# Patient Record
Sex: FEMALE | Race: BLACK OR AFRICAN AMERICAN | URBAN - METROPOLITAN AREA
[De-identification: names, ages, dates, MRNs, and addresses within clinical notes are randomized per-mention and may not be internally consistent; named-entity substitution may affect disease eponyms.]

---

## 2017-01-19 DIAGNOSIS — F41.8 DEPRESSION WITH ANXIETY: ICD-10-CM

## 2017-01-19 RX ORDER — SERTRALINE HYDROCHLORIDE 50 MG/1
TABLET, FILM COATED ORAL
Qty: 30 TAB | Refills: 0 | Status: SHIPPED | OUTPATIENT
Start: 2017-01-19 | End: 2017-02-20 | Stop reason: SDUPTHER

## 2017-01-24 ENCOUNTER — OFFICE VISIT (OUTPATIENT)
Dept: FAMILY MEDICINE CLINIC | Age: 72
End: 2017-01-24

## 2017-01-24 VITALS
RESPIRATION RATE: 16 BRPM | WEIGHT: 216.8 LBS | OXYGEN SATURATION: 98 % | HEART RATE: 106 BPM | TEMPERATURE: 97.9 F | SYSTOLIC BLOOD PRESSURE: 156 MMHG | BODY MASS INDEX: 34.84 KG/M2 | DIASTOLIC BLOOD PRESSURE: 60 MMHG | HEIGHT: 66 IN

## 2017-01-24 DIAGNOSIS — R05.9 COUGH: ICD-10-CM

## 2017-01-24 DIAGNOSIS — J22 LOWER RESPIRATORY INFECTION: Primary | ICD-10-CM

## 2017-01-24 DIAGNOSIS — M94.0 COSTOCHONDRITIS: ICD-10-CM

## 2017-01-24 RX ORDER — AZITHROMYCIN 250 MG/1
TABLET, FILM COATED ORAL
Qty: 6 TAB | Refills: 0 | Status: SHIPPED | OUTPATIENT
Start: 2017-01-24 | End: 2017-01-29

## 2017-01-24 RX ORDER — PROMETHAZINE HYDROCHLORIDE AND CODEINE PHOSPHATE 6.25; 1 MG/5ML; MG/5ML
1 SOLUTION ORAL
Qty: 120 ML | Refills: 0 | Status: SHIPPED | OUTPATIENT
Start: 2017-01-24 | End: 2017-04-27

## 2017-01-24 NOTE — MR AVS SNAPSHOT
Visit Information Date & Time Provider Department Dept. Phone Encounter #  
 1/24/2017 12:40 PM Kaye Flynn, [facility] [id number] Follow-up Instructions Return in about 6 weeks (around 3/7/2017), or if symptoms worsen or fail to improve, for costochondritis. Upcoming Health Maintenance Date Due Hepatitis C Screening 1945 COLONOSCOPY 8/22/1964 DTaP/Tdap/Td series (1 - Tdap) 8/22/1967 ZOSTER VACCINE AGE 60> 8/22/2006 OSTEOPOROSIS SCREENING (DEXA) 8/22/2011 INFLUENZA AGE 9 TO ADULT 8/1/2016 GLAUCOMA SCREENING Q2Y 5/11/2017 MEDICARE YEARLY EXAM 8/25/2017 BREAST CANCER SCRN MAMMOGRAM 5/31/2018 Allergies as of 1/24/2017  Review Complete On: 1/24/2017 By: Helen Avalos LPN Severity Noted Reaction Type Reactions Iodine  04/19/2010    Hives, Other (comments) Feels like Chest caving in  
  
Current Immunizations  Reviewed on 8/24/2016 Name Date Influenza High Dose Vaccine PF 1/21/2016 Influenza Vaccine PF 11/7/2013 Pneumococcal Conjugate (PCV-13) 11/7/2013 Not reviewed this visit You Were Diagnosed With   
  
 Codes Comments Lower respiratory infection    -  Primary ICD-10-CM: Kristofer Cedrick ICD-9-CM: 519.8 Cough     ICD-10-CM: R05 ICD-9-CM: 786.2 Costochondritis     ICD-10-CM: M94.0 ICD-9-CM: 733.6 Vitals BP Pulse Temp Resp Height(growth percentile) Weight(growth percentile) 156/60 (BP 1 Location: Left arm, BP Patient Position: Sitting) (!) 106 97.9 °F (36.6 °C) (Oral) 16 5' 6\" (1.676 m) 216 lb 12.8 oz (98.3 kg) LMP SpO2 BMI OB Status Smoking Status 01/01/1990 98% 34.99 kg/m2 Postmenopausal Former Smoker BMI and BSA Data Body Mass Index Body Surface Area 34.99 kg/m 2 2.14 m 2 Preferred Pharmacy Pharmacy Name Phone  [facility], [address] [facility] [address] AT [facility] [address] [phone] Your Updated Medication List  
  
   
This list is accurate as of: 1/24/17  1:22 PM.  Always use your most recent med list.  
  
  
  
  
 amitriptyline 25 mg tablet Commonly known as:  ELAVIL  
  
 ARMOUR THYROID 180 mg Tab Generic drug:  Thyroid (Pork) Take  by mouth. azithromycin 250 mg tablet Commonly known as:  Idelia Fines Take 2 tablets today, then take 1 tablet daily  
  
 biotin 2,500 mcg Tab Take  by mouth. butalbital-acetaminophen-caffeine -40 mg per tablet Commonly known as:  Mary Beth Stapleton Take 1 Tab by mouth every six (6) hours as needed for Pain or Headache. Max Daily Amount: 4 Tabs. CITRACAL PO Take 1 Tab by mouth daily. FLAXSEED PO Take 1 Tab by mouth daily. gabapentin 300 mg capsule Commonly known as:  NEURONTIN Take 300 mg by mouth daily. hydroCHLOROthiazide 25 mg tablet Commonly known as:  HYDRODIURIL  
TAKE 1 TABLET BY MOUTH DAILY MULTI-VITAMIN PO Take 1 Tab by mouth daily. naproxen 500 mg tablet Commonly known as:  NAPROSYN Take 1 Tab by mouth two (2) times daily (with meals). promethazine-codeine 6.25-10 mg/5 mL syrup Commonly known as:  PHENERGAN with CODEINE Take 5 mL by mouth every six (6) hours as needed for Cough. Max Daily Amount: 20 mL. Indications: COUGH, Nasal Congestion  
  
 sertraline 50 mg tablet Commonly known as:  ZOLOFT  
TAKE 1 TABLET BY MOUTH DAILY. INDICATIONS: ANXIETY WITH DEPRESSION. triamcinolone 55 mcg nasal inhaler Commonly known as:  NASACORT AQ  
2 Sprays daily. VITAMIN D 2,000 unit Cap capsule Generic drug:  Cholecalciferol (Vitamin D3) Take  by mouth. Prescriptions Printed Refills  
 promethazine-codeine (PHENERGAN WITH CODEINE) 6.25-10 mg/5 mL syrup 0 Sig: Take 5 mL by mouth every six (6) hours as needed for Cough. Max Daily Amount: 20 mL. Indications: COUGH, Nasal Congestion Class: Print Route: Oral  
  
Prescriptions Sent to Pharmacy Refills  
 azithromycin (ZITHROMAX) 250 mg tablet 0 Sig: Take 2 tablets today, then take 1 tablet daily Class: Normal  
 Pharmacy: [id number] [address], [place] [address]  #: [phone] Follow-up Instructions Return in about 6 weeks (around 3/7/2017), or if symptoms worsen or fail to improve, for costochondritis. Patient Instructions Costochondritis: Care Instructions Your Care Instructions You have chest pain because the cartilage of your rib cage is inflamed. This problem is called costochondritis. This type of chest wall pain may last from days to weeks. It is not a heart problem. Sometimes costochondritis occurs with a cold or the flu, and other times the exact cause is not known. Follow-up care is a key part of your treatment and safety. Be sure to make and go to all appointments, and call your doctor if you are having problems. Its also a good idea to know your test results and keep a list of the medicines you take. How can you care for yourself at home? · Take medicines for pain and inflammation exactly as directed. ¨ If the doctor gave you a prescription medicine, take it as prescribed. ¨ If you are not taking a prescription pain medicine, ask your doctor if you can take an over-the-counter medicine. ¨ Do not take two or more pain medicines at the same time unless the doctor told you to. Many pain medicines have acetaminophen, which is Tylenol. Too much acetaminophen (Tylenol) can be harmful. · It may help to use a warm compress or heating pad (set on low) on your chest. You can also try alternating heat and ice. Put ice or a cold pack on the area for 10 to 20 minutes at a time. Put a thin cloth between the ice and your skin. · Avoid any activity that strains the chest area.  As your pain gets better, you can slowly return to your normal activities. · Do not use tape, an elastic bandage, a \"rib belt,\" or anything else that restricts your chest wall motion. When should you call for help? Call 911 anytime you think you may need emergency care. For example, call if: 
· You have new or different chest pain or pressure. This may occur with: ¨ Sweating. ¨ Shortness of breath. ¨ Nausea or vomiting. ¨ Pain that spreads from the chest to the neck, jaw, or one or both shoulders or arms. ¨ Dizziness or lightheadedness. ¨ A fast or uneven pulse. After calling 911, chew 1 adult-strength aspirin. Wait for an ambulance. Do not try to drive yourself. · You have severe trouble breathing. Call your doctor now or seek immediate medical care if: 
· You have a fever or cough. · You have any trouble breathing. · Your chest pain gets worse. Watch closely for changes in your health, and be sure to contact your doctor if: 
· Your chest pain continues even though you are taking anti-inflammatory medicine. · Your chest wall pain has not improved after 5 to 7 days. Where can you learn more? Go to http://alvin-rogelio.info/. Enter A429 in the search box to learn more about \"Costochondritis: Care Instructions. \" Current as of: May 27, 2016 Content Version: 11.1 © 1834-8334 [employer]. Care instructions adapted under license by [employer] (which disclaims liability or warranty for this information). If you have questions about a medical condition or this instruction, always ask your healthcare professional. [place] [address] any warranty or liability for your use of this information. Cough: Care Instructions Your Care Instructions A cough is your body's response to something that bothers your throat or airways. Many things can cause a cough.  You might cough because of a cold or the flu, bronchitis, or asthma. Smoking, postnasal drip, allergies, and stomach acid that backs up into your throat also can cause coughs. A cough is a symptom, not a disease. Most coughs stop when the cause, such as a cold, goes away. You can take a few steps at home to cough less and feel better. Follow-up care is a key part of your treatment and safety. Be sure to make and go to all appointments, and call your doctor if you are having problems. It's also a good idea to know your test results and keep a list of the medicines you take. How can you care for yourself at home? · Drink lots of water and other fluids. This helps thin the mucus and soothes a dry or sore throat. Honey or lemon juice in hot water or tea may ease a dry cough. · Take cough medicine as directed by your doctor. · Prop up your head on pillows to help you breathe and ease a dry cough. · Try cough drops to soothe a dry or sore throat. Cough drops don't stop a cough. Medicine-flavored cough drops are no better than candy-flavored drops or hard candy. · Do not smoke. Avoid secondhand smoke. If you need help quitting, talk to your doctor about stop-smoking programs and medicines. These can increase your chances of quitting for good. When should you call for help? Call 911 anytime you think you may need emergency care. For example, call if: 
· You have severe trouble breathing. Call your doctor now or seek immediate medical care if: 
· You cough up blood. · You have new or worse trouble breathing. · You have a new or higher fever. · You have a new rash. Watch closely for changes in your health, and be sure to contact your doctor if: 
· You cough more deeply or more often, especially if you notice more mucus or a change in the color of your mucus. · You have new symptoms, such as a sore throat, an earache, or sinus pain. · You do not get better as expected. Where can you learn more? Go to http://alvin-rogelio.info/. Enter D279 in the search box to learn more about \"Cough: Care Instructions. \" Current as of: May 27, 2016 Content Version: 11.1 © 4081-0497 [employer]. Care instructions adapted under license by [employer] (which disclaims liability or warranty for this information). If you have questions about a medical condition or this instruction, always ask your healthcare professional. [address] any warranty or liability for your use of this information. Introducing [facility]! Dear Chandrakant Pacheco: Thank you for requesting a [facility] account. Our records indicate that you already have an active [facility] account. You can access your account anytime at [email or web address] Did you know that you can access your hospital and ER discharge instructions at any time in [facility]? You can also review all of your test results from your hospital stay or ER visit. Additional Information If you have questions, please visit the Frequently Asked Questions section of the [facility] website at [email or web address] Remember, [facility] is NOT to be used for urgent needs. For medical emergencies, dial 911. Now available from your iPhone and Android! Please provide this summary of care documentation to your next provider. Your primary care clinician is listed as Denis Barraza. If you have any questions after today's visit, please call [phone].

## 2017-01-24 NOTE — PROGRESS NOTES
1. Have you been to the ER, urgent care clinic since your last visit? Hospitalized since your last visit? No    2. Have you seen or consulted any other health care providers outside of the [address] [place] [address] since your last visit? Include any pap smears or colon screening. No    3. Would you like to have a Flu Shot Today?  No already had one

## 2017-01-24 NOTE — LETTER
1/24/2017 1:19 PM 
 
Ms. Josie Montalvo [address] [place] [phone] This letter is written on behalf of aforementioned patient. She is currently treated for costochondritis please suspend her membership dues for [employer] until complete resolution. Sincerely, Eros Prado NP

## 2017-01-24 NOTE — PATIENT INSTRUCTIONS
Costochondritis: Care Instructions  Your Care Instructions  You have chest pain because the cartilage of your rib cage is inflamed. This problem is called costochondritis. This type of chest wall pain may last from days to weeks. It is not a heart problem. Sometimes costochondritis occurs with a cold or the flu, and other times the exact cause is not known. Follow-up care is a key part of your treatment and safety. Be sure to make and go to all appointments, and call your doctor if you are having problems. Its also a good idea to know your test results and keep a list of the medicines you take. How can you care for yourself at home? · Take medicines for pain and inflammation exactly as directed. ¨ If the doctor gave you a prescription medicine, take it as prescribed. ¨ If you are not taking a prescription pain medicine, ask your doctor if you can take an over-the-counter medicine. ¨ Do not take two or more pain medicines at the same time unless the doctor told you to. Many pain medicines have acetaminophen, which is Tylenol. Too much acetaminophen (Tylenol) can be harmful. · It may help to use a warm compress or heating pad (set on low) on your chest. You can also try alternating heat and ice. Put ice or a cold pack on the area for 10 to 20 minutes at a time. Put a thin cloth between the ice and your skin. · Avoid any activity that strains the chest area. As your pain gets better, you can slowly return to your normal activities. · Do not use tape, an elastic bandage, a \"rib belt,\" or anything else that restricts your chest wall motion. When should you call for help? Call 911 anytime you think you may need emergency care. For example, call if:  · You have new or different chest pain or pressure. This may occur with:  ¨ Sweating. ¨ Shortness of breath. ¨ Nausea or vomiting. ¨ Pain that spreads from the chest to the neck, jaw, or one or both shoulders or arms. ¨ Dizziness or lightheadedness.   ¨ A fast or uneven pulse. After calling 911, chew 1 adult-strength aspirin. Wait for an ambulance. Do not try to drive yourself. · You have severe trouble breathing. Call your doctor now or seek immediate medical care if:  · You have a fever or cough. · You have any trouble breathing. · Your chest pain gets worse. Watch closely for changes in your health, and be sure to contact your doctor if:  · Your chest pain continues even though you are taking anti-inflammatory medicine. · Your chest wall pain has not improved after 5 to 7 days. Where can you learn more? Go to http://alvin-rogelio.info/. Enter A550 in the search box to learn more about \"Costochondritis: Care Instructions. \"  Current as of: May 27, 2016  Content Version: 11.1  © 5092-6696 [employer]. Care instructions adapted under license by [employer] (which disclaims liability or warranty for this information). If you have questions about a medical condition or this instruction, always ask your healthcare professional. [place] [address] any warranty or liability for your use of this information. Cough: Care Instructions  Your Care Instructions  A cough is your body's response to something that bothers your throat or airways. Many things can cause a cough. You might cough because of a cold or the flu, bronchitis, or asthma. Smoking, postnasal drip, allergies, and stomach acid that backs up into your throat also can cause coughs. A cough is a symptom, not a disease. Most coughs stop when the cause, such as a cold, goes away. You can take a few steps at home to cough less and feel better. Follow-up care is a key part of your treatment and safety. Be sure to make and go to all appointments, and call your doctor if you are having problems. It's also a good idea to know your test results and keep a list of the medicines you take. How can you care for yourself at home?   · Drink lots of water and other fluids. This helps thin the mucus and soothes a dry or sore throat. Honey or lemon juice in hot water or tea may ease a dry cough. · Take cough medicine as directed by your doctor. · Prop up your head on pillows to help you breathe and ease a dry cough. · Try cough drops to soothe a dry or sore throat. Cough drops don't stop a cough. Medicine-flavored cough drops are no better than candy-flavored drops or hard candy. · Do not smoke. Avoid secondhand smoke. If you need help quitting, talk to your doctor about stop-smoking programs and medicines. These can increase your chances of quitting for good. When should you call for help? Call 911 anytime you think you may need emergency care. For example, call if:  · You have severe trouble breathing. Call your doctor now or seek immediate medical care if:  · You cough up blood. · You have new or worse trouble breathing. · You have a new or higher fever. · You have a new rash. Watch closely for changes in your health, and be sure to contact your doctor if:  · You cough more deeply or more often, especially if you notice more mucus or a change in the color of your mucus. · You have new symptoms, such as a sore throat, an earache, or sinus pain. · You do not get better as expected. Where can you learn more? Go to http://alvin-rogelio.info/. Enter D279 in the search box to learn more about \"Cough: Care Instructions. \"  Current as of: May 27, 2016  Content Version: 11.1  © 8621-1059 [employer]. Care instructions adapted under license by [employer] (which disclaims liability or warranty for this information). If you have questions about a medical condition or this instruction, always ask your healthcare professional. [address] any warranty or liability for your use of this information.

## 2017-01-24 NOTE — PROGRESS NOTES
Subjective:   Wally Reid is a 80 y.o. female follow up costochondritis mainly on left anterior lateral chest wall. She reports that she has been coughing and has had a cold since beginning of January 2017. She has been using delsym and vaporizer but her symptoms are not improving. Complaining of head and chest congestion, fatigue, productive cough with brown sputum and also had diarrhea several days ago but has resolved. ROS: denies fever,chills, denies dyspnea, nausea or vomiting, denies diarrhea, +left lateral chest wall pain with coughing. New concern: needs letter outlining costochondritis and her membership to local exercise facility  PMH: reviewed medications and allergy lists and medical and family history. OBJECTIVE:  Awake and alert in no acute distress  HEENT: nares patent with erythema, boggy, clear secretions bilaterally. TMs retracted bilaterally, pharynx without erythema, neck supple with shotty lymphadenopathy. No tenderness to palpation over sinus passages bilaterally  Lungs with scattered rales anterior and posterior,  no rhonchi no wheezing no labored respirations  S1 S2 RRR without ectopy or murmur auscultated. Extremities: no clubbing, cyanosis, peripheral edema  Integumentary: no rashes  Normal skin turgor  Visit Vitals    /60 (BP 1 Location: Left arm, BP Patient Position: Sitting)    Pulse (!) 106    Temp 97.9 °F (36.6 °C) (Oral)    Resp 16    Ht 5' 6\" (1.676 m)    Wt 216 lb 12.8 oz (98.3 kg)    SpO2 98%    BMI 34.99 kg/m2     Mickie Tamayo was seen today for follow-up and cold symptoms. Diagnoses and all orders for this visit:    Lower respiratory infection  -     azithromycin (ZITHROMAX) 250 mg tablet; Take 2 tablets today, then take 1 tablet daily    Cough  -     promethazine-codeine (PHENERGAN WITH CODEINE) 6.25-10 mg/5 mL syrup; Take 5 mL by mouth every six (6) hours as needed for Cough. Max Daily Amount: 20 mL.  Indications: COUGH, Nasal Congestion    Costochondritis      General comfort measures  Reviewed risks and benefits and common side effects of new medication  Letter   I have discussed the diagnosis with the patient and the intended plan as seen in the above orders. The patient has received an after-visit summary and questions were answered concerning future plans. I have discussed medication side effects and warnings with the patient as well. Follow-up Disposition:  Return in about 6 weeks (around 3/7/2017), or if symptoms worsen or fail to improve, for costochondritis. Will discuss BMI at follow up visit.

## 2017-02-03 DIAGNOSIS — M94.0 COSTOCHONDRITIS: ICD-10-CM

## 2017-02-03 NOTE — TELEPHONE ENCOUNTER
Pt stated that she bent down today and felt that pain in her chest today can she get refill on this medication for her chest please advise [phone]

## 2017-02-06 RX ORDER — NAPROXEN 500 MG/1
500 TABLET ORAL 2 TIMES DAILY WITH MEALS
Qty: 60 TAB | Refills: 0 | Status: SHIPPED | OUTPATIENT
Start: 2017-02-06 | End: 2017-03-21 | Stop reason: SDUPTHER

## 2017-02-06 NOTE — TELEPHONE ENCOUNTER
Spoke with patient today. Denies any chest pain today. No dyspnea or heart palpitations noted. Does comment she has cough lingering from lower respiratory infection. States she just inquired about receiving a refill on Naprosyn for her costochondritis.

## 2017-02-20 DIAGNOSIS — F41.8 DEPRESSION WITH ANXIETY: ICD-10-CM

## 2017-02-20 RX ORDER — SERTRALINE HYDROCHLORIDE 50 MG/1
TABLET, FILM COATED ORAL
Qty: 30 TAB | Refills: 1 | Status: SHIPPED | OUTPATIENT
Start: 2017-02-20 | End: 2017-08-24

## 2017-03-20 DIAGNOSIS — I10 ESSENTIAL HYPERTENSION: ICD-10-CM

## 2017-03-20 RX ORDER — HYDROCHLOROTHIAZIDE 25 MG/1
TABLET ORAL
Qty: 90 TAB | Refills: 0 | Status: SHIPPED | OUTPATIENT
Start: 2017-03-20 | End: 2017-06-17 | Stop reason: SDUPTHER

## 2017-03-21 ENCOUNTER — HOSPITAL ENCOUNTER (OUTPATIENT)
Dept: LAB | Age: 72
Discharge: HOME | End: 2017-03-21
Payer: MEDICARE

## 2017-03-21 ENCOUNTER — OFFICE VISIT (OUTPATIENT)
Dept: FAMILY MEDICINE CLINIC | Age: 72
End: 2017-03-21

## 2017-03-21 VITALS
SYSTOLIC BLOOD PRESSURE: 144 MMHG | BODY MASS INDEX: 35.36 KG/M2 | HEART RATE: 104 BPM | DIASTOLIC BLOOD PRESSURE: 72 MMHG | RESPIRATION RATE: 16 BRPM | TEMPERATURE: 98 F | OXYGEN SATURATION: 98 % | WEIGHT: 220 LBS | HEIGHT: 66 IN

## 2017-03-21 DIAGNOSIS — Z23 ENCOUNTER FOR IMMUNIZATION: ICD-10-CM

## 2017-03-21 DIAGNOSIS — M25.511 ACUTE PAIN OF RIGHT SHOULDER: Primary | ICD-10-CM

## 2017-03-21 DIAGNOSIS — Z11.59 NEED FOR HEPATITIS C SCREENING TEST: ICD-10-CM

## 2017-03-21 DIAGNOSIS — Z12.11 COLON CANCER SCREENING: ICD-10-CM

## 2017-03-21 DIAGNOSIS — Z78.0 POSTMENOPAUSAL: ICD-10-CM

## 2017-03-21 PROCEDURE — 86803 HEPATITIS C AB TEST: CPT | Performed by: NURSE PRACTITIONER

## 2017-03-21 PROCEDURE — 36415 COLL VENOUS BLD VENIPUNCTURE: CPT | Performed by: NURSE PRACTITIONER

## 2017-03-21 RX ORDER — NAPROXEN 500 MG/1
500 TABLET ORAL 2 TIMES DAILY WITH MEALS
Qty: 60 TAB | Refills: 0 | Status: SHIPPED | OUTPATIENT
Start: 2017-03-21 | End: 2017-04-23 | Stop reason: SDUPTHER

## 2017-03-21 RX ORDER — ACETAMINOPHEN 500 MG
TABLET ORAL
COMMUNITY
End: 2021-01-01

## 2017-03-21 RX ORDER — LANOLIN ALCOHOL/MO/W.PET/CERES
1000 CREAM (GRAM) TOPICAL DAILY
COMMUNITY
End: 2018-01-25 | Stop reason: ALTCHOICE

## 2017-03-21 NOTE — PROGRESS NOTES
1. Have you been to the ER, urgent care clinic since your last visit? Hospitalized since your last visit? NO    2. Have you seen or consulted any other health care providers outside of the [address] [place] [address] since your last visit? Include any pap smears or colon screening. NO    3. Would you like to have a Flu Shot Today? Already had    4. Vaccines?  Discuss Shingles    Rx updated

## 2017-03-21 NOTE — PATIENT INSTRUCTIONS
Shoulder Pain: Care Instructions  Your Care Instructions    You can hurt your shoulder by using it too much during an activity, such as fishing or baseball. It can also happen as part of the everyday wear and tear of getting older. Shoulder injuries can be slow to heal, but your shoulder should get better with time. Your doctor may recommend a sling to rest your shoulder. If you have injured your shoulder, you may need testing and treatment. Follow-up care is a key part of your treatment and safety. Be sure to make and go to all appointments, and call your doctor if you are having problems. It's also a good idea to know your test results and keep a list of the medicines you take. How can you care for yourself at home? · Take pain medicines exactly as directed. ¨ If the doctor gave you a prescription medicine for pain, take it as prescribed. ¨ If you are not taking a prescription pain medicine, ask your doctor if you can take an over-the-counter medicine. ¨ Do not take two or more pain medicines at the same time unless the doctor told you to. Many pain medicines contain acetaminophen, which is Tylenol. Too much acetaminophen (Tylenol) can be harmful. · If your doctor recommends that you wear a sling, use it as directed. Do not take it off before your doctor tells you to. · Put ice or a cold pack on the sore area for 10 to 20 minutes at a time. Put a thin cloth between the ice and your skin. · If there is no swelling, you can put moist heat, a heating pad, or a warm cloth on your shoulder. Some doctors suggest alternating between hot and cold. · Rest your shoulder for a few days. If your doctor recommends it, you can then begin gentle exercise of the shoulder, but do not lift anything heavy. When should you call for help? Call 911 anytime you think you may need emergency care. For example, call if:  · You have chest pain or pressure. This may occur with:  ¨ Sweating. ¨ Shortness of breath.   ¨ Nausea or vomiting. ¨ Pain that spreads from the chest to the neck, jaw, or one or both shoulders or arms. ¨ Dizziness or lightheadedness. ¨ A fast or uneven pulse. After calling 911, chew 1 adult-strength aspirin. Wait for an ambulance. Do not try to drive yourself. · Your arm or hand is cool or pale or changes color. Call your doctor now or seek immediate medical care if:  · You have signs of infection, such as:  ¨ Increased pain, swelling, warmth, or redness in your shoulder. ¨ Red streaks leading from a place on your shoulder. ¨ Pus draining from an area of your shoulder. ¨ Swollen lymph nodes in your neck, armpits, or groin. ¨ A fever. Watch closely for changes in your health, and be sure to contact your doctor if:  · You cannot use your shoulder. · Your shoulder does not get better as expected. Where can you learn more? Go to http://[employer] Solarrogelio.info/. Enter R527 in the search box to learn more about \"Shoulder Pain: Care Instructions. \"  Current as of: May 23, 2016  Content Version: 11.1  © 4086-7485 [email or web address]. Care instructions adapted under license by [employer] (which disclaims liability or warranty for this information). If you have questions about a medical condition or this instruction, always ask your healthcare professional. [place] [address] any warranty or liability for your use of this information. Shoulder Stretches: Exercises  Your Care Instructions  Here are some examples of exercises for your shoulder. Start each exercise slowly. Ease off the exercise if you start to have pain. Your doctor or physical therapist will tell you when you can start these exercises and which ones will work best for you. How to do the exercises  Note: These exercises should cause you to feel a gentle stretch, but no pain. Shoulder stretch    1.  a doorway and place one arm against the door frame.  Your elbow should be a little higher than your shoulder. 2. Relax your shoulders as you lean forward, allowing your chest and shoulder muscles to stretch. You can also turn your body slightly away from your arm to stretch the muscles even more. 3. Hold for 15 to 30 seconds. 4. Repeat 2 to 4 times with each arm. Shoulder and chest stretch    Shoulder and chest stretch  1. While sitting, relax your upper body so you slump slightly in your chair. 2. As you breathe in, straighten your back and open your arms out to the sides. 3. Gently pull your shoulder blades back and downward. 4. Hold for 15 to 30 seconds as your breathe normally. 5. Repeat 2 to 4 times. Overhead stretch    1. Reach up over your head with both arms. 2. Hold for 15 to 30 seconds. 3. Repeat 2 to 4 times. Follow-up care is a key part of your treatment and safety. Be sure to make and go to all appointments, and call your doctor if you are having problems. It's also a good idea to know your test results and keep a list of the medicines you take. Where can you learn more? Go to http://alvin-rogelio.info/. Enter S254 in the search box to learn more about \"Shoulder Stretches: Exercises. \"  Current as of: May 23, 2016  Content Version: 11.1  © 3204-4040 [employer]. Care instructions adapted under license by [employer] (which disclaims liability or warranty for this information). If you have questions about a medical condition or this instruction, always ask your healthcare professional. [place] [address] any warranty or liability for your use of this information. Naproxen (By mouth)   Naproxen (na-PROX-en)  Treats fever and pain. This medicine is an NSAID.    Brand Name(s):Aleve, Aleve Arthritis, All Day Pain Relief, All Day Relief, Anaprox, Anaprox DS, EC Naprosyn, Flanax Pain Relief Kit, [facility] All Day Pain Relief, [employer] [facility] All Day Pain Relief, [facility] All Day Pain Relief, Mediproxen, Naprelan, NaproPak, NaproPax   There may be other brand names for this medicine. When This Medicine Should Not Be Used: This medicine is not right for everyone. Do not use it if you had an allergic reaction (including asthma) to naproxen, aspirin, or other NSAID medicine. How to Use This Medicine:   Liquid, Liquid Filled Capsule, Tablet, Coated Tablet, Long Acting Tablet  · Your doctor will tell you how much medicine to use. Do not use more than directed. · Prescription-strength naproxen: This medicine should come with a Medication Guide. Ask your pharmacist for a copy if you do not have one. · Follow the instructions on the medicine label if you are using this medicine without a prescription. · Take this medicine with food or milk so it does not upset your stomach. Drink a full glass of water after each dose. · Delayed-release tablet:  Swallow whole. Do not crush, break, or chew it. · Oral liquid:  Shake well just before you measure the dose. Measure the oral liquid medicine with a marked measuring spoon, oral syringe, or medicine cup. · Missed dose: Take a dose as soon as you remember. If it is almost time for your next dose, wait until then and take a regular dose. Do not take extra medicine to make up for a missed dose. · Store the medicine in a closed container at room temperature, away from heat, moisture, and direct light. Oral liquid:  Do not freeze. Drugs and Foods to Avoid:   Ask your doctor or pharmacist before using any other medicine, including over-the-counter medicines, vitamins, and herbal products. · Do not use any other NSAID medicine unless your doctor says it is okay. Some other NSAIDs are aspirin, diclofenac, ibuprofen, or celecoxib. · Some medicines and foods can affect how naproxen works.  Tell your doctor if you are using any of the following:  ¨ Blood thinner (such as warfarin)  ¨ Steroid medicine (such as hydrocortisone, methylprednisolone, prednisone, prednisolone, dexamethasone)  ¨ Blood pressure medicine  ¨ Lithium  ¨ Methotrexate  ¨ Probenecid  · Ask your doctor before you use an antacid or stomach medicine. Warnings While Using This Medicine:   · Tell your doctor if you are pregnant or breastfeeding. Do not use this medicine during the later part of pregnancy, unless your doctor tells you to. · Tell your doctor if you have kidney disease, liver disease, asthma, high blood pressure, congestive heart failure (CHF), other heart or blood problems, or a history of ulcers or digestion problems. Tell your doctor if you smoke or drink alcohol. · This medicine may cause the following problems:  ¨ Bleeding and ulcers in the stomach or intestines  ¨ Higher risk of heart attack or stroke  ¨ Liver damage  ¨ Kidney damage  · Call your doctor if symptoms get worse, pain lasts more than 10 days, or fever lasts more than 3 days. · Tell any doctor or dentist who treats that you are using this medicine, especially if you have surgery or a procedure. · This medicine may make you dizzy or drowsy. Do not drive or do anything else that could be dangerous if you are not alert. · Tell any doctor or dentist who treats you that you are using this medicine. This medicine may affect certain medical test results. · Keep all medicine out of the reach of children. Never share your medicine with anyone.   Possible Side Effects While Using This Medicine:   Call your doctor right away if you notice any of these side effects:  · Allergic reaction: Itching or hives, swelling in your face or hands, swelling or tingling in your mouth or throat, chest tightness, trouble breathing  · Blistering, peeling, red skin rash  · Change in how much or how often you urinate  · Chest pain, trouble breathing, weakness on one side of your body, severe headache, trouble seeing or talking, pain in your lower leg  · Chest pain that may spread, trouble breathing, nausea, unusual sweating, fainting  · Dark urine or pale stools, nausea, vomiting, loss of appetite, stomach pain, yellow skin or eyes  · Severe stomach pain, vomiting blood, bloody or black, tarry stools  · Swelling in your hands, ankles, or feet, rapid weight gain  · Unusual bleeding, bruising, or weakness  · Vision changes  If you notice these less serious side effects, talk with your doctor:   · Mild nausea, diarrhea, or constipation  · Ringing in your ears, dizziness, headache  If you notice other side effects that you think are caused by this medicine, tell your doctor. Call your doctor for medical advice about side effects. You may report side effects to FDA at 3-908-FDA-4292  © 2016 [address] is for End User's use only and may not be sold, redistributed or otherwise used for commercial purposes. The above information is an  only. It is not intended as medical advice for individual conditions or treatments. Talk to your doctor, nurse or pharmacist before following any medical regimen to see if it is safe and effective for you.

## 2017-03-21 NOTE — MR AVS SNAPSHOT
Visit Information Date & Time Provider Department Dept. Phone Encounter #  
 3/21/2017  3:40 PM Pro Andria, [facility] [id number] Follow-up Instructions Return in about 4 weeks (around 4/18/2017) for right shoulder pain. Upcoming Health Maintenance Date Due COLONOSCOPY 8/22/1964 DTaP/Tdap/Td series (1 - Tdap) 8/22/1967 ZOSTER VACCINE AGE 60> 8/22/2006 OSTEOPOROSIS SCREENING (DEXA) 8/22/2011 GLAUCOMA SCREENING Q2Y 5/11/2017 MEDICARE YEARLY EXAM 8/25/2017 BREAST CANCER SCRN MAMMOGRAM 5/31/2018 Allergies as of 3/21/2017  Review Complete On: 3/21/2017 By: Rylan Bains LPN Severity Noted Reaction Type Reactions Iodine  04/19/2010    Hives, Other (comments) Feels like Chest caving in  
  
Current Immunizations  Reviewed on 3/21/2017 Name Date Influenza High Dose Vaccine PF 1/21/2016 Influenza Vaccine PF 11/7/2013 Pneumococcal Conjugate (PCV-13) 11/7/2013 Reviewed by Pro Ayers NP on 3/21/2017 at  3:58 PM  
You Were Diagnosed With   
  
 Codes Comments Acute pain of right shoulder    -  Primary ICD-10-CM: M25.511 ICD-9-CM: 719.41 Need for hepatitis C screening test     ICD-10-CM: Z11.59 
ICD-9-CM: V73.89 Encounter for immunization     ICD-10-CM: J92 ICD-9-CM: V03.89 Postmenopausal     ICD-10-CM: Z78.0 ICD-9-CM: V49.81 Vitals BP Pulse Temp Resp Height(growth percentile) Weight(growth percentile) 144/72 (BP 1 Location: Left arm, BP Patient Position: Sitting) (!) 104 98 °F (36.7 °C) (Oral) 16 5' 6\" (1.676 m) 220 lb (99.8 kg) LMP SpO2 BMI OB Status Smoking Status 01/01/1990 98% 35.51 kg/m2 Postmenopausal Former Smoker Vitals History BMI and BSA Data Body Mass Index Body Surface Area 35.51 kg/m 2 2.16 m 2 Preferred Pharmacy Pharmacy Name Phone [facility] [address] [facility] [phone] Your Updated Medication List  
  
   
This list is accurate as of: 3/21/17  4:06 PM.  Always use your most recent med list.  
  
  
  
  
 amitriptyline 25 mg tablet Commonly known as:  ELAVIL  
  
 ARMOUR THYROID 180 mg Tab Generic drug:  Thyroid (Pork) Take  by mouth.  
  
 biotin 2,500 mcg Tab Take  by mouth. butalbital-acetaminophen-caffeine -40 mg per tablet Commonly known as:  Desiree Halls Take 1 Tab by mouth every six (6) hours as needed for Pain or Headache. Max Daily Amount: 4 Tabs. CITRACAL PO Take 1 Tab by mouth daily. FLAXSEED PO Take 1 Tab by mouth daily. gabapentin 300 mg capsule Commonly known as:  NEURONTIN Take 300 mg by mouth daily. hydroCHLOROthiazide 25 mg tablet Commonly known as:  HYDRODIURIL  
TAKE 1 TABLET BY MOUTH DAILY MULTI-VITAMIN PO Take 1 Tab by mouth daily. naproxen 500 mg tablet Commonly known as:  NAPROSYN Take 1 Tab by mouth two (2) times daily (with meals). promethazine-codeine 6.25-10 mg/5 mL syrup Commonly known as:  PHENERGAN with CODEINE Take 5 mL by mouth every six (6) hours as needed for Cough. Max Daily Amount: 20 mL. Indications: COUGH, Nasal Congestion  
  
 sertraline 50 mg tablet Commonly known as:  ZOLOFT  
TAKE 1 TABLET BY MOUTH DAILY. INDICATIONS: ANXIETY WITH DEPRESSION. triamcinolone 55 mcg nasal inhaler Commonly known as:  NASACORT AQ  
2 Sprays daily. varicella zoster vacine live 19,400 unit/0.65 mL Susr injection Commonly known as:  ZOSTAVAX  
1 Vial by SubCUTAneous route once for 1 dose. VITAMIN B-12 1,000 mcg tablet Generic drug:  cyanocobalamin Take 1,000 mcg by mouth daily. * VITAMIN D 2,000 unit Cap capsule Generic drug:  Cholecalciferol (Vitamin D3) Take  by mouth. * Cholecalciferol (Vitamin D3) 2,000 unit Cap capsule Commonly known as:  VITAMIN D3 Take  by Mouth. * Notice: This list has 2 medication(s) that are the same as other medications prescribed for you. Read the directions carefully, and ask your doctor or other care provider to review them with you. Prescriptions Printed Refills  
 varicella zoster vacine live (ZOSTAVAX) 19,400 unit/0.65 mL susr injection 0 Si Vial by SubCUTAneous route once for 1 dose. Class: Print Route: SubCUTAneous Prescriptions Sent to Pharmacy Refills  
 naproxen (NAPROSYN) 500 mg tablet 0 Sig: Take 1 Tab by mouth two (2) times daily (with meals). Class: Normal  
 Pharmacy: [facility] [address] [facility]  #: [phone] Route: Oral  
  
Follow-up Instructions Return in about 4 weeks (around 2017) for right shoulder pain. To-Do List   
 2017 Lab:  HEPATITIS C AB   
  
 2017 Imaging:  DEXA BONE DENSITY STUDY AXIAL Patient Instructions Shoulder Pain: Care Instructions Your Care Instructions You can hurt your shoulder by using it too much during an activity, such as fishing or baseball. It can also happen as part of the everyday wear and tear of getting older. Shoulder injuries can be slow to heal, but your shoulder should get better with time. Your doctor may recommend a sling to rest your shoulder. If you have injured your shoulder, you may need testing and treatment. Follow-up care is a key part of your treatment and safety. Be sure to make and go to all appointments, and call your doctor if you are having problems. It's also a good idea to know your test results and keep a list of the medicines you take. How can you care for yourself at home? · Take pain medicines exactly as directed.  
¨ If the doctor gave you a prescription medicine for pain, take it as prescribed. ¨ If you are not taking a prescription pain medicine, ask your doctor if you can take an over-the-counter medicine. ¨ Do not take two or more pain medicines at the same time unless the doctor told you to. Many pain medicines contain acetaminophen, which is Tylenol. Too much acetaminophen (Tylenol) can be harmful. · If your doctor recommends that you wear a sling, use it as directed. Do not take it off before your doctor tells you to. · Put ice or a cold pack on the sore area for 10 to 20 minutes at a time. Put a thin cloth between the ice and your skin. · If there is no swelling, you can put moist heat, a heating pad, or a warm cloth on your shoulder. Some doctors suggest alternating between hot and cold. · Rest your shoulder for a few days. If your doctor recommends it, you can then begin gentle exercise of the shoulder, but do not lift anything heavy. When should you call for help? Call 911 anytime you think you may need emergency care. For example, call if: 
· You have chest pain or pressure. This may occur with: ¨ Sweating. ¨ Shortness of breath. ¨ Nausea or vomiting. ¨ Pain that spreads from the chest to the neck, jaw, or one or both shoulders or arms. ¨ Dizziness or lightheadedness. ¨ A fast or uneven pulse. After calling 911, chew 1 adult-strength aspirin. Wait for an ambulance. Do not try to drive yourself. · Your arm or hand is cool or pale or changes color. Call your doctor now or seek immediate medical care if: 
· You have signs of infection, such as: 
¨ Increased pain, swelling, warmth, or redness in your shoulder. ¨ Red streaks leading from a place on your shoulder. ¨ Pus draining from an area of your shoulder. ¨ Swollen lymph nodes in your neck, armpits, or groin. ¨ A fever. Watch closely for changes in your health, and be sure to contact your doctor if: 
· You cannot use your shoulder. · Your shoulder does not get better as expected. Where can you learn more? Go to http://alvin-rogelio.info/. Enter Y048 in the search box to learn more about \"Shoulder Pain: Care Instructions. \" Current as of: May 23, 2016 Content Version: 11.1 © 2115-3049 [employer]bs. Care instructions adapted under license by [employer] (which disclaims liability or warranty for this information). If you have questions about a medical condition or this instruction, always ask your healthcare professional. [address] any warranty or liability for your use of this information. Shoulder Stretches: Exercises Your Care Instructions Here are some examples of exercises for your shoulder. Start each exercise slowly. Ease off the exercise if you start to have pain. Your doctor or physical therapist will tell you when you can start these exercises and which ones will work best for you. How to do the exercises Note: These exercises should cause you to feel a gentle stretch, but no pain. Shoulder stretch 1.  a doorway and place one arm against the door frame. Your elbow should be a little higher than your shoulder. 2. Relax your shoulders as you lean forward, allowing your chest and shoulder muscles to stretch. You can also turn your body slightly away from your arm to stretch the muscles even more. 3. Hold for 15 to 30 seconds. 4. Repeat 2 to 4 times with each arm. Shoulder and chest stretch Shoulder and chest stretch 1. While sitting, relax your upper body so you slump slightly in your chair. 2. As you breathe in, straighten your back and open your arms out to the sides. 3. Gently pull your shoulder blades back and downward. 4. Hold for 15 to 30 seconds as your breathe normally. 5. Repeat 2 to 4 times. Overhead stretch 1. Reach up over your head with both arms. 2. Hold for 15 to 30 seconds. 3. Repeat 2 to 4 times. Follow-up care is a key part of your treatment and safety.  Be sure to make and go to all appointments, and call your doctor if you are having problems. It's also a good idea to know your test results and keep a list of the medicines you take. Where can you learn more? Go to http://alvin-rogelio.info/. Enter S254 in the search box to learn more about \"Shoulder Stretches: Exercises. \" Current as of: May 23, 2016 Content Version: 11.1 © 2006-2016 [employer]. Care instructions adapted under license by [employer] (which disclaims liability or warranty for this information). If you have questions about a medical condition or this instruction, always ask your healthcare professional. [address] any warranty or liability for your use of this information. Naproxen (By mouth) Naproxen (na-PROX-en) Treats fever and pain. This medicine is an NSAID. Brand Name(s):Aleve, Aleve Arthritis, All Day Pain Relief, All Day Relief, Anaprox, Anaprox DS, EC Naprosyn, Flanax Pain Relief Kit, [facility] All Day Pain Relief, Good Sense All Day Pain Relief, Leader All Day Pain Relief, Mediproxen, Naprelan, NaproPak, NaproPax There may be other brand names for this medicine. When This Medicine Should Not Be Used: This medicine is not right for everyone. Do not use it if you had an allergic reaction (including asthma) to naproxen, aspirin, or other NSAID medicine. How to Use This Medicine:  
Liquid, Liquid Filled Capsule, Tablet, Coated Tablet, Long Acting Tablet · Your doctor will tell you how much medicine to use. Do not use more than directed. · Prescription-strength naproxen: This medicine should come with a Medication Guide. Ask your pharmacist for a copy if you do not have one. · Follow the instructions on the medicine label if you are using this medicine without a prescription. · Take this medicine with food or milk so it does not upset your stomach. Drink a full glass of water after each dose. · Delayed-release tablet:  Swallow whole. Do not crush, break, or chew it. · Oral liquid:  Shake well just before you measure the dose. Measure the oral liquid medicine with a marked measuring spoon, oral syringe, or medicine cup. · Missed dose: Take a dose as soon as you remember. If it is almost time for your next dose, wait until then and take a regular dose. Do not take extra medicine to make up for a missed dose. · Store the medicine in a closed container at room temperature, away from heat, moisture, and direct light. Oral liquid:  Do not freeze. Drugs and Foods to Avoid: Ask your doctor or pharmacist before using any other medicine, including over-the-counter medicines, vitamins, and herbal products. · Do not use any other NSAID medicine unless your doctor says it is okay. Some other NSAIDs are aspirin, diclofenac, ibuprofen, or celecoxib. · Some medicines and foods can affect how naproxen works. Tell your doctor if you are using any of the following: ¨ Blood thinner (such as warfarin) ¨ Steroid medicine (such as hydrocortisone, methylprednisolone, prednisone, prednisolone, dexamethasone) ¨ Blood pressure medicine ¨ Lithium ¨ Methotrexate ¨ Probenecid · Ask your doctor before you use an antacid or stomach medicine. Warnings While Using This Medicine: · Tell your doctor if you are pregnant or breastfeeding. Do not use this medicine during the later part of pregnancy, unless your doctor tells you to. · Tell your doctor if you have kidney disease, liver disease, asthma, high blood pressure, congestive heart failure (CHF), other heart or blood problems, or a history of ulcers or digestion problems. Tell your doctor if you smoke or drink alcohol. · This medicine may cause the following problems: ¨ Bleeding and ulcers in the stomach or intestines ¨ Higher risk of heart attack or stroke ¨ Liver damage ¨ Kidney damage · Call your doctor if symptoms get worse, pain lasts more than 10 days, or fever lasts more than 3 days. · Tell any doctor or dentist who treats that you are using this medicine, especially if you have surgery or a procedure. · This medicine may make you dizzy or drowsy. Do not drive or do anything else that could be dangerous if you are not alert. · Tell any doctor or dentist who treats you that you are using this medicine. This medicine may affect certain medical test results. · Keep all medicine out of the reach of children. Never share your medicine with anyone. Possible Side Effects While Using This Medicine:  
Call your doctor right away if you notice any of these side effects: · Allergic reaction: Itching or hives, swelling in your face or hands, swelling or tingling in your mouth or throat, chest tightness, trouble breathing · Blistering, peeling, red skin rash · Change in how much or how often you urinate · Chest pain, trouble breathing, weakness on one side of your body, severe headache, trouble seeing or talking, pain in your lower leg · Chest pain that may spread, trouble breathing, nausea, unusual sweating, fainting · Dark urine or pale stools, nausea, vomiting, loss of appetite, stomach pain, yellow skin or eyes · Severe stomach pain, vomiting blood, bloody or black, tarry stools · Swelling in your hands, ankles, or feet, rapid weight gain · Unusual bleeding, bruising, or weakness · Vision changes If you notice these less serious side effects, talk with your doctor: · Mild nausea, diarrhea, or constipation · Ringing in your ears, dizziness, headache If you notice other side effects that you think are caused by this medicine, tell your doctor. Call your doctor for medical advice about side effects. You may report side effects to FDA at 5-208-FDA-8734 © 2016 [address] is for End User's use only and may not be sold, redistributed or otherwise used for commercial purposes. The above information is an  only. It is not intended as medical advice for individual conditions or treatments. Talk to your doctor, nurse or pharmacist before following any medical regimen to see if it is safe and effective for you. Introducing [facility]! Dear Penelope Riley: Thank you for requesting a [employer] account. Our records indicate that you already have an active [employer] account. You can access your account anytime at [email or web address] Did you know that you can access your hospital and ER discharge instructions at any time in [employer]? You can also review all of your test results from your hospital stay or ER visit. Additional Information If you have questions, please visit the Frequently Asked Questions section of the [employer] website at [email or web address] Remember, [employer] is NOT to be used for urgent needs. For medical emergencies, dial 911. Now available from your iPhone and Android! Please provide this summary of care documentation to your next provider. Your primary care clinician is listed as Neil Barraza. If you have any questions after today's visit, please call [phone].

## 2017-03-21 NOTE — PROGRESS NOTES
Subjective:   Mere Yeboah is a 80 y.o. female presents today follow up costochondritis. Denies pain in chest wall today but does complain of right shoulder pain. Aching pain in right shoulder generally. Pain severity 5/10. Has not taken any Naprosyn since her costochondritis pain has subsided. Patient reports that she has overused right shoulder trying to exercise over the past several weeks. ROS: denies numbness or tingling to RUE, denies decrease in motor strength and denies trauma. Health maintenance reviewed  PMH: reviewed medications and allergy lists and medical and family history. Wt Readings from Last 3 Encounters:   03/21/17 220 lb (99.8 kg)   01/24/17 216 lb 12.8 oz (98.3 kg)   12/19/16 212 lb 6.4 oz (96.3 kg)     BP Readings from Last 3 Encounters:   03/21/17 144/72   01/24/17 156/60   12/19/16 130/78     OBJECTIVE:  Awake and alert in no acute distress  Obese  Lungs clear throughout  S1 S2 RRR without ectopy or murmur auscultated. Musculoskeletal: TTP anterior aspect of right shoulder pain with abduction and able to abduct to 110 degree approximately  No warmth to palpation   No edema to palpation  Motor strength +5/5 RUE  Visit Vitals    /72 (BP 1 Location: Left arm, BP Patient Position: Sitting)    Pulse (!) 104    Temp 98 °F (36.7 °C) (Oral)    Resp 16    Ht 5' 6\" (1.676 m)    Wt 220 lb (99.8 kg)    SpO2 98%    BMI 35.51 kg/m2     So Thakur was seen today for follow-up and shoulder pain. Diagnoses and all orders for this visit:    Acute pain of right shoulder  -     naproxen (NAPROSYN) 500 mg tablet; Take 1 Tab by mouth two (2) times daily (with meals). Need for hepatitis C screening test  -     HEPATITIS C AB; Future    Encounter for immunization  -     varicella zoster vacine live (ZOSTAVAX) 19,400 unit/0.65 mL susr injection; 1 Vial by SubCUTAneous route once for 1 dose. Postmenopausal  -     Dexa Bone Density Study Axial ([id number]);  Future    Other orders  - Cancel: TETANUS, DIPHTHERIA TOXOIDS AND ACELLULAR PERTUSSIS VACCINE (TDAP), IN INDIVIDS. >=7, IM    Health maintenance reviewed  Reviewed risks and benefits and common side effects of new medication  General comfort measures  Patient verbalizes understanding. I have discussed the diagnosis with the patient and the intended plan as seen in the above orders. The patient has received an after-visit summary and questions were answered concerning future plans. I have discussed medication side effects and warnings with the patient as well. Follow-up Disposition:  Return in about 4 weeks (around 4/18/2017) for right shoulder pain.

## 2017-03-24 LAB
HCV AB SER IA-ACNC: 0.09 INDEX
HCV AB SERPL QL IA: NEGATIVE
HCV COMMENT,HCGAC: NORMAL

## 2017-04-05 ENCOUNTER — HOSPITAL ENCOUNTER (OUTPATIENT)
Dept: GENERAL RADIOLOGY | Age: 72
Discharge: HOME | End: 2017-04-05
Attending: NURSE PRACTITIONER
Payer: MEDICARE

## 2017-04-05 DIAGNOSIS — Z78.0 POSTMENOPAUSAL: ICD-10-CM

## 2017-04-05 PROCEDURE — 77080 DXA BONE DENSITY AXIAL: CPT

## 2017-04-07 ENCOUNTER — TELEPHONE (OUTPATIENT)
Dept: FAMILY MEDICINE CLINIC | Age: 72
End: 2017-04-07

## 2017-04-07 NOTE — TELEPHONE ENCOUNTER
Spoke to pt adv that she needs to make an appt to come in to discuss her bone density results. She says she will call back to make the appt.

## 2017-04-07 NOTE — TELEPHONE ENCOUNTER
----- Message from Chi Valencia NP sent at 4/6/2017  3:55 PM EDT -----  Appointment for osteoporosis and discussion of Bone density results   [facility]

## 2017-04-07 NOTE — TELEPHONE ENCOUNTER
Pt called stating she is about to have a heart attack waiting on results of bone density can someone call her but nervous and don't want to wait until the 27th for results please advise [phone]

## 2017-04-07 NOTE — TELEPHONE ENCOUNTER
Patient returned call to the office. Advised patient that the provider would like her to follow up to discuss osteoporosis and bone density results. She verbalized understanding.

## 2017-04-23 DIAGNOSIS — M25.511 ACUTE PAIN OF RIGHT SHOULDER: ICD-10-CM

## 2017-04-24 RX ORDER — NAPROXEN 500 MG/1
TABLET ORAL
Qty: 60 TAB | Refills: 0 | Status: SHIPPED | OUTPATIENT
Start: 2017-04-24 | End: 2017-07-24 | Stop reason: SDUPTHER

## 2017-04-27 ENCOUNTER — OFFICE VISIT (OUTPATIENT)
Dept: FAMILY MEDICINE CLINIC | Age: 72
End: 2017-04-27

## 2017-04-27 VITALS
RESPIRATION RATE: 18 BRPM | SYSTOLIC BLOOD PRESSURE: 124 MMHG | BODY MASS INDEX: 35.2 KG/M2 | WEIGHT: 219 LBS | HEIGHT: 66 IN | HEART RATE: 100 BPM | TEMPERATURE: 97.8 F | OXYGEN SATURATION: 98 % | DIASTOLIC BLOOD PRESSURE: 80 MMHG

## 2017-04-27 DIAGNOSIS — M81.0 POST-MENOPAUSAL OSTEOPOROSIS: Primary | ICD-10-CM

## 2017-04-27 DIAGNOSIS — Z12.11 COLON CANCER SCREENING: ICD-10-CM

## 2017-04-27 RX ORDER — ALENDRONATE SODIUM 70 MG/1
70 TABLET ORAL
Qty: 4 TAB | Refills: 11 | Status: SHIPPED | OUTPATIENT
Start: 2017-04-27 | End: 2017-09-19 | Stop reason: SINTOL

## 2017-04-27 NOTE — PATIENT INSTRUCTIONS
Deciding About Bisphosphonate Medicine for Osteoporosis  What is osteoporosis? Osteoporosis is a disease that affects your bones. It means you have bones that are thin and brittle and have lots of holes inside them like a sponge. This makes them easy to break. It also increases your risk for spine and hip fractures. These fractures may make it hard for you to live on your own. Bisphosphonates are the most common medicines used to prevent bone loss. Most of the time, you take them as pills. They slow the way bone dissolves and is absorbed by your body. They can increase bone thickness and strength. What are key points about this decision? · If you have osteoporosis, these medicines can increase bone thickness. And they can lower your risk of spine and hip fractures. You may also want to think about taking them if you have osteopenia or risk factors for osteoporosis. · These medicines can have side effects, such as heartburn and belly pain. They also can cause headaches. Their long-term effects are not known. · Whether you take medicine or not, healthy habits can also help protect your bones. Talk to your doctor about taking calcium and vitamin D supplements. Get regular weight-bearing exercise. Cut back on alcohol. And if you smoke, quit. Why might you choose to take bisphosphonate medicines? · You have bone loss that is not normal for your age. · You have osteoporosis or osteopenia. Or you have risk factors for osteoporosis. · You have been taking hormone therapy (HT) and stopped. Bone loss medicines can protect against rapid bone loss after you quit HT. · You do not mind taking pills. Or you do not mind getting medicines through a tube in your vein, called an IV. · You think the benefits of taking these medicines outweigh the side effects. Why might you choose not to take these medicines? · You want to try other medicines that help prevent bone loss.  These include calcitonin (Calcimar or Miacalcin), denosumab (Prolia or Xgeva), hormone therapy, raloxifene (Evista), and teriparatide (Forteo). · You want to try healthy habits to prevent bone fractures. · You do not like the idea of taking pills. Or you do not like getting medicines through a tube in your vein, called an IV. · You are worried about the side effects of these medicines. Your decision  Thinking about the facts and your feelings can help you make a decision that is right for you. Be sure you understand the benefits and risks of your options, and think about what else you need to do before you make the decision. Where can you learn more? Go to http://alvin-rogelio.info/. Enter D082 in the search box to learn more about \"Deciding About Bisphosphonate Medicine for Osteoporosis. \"  Current as of: August 10, 2016  Content Version: 11.2  © 7901-6526 [employer]. Care instructions adapted under license by [employer] (which disclaims liability or warranty for this information). If you have questions about a medical condition or this instruction, always ask your healthcare professional. [place] [address] any warranty or liability for your use of this information. Osteoporosis: Care Instructions  Your Care Instructions    Osteoporosis causes bones to become thin and weak. It is much more common in women than in men. Osteoporosis may be very advanced before you know you have it. Sometimes the first sign is a broken bone in the hip, spine, or wrist or sudden pain in your middle or lower back. Follow-up care is a key part of your treatment and safety. Be sure to make and go to all appointments, and call your doctor if you are having problems. Its also a good idea to know your test results and keep a list of the medicines you take. How can you care for yourself at home?   · Your doctor may prescribe a bisphosphonate, such as risedronate (Actonel) or alendronate (Fosamax), for osteoporosis. If you are taking one of these medicines by mouth:  ¨ Take your medicine with a full glass of water when you first get up in the morning. ¨ Do not lie down, eat, drink a beverage, or take any other medicine for at least 30 minutes after taking the drug. This helps prevent stomach problems. ¨ Do not take your medicine late in the day if you forgot to take it in the morning. Skip it, and take the usual dose the next morning. ¨ If you have side effects, tell your doctor. He or she may prescribe another medicine. · Get enough calcium and vitamin D. The [place] of Medicine recommends adults younger than age 46 need 1,000 mg of calcium and 600 IU of vitamin D each day. Women ages 46 to 79 need 1,200 mg of calcium and 600 IU of vitamin D each day. Men ages 46 to 79 need 1,000 mg of calcium and 600 IU of vitamin D each day. Adults 71 and older need 1,200 mg of calcium and 800 IU of vitamin D each day. [place] Eat foods rich in calcium, like yogurt, cheese, milk, and dark green vegetables. This is a good way to get the calcium you need. You can get vitamin D from eggs, fatty fish, cereal, and milk. ¨ Talk to your doctor about taking a calcium plus vitamin D supplement. Be careful, though. Adults ages 23 to 48 should not get more than 2,500 mg of calcium and 4,000 IU of vitamin D each day, whether it is from supplements and/or food. Adults ages 46 and older should not get more than 2,000 mg of calcium and 4,000 IU of vitamin D each day from supplements and/or food. · Limit alcohol to 2 drinks a day for men and 1 drink a day for women. Too much alcohol can cause health problems. · Do not smoke. Smoking puts you at a much higher risk for osteoporosis. If you need help quitting, talk to your doctor about stop-smoking programs and medicines. These can increase your chances of quitting for good. · Get regular bone-building exercise.  Weight-bearing and resistance exercises keep bones healthy by working the muscles and bones against gravity. Start out at an exercise level that feels right for you. Add a little at a time until you can do the following:  ¨ Do 30 minutes of weight-bearing exercise on most days of the week. Walking, jogging, stair climbing, and dancing are good choices. ¨ Do resistance exercises with weights or elastic bands 2 to 3 days a week. · Reduce your risk of falls:  ¨ Wear supportive shoes with low heels and nonslip soles. ¨ Use a cane or walker, if you need it. Use shower chairs and bath benches. Put in handrails on stairways, around your shower or tub area, and near the toilet. ¨ Keep stairs, porches, and walkways well lit. Use night-lights. ¨ Remove throw rugs and other objects that are in the way. ¨ Avoid icy, wet, or slippery surfaces. ¨ Keep a cordless phone and a flashlight with new batteries by your bed. When should you call for help? Call your doctor now or seek immediate medical care if:  · You think you have broken a bone, have pain and swelling after a fall, or cannot move a part of your body. · You have sudden, severe pain when you stand or walk. Watch closely for changes in your health, and be sure to contact your doctor if you have any problems. Where can you learn more? Go to http://alvin-rogelio.info/. Enter K100 in the search box to learn more about \"Osteoporosis: Care Instructions. \"  Current as of: August 4, 2016  Content Version: 11.2  © 2195-5446 [employer]. Care instructions adapted under license by [employer] (which disclaims liability or warranty for this information). If you have questions about a medical condition or this instruction, always ask your healthcare professional. [address] any warranty or liability for your use of this information.

## 2017-04-27 NOTE — PROGRESS NOTES
Subjective:   Naveen Chen is a 80 y.o. female here today to follow up for bone density results. Advised that she has osteoporosis and discussed treatment options. Patient is taking calcium 1200 mg with vitamin d daily. She is exercising. She reports that she is motivated to take better care of herself. Portion control and exercise healthy eating general guidelines reviewed with patient to get closer to normal BMI  Patient verbalizes understanding. Health maintenance reviewed  PMH: reviewed medications and allergy lists and medical and family history. ROS: denies falls, denies pain. OBJECTIVE:  Awake and alert in no acute distress  Lungs clear throughout  S1 S2 RRR without ectopy or murmur auscultated. Visit Vitals    /80 (BP 1 Location: Right arm, BP Patient Position: Sitting)    Pulse 100    Temp 97.8 °F (36.6 °C) (Oral)    Resp 18    Ht 5' 6\" (1.676 m)    Wt 219 lb (99.3 kg)    SpO2 98%    BMI 35.35 kg/m2     Fautma Leyva was seen today for results and other. Diagnoses and all orders for this visit:    Post-menopausal osteoporosis  -     alendronate (FOSAMAX) 70 mg tablet; Take 1 Tab by mouth every seven (7) days. Indications: POST-MENOPAUSAL OSTEOPOROSIS    Colon cancer screening  -     REFERRAL TO COLON AND RECTAL SURGERY      Reviewed risks and benefits and common side effects of new medication  General comfort measures  Portion control and exercise healthy eating general guidelines reviewed with patient to get closer to normal BMI  Patient verbalizes understanding. I have discussed the diagnosis with the patient and the intended plan as seen in the above orders. The patient has received an after-visit summary and questions were answered concerning future plans. I have discussed medication side effects and warnings with the patient as well. Follow-up Disposition:  Return in about 4 months (around 8/27/2017) for osteoporosis .

## 2017-04-27 NOTE — MR AVS SNAPSHOT
Visit Information Date & Time Provider Department Dept. Phone Encounter #  
 4/27/2017 12:40 PM Guadalupe Santoyo NP [facility] [phone] [id number] Follow-up Instructions Return in about 4 months (around 8/27/2017) for osteoporosis . Upcoming Health Maintenance Date Due  
 GLAUCOMA SCREENING Q2Y 5/11/2017 MEDICARE YEARLY EXAM 8/25/2017 BREAST CANCER SCRN MAMMOGRAM 5/31/2018 DTaP/Tdap/Td series (2 - Td) 3/21/2027 COLONOSCOPY 4/27/2027 Allergies as of 4/27/2017  Review Complete On: 4/27/2017 By: Guadalupe Santoyo NP Severity Noted Reaction Type Reactions Iodine  04/19/2010    Hives, Other (comments) Feels like Chest caving in  
  
Current Immunizations  Reviewed on 3/21/2017 Name Date Influenza High Dose Vaccine PF 1/21/2016 Influenza Vaccine PF 11/7/2013 Pneumococcal Conjugate (PCV-13) 11/7/2013 Not reviewed this visit You Were Diagnosed With   
  
 Codes Comments Post-menopausal osteoporosis    -  Primary ICD-10-CM: M81.0 ICD-9-CM: 733.01 Colon cancer screening     ICD-10-CM: Z12.11 ICD-9-CM: V76.51 Vitals BP Pulse Temp Resp Height(growth percentile) Weight(growth percentile) 124/80 (BP 1 Location: Right arm, BP Patient Position: Sitting) 100 97.8 °F (36.6 °C) (Oral) 18 5' 6\" (1.676 m) 219 lb (99.3 kg) LMP SpO2 BMI OB Status Smoking Status 01/01/1990 98% 35.35 kg/m2 Postmenopausal Former Smoker Vitals History BMI and BSA Data Body Mass Index Body Surface Area  
 35.35 kg/m 2 2.15 m 2 Preferred Pharmacy Pharmacy Name Phone [facility] [phone] [phone] Your Updated Medication List  
  
   
This list is accurate as of: 4/27/17  1:04 PM.  Always use your most recent med list.  
  
  
  
  
 alendronate 70 mg tablet Commonly known as:  FOSAMAX Take 1 Tab by mouth every seven (7) days. Indications: POST-MENOPAUSAL OSTEOPOROSIS  
  
 amitriptyline 25 mg tablet Commonly known as:  ELAVIL  
  
 ARMOUR THYROID 180 mg Tab Generic drug:  Thyroid (Pork) Take  by mouth.  
  
 biotin 2,500 mcg Tab Take  by mouth. butalbital-acetaminophen-caffeine -40 mg per tablet Commonly known as:  Jack Constable Take 1 Tab by mouth every six (6) hours as needed for Pain or Headache. Max Daily Amount: 4 Tabs. CITRACAL PO Take 1 Tab by mouth daily. FLAXSEED PO Take 1 Tab by mouth daily. gabapentin 300 mg capsule Commonly known as:  NEURONTIN Take 300 mg by mouth daily. hydroCHLOROthiazide 25 mg tablet Commonly known as:  HYDRODIURIL  
TAKE 1 TABLET BY MOUTH DAILY MULTI-VITAMIN PO Take 1 Tab by mouth daily. naproxen 500 mg tablet Commonly known as:  NAPROSYN  
TAKE 1 TABLET BY MOUTH TWICE DAILY WITH MEALS  
  
 sertraline 50 mg tablet Commonly known as:  ZOLOFT  
TAKE 1 TABLET BY MOUTH DAILY. INDICATIONS: ANXIETY WITH DEPRESSION. triamcinolone 55 mcg nasal inhaler Commonly known as:  NASACORT AQ  
2 Sprays daily. VITAMIN B-12 1,000 mcg tablet Generic drug:  cyanocobalamin Take 1,000 mcg by mouth daily. * VITAMIN D 2,000 unit Cap capsule Generic drug:  Cholecalciferol (Vitamin D3) Take  by mouth. * Cholecalciferol (Vitamin D3) 2,000 unit Cap capsule Commonly known as:  VITAMIN D3 Take  by Mouth. * Notice: This list has 2 medication(s) that are the same as other medications prescribed for you. Read the directions carefully, and ask your doctor or other care provider to review them with you. Prescriptions Sent to Pharmacy Refills  
 alendronate (FOSAMAX) 70 mg tablet 11 Sig: Take 1 Tab by mouth every seven (7) days. Indications: POST-MENOPAUSAL OSTEOPOROSIS  Class: Normal  
 Pharmacy: [facility] [address]  #: [phone] Route: Oral  
  
We Performed the Following REFERRAL TO COLON AND RECTAL SURGERY [REF17 Custom] Comments:  
 Please evaluate patient for colonoscopy note patient wants to have procedure done at [facility] she lives at [facility]. Follow-up Instructions Return in about 4 months (around 8/27/2017) for osteoporosis . Referral Information Referral ID Referred By Referred To  
  
 [id number] Amrit Ngo MD   
   [address] [place] [address] [address] [facility] [place], [address] Phone: [phone] Fax: [phone] Visits Status Start Date End Date 1 New Request 4/27/17 4/27/18 If your referral has a status of pending review or denied, additional information will be sent to support the outcome of this decision. Patient Instructions Deciding About Bisphosphonate Medicine for Osteoporosis What is osteoporosis? Osteoporosis is a disease that affects your bones. It means you have bones that are thin and brittle and have lots of holes inside them like a sponge. This makes them easy to break. It also increases your risk for spine and hip fractures. These fractures may make it hard for you to live on your own. Bisphosphonates are the most common medicines used to prevent bone loss. Most of the time, you take them as pills. They slow the way bone dissolves and is absorbed by your body. They can increase bone thickness and strength. What are key points about this decision? · If you have osteoporosis, these medicines can increase bone thickness. And they can lower your risk of spine and hip fractures. You may also want to think about taking them if you have osteopenia or risk factors for osteoporosis. · These medicines can have side effects, such as heartburn and belly pain. They also can cause headaches. Their long-term effects are not known. · Whether you take medicine or not, healthy habits can also help protect your bones. Talk to your doctor about taking calcium and vitamin D supplements. Get regular weight-bearing exercise. Cut back on alcohol. And if you smoke, quit. Why might you choose to take bisphosphonate medicines? · You have bone loss that is not normal for your age. · You have osteoporosis or osteopenia. Or you have risk factors for osteoporosis. · You have been taking hormone therapy (HT) and stopped. Bone loss medicines can protect against rapid bone loss after you quit HT. · You do not mind taking pills. Or you do not mind getting medicines through a tube in your vein, called an IV. · You think the benefits of taking these medicines outweigh the side effects. Why might you choose not to take these medicines? · You want to try other medicines that help prevent bone loss. These include calcitonin (Calcimar or Miacalcin), denosumab (Prolia or Xgeva), hormone therapy, raloxifene (Evista), and teriparatide (Forteo). · You want to try healthy habits to prevent bone fractures. · You do not like the idea of taking pills. Or you do not like getting medicines through a tube in your vein, called an IV. · You are worried about the side effects of these medicines. Your decision Thinking about the facts and your feelings can help you make a decision that is right for you. Be sure you understand the benefits and risks of your options, and think about what else you need to do before you make the decision. Where can you learn more? Go to http://alvin-rogelio.info/. Enter H061 in the search box to learn more about \"Deciding About Bisphosphonate Medicine for Osteoporosis. \" Current as of: August 10, 2016 Content Version: 11.2 © 5514-4292 "[employer]. Care instructions adapted under license by [employer] (which disclaims liability or warranty for this information). If you have questions about a medical condition or this instruction, always ask your healthcare professional. [address] any warranty or liability for your use of this information. Osteoporosis: Care Instructions Your Care Instructions Osteoporosis causes bones to become thin and weak. It is much more common in women than in men. Osteoporosis may be very advanced before you know you have it. Sometimes the first sign is a broken bone in the hip, spine, or wrist or sudden pain in your middle or lower back. Follow-up care is a key part of your treatment and safety. Be sure to make and go to all appointments, and call your doctor if you are having problems. Its also a good idea to know your test results and keep a list of the medicines you take. How can you care for yourself at home? · Your doctor may prescribe a bisphosphonate, such as risedronate (Actonel) or alendronate (Fosamax), for osteoporosis. If you are taking one of these medicines by mouth: 
¨ Take your medicine with a full glass of water when you first get up in the morning. ¨ Do not lie down, eat, drink a beverage, or take any other medicine for at least 30 minutes after taking the drug. This helps prevent stomach problems. ¨ Do not take your medicine late in the day if you forgot to take it in the morning. Skip it, and take the usual dose the next morning. ¨ If you have side effects, tell your doctor. He or she may prescribe another medicine. · Get enough calcium and vitamin D. The [place] of Medicine recommends adults younger than age 46 need 1,000 mg of calcium and 600 IU of vitamin D each day. Women ages 46 to 79 need 1,200 mg of calcium and 600 IU of vitamin D each day.  Men ages 46 to 79 need 1,000 mg of calcium and 600 IU of vitamin D each day. Adults 71 and older need 1,200 mg of calcium and 800 IU of vitamin D each day. Jose Ramon Chapin ¨ Eat foods rich in calcium, like yogurt, cheese, milk, and dark green vegetables. This is a good way to get the calcium you need. You can get vitamin D from eggs, fatty fish, cereal, and milk. ¨ Talk to your doctor about taking a calcium plus vitamin D supplement. Be careful, though. Adults ages 23 to 48 should not get more than 2,500 mg of calcium and 4,000 IU of vitamin D each day, whether it is from supplements and/or food. Adults ages 46 and older should not get more than 2,000 mg of calcium and 4,000 IU of vitamin D each day from supplements and/or food. · Limit alcohol to 2 drinks a day for men and 1 drink a day for women. Too much alcohol can cause health problems. · Do not smoke. Smoking puts you at a much higher risk for osteoporosis. If you need help quitting, talk to your doctor about stop-smoking programs and medicines. These can increase your chances of quitting for good. · Get regular bone-building exercise. Weight-bearing and resistance exercises keep bones healthy by working the muscles and bones against gravity. Start out at an exercise level that feels right for you. Add a little at a time until you can do the following: ¨ Do 30 minutes of weight-bearing exercise on most days of the week. Walking, jogging, stair climbing, and dancing are good choices. ¨ Do resistance exercises with weights or elastic bands 2 to 3 days a week. · Reduce your risk of falls: 
¨ Wear supportive shoes with low heels and nonslip soles. ¨ Use a cane or walker, if you need it. Use shower chairs and bath benches. Put in handrails on stairways, around your shower or tub area, and near the toilet. ¨ Keep stairs, porches, and walkways well lit. Use night-lights. ¨ Remove throw rugs and other objects that are in the way. ¨ Avoid icy, wet, or slippery surfaces. ¨ Keep a cordless phone and a flashlight with new batteries by your bed. When should you call for help? Call your doctor now or seek immediate medical care if: 
· You think you have broken a bone, have pain and swelling after a fall, or cannot move a part of your body. · You have sudden, severe pain when you stand or walk. Watch closely for changes in your health, and be sure to contact your doctor if you have any problems. Where can you learn more? Go to http://alvin-rogelio.info/. Enter K100 in the search box to learn more about \"Osteoporosis: Care Instructions. \" Current as of: August 4, 2016 Content Version: 11.2 © 2064-0340 [employer]. Care instructions adapted under license by [employer] (which disclaims liability or warranty for this information). If you have questions about a medical condition or this instruction, always ask your healthcare professional. [address] any warranty or liability for your use of this information. Introducing [facility]! Dear Deanna Weldon: Thank you for requesting a SimpliVT account. Our records indicate that you already have an active SimpliVT account. You can access your account anytime at [email or web address] Did you know that you can access your hospital and ER discharge instructions at any time in SimpliVT? You can also review all of your test results from your hospital stay or ER visit. Additional Information If you have questions, please visit the Frequently Asked Questions section of the SimpliVT website at [email or web address] Remember, SimpliVT is NOT to be used for urgent needs. For medical emergencies, dial 911. Now available from your iPhone and Android! Please provide this summary of care documentation to your next provider. Your primary care clinician is listed as Geo Barraza.  If you have any questions after today's visit, please call [phone].

## 2017-06-17 DIAGNOSIS — I10 ESSENTIAL HYPERTENSION: ICD-10-CM

## 2017-06-19 RX ORDER — HYDROCHLOROTHIAZIDE 25 MG/1
TABLET ORAL
Qty: 90 TAB | Refills: 0 | Status: SHIPPED | OUTPATIENT
Start: 2017-06-19 | End: 2018-01-12 | Stop reason: SDUPTHER

## 2017-07-24 DIAGNOSIS — M25.511 ACUTE PAIN OF RIGHT SHOULDER: ICD-10-CM

## 2017-07-25 RX ORDER — NAPROXEN 500 MG/1
TABLET ORAL
Qty: 60 TAB | Refills: 0 | Status: SHIPPED | OUTPATIENT
Start: 2017-07-25 | End: 2017-08-24

## 2017-08-02 ENCOUNTER — OFFICE VISIT (OUTPATIENT)
Dept: SURGERY | Age: 72
End: 2017-08-02

## 2017-08-02 VITALS
RESPIRATION RATE: 22 BRPM | HEIGHT: 66 IN | OXYGEN SATURATION: 98 % | SYSTOLIC BLOOD PRESSURE: 144 MMHG | WEIGHT: 217 LBS | DIASTOLIC BLOOD PRESSURE: 70 MMHG | BODY MASS INDEX: 34.87 KG/M2 | TEMPERATURE: 97 F | HEART RATE: 99 BPM

## 2017-08-02 DIAGNOSIS — Z12.11 ENCOUNTER FOR SCREENING COLONOSCOPY: Primary | ICD-10-CM

## 2017-08-02 RX ORDER — POLYETHYLENE GLYCOL 3350, SODIUM SULFATE ANHYDROUS, SODIUM BICARBONATE, SODIUM CHLORIDE, POTASSIUM CHLORIDE 236; 22.74; 6.74; 5.86; 2.97 G/4L; G/4L; G/4L; G/4L; G/4L
POWDER, FOR SOLUTION ORAL
Qty: 1 BOTTLE | Refills: 0 | Status: SHIPPED | OUTPATIENT
Start: 2017-08-02 | End: 2018-01-25 | Stop reason: ALTCHOICE

## 2017-08-02 NOTE — PROGRESS NOTES
Colon Screen    Patient: Zaida Burch MRN: [id number]  SSN: [id number]    YOB: 1945  Age: 80 y.o. Sex: female        Subjective:   Zaida Burch presents for colon screening. PCP is Camilla Boone NP. Patient denies rectal pain or bleeding. Abdominal surgeries as described below, specifically none. Patient has a bowel movement 1-2 per day. She denies changes in weight or bowel habits. Patient has no known family history of colon cancer. Last colonoscopy was 10 years ago in [place]. She reports it was negative. Allergies   Allergen Reactions    Iodine Hives and Other (comments)     Feels like Chest caving in       Past Medical History:   Diagnosis Date    Anxiety 5/18/2010    Arthritis     OA spine, left knee    Depression 5/18/2010    Headache     migraines    HTN (hypertension) 5/18/2010    Hypertension     Hypothyroid 5/18/2010    Osteopenia     Seasonal allergic rhinitis 5/18/2010    Seasonal allergies     Spondylolisthesis of lumbar region 1/21/2016    Dr. Stephan Irene Shall ortho    Thyroid disease     hypothyroid,  thromeagaly     Past Surgical History:   Procedure Laterality Date    BREAST SURGERY PROCEDURE UNLISTED      breast cyst s/p biopsy 2008    HX [facility]  [facility]2011    [facility]  HX CATARACT REMOVAL  8/2011    right eye 2 weeks apart from left [facility]. Dr. Ismael Delatorre      10 years ago    HX HEENT      partical parathyroidectomy     HX ORTHOPAEDIC      rotator cuff repqir 10/1996    NEUROLOGICAL PROCEDURE UNLISTED  5-20-13    meninginoma Dr. Chetna Chavez      No family history on file. Social History   Substance Use Topics    Smoking status: Former Smoker     Packs/day: 0.25     Years: 40.00     Quit date: 2/8/2013    Smokeless tobacco: Never Used    Alcohol use No      Prior to Admission medications    Medication Sig Start Date End Date Taking? Authorizing Provider   naproxen (NAPROSYN) 500 mg tablet TAKE 1 TABLET BY MOUTH TWICE DAILY WITH MEALS 7/25/17  Yes Baljinder Longoria NP   hydroCHLOROthiazide (HYDRODIURIL) 25 mg tablet TAKE 1 TABLET BY MOUTH DAILY 6/19/17  Yes Baljinder Longoria NP   alendronate (FOSAMAX) 70 mg tablet Take 1 Tab by mouth every seven (7) days. Indications: POST-MENOPAUSAL OSTEOPOROSIS 4/27/17  Yes Baljinder Longoria NP   cyanocobalamin (VITAMIN B-12) 1,000 mcg tablet Take 1,000 mcg by mouth daily. Yes Historical Provider   amitriptyline (ELAVIL) 25 mg tablet  6/15/16  Yes Historical Provider   Thyroid, Pork, (ARMOUR THYROID) 180 mg tab Take  by mouth. Yes Historical Provider   biotin 2,500 mcg tab Take  by mouth. Yes Historical Provider   Cholecalciferol, Vitamin D3, (VITAMIN D) 2,000 unit Cap Take  by mouth. Yes Historical Provider   CALCIUM CITRATE (CITRACAL PO) Take 1 Tab by mouth daily. 4/19/10  Yes Historical Provider   FLAXSEED PO Take 1 Tab by mouth daily. 4/19/10  Yes Historical Provider   Cholecalciferol, Vitamin D3, (VITAMIN D3) 2,000 unit cap capsule Take  by Mouth. Historical Provider   sertraline (ZOLOFT) 50 mg tablet TAKE 1 TABLET BY MOUTH DAILY. INDICATIONS: ANXIETY WITH DEPRESSION. 2/20/17   Baljinder Longoria NP   butalbital-acetaminophen-caffeine (FIORICET, ESGIC) -40 mg per tablet Take 1 Tab by mouth every six (6) hours as needed for Pain or Headache. Max Daily Amount: 4 Tabs. 6/24/16   Jena Saldivar NP   triamcinolone (NASACORT AQ) 55 mcg nasal inhaler 2 Sprays daily. Historical Provider   gabapentin (NEURONTIN) 300 mg capsule Take 300 mg by mouth daily. Santo Nava MD   MULTIVITAMINS (MULTI-VITAMIN PO) Take 1 Tab by mouth daily. 4/19/10   Historical Provider          Review of Systems:  Gastrointestinal: negative      Risks colonoscopy described- colon injury, missed lesion, anesthesia problems, bleeding.     Colonoscopy preparation reviewed in detail with patient and a copy of the instructions was provided to the patient.        Dominga Albright LPN  August 2, 3118  12:07 PM

## 2017-08-02 NOTE — PATIENT INSTRUCTIONS
If you have any questions or concerns about today's appointment, the verbal and/or written instructions you were given for follow up care, please call our office at [phone]. [facility] - [address]    [phone] office  [phone]

## 2017-08-09 ENCOUNTER — HOSPITAL ENCOUNTER (OUTPATIENT)
Dept: GENERAL RADIOLOGY | Age: 72
Discharge: HOME | End: 2017-08-09
Payer: MEDICARE

## 2017-08-09 ENCOUNTER — OFFICE VISIT (OUTPATIENT)
Dept: FAMILY MEDICINE CLINIC | Age: 72
End: 2017-08-09

## 2017-08-09 VITALS
DIASTOLIC BLOOD PRESSURE: 88 MMHG | BODY MASS INDEX: 34.87 KG/M2 | SYSTOLIC BLOOD PRESSURE: 126 MMHG | HEART RATE: 92 BPM | RESPIRATION RATE: 18 BRPM | TEMPERATURE: 97.6 F | HEIGHT: 66 IN | WEIGHT: 217 LBS | OXYGEN SATURATION: 98 %

## 2017-08-09 DIAGNOSIS — R07.81 RIB PAIN ON RIGHT SIDE: Primary | ICD-10-CM

## 2017-08-09 DIAGNOSIS — R93.89 ABNORMAL X-RAY: ICD-10-CM

## 2017-08-09 DIAGNOSIS — R07.81 RIB PAIN ON RIGHT SIDE: ICD-10-CM

## 2017-08-09 DIAGNOSIS — R10.11 RIGHT UPPER QUADRANT ABDOMINAL PAIN: ICD-10-CM

## 2017-08-09 LAB
BILIRUB UR QL STRIP: NORMAL
GLUCOSE UR-MCNC: NEGATIVE MG/DL
KETONES P FAST UR STRIP-MCNC: NEGATIVE MG/DL
PH UR STRIP: 5.5 [PH] (ref 4.6–8)
PROT UR QL STRIP: NORMAL MG/DL
SP GR UR STRIP: 1.03 (ref 1–1.03)
UA UROBILINOGEN AMB POC: NORMAL (ref 0.2–1)
URINALYSIS CLARITY POC: CLEAR
URINALYSIS COLOR POC: YELLOW
URINE BLOOD POC: NEGATIVE
URINE LEUKOCYTES POC: NEGATIVE
URINE NITRITES POC: NEGATIVE

## 2017-08-09 PROCEDURE — 71100 X-RAY EXAM RIBS UNI 2 VIEWS: CPT

## 2017-08-09 RX ORDER — LEVOTHYROXINE SODIUM 112 UG/1
112 TABLET ORAL
COMMUNITY
End: 2017-08-24

## 2017-08-09 RX ORDER — HYDROCODONE BITARTRATE AND ACETAMINOPHEN 5; 300 MG/1; MG/1
1 TABLET ORAL
Qty: 20 TAB | Status: SHIPPED | OUTPATIENT
Start: 2017-08-09 | End: 2017-08-21 | Stop reason: ALTCHOICE

## 2017-08-09 NOTE — PROGRESS NOTES
HISTORY OF PRESENT ILLNESS  Nara Lakhani is a 80 y.o. female.   HPI    ROS    Physical Exam    ASSESSMENT and PLAN  {ASSESSMENT/PLAN:17913}

## 2017-08-09 NOTE — PROGRESS NOTES
HISTORY OF PRESENT ILLNESS  Delma Wooten is a 80 y.o. female. Patient presents today for right side pain. July 26 fell down 6 stairs. It did not hurt right away. No pain until this past Sunday. Now it hurts to make any movement. She denies shortness of breath. She denies fever. HPI    Review of Systems   Constitutional: Negative. HENT: Negative. Eyes: Negative. Respiratory: Negative. Cardiovascular: Negative. Gastrointestinal: Negative. Genitourinary: Negative. Musculoskeletal: Positive for falls. Skin: Negative. Neurological: Negative. Visit Vitals    /88 (BP 1 Location: Left arm, BP Patient Position: Sitting)    Pulse 92    Temp 97.6 °F (36.4 °C) (Oral)    Resp 18    Ht 5' 6\" (1.676 m)    Wt 217 lb (98.4 kg)    LMP 01/01/1990    SpO2 98%    BMI 35.02 kg/m2       Physical Exam   Constitutional: She appears well-developed. Neck: Normal range of motion. Neck supple. Cardiovascular: Normal rate, regular rhythm and normal heart sounds. No murmur heard. Pulmonary/Chest: Effort normal and breath sounds normal. No respiratory distress. She has no wheezes. She has no rales. Musculoskeletal:        Thoracic back: She exhibits tenderness (tenderness at the right lower rib cage with compression.), pain and spasm. She exhibits no swelling and no edema. ASSESSMENT and PLAN    ICD-10-CM ICD-9-CM    1. Rib pain on right side R07.81 786.50 XR RIBS RT UNI 2 V      HYDROcodone-acetaminophen (XODOL) 5-300 mg tablet   2. Right upper quadrant abdominal pain R10.11 789.01 AMB POC URINALYSIS DIP STICK AUTO W/O MICRO       PLAN:  Patient is aware of normal UA. Pt advised about how to take pain medications. She is to continue doing her breathing exercises that she started on her own. I spoke to patient regarding her x-ray results. She is aware of the fracture of the 5th, 7th and 8th right rib.   I also spoke to her about the finding on her 6th right rib and that it is recommended to do I CT. Pt agrees to this plan. I also spoke to her about the possible finding of a gallstone and she was advised a bland diet for now until we looked at that with an [place]. We will wait until the CT is done and patient agrees. Pt is to call with any concerns. Pt given after visit summary.

## 2017-08-09 NOTE — PROGRESS NOTES
1. Have you been to the ER, urgent care clinic since your last visit? Hospitalized since your last visit? No    2. Have you seen or consulted any other health care providers outside of the [address] [place] [address] since your last visit? Include any pap smears or colon screening.  No

## 2017-08-09 NOTE — MR AVS SNAPSHOT
Visit Information Date & Time Provider Department Dept. Phone Encounter #  
 8/9/2017 11:15 AM FRANCK Mcmahon [phone] Your Appointments 8/21/2017 12:20 PM  
Follow Up with FRANCK Haile ([facility]) Appt Note: f/u osteoporosis; r/s from 8/25 at request of pt  
 [address] [address] 2 Jignesh Shethza [address] [address] Upcoming Health Maintenance Date Due  
 GLAUCOMA SCREENING Q2Y 5/11/2017 INFLUENZA AGE 9 TO ADULT 8/1/2017 MEDICARE YEARLY EXAM 8/25/2017 BREAST CANCER SCRN MAMMOGRAM 6/6/2019 DTaP/Tdap/Td series (2 - Td) 3/21/2027 COLONOSCOPY 4/27/2027 Allergies as of 8/9/2017  Review Complete On: 8/9/2017 By: Kirby Randall NP Severity Noted Reaction Type Reactions Iodine  04/19/2010    Hives, Other (comments) Feels like Chest caving in  
  
Current Immunizations  Reviewed on 3/21/2017 Name Date Influenza High Dose Vaccine PF 1/21/2016 Influenza Vaccine PF 11/7/2013 Pneumococcal Conjugate (PCV-13) 11/7/2013 Not reviewed this visit You Were Diagnosed With   
  
 Codes Comments Rib pain on right side    -  Primary ICD-10-CM: R07.81 ICD-9-CM: 786.50 Right upper quadrant abdominal pain     ICD-10-CM: R10.11 ICD-9-CM: 789.01 Vitals BP Pulse Temp Resp Height(growth percentile) Weight(growth percentile) 126/88 (BP 1 Location: Left arm, BP Patient Position: Sitting) 92 97.6 °F (36.4 °C) (Oral) 18 5' 6\" (1.676 m) 217 lb (98.4 kg) LMP SpO2 BMI OB Status Smoking Status 01/01/1990 98% 35.02 kg/m2 Postmenopausal Former Smoker Vitals History BMI and BSA Data Body Mass Index Body Surface Area 35.02 kg/m 2 2.14 m 2 Preferred Pharmacy Pharmacy Name Phone  [facility], [address] [place] [address] [place] AT [facility] [address] [phone] Your Updated Medication List  
  
   
This list is accurate as of: 8/9/17 12:13 PM.  Always use your most recent med list.  
  
  
  
  
 alendronate 70 mg tablet Commonly known as:  FOSAMAX Take 1 Tab by mouth every seven (7) days. Indications: POST-MENOPAUSAL OSTEOPOROSIS  
  
 amitriptyline 25 mg tablet Commonly known as:  ELAVIL  
  
 ARMOUR THYROID 180 mg Tab Generic drug:  Thyroid (Pork) Take  by mouth.  
  
 biotin 2,500 mcg Tab Take  by mouth. butalbital-acetaminophen-caffeine -40 mg per tablet Commonly known as:  Scott Rojo Take 1 Tab by mouth every six (6) hours as needed for Pain or Headache. Max Daily Amount: 4 Tabs. CITRACAL PO Take 1 Tab by mouth daily. FLAXSEED PO Take 1 Tab by mouth daily. gabapentin 300 mg capsule Commonly known as:  NEURONTIN Take 300 mg by mouth daily. hydroCHLOROthiazide 25 mg tablet Commonly known as:  HYDRODIURIL  
TAKE 1 TABLET BY MOUTH DAILY HYDROcodone-acetaminophen 5-300 mg tablet Commonly known as:  Johnye Hero Take 1 Tab by mouth every four (4) hours as needed. Max Daily Amount: 6 Tabs. levothyroxine 112 mcg tablet Commonly known as:  SYNTHROID  
112 mcg. MULTI-VITAMIN PO Take 1 Tab by mouth daily. naproxen 500 mg tablet Commonly known as:  NAPROSYN  
TAKE 1 TABLET BY MOUTH TWICE DAILY WITH MEALS  
  
 PEG 3350-Electrolytes 236-22.74-6.74 -5.86 gram suspension Commonly known as:  GO-LYTELY Take as directed for bowel prep  Indications: BOWEL EVACUATION  
  
 sertraline 50 mg tablet Commonly known as:  ZOLOFT  
TAKE 1 TABLET BY MOUTH DAILY. INDICATIONS: ANXIETY WITH DEPRESSION. triamcinolone 55 mcg nasal inhaler Commonly known as:  NASACORT AQ  
2 Sprays daily. VITAMIN B-12 1,000 mcg tablet Generic drug:  cyanocobalamin Take 1,000 mcg by mouth daily. * VITAMIN D 2,000 unit Cap capsule Generic drug:  Cholecalciferol (Vitamin D3) Take  by mouth. * Cholecalciferol (Vitamin D3) 2,000 unit Cap capsule Commonly known as:  VITAMIN D3 Take  by Mouth. * Notice: This list has 2 medication(s) that are the same as other medications prescribed for you. Read the directions carefully, and ask your doctor or other care provider to review them with you. Prescriptions Printed Refills HYDROcodone-acetaminophen (XODOL) 5-300 mg tablet o Sig: Take 1 Tab by mouth every four (4) hours as needed. Max Daily Amount: 6 Tabs. Class: Print Route: Oral  
  
We Performed the Following AMB POC URINALYSIS DIP STICK AUTO W/O MICRO [95568 CPT(R)] To-Do List   
 08/09/2017 Imaging:  XR RIBS RT UNI 2 V Introducing [facility] SERVICES! Dear John Alarcon: Thank you for requesting a [facility] account. Our records indicate that you already have an active [facility] account. You can access your account anytime at [email or web address] Did you know that you can access your hospital and ER discharge instructions at any time in [facility]? You can also review all of your test results from your hospital stay or ER visit. Additional Information If you have questions, please visit the Frequently Asked Questions section of the [facility] website at [email or web address] Remember, [facility] is NOT to be used for urgent needs. For medical emergencies, dial 911. Now available from your iPhone and Android! Please provide this summary of care documentation to your next provider. Your primary care clinician is listed as Kiley Barraza. If you have any questions after today's visit, please call [phone].

## 2017-08-16 ENCOUNTER — HOSPITAL ENCOUNTER (OUTPATIENT)
Dept: CT IMAGING | Age: 72
Discharge: HOME | End: 2017-08-16
Attending: NURSE PRACTITIONER

## 2017-08-16 DIAGNOSIS — R93.89 ABNORMAL X-RAY: ICD-10-CM

## 2017-08-17 ENCOUNTER — TELEPHONE (OUTPATIENT)
Dept: FAMILY MEDICINE CLINIC | Age: 72
End: 2017-08-17

## 2017-08-17 RX ORDER — DIPHENHYDRAMINE HCL 25 MG
50 TABLET ORAL
Qty: 2 TAB | Refills: 0 | Status: SHIPPED | OUTPATIENT
Start: 2017-08-17 | End: 2017-08-17 | Stop reason: SDUPTHER

## 2017-08-17 RX ORDER — PREDNISONE 50 MG/1
50 TABLET ORAL
Qty: 3 TAB | Refills: 0 | Status: SHIPPED | OUTPATIENT
Start: 2017-08-17 | End: 2017-08-24

## 2017-08-17 RX ORDER — PREDNISONE 50 MG/1
50 TABLET ORAL
Qty: 3 TAB | Refills: 0 | Status: SHIPPED | OUTPATIENT
Start: 2017-08-17 | End: 2017-08-17 | Stop reason: SDUPTHER

## 2017-08-17 RX ORDER — RANITIDINE 150 MG/1
150 TABLET, FILM COATED ORAL 2 TIMES DAILY
Qty: 2 TAB | Refills: 0 | Status: SHIPPED | OUTPATIENT
Start: 2017-08-17 | End: 2017-08-24

## 2017-08-17 RX ORDER — RANITIDINE 150 MG/1
150 TABLET, FILM COATED ORAL 2 TIMES DAILY
Qty: 2 TAB | Refills: 0 | Status: SHIPPED | OUTPATIENT
Start: 2017-08-17 | End: 2017-08-17 | Stop reason: SDUPTHER

## 2017-08-17 RX ORDER — DIPHENHYDRAMINE HCL 25 MG
50 TABLET ORAL
Qty: 2 TAB | Refills: 0 | Status: SHIPPED | OUTPATIENT
Start: 2017-08-17 | End: 2017-08-24

## 2017-08-17 NOTE — TELEPHONE ENCOUNTER
Spoke with patient to inform that medications prednisone, ranitidine, and diphenhydramine have been sent to the pharmacy. Went over how each medication is to be taken. Gave phone number to Central Scheduling to reschedule CT chest. Patient verbalized understanding.

## 2017-08-21 ENCOUNTER — OFFICE VISIT (OUTPATIENT)
Dept: FAMILY MEDICINE CLINIC | Age: 72
End: 2017-08-21

## 2017-08-21 VITALS
RESPIRATION RATE: 18 BRPM | SYSTOLIC BLOOD PRESSURE: 140 MMHG | DIASTOLIC BLOOD PRESSURE: 60 MMHG | WEIGHT: 212 LBS | BODY MASS INDEX: 34.07 KG/M2 | HEART RATE: 106 BPM | OXYGEN SATURATION: 98 % | TEMPERATURE: 98.1 F | HEIGHT: 66 IN

## 2017-08-21 DIAGNOSIS — S22.41XD CLOSED FRACTURE OF MULTIPLE RIBS OF RIGHT SIDE WITH ROUTINE HEALING, SUBSEQUENT ENCOUNTER: ICD-10-CM

## 2017-08-21 DIAGNOSIS — Z87.891 HISTORY OF TOBACCO ABUSE: ICD-10-CM

## 2017-08-21 DIAGNOSIS — Z00.00 ROUTINE GENERAL MEDICAL EXAMINATION AT A HEALTH CARE FACILITY: Primary | ICD-10-CM

## 2017-08-21 NOTE — PATIENT INSTRUCTIONS
Stopping Smoking: Care Instructions  Your Care Instructions  Cigarette smokers crave the nicotine in cigarettes. Giving it up is much harder than simply changing a habit. Your body has to stop craving the nicotine. It is hard to quit, but you can do it. There are many tools that people use to quit smoking. You may find that combining tools works best for you. There are several steps to quitting. First you get ready to quit. Then you get support to help you. After that, you learn new skills and behaviors to become a nonsmoker. For many people, a necessary step is getting and using medicine. Your doctor will help you set up the plan that best meets your needs. You may want to attend a smoking cessation program to help you quit smoking. When you choose a program, look for one that has proven success. Ask your doctor for ideas. You will greatly increase your chances of success if you take medicine as well as get counseling or join a cessation program.  Some of the changes you feel when you first quit tobacco are uncomfortable. Your body will miss the nicotine at first, and you may feel short-tempered and grumpy. You may have trouble sleeping or concentrating. Medicine can help you deal with these symptoms. You may struggle with changing your smoking habits and rituals. The last step is the tricky one: Be prepared for the smoking urge to continue for a time. This is a lot to deal with, but keep at it. You will feel better. Follow-up care is a key part of your treatment and safety. Be sure to make and go to all appointments, and call your doctor if you are having problems. Its also a good idea to know your test results and keep a list of the medicines you take. How can you care for yourself at home? · Ask your family, friends, and coworkers for support. You have a better chance of quitting if you have help and support.   · Join a support group, such as Nicotine Anonymous, for people who are trying to quit smoking. · Consider signing up for a smoking cessation program, such as the American Lung Association's Freedom from Smoking program.  · Set a quit date. Pick your date carefully so that it is not right in the middle of a big deadline or stressful time. Once you quit, do not even take a puff. Get rid of all ashtrays and lighters after your last cigarette. Clean your house and your clothes so that they do not smell of smoke. · Learn how to be a nonsmoker. Think about ways you can avoid those things that make you reach for a cigarette. ¨ Avoid situations that put you at greatest risk for smoking. For some people, it is hard to have a drink with friends without smoking. For others, they might skip a coffee break with coworkers who smoke. ¨ Change your daily routine. Take a different route to work or eat a meal in a different place. · Cut down on stress. Calm yourself or release tension by doing an activity you enjoy, such as reading a book, taking a hot bath, or gardening. · Talk to your doctor or pharmacist about nicotine replacement therapy, which replaces the nicotine in your body. You still get nicotine but you do not use tobacco. Nicotine replacement products help you slowly reduce the amount of nicotine you need. These products come in several forms, many of them available over-the-counter:  ¨ Nicotine patches  ¨ Nicotine gum and lozenges  ¨ Nicotine inhaler  · Ask your doctor about bupropion (Wellbutrin) or varenicline (Chantix), which are prescription medicines. They do not contain nicotine. They help you by reducing withdrawal symptoms, such as stress and anxiety. · Some people find hypnosis, acupuncture, and massage helpful for ending the smoking habit. · Eat a healthy diet and get regular exercise. Having healthy habits will help your body move past its craving for nicotine. · Be prepared to keep trying. Most people are not successful the first few times they try to quit.  Do not get mad at yourself if you smoke again. Make a list of things you learned and think about when you want to try again, such as next week, next month, or next year. Where can you learn more? Go to http://alvin-rogelio.info/. Enter B033 in the search box to learn more about \"Stopping Smoking: Care Instructions. \"  Current as of: March 20, 2017  Content Version: 11.3  © 2596-0288 [employer]. Care instructions adapted under license by [employer] (which disclaims liability or warranty for this information). If you have questions about a medical condition or this instruction, always ask your healthcare professional. [address] any warranty or liability for your use of this information. CT Scan of the Chest: About This Test  What is it? A CT (computed tomography) scan uses X-rays to make detailed pictures of your body and structures inside your body. A CT scan of the chest can give your doctor information about your lungs, your heart, and other structures in your chest.  During the test, you will lie on a table that is attached to the CT scanner. The CT scanner is a large doughnut-shaped machine. Why is this test done? A CT scan of the chest can help find problems such as infection, lung cancer, blocked blood flow in the lung (pulmonary embolism), and other lung problems. It also can be used to see if cancer has spread into the chest from another area of the body. A low-dose CT scan is a different type of chest CT scan. It may be used as a lung cancer screening test for some smokers. Your age and how much you've smoked help your doctor decide if this would be a helpful test for you. This type of CT scan uses less radiation than a regular chest CT scan. How can you prepare for the test?  Talk to your doctor about all your health conditions before the test. For example, tell your doctor if:  · You are or might be pregnant.   · You are allergic to any medicines. · You have diabetes. · You take metformin. · You are breastfeeding. · You get nervous in confined spaces. You may need medicine to help you relax. What happens before the test?  · You may have to take off jewelry. · You will take off all or most of your clothes and change into a gown. If you do leave some clothes on, make sure you take everything out of your pockets. · You may have contrast material (dye) put into your arm through a tube called an IV. Contrast material helps doctors see specific organs, blood vessels, and most tumors. What happens during the test?  · You will lie on a table that is attached to the CT scanner. · The table will slide into the round opening of the scanner. The table will move during the scan. The scanner moves inside the doughnut-shaped casing around your body. · You will be asked to hold still during the scan. You may be asked to hold your breath for short periods. · You may be alone in the scanning room, but a technologist will be watching you through a window and talking with you during the test.  What else should you know about the test?  · A CT scan does not hurt. · If a dye is used, you may feel a quick sting or pinch when the IV is started. The dye may make you feel warm and flushed and give you a metallic taste in your mouth. Some people feel sick to their stomach or get a headache. · If you breastfeed and are concerned about whether the dye used in this test is safe, talk to your doctor. Most experts believe that very little dye passes into breast milk and even less is passed on to the baby. But if you prefer, you can store some of your breast milk ahead of time and use it for a day or two after the test.  · There is a small chance of getting cancer from some types of CT scans. The risk is higher in children, young adults, and people who have many radiation tests.  If you are concerned about this risk, talk to your doctor about the benefits and risks of a CT scan and confirm that the test is needed. How long does the test take? · The test will take about 30 to 60 minutes. Most of this time is spent getting ready for the scan. The actual test only takes a few minutes. What happens after the test?  · Drink plenty of fluids for 24 hours after the test if dye was used, unless your doctor tells you not to. When should you call for help? Watch closely for changes in your health, and be sure to contact your doctor if you have any problems. Follow-up care is a key part of your treatment and safety. Be sure to make and go to all appointments, and call your doctor if you are having problems. It's also a good idea to keep a list of the medicines you take. Ask your doctor when you can expect to have your test results. Where can you learn more? Go to http://alvin-rogelio.info/. Enter O065 in the search box to learn more about \"CT Scan of the Chest: About This Test.\"  Current as of: November 28, 2016  Content Version: 11.3  © 9835-6941 [employer]. Care instructions adapted under license by [employer] (which disclaims liability or warranty for this information). If you have questions about a medical condition or this instruction, always ask your healthcare professional. [address] any warranty or liability for your use of this information. Well Visit, Over 72: Care Instructions  Your Care Instructions  Physical exams can help you stay healthy. Your doctor has checked your overall health and may have suggested ways to take good care of yourself. He or she also may have recommended tests. At home, you can help prevent illness with healthy eating, regular exercise, and other steps. Follow-up care is a key part of your treatment and safety. Be sure to make and go to all appointments, and call your doctor if you are having problems.  It's also a good idea to know your test results and keep a list of the medicines you take. How can you care for yourself at home? · Reach and stay at a healthy weight. This will lower your risk for many problems, such as obesity, diabetes, heart disease, and high blood pressure. · Get at least 30 minutes of exercise on most days of the week. Walking is a good choice. You also may want to do other activities, such as running, swimming, cycling, or playing tennis or team sports. · Do not smoke. Smoking can make health problems worse. If you need help quitting, talk to your doctor about stop-smoking programs and medicines. These can increase your chances of quitting for good. · Protect your skin from too much sun. When you're outdoors from 10 a.m. to 4 p.m., stay in the shade or cover up with clothing and a hat with a wide brim. Wear sunglasses that block UV rays. Even when it's cloudy, put broad-spectrum sunscreen (SPF 30 or higher) on any exposed skin. · See a dentist one or two times a year for checkups and to have your teeth cleaned. · Wear a seat belt in the car. · Limit alcohol to 2 drinks a day for men and 1 drink a day for women. Too much alcohol can cause health problems. Follow your doctor's advice about when to have certain tests. These tests can spot problems early. For men and women  · Cholesterol. Your doctor will tell you how often to have this done based on your overall health and other things that can increase your risk for heart attack and stroke. · Blood pressure. Have your blood pressure checked during a routine doctor visit. Your doctor will tell you how often to check your blood pressure based on your age, your blood pressure results, and other factors. · Diabetes. Ask your doctor whether you should have tests for diabetes. · Vision. Experts recommend that you have yearly exams for glaucoma and other age-related eye problems. · Hearing. Tell your doctor if you notice any change in your hearing. You can have tests to find out how well you hear.   · Colon cancer tests. Keep having colon cancer tests as your doctor recommends. You can have one of several types of tests. · Heart attack and stroke risk. At least every 4 to 6 years, you should have your risk for heart attack and stroke assessed. Your doctor uses factors such as your age, blood pressure, cholesterol, and whether you smoke or have diabetes to show what your risk for a heart attack or stroke is over the next 10 years. · Osteoporosis. Talk to your doctor about whether you should have a bone density test to find out whether you have thinning bones. Also ask your doctor about whether you should take calcium and vitamin D supplements. For women  · Pap test and pelvic exam. You may no longer need a Pap test. Talk with your doctor about whether to stop or continue to have Pap tests. · Breast exam and mammogram. Ask how often you should have a mammogram, which is an X-ray of your breasts. A mammogram can spot breast cancer before it can be felt and when it is easiest to treat. · Thyroid disease. Talk to your doctor about whether to have your thyroid checked as part of a regular physical exam. Women have an increased chance of a thyroid problem. For men  · Prostate exam. Talk to your doctor about whether you should have a blood test (called a PSA test) for prostate cancer. Experts disagree on whether men should have this test. Some experts recommend that you discuss the benefits and risks of the test with your doctor. · Abdominal aortic aneurysm. Ask your doctor whether you should have a test to check for an aneurysm. You may need a test if you ever smoked or if your parent, brother, sister, or child has had an aneurysm. When should you call for help? Watch closely for changes in your health, and be sure to contact your doctor if you have any problems or symptoms that concern you. Where can you learn more? Go to http://alvin-rogelio.info/.   Enter I276 in the search box to learn more about \"Well Visit, Over 72: Care Instructions. \"  Current as of: July 19, 2016  Content Version: 11.3  © 1943-6952 [employer]. Care instructions adapted under license by [facility] (which disclaims liability or warranty for this information). If you have questions about a medical condition or this instruction, always ask your healthcare professional. [address] any warranty or liability for your use of this information.

## 2017-08-21 NOTE — ACP (ADVANCE CARE PLANNING)
Advance Care Planning (ACP) Provider Conversation Snapshot    Date of ACP Conversation: 08/21/17  Persons included in Conversation:  patient  Length of ACP Conversation in minutes:  <16 minutes (Non-Billable)    Authorized Decision Maker (if patient is incapable of making informed decisions): This person is:   Healthcare Agent/Medical Power of  under Advance Directive          For Patients with Decision Making Capacity:   Values/Goals: Exploration of values, goals, and preferences if recovery is not expected, even with continued medical treatment in the event of:  Imminent death  Severe, permanent brain injury  \"In these circumstances, what matters most to you? \"  patient has completed the form and had it notarized     Conversation Outcomes / Follow-Up Plan:   completed and copy on chart, original given back to patient.

## 2017-08-21 NOTE — PROGRESS NOTES
This is a Subsequent Medicare Annual Wellness Visit providing Personalized Prevention Plan Services (PPPS) (Performed 12 months after initial AWV and PPPS )    I have reviewed the patient's medical history in detail and updated the computerized patient record. History     Past Medical History:   Diagnosis Date    Anxiety 5/18/2010    Arthritis     OA spine, left knee    Depression 5/18/2010    Headache     migraines    HTN (hypertension) 5/18/2010    Hypertension     Hypothyroid 5/18/2010    Osteopenia     Seasonal allergic rhinitis 5/18/2010    Seasonal allergies     Spondylolisthesis of lumbar region 1/21/2016    Dr. Quiñonez [facility]    Thyroid disease     hypothyroid,  thromeagaly      Past Surgical History:   Procedure Laterality Date    BREAST SURGERY PROCEDURE UNLISTED      breast cyst s/p biopsy 2008    HX CATARACT [facility]  8[facility]2011    [facility]  HX CATARACT REMOVAL  8/2011    right eye 2 weeks apart from left [facility]. Dr. Karl Sheehan      10 years ago    HX HEENT      partical parathyroidectomy     HX ORTHOPAEDIC      rotator cuff repqir 10/1996    NEUROLOGICAL PROCEDURE UNLISTED  5-20-13    meninginoma Dr. iDon Guaman     Current Outpatient Prescriptions   Medication Sig Dispense Refill    raNITIdine (ZANTAC) 150 mg tablet Take 1 Tab by mouth two (2) times a day. 150mg po at 7 hours prior to study and 1 hour prior to study. 2 Tab 0    predniSONE (DELTASONE) 50 mg tablet Take 1 Tab by mouth daily (with breakfast). Take 50mg po at 13 hours before procedure, then 7 hours and then 1 hour prior to study. CT protocol IV Contrast-MVM 3 Tab 0    diphenhydrAMINE (BENADRYL ALLERGY) 25 mg tablet Take 2 Tabs by mouth every six (6) hours as needed for Sleep. 50mg po at 7 hours prior to study and 1 hour prior to study  CT Protocol IV Contrast-MVM 2 Tab 0    levothyroxine (SYNTHROID) 112 mcg tablet 112 mcg.  HYDROcodone-acetaminophen (XODOL) 5-300 mg tablet Take 1 Tab by mouth every four (4) hours as needed. Max Daily Amount: 6 Tabs. 20 Tab o    PEG 3350-Electrolytes (GO-LYTELY) 236-22.74-6.74 -5.86 gram suspension Take as directed for bowel prep  Indications: BOWEL EVACUATION 1 Bottle 0    naproxen (NAPROSYN) 500 mg tablet TAKE 1 TABLET BY MOUTH TWICE DAILY WITH MEALS 60 Tab 0    hydroCHLOROthiazide (HYDRODIURIL) 25 mg tablet TAKE 1 TABLET BY MOUTH DAILY 90 Tab 0    alendronate (FOSAMAX) 70 mg tablet Take 1 Tab by mouth every seven (7) days. Indications: POST-MENOPAUSAL OSTEOPOROSIS 4 Tab 11    Cholecalciferol, Vitamin D3, (VITAMIN D3) 2,000 unit cap capsule Take  by Mouth.  cyanocobalamin (VITAMIN B-12) 1,000 mcg tablet Take 1,000 mcg by mouth daily.  sertraline (ZOLOFT) 50 mg tablet TAKE 1 TABLET BY MOUTH DAILY. INDICATIONS: ANXIETY WITH DEPRESSION. 30 Tab 1    amitriptyline (ELAVIL) 25 mg tablet   6    butalbital-acetaminophen-caffeine (FIORICET, ESGIC) -40 mg per tablet Take 1 Tab by mouth every six (6) hours as needed for Pain or Headache. Max Daily Amount: 4 Tabs. 30 Tab 0    triamcinolone (NASACORT AQ) 55 mcg nasal inhaler 2 Sprays daily.  gabapentin (NEURONTIN) 300 mg capsule Take 300 mg by mouth daily.  Thyroid, Pork, (ARMOUR THYROID) 180 mg tab Take  by mouth.  biotin 2,500 mcg tab Take  by mouth.  MULTIVITAMINS (MULTI-VITAMIN PO) Take 1 Tab by mouth daily.  Cholecalciferol, Vitamin D3, (VITAMIN D) 2,000 unit Cap Take  by mouth.  CALCIUM CITRATE (CITRACAL PO) Take 1 Tab by mouth daily.  FLAXSEED PO Take 1 Tab by mouth daily.        Allergies   Allergen Reactions    Iodine Hives and Other (comments)     Feels like Chest caving in     Family History   Problem Relation Age of Onset    Cancer Brother      brain     Social History   Substance Use Topics    Smoking status: Former Smoker     Packs/day: 0.25     Years: 40.00     Quit date: 2/8/2013    Smokeless tobacco: Never Used    Alcohol use No     Patient Active Problem List   Diagnosis Code    HTN (hypertension) I10    Hypothyroid E03.9    Arthritis M19.90    Depression F32.9    Anxiety F41.9    Seasonal allergic rhinitis J30.2    Migraines G43.909    Insomnia G47.00    BPPV (benign paroxysmal positional vertigo) H81.10    Meningioma (HCC) D32.9    Spondylolisthesis of lumbar region M43.16    History of thyroidectomy E89.0    Post-menopausal osteoporosis M81.0       Depression Risk Factor Screening:     PHQ over the last two weeks 4/27/2017   PHQ Not Done -   Little interest or pleasure in doing things Not at all   Feeling down, depressed or hopeless Not at all   Total Score PHQ 2 0     Alcohol Risk Factor Screening: On any occasion during the past 3 months, have you had more than 3 drinks containing alcohol? No    Do you average more than 7 drinks per week? No        Functional Ability and Level of Safety:     Hearing Loss   none    Activities of Daily Living   Partial assistance. Requires assistance with: ambulation, bathing and hygiene and no ADLs- This is secondary to recent fall and fractured ribs. Note her daughter Toney Hudson is staying with patient for assistance with ADLs and her  is driving her to appointments and tests. Fall Risk   Fall Risk Assessment, last 12 mths 6/24/2016   Able to walk? Yes   Fall in past 12 months? yes     Abuse Screen   Patient is not abused    Review of Systems   Review of Systems - negative except below    Patient reports that she is also here for follow up for a fall on July 17, 2017 which resulted in multiple rib fractions on right and a sclerotic lesion on left 6 th rib. She has a scheduled CT tomorrow at 330  pm [facility]. She has ordered prep as she has had reactions to iodine in past.  Her  is with her today and is driving her to all appointments and diagnostics. Patient is complaining of moderate right sided rib pain.   She has three vicodin left and reports that she takes only when she is experiencing severe pain. Patient education regarding pain and pain control. She denies pain medication today. She is using a cane for gait safety fall prevention and safety  Patient verbalizes understanding. ROS: denies any falls since July 2017 fall, denies cigarette smoking, denies wheezing or coughing, denies labored breathing, denies anterior chest pain, denies left sided rib pain, +pain from bra squeezing her thorax, denies side effects to hydrocodone. Patient reports that she has been smoking 3 cigarettes daily but stopped last week. Praised for behavior changes    Physical Examination     Evaluation of Cognitive Function:  Mood/affect:  neutral  Appearance: age appropriate  Family member/caregiver input: Spouse offers that his daughter is living with patient (patient and spouse legally ). He also reports that he will drive her to appointments and diagnostics    Visit Vitals    /60 (BP 1 Location: Right arm, BP Patient Position: Sitting)    Pulse (!) 106    Temp 98.1 °F (36.7 °C) (Oral)    Resp 18    Ht 5' 6\" (1.676 m)    Wt 212 lb (96.2 kg)    LMP 01/01/1990    SpO2 98%    BMI 34.22 kg/m2     General:  Alert, cooperative, no distress, appears stated age. Head:  Normocephalic, without obvious abnormality, atraumatic. Eyes:  Conjunctivae/corneas clear. PERRL, EOMs intact. Fundi benign. Ears:  Normal TMs and external ear canals both ears. Nose: Nares normal. Septum midline. Mucosa normal. No drainage or sinus tenderness. Throat: Lips, mucosa, and tongue normal. Teeth and gums normal.   Neck: Supple, symmetrical, trachea midline, no adenopathy, thyroid: no enlargement/tenderness/nodules, no carotid bruit and no JVD. Back:   Symmetric, no curvature. ROM normal. No CVA tenderness. Lungs:   Clear to auscultation bilaterally.    Chest wall:  TTP right lateral thoracic mid to lower    Heart:  Regular rate and rhythm, S1, S2 normal, no murmur, click, rub or gallop. Breast Exam:  Not examined patient complaining of pain will defer until follow up appointment   Abdomen:   Soft, non-tender. Bowel sounds normal. No masses,  No organomegaly. Genitalia:  Not examined   Rectal:  Not examined   Extremities: Extremities normal, atraumatic, no cyanosis or edema. Pulses: 2+ and symmetric all extremities. Skin: Skin color, texture, turgor normal. No rashes or lesions. Lymph nodes: Cervical, supraclavicular, and axillary nodes normal.   Neurologic: CNII-XII intact. Normal strength, sensation and reflexes throughout. Patient Care Team:  Dawson Kauffman NP as PCP - [facility])        Diagnoses and all orders for this visit:    1. Routine general medical examination at a health care facility    2. Closed fracture of multiple ribs of right side with routine healing, subsequent encounter    3. History of tobacco abuse    Anticipatory guidance regarding health promotion for this age range and patient verbalizes understanding. Anticipatory guidance given  Rib fractures  Chest CT   Patient verbalizes understanding. I have discussed the diagnosis with the patient and the intended plan as seen in the above orders. The patient has received an after-visit summary and questions were answered concerning future plans. I have discussed medication side effects and warnings with the patient as well.     Follow-up Disposition:  Return in about 4 days (around 8/25/2017) for appointment with Yamel/for follow up CT scan of chest.

## 2017-08-21 NOTE — MR AVS SNAPSHOT
Visit Information Date & Time Provider Department Dept. Phone Encounter #  
 8/21/2017 12:20 PM FRANCK Dunbar [phone] [id number] Follow-up Instructions Return in about 4 days (around 8/25/2017) for appointment with Palma/for follow up CT scan of chest.  
  
Your Appointments 8/25/2017 10:20 AM  
ACUTE CARE with FRANCK Dunbar ([address]) Appt Note: fu labs per palma [address] [facility] [address] [id number] 2 Jignesh Dennis [address] [address] Upcoming Health Maintenance Date Due  
 MEDICARE YEARLY EXAM 8/22/2018 BREAST CANCER SCRN MAMMOGRAM 6/6/2019 GLAUCOMA SCREENING Q2Y 8/21/2019 DTaP/Tdap/Td series (2 - Td) 3/21/2027 COLONOSCOPY 4/27/2027 Allergies as of 8/21/2017  Review Complete On: 8/21/2017 By: Tod Dutta LPN Severity Noted Reaction Type Reactions Iodine  04/19/2010    Hives, Other (comments) Feels like Chest caving in  
  
Current Immunizations  Reviewed on 3/21/2017 Name Date Influenza High Dose Vaccine PF 1/21/2016 Influenza Vaccine PF 11/7/2013 Pneumococcal Conjugate (PCV-13) 11/7/2013 Not reviewed this visit You Were Diagnosed With   
  
 Codes Comments Routine general medical examination at a health care facility    -  Primary ICD-10-CM: Z00.00 ICD-9-CM: V70.0 Closed fracture of multiple ribs of right side with routine healing, subsequent encounter     ICD-10-CM: S22.41XD ICD-9-CM: V54.19 Tobacco abuse     ICD-10-CM: Z72.0 ICD-9-CM: 305.1 Vitals BP Pulse Temp Resp Height(growth percentile) Weight(growth percentile) 160/68 (BP 1 Location: Left arm, BP Patient Position: Sitting) (!) 106 98.1 °F (36.7 °C) (Oral) 18 5' 6\" (1.676 m) 212 lb (96.2 kg) LMP SpO2 BMI OB Status Smoking Status 01/01/1990 98% 34.22 kg/m2 Postmenopausal Former Smoker BMI and BSA Data Body Mass Index Body Surface Area  
 34.22 kg/m 2 2.12 m 2 Preferred Pharmacy Pharmacy Name Phone [address], [address] 17 [facility] 22 [phone] [facility] [phone] Your Updated Medication List  
  
   
This list is accurate as of: 8/21/17 12:49 PM.  Always use your most recent med list.  
  
  
  
  
 alendronate 70 mg tablet Commonly known as:  FOSAMAX Take 1 Tab by mouth every seven (7) days. Indications: POST-MENOPAUSAL OSTEOPOROSIS  
  
 amitriptyline 25 mg tablet Commonly known as:  ELAVIL  
  
 ARMOUR THYROID 180 mg Tab Generic drug:  Thyroid (Pork) Take  by mouth.  
  
 biotin 2,500 mcg Tab Take  by mouth. butalbital-acetaminophen-caffeine -40 mg per tablet Commonly known as:  Rise Jyotsna Take 1 Tab by mouth every six (6) hours as needed for Pain or Headache. Max Daily Amount: 4 Tabs. CITRACAL PO Take 1 Tab by mouth daily. diphenhydrAMINE 25 mg tablet Commonly known as:  BENADRYL ALLERGY Take 2 Tabs by mouth every six (6) hours as needed for Sleep. 50mg po at 7 hours prior to study and 1 hour prior to study CT Protocol IV Contrast-MVM FLAXSEED PO Take 1 Tab by mouth daily. gabapentin 300 mg capsule Commonly known as:  NEURONTIN Take 300 mg by mouth daily. hydroCHLOROthiazide 25 mg tablet Commonly known as:  HYDRODIURIL  
TAKE 1 TABLET BY MOUTH DAILY  
  
 levothyroxine 112 mcg tablet Commonly known as:  SYNTHROID  
112 mcg. MULTI-VITAMIN PO Take 1 Tab by mouth daily. naproxen 500 mg tablet Commonly known as:  NAPROSYN  
TAKE 1 TABLET BY MOUTH TWICE DAILY WITH MEALS  
  
 PEG 3350-Electrolytes 236-22.74-6.74 -5.86 gram suspension Commonly known as:  GO-LYTELY Take as directed for bowel prep  Indications: BOWEL EVACUATION  
  
 predniSONE 50 mg tablet Commonly known as:  Patrick Res  
 Take 1 Tab by mouth daily (with breakfast). Take 50mg po at 13 hours before procedure, then 7 hours and then 1 hour prior to study. CT protocol IV Contrast-MVM  
  
 raNITIdine 150 mg tablet Commonly known as:  ZANTAC Take 1 Tab by mouth two (2) times a day. 150mg po at 7 hours prior to study and 1 hour prior to study. sertraline 50 mg tablet Commonly known as:  ZOLOFT  
TAKE 1 TABLET BY MOUTH DAILY. INDICATIONS: ANXIETY WITH DEPRESSION. triamcinolone 55 mcg nasal inhaler Commonly known as:  NASACORT AQ  
2 Sprays daily. VITAMIN B-12 1,000 mcg tablet Generic drug:  cyanocobalamin Take 1,000 mcg by mouth daily. * VITAMIN D 2,000 unit Cap capsule Generic drug:  Cholecalciferol (Vitamin D3) Take  by mouth. * Cholecalciferol (Vitamin D3) 2,000 unit Cap capsule Commonly known as:  VITAMIN D3 Take  by Mouth. * Notice: This list has 2 medication(s) that are the same as other medications prescribed for you. Read the directions carefully, and ask your doctor or other care provider to review them with you. Follow-up Instructions Return in about 4 days (around 8/25/2017) for appointment with Yamel/for follow up CT scan of chest.  
  
To-Do List   
 08/22/2017 3:30 PM  
  Appointment with HBV CT RM 1 at [facility] RAD CT ([phone]) DIET RESTRICTIONS  Nothing to eat or drink 4 hours prior to study May have water to take meds  GENERAL INSTRUCTIONS  If you were given medications to take for a contrast allergy prior to having this study, please arrange to have someone drive you to your appointment. If you have had a creatinine level drawn within the past 30 days, please bring most recent results to your appt. This study does not require you to drink contrast prior to your study.   MEDICATIONS  Bring a complete list of all medications you are currently taking to include prescriptions, over-the-counter meds, herbals, vitamins & any dietary supplements. OUTSIDE FILMS  Bring outside films, CDs, and reports related to the study with you on the day of your exam.  QUESTIONS  Notify the CT Department if you have any questions concerning your study. [place] - [phone] [facility] [phone] Patient Instructions Stopping Smoking: Care Instructions Your Care Instructions Cigarette smokers crave the nicotine in cigarettes. Giving it up is much harder than simply changing a habit. Your body has to stop craving the nicotine. It is hard to quit, but you can do it. There are many tools that people use to quit smoking. You may find that combining tools works best for you. There are several steps to quitting. First you get ready to quit. Then you get support to help you. After that, you learn new skills and behaviors to become a nonsmoker. For many people, a necessary step is getting and using medicine. Your doctor will help you set up the plan that best meets your needs. You may want to attend a smoking cessation program to help you quit smoking. When you choose a program, look for one that has proven success. Ask your doctor for ideas. You will greatly increase your chances of success if you take medicine as well as get counseling or join a cessation program. 
Some of the changes you feel when you first quit tobacco are uncomfortable. Your body will miss the nicotine at first, and you may feel short-tempered and grumpy. You may have trouble sleeping or concentrating. Medicine can help you deal with these symptoms. You may struggle with changing your smoking habits and rituals. The last step is the tricky one: Be prepared for the smoking urge to continue for a time. This is a lot to deal with, but keep at it. You will feel better. Follow-up care is a key part of your treatment and safety.  Be sure to make and go to all appointments, and call your doctor if you are having problems. Its also a good idea to know your test results and keep a list of the medicines you take. How can you care for yourself at home? · Ask your family, friends, and coworkers for support. You have a better chance of quitting if you have help and support. · Join a support group, such as Nicotine Anonymous, for people who are trying to quit smoking. · Consider signing up for a smoking cessation program, such as the American Lung Association's Freedom from Smoking program. 
· Set a quit date. Pick your date carefully so that it is not right in the middle of a big deadline or stressful time. Once you quit, do not even take a puff. Get rid of all ashtrays and lighters after your last cigarette. Clean your house and your clothes so that they do not smell of smoke. · Learn how to be a nonsmoker. Think about ways you can avoid those things that make you reach for a cigarette. ¨ Avoid situations that put you at greatest risk for smoking. For some people, it is hard to have a drink with friends without smoking. For others, they might skip a coffee break with coworkers who smoke. ¨ Change your daily routine. Take a different route to work or eat a meal in a different place. · Cut down on stress. Calm yourself or release tension by doing an activity you enjoy, such as reading a book, taking a hot bath, or gardening. · Talk to your doctor or pharmacist about nicotine replacement therapy, which replaces the nicotine in your body. You still get nicotine but you do not use tobacco. Nicotine replacement products help you slowly reduce the amount of nicotine you need. These products come in several forms, many of them available over-the-counter: ¨ Nicotine patches ¨ Nicotine gum and lozenges ¨ Nicotine inhaler · Ask your doctor about bupropion (Wellbutrin) or varenicline (Chantix), which are prescription medicines. They do not contain nicotine.  They help you by reducing withdrawal symptoms, such as stress and anxiety. · Some people find hypnosis, acupuncture, and massage helpful for ending the smoking habit. · Eat a healthy diet and get regular exercise. Having healthy habits will help your body move past its craving for nicotine. · Be prepared to keep trying. Most people are not successful the first few times they try to quit. Do not get mad at yourself if you smoke again. Make a list of things you learned and think about when you want to try again, such as next week, next month, or next year. Where can you learn more? Go to http://alvinChainalyticsrogelio.info/. Enter R229 in the search box to learn more about \"Stopping Smoking: Care Instructions. \" Current as of: March 20, 2017 Content Version: 11.3 © 5106-6736 Onehub. Care instructions adapted under license by Like.fm (which disclaims liability or warranty for this information). If you have questions about a medical condition or this instruction, always ask your healthcare professional. [place] [address] any warranty or liability for your use of this information. CT Scan of the Chest: About This Test 
What is it? A CT (computed tomography) scan uses X-rays to make detailed pictures of your body and structures inside your body. A CT scan of the chest can give your doctor information about your lungs, your heart, and other structures in your chest. 
During the test, you will lie on a table that is attached to the CT scanner. The CT scanner is a large doughnut-shaped machine. Why is this test done? A CT scan of the chest can help find problems such as infection, lung cancer, blocked blood flow in the lung (pulmonary embolism), and other lung problems. It also can be used to see if cancer has spread into the chest from another area of the body. A low-dose CT scan is a different type of chest CT scan.  It may be used as a lung cancer screening test for some smokers. Your age and how much you've smoked help your doctor decide if this would be a helpful test for you. This type of CT scan uses less radiation than a regular chest CT scan. How can you prepare for the test? 
Talk to your doctor about all your health conditions before the test. For example, tell your doctor if: 
· You are or might be pregnant. · You are allergic to any medicines. · You have diabetes. · You take metformin. · You are breastfeeding. · You get nervous in confined spaces. You may need medicine to help you relax. What happens before the test? 
· You may have to take off jewelry. · You will take off all or most of your clothes and change into a gown. If you do leave some clothes on, make sure you take everything out of your pockets. · You may have contrast material (dye) put into your arm through a tube called an IV. Contrast material helps doctors see specific organs, blood vessels, and most tumors. What happens during the test? 
· You will lie on a table that is attached to the CT scanner. · The table will slide into the round opening of the scanner. The table will move during the scan. The scanner moves inside the doughnut-shaped casing around your body. · You will be asked to hold still during the scan. You may be asked to hold your breath for short periods. · You may be alone in the scanning room, but a technologist will be watching you through a window and talking with you during the test. 
What else should you know about the test? 
· A CT scan does not hurt. · If a dye is used, you may feel a quick sting or pinch when the IV is started. The dye may make you feel warm and flushed and give you a metallic taste in your mouth. Some people feel sick to their stomach or get a headache. · If you breastfeed and are concerned about whether the dye used in this test is safe, talk to your doctor.  Most experts believe that very little dye passes into breast milk and even less is passed on to the baby. But if you prefer, you can store some of your breast milk ahead of time and use it for a day or two after the test. 
· There is a small chance of getting cancer from some types of CT scans. The risk is higher in children, young adults, and people who have many radiation tests. If you are concerned about this risk, talk to your doctor about the benefits and risks of a CT scan and confirm that the test is needed. How long does the test take? · The test will take about 30 to 60 minutes. Most of this time is spent getting ready for the scan. The actual test only takes a few minutes. What happens after the test? 
· Drink plenty of fluids for 24 hours after the test if dye was used, unless your doctor tells you not to. When should you call for help? Watch closely for changes in your health, and be sure to contact your doctor if you have any problems. Follow-up care is a key part of your treatment and safety. Be sure to make and go to all appointments, and call your doctor if you are having problems. It's also a good idea to keep a list of the medicines you take. Ask your doctor when you can expect to have your test results. Where can you learn more? Go to http://alvin-rogelio.info/. Enter J618 in the search box to learn more about \"CT Scan of the Chest: About This Test.\" Current as of: November 28, 2016 Content Version: 11.3 © 0325-2525 Devtap. Care instructions adapted under license by BioMax (which disclaims liability or warranty for this information). If you have questions about a medical condition or this instruction, always ask your healthcare professional. [place] [address] any warranty or liability for your use of this information. Well Visit, Over 72: Care Instructions Your Care Instructions Physical exams can help you stay healthy. Your doctor has checked your overall health and may have suggested ways to take good care of yourself. He or she also may have recommended tests. At home, you can help prevent illness with healthy eating, regular exercise, and other steps. Follow-up care is a key part of your treatment and safety. Be sure to make and go to all appointments, and call your doctor if you are having problems. It's also a good idea to know your test results and keep a list of the medicines you take. How can you care for yourself at home? · Reach and stay at a healthy weight. This will lower your risk for many problems, such as obesity, diabetes, heart disease, and high blood pressure. · Get at least 30 minutes of exercise on most days of the week. Walking is a good choice. You also may want to do other activities, such as running, swimming, cycling, or playing tennis or team sports. · Do not smoke. Smoking can make health problems worse. If you need help quitting, talk to your doctor about stop-smoking programs and medicines. These can increase your chances of quitting for good. · Protect your skin from too much sun. When you're outdoors from 10 a.m. to 4 p.m., stay in the shade or cover up with clothing and a hat with a wide brim. Wear sunglasses that block UV rays. Even when it's cloudy, put broad-spectrum sunscreen (SPF 30 or higher) on any exposed skin. · See a dentist one or two times a year for checkups and to have your teeth cleaned. · Wear a seat belt in the car. · Limit alcohol to 2 drinks a day for men and 1 drink a day for women. Too much alcohol can cause health problems. Follow your doctor's advice about when to have certain tests. These tests can spot problems early. For men and women · Cholesterol. Your doctor will tell you how often to have this done based on your overall health and other things that can increase your risk for heart attack and stroke. · Blood pressure. Have your blood pressure checked during a routine doctor visit. Your doctor will tell you how often to check your blood pressure based on your age, your blood pressure results, and other factors. · Diabetes. Ask your doctor whether you should have tests for diabetes. · Vision. Experts recommend that you have yearly exams for glaucoma and other age-related eye problems. · Hearing. Tell your doctor if you notice any change in your hearing. You can have tests to find out how well you hear. · Colon cancer tests. Keep having colon cancer tests as your doctor recommends. You can have one of several types of tests. · Heart attack and stroke risk. At least every 4 to 6 years, you should have your risk for heart attack and stroke assessed. Your doctor uses factors such as your age, blood pressure, cholesterol, and whether you smoke or have diabetes to show what your risk for a heart attack or stroke is over the next 10 years. · Osteoporosis. Talk to your doctor about whether you should have a bone density test to find out whether you have thinning bones. Also ask your doctor about whether you should take calcium and vitamin D supplements. For women · Pap test and pelvic exam. You may no longer need a Pap test. Talk with your doctor about whether to stop or continue to have Pap tests. · Breast exam and mammogram. Ask how often you should have a mammogram, which is an X-ray of your breasts. A mammogram can spot breast cancer before it can be felt and when it is easiest to treat. · Thyroid disease. Talk to your doctor about whether to have your thyroid checked as part of a regular physical exam. Women have an increased chance of a thyroid problem. For men · Prostate exam. Talk to your doctor about whether you should have a blood test (called a PSA test) for prostate cancer.  Experts disagree on whether men should have this test. Some experts recommend that you discuss the benefits and risks of the test with your doctor. · Abdominal aortic aneurysm. Ask your doctor whether you should have a test to check for an aneurysm. You may need a test if you ever smoked or if your parent, brother, sister, or child has had an aneurysm. When should you call for help? Watch closely for changes in your health, and be sure to contact your doctor if you have any problems or symptoms that concern you. Where can you learn more? Go to http://alvin-rogelio.info/. Enter O539 in the search box to learn more about \"Well Visit, Over 65: Care Instructions. \" Current as of: July 19, 2016 Content Version: 11.3 © 8721-7103 Magisto. Care instructions adapted under license by GoAlbert (which disclaims liability or warranty for this information). If you have questions about a medical condition or this instruction, always ask your healthcare professional. [place] [address] any warranty or liability for your use of this information. Introducing [facility]! Dear Juvenal Trimble: Thank you for requesting a [employer] account. Our records indicate that you already have an active [employer] account. You can access your account anytime at [email or web address] Did you know that you can access your hospital and ER discharge instructions at any time in [employer]? You can also review all of your test results from your hospital stay or ER visit. Additional Information If you have questions, please visit the Frequently Asked Questions section of the [employer] website at [email or web address] Remember, [employer] is NOT to be used for urgent needs. For medical emergencies, dial 911. Now available from your iPhone and Android! Please provide this summary of care documentation to your next provider. Your primary care clinician is listed as Ade Barraza.  If you have any questions after today's visit, please call [phone].

## 2017-08-22 ENCOUNTER — HOSPITAL ENCOUNTER (OUTPATIENT)
Dept: CT IMAGING | Age: 72
Discharge: HOME | End: 2017-08-22
Attending: NURSE PRACTITIONER
Payer: MEDICARE

## 2017-08-22 DIAGNOSIS — R93.89 ABNORMAL X-RAY: ICD-10-CM

## 2017-08-22 LAB — CREAT UR-MCNC: 0.7 MG/DL (ref 0.6–1.3)

## 2017-08-22 PROCEDURE — 82565 ASSAY OF CREATININE: CPT

## 2017-08-22 PROCEDURE — 71260 CT THORAX DX C+: CPT

## 2017-08-22 PROCEDURE — 74011636320 HC RX REV CODE- 636/320: Performed by: NURSE PRACTITIONER

## 2017-08-22 RX ADMIN — IOPAMIDOL 80 ML: 612 INJECTION, SOLUTION INTRAVENOUS at 16:00

## 2017-08-24 RX ORDER — HYDROCODONE BITARTRATE AND ACETAMINOPHEN 5; 300 MG/1; MG/1
1 TABLET ORAL
COMMUNITY
End: 2018-10-19 | Stop reason: ALTCHOICE

## 2017-08-24 NOTE — PROGRESS NOTES
I left a message times two that I called. Her CT still shows that lesion on the 6 th rib. I would like her to see an oncologist for further evaluation. I will put in the ref as soon as patient is aware of the CT results.

## 2017-08-25 ENCOUNTER — OFFICE VISIT (OUTPATIENT)
Dept: FAMILY MEDICINE CLINIC | Age: 72
End: 2017-08-25

## 2017-08-25 VITALS
HEART RATE: 109 BPM | OXYGEN SATURATION: 98 % | HEIGHT: 66 IN | WEIGHT: 214 LBS | RESPIRATION RATE: 16 BRPM | TEMPERATURE: 97.7 F | DIASTOLIC BLOOD PRESSURE: 80 MMHG | SYSTOLIC BLOOD PRESSURE: 160 MMHG | BODY MASS INDEX: 34.39 KG/M2

## 2017-08-25 DIAGNOSIS — R92.8 ABNORMAL MAMMOGRAM OF LEFT BREAST: ICD-10-CM

## 2017-08-25 DIAGNOSIS — T14.8XXA BLISTER: ICD-10-CM

## 2017-08-25 DIAGNOSIS — L29.9 ITCHING: ICD-10-CM

## 2017-08-25 DIAGNOSIS — R93.89 ABNORMAL CHEST CT: Primary | ICD-10-CM

## 2017-08-25 RX ORDER — CETIRIZINE HCL 10 MG
10 TABLET ORAL
Qty: 30 TAB | Refills: 1 | Status: SHIPPED | OUTPATIENT
Start: 2017-08-25 | End: 2017-09-19 | Stop reason: SDUPTHER

## 2017-08-25 NOTE — PROGRESS NOTES
1. Have you been to the ER, urgent care clinic since your last visit? Hospitalized since your last visit? No    2. Have you seen or consulted any other health care providers outside of the [facility] since your last visit? Include any pap smears or colon screening. No  Subjective:   Gurdeep Natarajan is a 71 y.o. female with her estranged , Fernanda Crump, here today for CT of chest results----see last encounter. Reviewed with patient the followin. Left lateral rib 6 lesion remains nonspecific and this same rib has a healing  posterior rib fracture distant from the lesion. While fibrous dysplasia can have  this appearance, neoplastic or metastatic involvement cannot be excluded. Further workup may be indicated.     2. Multiple right rib fractures as detailed. No pneumothorax.     3. Left breast focal calcification, advise correlation with mammographic imaging  if clinically indicated. Last mammogram 17 at [facility] [address] [place] [address] normal study. ROS: +anxiety and tears, patient has talked with her  regarding her medical issues, +pain on left posterior and lateral midthoracic region moderate pain today bilateral ribs (5/10), denies falls, denies coughing, +fell asleep with heating pad on left posterior thoracic back and noticed a blister, she also reports that she has notices a pruritic rash lower back. Itching is intense at times per patient. Patient continues to smoke cigarettes.         Current Outpatient Prescriptions   Medication Sig Dispense Refill    cetirizine (ZYRTEC) 10 mg tablet Take 1 Tab by mouth daily as needed for Itching. 30 Tab 1    hydroCHLOROthiazide (HYDRODIURIL) 25 mg tablet TAKE 1 TABLET BY MOUTH DAILY 90 Tab 0    alendronate (FOSAMAX) 70 mg tablet Take 1 Tab by mouth every seven (7) days. Indications: POST-MENOPAUSAL OSTEOPOROSIS 4 Tab 11    Cholecalciferol, Vitamin D3, (VITAMIN D3) 2,000 unit cap capsule Take  by Mouth.       cyanocobalamin (VITAMIN B-12) 1,000 mcg tablet Take 1,000 mcg by mouth daily.  amitriptyline (ELAVIL) 25 mg tablet   6    triamcinolone (NASACORT AQ) 55 mcg nasal inhaler 2 Sprays daily.  Thyroid, Pork, (ARMOUR THYROID) 180 mg tab Take  by mouth.  biotin 2,500 mcg tab Take  by mouth.  MULTIVITAMINS (MULTI-VITAMIN PO) Take 1 Tab by mouth daily.  CALCIUM CITRATE (CITRACAL PO) Take 1 Tab by mouth daily.  FLAXSEED PO Take 1 Tab by mouth daily.  HYDROcodone-acetaminophen (VICODIN) 5-300 mg tablet Take 1 Tab by mouth every four (4) hours as needed.  PEG 3350-Electrolytes (GO-LYTELY) 236-22.74-6.74 -5.86 gram suspension Take as directed for bowel prep  Indications: BOWEL EVACUATION 1 Bottle 0      Wt Readings from Last 3 Encounters:   08/25/17 214 lb (97.1 kg)   08/21/17 212 lb (96.2 kg)   08/09/17 217 lb (98.4 kg)     BP Readings from Last 3 Encounters:   08/25/17 160/80   08/21/17 140/60   08/09/17 126/88     PMH: reviewed medications and allergy lists and medical and family history. OBJECTIVE:  Awake and alert in no acute distress  Lungs clear throughout  S1 S2 RRR without ectopy or murmur auscultated. Breasts: breasts appear normal, no suspicious masses, no skin or nipple changes or axillary nodes, risk and benefit of breast self-exam was discussed. Musculoskeletal: right lateral ribs with TTP  Left lateral thoracic rib with TTP mid thoracic   Integumentary: maculopapular rash lower spine approximately 1.5 cm   One blister visible with fluid clear, left scapular region inferior no erythema approximately 5 mm   Visit Vitals    /80 (BP 1 Location: Left arm, BP Patient Position: Sitting)    Pulse (!) 109    Temp 97.7 °F (36.5 °C) (Oral)    Ht 5' 6\" (1.676 m)    Wt 214 lb (97.1 kg)    SpO2 98%    BMI 34.54 kg/m2     Diagnoses and all orders for this visit:    1. Abnormal chest CT  -     REFERRAL TO PULMONARY DISEASE  -     REFERRAL TO ONCOLOGY    2.  Abnormal mammogram of left breast  -     DURAN MAMMO LT DX INCL CAD; Future  -     US BREAST LT LIMITED=<3 QUAD; Future    3. Itching  -     cetirizine (ZYRTEC) 10 mg tablet; Take 1 Tab by mouth daily as needed for Itching. 4. Blister    Anticipatory guidance given  General comfort measures  Advised to leave the blister and if it ruptures then use bacitracin Zinc topical to area tid for 7 days  Do not cover  Patient verbalizes understanding. I have discussed the diagnosis with the patient and the intended plan as seen in the above orders. The patient has received an after-visit summary and questions were answered concerning future plans. I have discussed medication side effects and warnings with the patient as well. Follow-up Disposition:  Return if symptoms worsen or fail to improve. Time with Pt 15 minutes, >50% of which was counseling pt regarding Dx and Tx options diagnostics and referrals and coordination of care.

## 2017-08-25 NOTE — MR AVS SNAPSHOT
Visit Information Date & Time Provider Department Dept. Phone Encounter #  
 8/25/2017 10:20 AM Bernie Becerril NP [facility] [phone] [id number] Follow-up Instructions Return if symptoms worsen or fail to improve. Upcoming Health Maintenance Date Due  
 MEDICARE YEARLY EXAM 8/22/2018 BREAST CANCER SCRN MAMMOGRAM 6/6/2019 GLAUCOMA SCREENING Q2Y 8/21/2019 DTaP/Tdap/Td series (2 - Td) 3/21/2027 COLONOSCOPY 4/27/2027 Allergies as of 8/25/2017  Review Complete On: 8/25/2017 By: Bernie Becerril NP Severity Noted Reaction Type Reactions Iodine  04/19/2010    Hives, Other (comments) Feels like Chest caving in  
  
Current Immunizations  Reviewed on 3/21/2017 Name Date Influenza High Dose Vaccine PF 1/21/2016 Influenza Vaccine PF 11/7/2013 Pneumococcal Conjugate (PCV-13) 11/7/2013 Not reviewed this visit You Were Diagnosed With   
  
 Codes Comments Abnormal chest CT    -  Primary ICD-10-CM: R93.8 ICD-9-CM: 793.2 Abnormal mammogram of left breast     ICD-10-CM: R92.8 ICD-9-CM: 793.80 Itching     ICD-10-CM: L29.9 ICD-9-CM: 698.9 Blister     ICD-10-CM: T14.8 ICD-9-CM: 919.2 Vitals BP Pulse Temp Resp Height(growth percentile) Weight(growth percentile) 160/80 (BP 1 Location: Left arm, BP Patient Position: Sitting) (!) 109 97.7 °F (36.5 °C) (Oral) 16 5' 6\" (1.676 m) 214 lb (97.1 kg) LMP SpO2 BMI OB Status Smoking Status 01/01/1990 98% 34.54 kg/m2 Postmenopausal Former Smoker Vitals History BMI and BSA Data Body Mass Index Body Surface Area 34.54 kg/m 2 2.13 m 2 Preferred Pharmacy Pharmacy Name Phone [address] [facility] [phone] Your Updated Medication List  
  
   
This list is accurate as of: 8/25/17 11:05 AM.  Always use your most recent med list.  
  
  
  
  
 alendronate 70 mg tablet Commonly known as:  FOSAMAX Take 1 Tab by mouth every seven (7) days. Indications: POST-MENOPAUSAL OSTEOPOROSIS  
  
 amitriptyline 25 mg tablet Commonly known as:  ELAVIL  
  
 ARMOUR THYROID 180 mg Tab Generic drug:  Thyroid (Pork) Take  by mouth.  
  
 biotin 2,500 mcg Tab Take  by mouth. cetirizine 10 mg tablet Commonly known as:  ZYRTEC Take 1 Tab by mouth daily as needed for Itching. Cholecalciferol (Vitamin D3) 2,000 unit Cap capsule Commonly known as:  VITAMIN D3 Take  by Mouth. CITRACAL PO Take 1 Tab by mouth daily. FLAXSEED PO Take 1 Tab by mouth daily. hydroCHLOROthiazide 25 mg tablet Commonly known as:  HYDRODIURIL  
TAKE 1 TABLET BY MOUTH DAILY MULTI-VITAMIN PO Take 1 Tab by mouth daily. PEG 3350-Electrolytes 236-22.74-6.74 -5.86 gram suspension Commonly known as:  GO-LYTELY Take as directed for bowel prep  Indications: BOWEL EVACUATION  
  
 triamcinolone 55 mcg nasal inhaler Commonly known as:  NASACORT AQ  
2 Sprays daily. VICODIN 5-300 mg tablet Generic drug:  HYDROcodone-acetaminophen Take 1 Tab by mouth every four (4) hours as needed. VITAMIN B-12 1,000 mcg tablet Generic drug:  cyanocobalamin Take 1,000 mcg by mouth daily. Prescriptions Sent to Pharmacy Refills  
 cetirizine (ZYRTEC) 10 mg tablet 1 Sig: Take 1 Tab by mouth daily as needed for Itching. Class: Normal  
 Pharmacy: [address] [place] [address] Ph #: [phone] Route: Oral  
  
We Performed the Following REFERRAL TO PULMONARY DISEASE [QGV92 Custom] Comments:  
 Please evaluate patient for abnormal chest ct. Follow-up Instructions Return if symptoms worsen or fail to improve. To-Do List   
 08/25/2017 Imaging:  DURAN MAMMO LT DX INCL CAD   
  
 08/25/2017 Imaging:  US BREAST LT LIMITED=<3 QUAD Referral Information Referral ID Referred By Referred To  
  
 [id number] Latesha Barraza, Char Jiang MD   
   [address] [place] [address] [address] [facility] Alicia Sinclair, [address] [address] Phone: [phone] Fax: [phone] Visits Status Start Date End Date 1 New Request 8/25/17 8/25/18 If your referral has a status of pending review or denied, additional information will be sent to support the outcome of this decision. Introducing [facility]! Dear Amada Alarcon: Thank you for requesting a [employer] account. Our records indicate that you already have an active [employer] account. You can access your account anytime at [email or web address] Did you know that you can access your hospital and ER discharge instructions at any time in [employer]? You can also review all of your test results from your hospital stay or ER visit. Additional Information If you have questions, please visit the Frequently Asked Questions section of the [employer] website at [email or web address] Remember, [employer] is NOT to be used for urgent needs. For medical emergencies, dial 911. Now available from your iPhone and Android! Please provide this summary of care documentation to your next provider. Your primary care clinician is listed as Jamey Barraza. If you have any questions after today's visit, please call [phone].

## 2017-08-28 NOTE — PROGRESS NOTES
Pt has been seen by Cuco Capone and referred to both pulmonology and oncology. Pt is aware of CT results.

## 2017-08-30 ENCOUNTER — OFFICE VISIT (OUTPATIENT)
Dept: PULMONOLOGY | Age: 72
End: 2017-08-30

## 2017-08-30 VITALS
TEMPERATURE: 97.6 F | DIASTOLIC BLOOD PRESSURE: 70 MMHG | HEIGHT: 66 IN | RESPIRATION RATE: 20 BRPM | WEIGHT: 210 LBS | SYSTOLIC BLOOD PRESSURE: 134 MMHG | BODY MASS INDEX: 33.75 KG/M2 | HEART RATE: 99 BPM | OXYGEN SATURATION: 99 %

## 2017-08-30 DIAGNOSIS — Z87.891 EX-SMOKER: ICD-10-CM

## 2017-08-30 DIAGNOSIS — R91.1 LUNG NODULE: Primary | ICD-10-CM

## 2017-08-30 DIAGNOSIS — S22.49XA MULTIPLE RIB FRACTURES INVOLVING FOUR OR MORE RIBS: ICD-10-CM

## 2017-08-30 NOTE — MR AVS SNAPSHOT
Visit Information Date & Time Provider Department Dept. Phone Encounter #  
 8/30/2017  3:00 PM Desmond Higgins MD [facility] [id number] Follow-up Instructions Return in about 1 year (around 8/30/2018). Upcoming Health Maintenance Date Due  
 MEDICARE YEARLY EXAM 8/22/2018 BREAST CANCER SCRN MAMMOGRAM 6/6/2019 GLAUCOMA SCREENING Q2Y 8/21/2019 DTaP/Tdap/Td series (2 - Td) 3/21/2027 COLONOSCOPY 4/27/2027 Allergies as of 8/30/2017  Review Complete On: 8/30/2017 By: John Newell LPN Severity Noted Reaction Type Reactions Iodine Medium 04/19/2010   Systemic Hives, Rash, Other (comments) Feels like Chest caving in. Rash LL lumbosacral area Current Immunizations  Reviewed on 3/21/2017 Name Date Influenza High Dose Vaccine PF 1/21/2016 Influenza Vaccine PF 11/7/2013 Pneumococcal Conjugate (PCV-13) 11/7/2013 Not reviewed this visit Vitals BP Pulse Temp Resp Height(growth percentile) Weight(growth percentile) 134/70 (BP 1 Location: Left arm, BP Patient Position: Sitting) 99 97.6 °F (36.4 °C) 20 5' 6\" (1.676 m) 210 lb (95.3 kg) LMP SpO2 BMI OB Status Smoking Status 01/01/1990 99% 33.89 kg/m2 Postmenopausal Former Smoker BMI and BSA Data Body Mass Index Body Surface Area  
 33.89 kg/m 2 2.11 m 2 Preferred Pharmacy Pharmacy Name Phone [address] [facility] [address] [place] [address] [facility] [phone] Your Updated Medication List  
  
   
This list is accurate as of: 8/30/17  3:54 PM.  Always use your most recent med list.  
  
  
  
  
 alendronate 70 mg tablet Commonly known as:  FOSAMAX Take 1 Tab by mouth every seven (7) days. Indications: POST-MENOPAUSAL OSTEOPOROSIS  
  
 amitriptyline 25 mg tablet Commonly known as:  ELAVIL Take 25 mg by mouth nightly. ARMOUR THYROID 180 mg Tab Generic drug:  Thyroid (Pork) Take  by mouth.  
  
 biotin 2,500 mcg Tab Take  by mouth daily. cetirizine 10 mg tablet Commonly known as:  ZYRTEC Take 1 Tab by mouth daily as needed for Itching. Cholecalciferol (Vitamin D3) 2,000 unit Cap capsule Commonly known as:  VITAMIN D3 Take  by Mouth. CITRACAL PO Take 1 Tab by mouth daily. FLAXSEED PO Take 1 Tab by mouth daily. hydroCHLOROthiazide 25 mg tablet Commonly known as:  HYDRODIURIL  
TAKE 1 TABLET BY MOUTH DAILY MULTI-VITAMIN PO Take 1 Tab by mouth daily. PEG 3350-Electrolytes 236-22.74-6.74 -5.86 gram suspension Commonly known as:  GO-LYTELY Take as directed for bowel prep  Indications: BOWEL EVACUATION  
  
 triamcinolone 55 mcg nasal inhaler Commonly known as:  NASACORT AQ  
2 Sprays daily. VICODIN 5-300 mg tablet Generic drug:  HYDROcodone-acetaminophen Take 1 Tab by mouth every four (4) hours as needed. VITAMIN B-12 1,000 mcg tablet Generic drug:  cyanocobalamin Take 1,000 mcg by mouth daily. Follow-up Instructions Return in about 1 year (around 8/30/2018). Patient Instructions Stopping Smoking: Care Instructions Your Care Instructions Cigarette smokers crave the nicotine in cigarettes. Giving it up is much harder than simply changing a habit. Your body has to stop craving the nicotine. It is hard to quit, but you can do it. There are many tools that people use to quit smoking. You may find that combining tools works best for you. There are several steps to quitting. First you get ready to quit. Then you get support to help you. After that, you learn new skills and behaviors to become a nonsmoker. For many people, a necessary step is getting and using medicine. Your doctor will help you set up the plan that best meets your needs. You may want to attend a smoking cessation program to help you quit smoking. When you choose a program, look for one that has proven success. Ask your doctor for ideas. You will greatly increase your chances of success if you take medicine as well as get counseling or join a cessation program. 
Some of the changes you feel when you first quit tobacco are uncomfortable. Your body will miss the nicotine at first, and you may feel short-tempered and grumpy. You may have trouble sleeping or concentrating. Medicine can help you deal with these symptoms. You may struggle with changing your smoking habits and rituals. The last step is the tricky one: Be prepared for the smoking urge to continue for a time. This is a lot to deal with, but keep at it. You will feel better. Follow-up care is a key part of your treatment and safety. Be sure to make and go to all appointments, and call your doctor if you are having problems. Its also a good idea to know your test results and keep a list of the medicines you take. How can you care for yourself at home? · Ask your family, friends, and coworkers for support. You have a better chance of quitting if you have help and support. · Join a support group, such as Nicotine Anonymous, for people who are trying to quit smoking. · Consider signing up for a smoking cessation program, such as the American Lung Association's Freedom from Smoking program. 
· Set a quit date. Pick your date carefully so that it is not right in the middle of a big deadline or stressful time. Once you quit, do not even take a puff. Get rid of all ashtrays and lighters after your last cigarette. Clean your house and your clothes so that they do not smell of smoke. · Learn how to be a nonsmoker. Think about ways you can avoid those things that make you reach for a cigarette. ¨ Avoid situations that put you at greatest risk for smoking. For some people, it is hard to have a drink with friends without smoking. For others, they might skip a coffee break with coworkers who smoke. ¨ Change your daily routine. Take a different route to work or eat a meal in a different place. · Cut down on stress. Calm yourself or release tension by doing an activity you enjoy, such as reading a book, taking a hot bath, or gardening. · Talk to your doctor or pharmacist about nicotine replacement therapy, which replaces the nicotine in your body. You still get nicotine but you do not use tobacco. Nicotine replacement products help you slowly reduce the amount of nicotine you need. These products come in several forms, many of them available over-the-counter: ¨ Nicotine patches ¨ Nicotine gum and lozenges ¨ Nicotine inhaler · Ask your doctor about bupropion (Wellbutrin) or varenicline (Chantix), which are prescription medicines. They do not contain nicotine. They help you by reducing withdrawal symptoms, such as stress and anxiety. · Some people find hypnosis, acupuncture, and massage helpful for ending the smoking habit. · Eat a healthy diet and get regular exercise. Having healthy habits will help your body move past its craving for nicotine. · Be prepared to keep trying. Most people are not successful the first few times they try to quit. Do not get mad at yourself if you smoke again. Make a list of things you learned and think about when you want to try again, such as next week, next month, or next year. Where can you learn more? Go to http://alvin-rogelio.info/. Enter G308 in the search box to learn more about \"Stopping Smoking: Care Instructions. \" Current as of: March 20, 2017 Content Version: 11.3 © 3418-0373 [employer]. Care instructions adapted under license by [employer] (which disclaims liability or warranty for this information). If you have questions about a medical condition or this instruction, always ask your healthcare professional. [address] any warranty or liability for your use of this information. Introducing [facility]! Dear Zahraa Spencer: Thank you for requesting a [employer] account. Our records indicate that you already have an active [employer] account. You can access your account anytime at [email or web address]. ViaBill/"[employer] Did you know that you can access your hospital and ER discharge instructions at any time in [employer]? You can also review all of your test results from your hospital stay or ER visit. Additional Information If you have questions, please visit the Frequently Asked Questions section of the [employer] website at [email or web address]. [employer]/"[employer]/. Remember, [employer] is NOT to be used for urgent needs. For medical emergencies, dial 911. Now available from your iPhone and Android! Please provide this summary of care documentation to your next provider. Your primary care clinician is listed as Nguyễn Aguilera. If you have any questions after today's visit, please call [phone].

## 2017-08-30 NOTE — PATIENT INSTRUCTIONS

## 2017-08-30 NOTE — PROGRESS NOTES
[facility]  Pulmonary, Critical Care, and Sleep Medicine      Pulmonary Office Initial Consultation    Name: Kaley Alatorre     : 1945     Date: 2017        Subjective:   Patient has been referred for evaluation of: Abnormal Ct scan of chest findings. Patient is a 71 y.o. female with c/o right side pain.  fell down 6 stairs. It did not hurt right away. No pain untl later  Now it hurts to make any movement. She denies shortness of breath. She denies fever. Had Ct scan chest after CXR and referred to pulmonology. Denies any cough. ,wheezing, chest pain, fever, chills, night sweats dyspepsia, reflux. Treatment so far- pain control  Imaging studies- CXR and CT scan chest  Other testing- mammogram  Comorbid conditions include- HTN, anxiety/depression, breast pain- recurring. exsmoker - quit 1 month back  Occupational exposure-none      Past Medical History:   Diagnosis Date    Anxiety 2010    Arthritis     OA spine, left knee    Depression 2010    Headache     migraines    HTN (hypertension) 2010    Hypothyroid 2010    Osteopenia     Rib fractures 2017    r/t fall    Seasonal allergic rhinitis 2010    Seasonal allergies     Spondylolisthesis of lumbar region 2016    Dr. Laura Mcginnis [facility]    Thyroid disease     hypothyroid,  thromeagaly       Past Surgical History:   Procedure Laterality Date    BREAST SURGERY PROCEDURE UNLISTED      breast cyst s/p biopsy     HX CATARACT REM[facility]AL  2011    [facility]  HX CATARACT REMOVAL  2011    right eye 2 weeks apart from left [facility].  Dr. Ramos Davsi    HX COLONOSCOPY      HX HEENT      partical parathyroidectomy     HX ORTHOPAEDIC      rotator cuff repqir 10/1996    NEUROLOGICAL PROCEDURE UNLISTED  13    meninginoma Dr. Lanette Cabrera       Social History     Social History    Marital status: LEGALLY      Spouse name: N/A    Number of children: N/A    Years of education: N/A     Social History Main Topics    Smoking status: Former Smoker     Packs/day: 0.25     Years: 40.00     Types: Cigarettes     Start date: 8/11/1970     Quit date: 8/17/2017    Smokeless tobacco: Never Used      Comment: patient reports that she stopped smoking severa days ago    Alcohol use Yes      Comment: wine for communion only    Drug use: No    Sexual activity: Yes     Partners: Male     Other Topics Concern    None     Social History Narrative       Family History   Problem Relation Age of Onset    Cancer Brother      brain       Allergies   Allergen Reactions    Iodine Hives, Rash and Other (comments)     Feels like Chest caving in. Rash LL lumbosacral area       . Current Outpatient Prescriptions   Medication Sig Dispense Refill    cetirizine (ZYRTEC) 10 mg tablet Take 1 Tab by mouth daily as needed for Itching. 30 Tab 1    HYDROcodone-acetaminophen (VICODIN) 5-300 mg tablet Take 1 Tab by mouth every four (4) hours as needed.  hydroCHLOROthiazide (HYDRODIURIL) 25 mg tablet TAKE 1 TABLET BY MOUTH DAILY 90 Tab 0    alendronate (FOSAMAX) 70 mg tablet Take 1 Tab by mouth every seven (7) days. Indications: POST-MENOPAUSAL OSTEOPOROSIS 4 Tab 11    Cholecalciferol, Vitamin D3, (VITAMIN D3) 2,000 unit cap capsule Take  by Mouth.  amitriptyline (ELAVIL) 25 mg tablet Take 25 mg by mouth nightly. 6    triamcinolone (NASACORT AQ) 55 mcg nasal inhaler 2 Sprays daily.  Thyroid, Pork, (ARMOUR THYROID) 180 mg tab Take  by mouth.  biotin 2,500 mcg tab Take  by mouth daily.  MULTIVITAMINS (MULTI-VITAMIN PO) Take 1 Tab by mouth daily.  CALCIUM CITRATE (CITRACAL PO) Take 1 Tab by mouth daily.  FLAXSEED PO Take 1 Tab by mouth daily.       PEG 3350-Electrolytes (GO-LYTELY) 236-22.74-6.74 -5.86 gram suspension Take as directed for bowel prep  Indications: BOWEL EVACUATION 1 Bottle 0    cyanocobalamin (VITAMIN B-12) 1,000 mcg tablet Take 1,000 mcg by mouth daily. Review of Systems:  HEENT: No epistaxis, no nasal drainage, no difficulty in swallowing, no redness in eyes  Respiratory: as above  Cardiovascular: no chest pain, no palpitations, no chronic leg edema, no syncope  Gastrointestinal: no abd pain, no vomiting, no diarrhea, no bleeding symptoms  Genitourinary: No urinary symptoms or hematuria  Integument/breast: No ulcers or rashes  Musculoskeletal:Neg  Neurological: No focal weakness, no seizures, no headaches  Behvioral/Psych: No anxiety, no depression  Constitutional: No fever, no chills, no weight loss, no night sweats     Objective:     Visit Vitals    /70 (BP 1 Location: Left arm, BP Patient Position: Sitting)    Pulse 99    Temp 97.6 °F (36.4 °C)    Resp 20    Ht 5' 6\" (1.676 m)    Wt 95.3 kg (210 lb)    LMP 01/01/1990    SpO2 99%    BMI 33.89 kg/m2        Physical Exam:   General: comfortable, no acute distress  HEENT: pupils reactive, sclera anicteric, EOM intact  Neck: No adenopathy or thyroid swelling, no lymphadenopathy or JVD, supple  Chest wall: tender on right  CVS: S1S2 no murmurs  RS: Mod AE bilaterally, no tactile fremitus or egophony, no accessory muscle use  Abd: soft, non tender, no hepatosplenomegaly  Neuro: non focal, awake, alert  Extrm: no leg edema, clubbing or cyanosis  Skin: no rash    Data review:   Pertinent labs: CBC, BMP, LFT's    Imaging:  I have personally reviewed the patients radiographs and have reviewed the reports:  CT scan chest 8/22/2017  Impression:   1. Left lateral rib 6 lesion remains nonspecific and this same rib has a healing posterior rib fracture distant from the lesion. While fibrous dysplasia can have this appearance, neoplastic or metastatic involvement cannot be excluded. Further workup may be indicated.     2. Multiple right rib fractures as detailed. No pneumothorax.      Patient Active Problem List   Diagnosis Code    HTN (hypertension) I10    Hypothyroid E03.9    Arthritis M19.90    Depression F32.9    Anxiety F41.9    Seasonal allergic rhinitis J30.2    Migraines G43.909    Insomnia G47.00    BPPV (benign paroxysmal positional vertigo) H81.10    Meningioma (HCC) D32.9    Spondylolisthesis of lumbar region M43.16    History of thyroidectomy E89.0    Post-menopausal osteoporosis M81.0    History of tobacco abuse Z87.891     IMPRESSION:   · Abnormal Ct scan of chest- incidental findings of left pleural fissural fibrotic scar and a Left lateral rib 6 lesion remains nonspecific and this same rib has a healing posterior rib fracture distant from the lesion. While fibrous dysplasia can have this appearance, neoplastic or metastatic involvement cannot be excluded. · Ex- smoker- quit about 1 month back  · Right sided rib fractures from fall- current chest wall pain and splinting  · H/o breast pain      RECOMMENDATIONS:   · Will encourage use of incentive spirometer, deep breathing with pain control  · Will need follow up with imaging- LDCT in 1 year. Discussed with patient rationale and completed informed shared decision with emphasis on need for continued smoking cessation. · Will follow up with PCP- breast mammogram follow up. · Will call if any change in symptoms otherwise will follow up annually. · screening PFT for COPD recommended  · Preventive vaccinations- Pneumovax, influenza. Health maintenance screens deferred to Primary care provider.      Yecenia Little MD

## 2017-09-05 ENCOUNTER — ANESTHESIA EVENT (OUTPATIENT)
Dept: ENDOSCOPY | Age: 72
End: 2017-09-05
Payer: MEDICARE

## 2017-09-06 ENCOUNTER — ANESTHESIA (OUTPATIENT)
Dept: ENDOSCOPY | Age: 72
End: 2017-09-06
Payer: MEDICARE

## 2017-09-06 ENCOUNTER — HOSPITAL ENCOUNTER (OUTPATIENT)
Age: 72
Setting detail: OUTPATIENT SURGERY
Discharge: HOME | End: 2017-09-06
Attending: COLON & RECTAL SURGERY | Admitting: COLON & RECTAL SURGERY
Payer: MEDICARE

## 2017-09-06 VITALS
TEMPERATURE: 97.8 F | OXYGEN SATURATION: 98 % | HEIGHT: 66 IN | DIASTOLIC BLOOD PRESSURE: 53 MMHG | HEART RATE: 99 BPM | BODY MASS INDEX: 33.2 KG/M2 | RESPIRATION RATE: 16 BRPM | WEIGHT: 206.6 LBS | SYSTOLIC BLOOD PRESSURE: 139 MMHG

## 2017-09-06 PROCEDURE — 77030013992 HC SNR POLYP ENDOSC BSC -B: Performed by: COLON & RECTAL SURGERY

## 2017-09-06 PROCEDURE — C1729 CATH, DRAINAGE: HCPCS | Performed by: COLON & RECTAL SURGERY

## 2017-09-06 PROCEDURE — 74011250636 HC RX REV CODE- 250/636: Performed by: NURSE ANESTHETIST, CERTIFIED REGISTERED

## 2017-09-06 PROCEDURE — 77030018846 HC SOL IRR STRL H20 ICUM -A: Performed by: COLON & RECTAL SURGERY

## 2017-09-06 PROCEDURE — 77030009426 HC FCPS BIOP ENDOSC BSC -B: Performed by: COLON & RECTAL SURGERY

## 2017-09-06 PROCEDURE — 74011000250 HC RX REV CODE- 250: Performed by: NURSE ANESTHETIST, CERTIFIED REGISTERED

## 2017-09-06 PROCEDURE — 74011250636 HC RX REV CODE- 250/636

## 2017-09-06 PROCEDURE — 76060000031 HC ANESTHESIA FIRST 0.5 HR: Performed by: COLON & RECTAL SURGERY

## 2017-09-06 PROCEDURE — 76040000019: Performed by: COLON & RECTAL SURGERY

## 2017-09-06 PROCEDURE — 77030008565 HC TBNG SUC IRR ERBE -B: Performed by: COLON & RECTAL SURGERY

## 2017-09-06 RX ORDER — SODIUM CHLORIDE 0.9 % (FLUSH) 0.9 %
5-10 SYRINGE (ML) INJECTION AS NEEDED
Status: DISCONTINUED | OUTPATIENT
Start: 2017-09-06 | End: 2017-09-06 | Stop reason: HOSPADM

## 2017-09-06 RX ORDER — SODIUM CHLORIDE 0.9 % (FLUSH) 0.9 %
5-10 SYRINGE (ML) INJECTION EVERY 8 HOURS
Status: DISCONTINUED | OUTPATIENT
Start: 2017-09-06 | End: 2017-09-06 | Stop reason: HOSPADM

## 2017-09-06 RX ORDER — SODIUM CHLORIDE 9 MG/ML
INJECTION INTRAMUSCULAR; INTRAVENOUS; SUBCUTANEOUS
Status: DISCONTINUED
Start: 2017-09-06 | End: 2017-09-06 | Stop reason: HOSPADM

## 2017-09-06 RX ORDER — HYDROMORPHONE HYDROCHLORIDE 1 MG/ML
0.5 INJECTION, SOLUTION INTRAMUSCULAR; INTRAVENOUS; SUBCUTANEOUS
Status: DISCONTINUED | OUTPATIENT
Start: 2017-09-06 | End: 2017-09-06 | Stop reason: HOSPADM

## 2017-09-06 RX ORDER — PROPOFOL 10 MG/ML
INJECTION, EMULSION INTRAVENOUS AS NEEDED
Status: DISCONTINUED | OUTPATIENT
Start: 2017-09-06 | End: 2017-09-06 | Stop reason: HOSPADM

## 2017-09-06 RX ORDER — DIPHENHYDRAMINE HYDROCHLORIDE 50 MG/ML
12.5 INJECTION, SOLUTION INTRAMUSCULAR; INTRAVENOUS
Status: DISCONTINUED | OUTPATIENT
Start: 2017-09-06 | End: 2017-09-06 | Stop reason: HOSPADM

## 2017-09-06 RX ORDER — NALOXONE HYDROCHLORIDE 0.4 MG/ML
0.04 INJECTION, SOLUTION INTRAMUSCULAR; INTRAVENOUS; SUBCUTANEOUS AS NEEDED
Status: DISCONTINUED | OUTPATIENT
Start: 2017-09-06 | End: 2017-09-06 | Stop reason: HOSPADM

## 2017-09-06 RX ORDER — ALBUTEROL SULFATE 0.83 MG/ML
2.5 SOLUTION RESPIRATORY (INHALATION) AS NEEDED
Status: DISCONTINUED | OUTPATIENT
Start: 2017-09-06 | End: 2017-09-06 | Stop reason: HOSPADM

## 2017-09-06 RX ORDER — ONDANSETRON 2 MG/ML
4 INJECTION INTRAMUSCULAR; INTRAVENOUS ONCE
Status: DISCONTINUED | OUTPATIENT
Start: 2017-09-06 | End: 2017-09-06 | Stop reason: HOSPADM

## 2017-09-06 RX ORDER — SODIUM CHLORIDE, SODIUM LACTATE, POTASSIUM CHLORIDE, CALCIUM CHLORIDE 600; 310; 30; 20 MG/100ML; MG/100ML; MG/100ML; MG/100ML
75 INJECTION, SOLUTION INTRAVENOUS CONTINUOUS
Status: DISCONTINUED | OUTPATIENT
Start: 2017-09-06 | End: 2017-09-06 | Stop reason: HOSPADM

## 2017-09-06 RX ORDER — SODIUM CHLORIDE, SODIUM LACTATE, POTASSIUM CHLORIDE, CALCIUM CHLORIDE 600; 310; 30; 20 MG/100ML; MG/100ML; MG/100ML; MG/100ML
50 INJECTION, SOLUTION INTRAVENOUS CONTINUOUS
Status: DISCONTINUED | OUTPATIENT
Start: 2017-09-06 | End: 2017-09-06 | Stop reason: HOSPADM

## 2017-09-06 RX ADMIN — PROPOFOL 50 MG: 10 INJECTION, EMULSION INTRAVENOUS at 14:52

## 2017-09-06 RX ADMIN — PROPOFOL 50 MG: 10 INJECTION, EMULSION INTRAVENOUS at 14:39

## 2017-09-06 RX ADMIN — SODIUM CHLORIDE, SODIUM LACTATE, POTASSIUM CHLORIDE, AND CALCIUM CHLORIDE 75 ML/HR: 600; 310; 30; 20 INJECTION, SOLUTION INTRAVENOUS at 14:01

## 2017-09-06 RX ADMIN — PROPOFOL 50 MG: 10 INJECTION, EMULSION INTRAVENOUS at 14:46

## 2017-09-06 RX ADMIN — PROPOFOL 50 MG: 10 INJECTION, EMULSION INTRAVENOUS at 14:34

## 2017-09-06 RX ADMIN — FAMOTIDINE 20 MG: 10 INJECTION INTRAVENOUS at 14:01

## 2017-09-06 RX ADMIN — SODIUM CHLORIDE, SODIUM LACTATE, POTASSIUM CHLORIDE, AND CALCIUM CHLORIDE: 600; 310; 30; 20 INJECTION, SOLUTION INTRAVENOUS at 14:34

## 2017-09-06 RX ADMIN — PROPOFOL 50 MG: 10 INJECTION, EMULSION INTRAVENOUS at 14:42

## 2017-09-06 NOTE — DISCHARGE INSTRUCTIONS
Colonoscopy Discharge Instructions       Jalen Guerin  [id number]  1945      COLONOSCOPY FINDINGS:  Your colonoscopy showed:    Normal examination. FOLLOW UP RECOMMENDATIONS:  Dr. Viral Ireland recommends your next colonoscopy in 10 years. DISCOMFORT:  If you have redness at your IV site- apply warm compress to area; if redness or soreness persist- contact your physician  There may be a slight amount of blood passed from the rectum, more than a teaspoon of bright red blood is not expected - contact your physician  Gaseous discomfort is common- walking, belching will help relieve any gas pains. If discomfort persist- contact your physician    DIET:   Regular diet. ACTIVITY:  You may resume your normal daily activities, however, it is recommended that you spend the remainder of the day resting - avoiding any strenuous activities. You may not operate a vehicle for 24 hours  You may not engage in an occupation involving machinery or appliances for rest of today  You may not drink alcoholic beverages for at least 24 hours  Avoid making any critical decisions for at least 24 hour    CALL M.D.   ANY SIGN OF:   Increasing pain, nausea, vomiting  Abdominal distension (swelling)  New increased bleeding   Fever or chills  Pain in chest area or shortness of breath      Adeel Nicole MD, FACS, FASCRS  Colon and Rectal Surgery  [facility]  Office [phone]  Fax     [phone] SUMMARY from Nurse    The following personal items are in your possession at time of discharge:    Dental Appliances: None  Visual Aid: None                    PATIENT INSTRUCTIONS:    After general anesthesia or intravenous sedation, for 24 hours or while taking prescription Narcotics:  · Limit your activities  · Do not drive and operate hazardous machinery  · Do not make important personal or business decisions  · Do  not drink alcoholic beverages  · If you have not urinated within 8 hours after discharge, please contact your surgeon on call. Report the following to your surgeon:  · Excessive pain, swelling, redness or odor of or around the surgical area  · Temperature over 100.5  · Nausea and vomiting lasting longer than 4 hours or if unable to take medications  · Any signs of decreased circulation or nerve impairment to extremity: change in color, persistent  numbness, tingling, coldness or increase pain  · Any questions    *  Please give a list of your current medications to your Primary Care Provider. *  Please update this list whenever your medications are discontinued, doses are      changed, or new medications (including over-the-counter products) are added. *  Please carry medication information at all times in case of emergency situations. These are general instructions for a healthy lifestyle:    No smoking/ No tobacco products/ Avoid exposure to second hand smoke    Surgeon General's Warning:  Quitting smoking now greatly reduces serious risk to your health. Obesity, smoking, and sedentary lifestyle greatly increases your risk for illness    A healthy diet, regular physical exercise & weight monitoring are important for maintaining a healthy lifestyle    You may be retaining fluid if you have a history of heart failure or if you experience any of the following symptoms:  Weight gain of 3 pounds or more overnight or 5 pounds in a week, increased swelling in our hands or feet or shortness of breath while lying flat in bed. Please call your doctor as soon as you notice any of these symptoms; do not wait until your next office visit. Recognize signs and symptoms of STROKE:    F-face looks uneven    A-arms unable to move or move unevenly    S-speech slurred or non-existent    T-time-call 911 as soon as signs and symptoms begin-DO NOT go       Back to bed or wait to see if you get better-TIME IS BRAIN.     Warning Signs of HEART ATTACK     Call 911 if you have these symptoms:   Chest discomfort. Most heart attacks involve discomfort in the center of the chest that lasts more than a few minutes, or that goes away and comes back. It can feel like uncomfortable pressure, squeezing, fullness, or pain.  Discomfort in other areas of the upper body. Symptoms can include pain or discomfort in one or both arms, the back, neck, jaw, or stomach.  Shortness of breath with or without chest discomfort.  Other signs may include breaking out in a cold sweat, nausea, or lightheadedness. Don't wait more than five minutes to call 911 - MINUTES MATTER! Fast action can save your life. Calling 911 is almost always the fastest way to get lifesaving treatment. Emergency Medical Services staff can begin treatment when they arrive -- up to an hour sooner than if someone gets to the hospital by car. The discharge information has been reviewed with the patient and spouse. The patient and spouse verbalized understanding. Discharge medications reviewed with the patient and spouse and appropriate educational materials and side effects teaching were provided.

## 2017-09-06 NOTE — ANESTHESIA POSTPROCEDURE EVALUATION
Post-Anesthesia Evaluation and Assessment    Patient: Chantal Sow MRN: [id number]  SSN: [id number]    YOB: 1945  Age: 71 y.o. Sex: female       Cardiovascular Function/Vital Signs  Visit Vitals    /53    Pulse 99    Temp 36.6 °C (97.8 °F)    Resp 16    Ht 5' 6\" (1.676 m)    Wt 93.7 kg (206 lb 9.6 oz)    SpO2 98%    BMI 33.35 kg/m2       Patient is status post MAC anesthesia for Procedure(s):  COLONOSCOPY. Nausea/Vomiting: None    Postoperative hydration reviewed and adequate. Pain:  Pain Scale 1: Numeric (0 - 10) (09/06/17 1530)  Pain Intensity 1: 0 (09/06/17 1530)   Managed    Neurological Status: At baseline    Mental Status and Level of Consciousness: Arousable    Pulmonary Status:   O2 Device: Room air (09/06/17 1530)   Adequate oxygenation and airway patent    Complications related to anesthesia: None    Post-anesthesia assessment completed.  No concerns    Signed By: Emily Chavez MD     September 6, 2017

## 2017-09-06 NOTE — H&P
[facility]  [address] [address], [facility]        Colon and Rectal Surgery History and Physical    Subjective:      Cathie Urena is a 71 y.o. female who presents for colon screening. PCP is Torsten Song NP. Patient denies rectal pain or bleeding. Abdominal surgeries as described below, specifically none. Patient has a bowel movement 1-2 per day. She denies changes in weight or bowel habits. Patient has no known family history of colon cancer.      Last colonoscopy was 10 years ago in [place]. She reports it was negative. Patient Active Problem List    Diagnosis Date Noted    History of tobacco abuse 08/21/2017    Post-menopausal osteoporosis 04/27/2017    Spondylolisthesis of lumbar region 01/21/2016    History of thyroidectomy 05/20/2013    Meningioma ([address] 05/06/2013    BPPV (benign paroxysmal positional vertigo) 08/18/2010    Insomnia 08/03/2010    Migraines 06/22/2010    HTN (hypertension) 05/18/2010    Hypothyroid 05/18/2010    Arthritis 05/18/2010    Depression 05/18/2010    Anxiety 05/18/2010    Seasonal allergic rhinitis 05/18/2010     Past Medical History:   Diagnosis Date    Anxiety 5/18/2010    Arthritis     OA spine, left knee    Depression 5/18/2010    Headache     migraines    HTN (hypertension) 5/18/2010    Hypothyroid 5/18/2010    Osteopenia     Rib fractures 07/17/2017    r/t fall    Seasonal allergic rhinitis 5/18/2010    Seasonal allergies     Spondylolisthesis of lumbar region 1/21/2016    Dr. Vijaya King [facility] ortho    Thyroid disease     hypothyroid,  thromeagaly      Past Surgical History:   Procedure Laterality Date    BREAST SURGERY PROCEDURE UNLISTED      breast cyst s/p biopsy 2008    HX [facility]ACT [facility]  [facility]2011    [facility]  HX CATARACT REMOVAL  8/2011    right eye 2 weeks apart from left [facility].  Dr. Jairo Dyer COLONOSCOPY  2007    HX HEENT      partical parathyroidectomy     HX ORTHOPAEDIC      rotator cuff repqir 10/1996    NEUROLOGICAL PROCEDURE UNLISTED  5-20-13    meninginoma Dr. Jodine Fabry      Social History   Substance Use Topics    Smoking status: Former Smoker     Packs/day: 0.25     Years: 40.00     Types: Cigarettes     Start date: 8/11/1970     Quit date: 8/17/2017    Smokeless tobacco: Never Used      Comment: patient reports that she stopped smoking severa days ago    Alcohol use Yes      Comment: wine for communion only      Family History   Problem Relation Age of Onset    Cancer Brother      brain      Prior to Admission medications    Medication Sig Start Date End Date Taking? Authorizing Provider   HYDROcodone-acetaminophen (VICODIN) 5-300 mg tablet Take 1 Tab by mouth every four (4) hours as needed. Yes Historical Provider   hydroCHLOROthiazide (HYDRODIURIL) 25 mg tablet TAKE 1 TABLET BY MOUTH DAILY 6/19/17  Yes Sridevi Colon NP   alendronate (FOSAMAX) 70 mg tablet Take 1 Tab by mouth every seven (7) days. Indications: POST-MENOPAUSAL OSTEOPOROSIS 4/27/17  Yes Sridevi Colon NP   Cholecalciferol, Vitamin D3, (VITAMIN D3) 2,000 unit cap capsule Take  by Mouth. Yes Historical Provider   cyanocobalamin (VITAMIN B-12) 1,000 mcg tablet Take 1,000 mcg by mouth daily. Yes Historical Provider   amitriptyline (ELAVIL) 25 mg tablet Take 25 mg by mouth nightly. 6/15/16  Yes Historical Provider   triamcinolone (NASACORT AQ) 55 mcg nasal inhaler 2 Sprays daily. Yes Historical Provider   Thyroid, Pork, (ARMOUR THYROID) 180 mg tab Take  by mouth. Yes Historical Provider   biotin 2,500 mcg tab Take  by mouth daily. Yes Historical Provider   MULTIVITAMINS (MULTI-VITAMIN PO) Take 1 Tab by mouth daily. 4/19/10  Yes Historical Provider   CALCIUM CITRATE (CITRACAL PO) Take 1 Tab by mouth daily. 4/19/10  Yes Historical Provider   FLAXSEED PO Take 1 Tab by mouth daily.  4/19/10  Yes Historical Provider   cetirizine (ZYRTEC) 10 mg tablet Take 1 Tab by mouth daily as needed for Itching. 8/25/17   Sridevi Colon NP   PEG 3350-Electrolytes (GO-LYTELY) 874-34.05-5.43 -5.86 gram suspension Take as directed for bowel prep  Indications: BOWEL EVACUATION 8/2/17   Lokesh Espinoza MD     No current facility-administered medications for this encounter. Allergies   Allergen Reactions    Iodine Hives, Rash and Other (comments)     Feels like Chest caving in. Rash LL lumbosacral area            Objective:     Visit Vitals    Ht 5' 6\" (1.676 m)    Wt 96.2 kg (212 lb)    LMP 01/01/1990    BMI 34.22 kg/m2        Physical Exam:   GENERAL: alert, cooperative, no distress, appears stated age  LUNG: clear to auscultation bilaterally  HEART: regular rate and rhythm, S1, S2 normal, no murmur, click, rub or gallop  ABDOMEN: soft, non-tender. Bowel sounds normal. No masses,  no organomegaly       Assessment:     Maral Villalba is a 71 y.o. female who requires colonoscopy for   Screening colonoscopy. Plan:     1. I recommend proceeding with colonoscopy. The patient was in full agreement and was eager to proceed. 2. I discussed the details of the colonoscopy procedure. The risks of colonoscopy were discussed including colon injury/perforation, anesthesia issues, bleeding, and the possibility of incomplete examination. The patient was willing to accept these risks and proceed with the examination. All questions were answered to the patient's satisfaction.          Lokesh Espinoza MD, FACS, FASCRS  Colon and Rectal Surgery  [facility]  Office [phone]  Fax     [phone]  9/6/2017  12:45 PM

## 2017-09-06 NOTE — PERIOP NOTES
Patient c/o tightness to the left hand. Patient reports her hand began to feel tight after an IV attempt in that hand earlier today. No swelling noted to hand. Patient able to move fingers and make a fist spontaneously. Ice pack applied.

## 2017-09-06 NOTE — ANESTHESIA PREPROCEDURE EVALUATION
Anesthetic History   No history of anesthetic complications            Review of Systems / Medical History  Patient summary reviewed and pertinent labs reviewed    Pulmonary  Within defined limits                 Neuro/Psych         Psychiatric history     Cardiovascular    Hypertension                   GI/Hepatic/Renal                Endo/Other      Hypothyroidism  Arthritis     Other Findings   Comments:   Risk Factors for Postoperative nausea/vomiting:       History of postoperative nausea/vomiting? YES       Female? YES       Motion sickness? NO       Intended opioid administration for postoperative analgesia? NO      Smoking Abstinence  Current Smoker? NO  Elective Surgery? YES  Seen preoperatively by anesthesiologist or proxy prior to day of surgery? YES  Pt abstained from smoking 24 hours prior to anesthesia?  N/A           Physical Exam    Airway  Mallampati: III  TM Distance: 4 - 6 cm  Neck ROM: decreased range of motion, short neck   Mouth opening: Diminished (comment)     Cardiovascular    Rhythm: irregular  Rate: normal         Dental    Dentition: Full upper dentures and Poor dentition     Pulmonary      Decreased breath sounds: bilateral           Abdominal  GI exam deferred       Other Findings            Anesthetic Plan    ASA: 3  Anesthesia type: MAC            Anesthetic plan and risks discussed with: Patient

## 2017-09-06 NOTE — PROCEDURES
Colonoscopy Procedure Note      Neel Negron  1945  [id number]                Date of Procedure: 9/6/2017    Indications:    Screening colonoscopy     Preoperative diagnosis: Colon cancer screening [Z12.11]      Postoperative diagnosis: Normal exam.     Title of Procedure:  Colonoscopy, screening    :  Joel Phillips MD    Assistant(s): Endoscopy Technician-1: Xenia Reese MKPACNC  Endoscopy Technician-2: Jalen Mcdowell  Endoscopy RN-1: Reynold Yao RN  Endoscopy RN-2: Jacinda Kate RN    Referring Source:  Maria Martinez NP    Sedation:  MAC      ASA Class: ASA 3 - Severe systemic disease but compensated        Procedure Details:    Prior to the procedure, a history and physical were performed. The patients medications, allergies and sensitivities were reviewed and all questions were answered. After informed consent was obtained for the procedure, with all risks and benefits of procedure explained. The patient was taken to the endoscopy suite and placed in the left lateral decubitus position. Patient identification and proposed procedure were verified prior to the procedure by the nurse and I. Following the  satisfactory administration of sedation,  the anus was inspected and appeared within normal limits. Digital rectal examination revealed Normal sphincter tone and squeeze pressure. Palpation revealed No Masses. Next the Olympus video colonoscope was introduced through the anus and advanced to cecum, which was identified by the ileocecal valve and appendiceal orifice, terminal ileum. The quality of preparation was excellent. The terminal ileum was visualized. The colonoscope was then slowly withdrawn and the mucosa carefully examined for any abnormalities. Cecal withdrawl time was greater than six minutes. The patient tolerated the procedure well.       Findings:   Rectum: normal  Sigmoid: normal  Descending Colon: normal  Transverse Colon: normal  Ascending Colon: normal  Cecum: normal  Terminal Ileum: normal      Interventions:  none    Specimen Removed: * No specimens in log *     Complications: None. EBL:  None. Impression:    Normal exam.      Recommendations: -Repeat colonoscopy in 10 years   Resume normal medication(s). Discharge Disposition:  Home in the company of a  when able to ambulate.         Lawanda Quiroga MD, FACS, FASCRS  Colon and Rectal Surgery  [facility]  Office [phone]  Fax     [phone]  9/6/2017  3:02 PM       [facility]  [place] [address]  [place], [address]

## 2017-09-06 NOTE — IP AVS SNAPSHOT
Keila Fournier 
 
 
 [address] [facility] [address] 
[phone] Patient: Pam Fonseca MRN: [id number] :1945 You are allergic to the following Allergen Reactions Iodine Hives Rash Other (comments) Feels like Chest caving in. Rash LL lumbosacral area Recent Documentation Height Weight BMI OB Status Smoking Status 1.676 m 93.7 kg 33.35 kg/m2 Postmenopausal Former Smoker Emergency Contacts Name Discharge Info Relation Home Work Mobile Katt Lee CAREGIVER [3] Daughter [21] [phone] Jonelle Duran DISCHARGE CAREGIVER [3] Other Relative [6] [phone] Amada Winter  Spouse [3] [phone] About your hospitalization You were admitted on:  2017 You last received care in the:  [facility] PACU You were discharged on:  2017 Unit phone number:  [phone] Why you were hospitalized Your primary diagnosis was:  Not on File Providers Seen During Your Hospitalizations Provider Role Specialty Primary office phone Lisette Chase MD Attending Provider Colon and Rectal Surgery [phone] Your Primary Care Physician (PCP) Primary Care Physician Office Phone Office Fax Janiya Miller [phone] Follow-up Information Follow up With Details Comments Contact Info Lashell Borrero NP   [address] [id number] [facility] [address] [phone] Lisette Chase MD  Please contact Dr Philip Doss for any questions. [facility] [address] [place] [address] 
[phone] Current Discharge Medication List  
  
CONTINUE these medications which have NOT CHANGED Dose & Instructions Dispensing Information Comments Morning Noon Evening Bedtime  
 alendronate 70 mg tablet Commonly known as:  FOSAMAX Your last dose was: Your next dose is: Dose:  70 mg Take 1 Tab by mouth every seven (7) days. Indications: POST-MENOPAUSAL OSTEOPOROSIS Quantity:  4 Tab Refills:  11  
     
   
   
   
  
 amitriptyline 25 mg tablet Commonly known as:  ELAVIL Your last dose was: Your next dose is:    
   
   
 Dose:  25 mg Take 25 mg by mouth nightly. Refills:  6 ARMOUR THYROID 180 mg Tab Generic drug:  Thyroid (Pork) Your last dose was: Your next dose is: Take  by mouth. Refills:  0  
     
   
   
   
  
 biotin 2,500 mcg Tab Your last dose was: Your next dose is: Take  by mouth daily. Refills:  0  
     
   
   
   
  
 cetirizine 10 mg tablet Commonly known as:  ZYRTEC Your last dose was: Your next dose is:    
   
   
 Dose:  10 mg Take 1 Tab by mouth daily as needed for Itching. Quantity:  30 Tab Refills:  1 Cholecalciferol (Vitamin D3) 2,000 unit Cap capsule Commonly known as:  VITAMIN D3 Your last dose was: Your next dose is: Take  by Mouth. Refills:  0  
     
   
   
   
  
 CITRACAL PO Your last dose was: Your next dose is:    
   
   
 Dose:  1 Tab Take 1 Tab by mouth daily. Refills:  0 FLAXSEED PO Your last dose was: Your next dose is:    
   
   
 Dose:  1 Tab Take 1 Tab by mouth daily. Refills:  0  
     
   
   
   
  
 hydroCHLOROthiazide 25 mg tablet Commonly known as:  HYDRODIURIL Your last dose was: Your next dose is: TAKE 1 TABLET BY MOUTH DAILY Quantity:  90 Tab Refills:  0 MULTI-VITAMIN PO Your last dose was: Your next dose is:    
   
   
 Dose:  1 Tab Take 1 Tab by mouth daily. Refills:  0  
     
   
   
   
  
 PEG 3350-Electrolytes 236-22.74-6.74 -5.86 gram suspension Commonly known as:  GO-LYTELY Your last dose was: Your next dose is: Take as directed for bowel prep  Indications: BOWEL EVACUATION Quantity:  1 Bottle Refills:  0  
     
   
   
   
  
 triamcinolone 55 mcg nasal inhaler Commonly known as:  NASACORT AQ Your last dose was: Your next dose is:    
   
   
 Dose:  2 Spray 2 Sprays daily. Refills:  0  
     
   
   
   
  
 VICODIN 5-300 mg tablet Generic drug:  HYDROcodone-acetaminophen Your last dose was: Your next dose is:    
   
   
 Dose:  1 Tab Take 1 Tab by mouth every four (4) hours as needed. Refills:  0  
     
   
   
   
  
 VITAMIN B-12 1,000 mcg tablet Generic drug:  cyanocobalamin Your last dose was: Your next dose is:    
   
   
 Dose:  1000 mcg Take 1,000 mcg by mouth daily. Refills:  0 Discharge Instructions Colonoscopy Discharge Instructions Beba Sanchez [id number] 
1945 COLONOSCOPY FINDINGS: 
Your colonoscopy showed:    Normal examination. FOLLOW UP RECOMMENDATIONS: 
Dr. Hipolito Guardado recommends your next colonoscopy in 10 years. DISCOMFORT: 
If you have redness at your IV site- apply warm compress to area; if redness or soreness persist- contact your physician There may be a slight amount of blood passed from the rectum, more than a teaspoon of bright red blood is not expected - contact your physician Gaseous discomfort is common- walking, belching will help relieve any gas pains. If discomfort persist- contact your physician DIET: 
 Regular diet. ACTIVITY: 
You may resume your normal daily activities, however, it is recommended that you spend the remainder of the day resting - avoiding any strenuous activities. You may not operate a vehicle for 24 hours You may not engage in an occupation involving machinery or appliances for rest of today You may not drink alcoholic beverages for at least 24 hours Avoid making any critical decisions for at least 24 hour CALL M.D. ANY SIGN OF: Increasing pain, nausea, vomiting Abdominal distension (swelling) New increased bleeding Fever or chills Pain in chest area or shortness of breath Jack Mary MD, FACS, FASCRS Colon and Rectal Surgery [facility] Office [phone] Fax     [phone] DISCHARGE SUMMARY from Nurse The following personal items are in your possession at time of discharge: 
 
Dental Appliances: None Visual Aid: None PATIENT INSTRUCTIONS: 
 
 
F-face looks uneven A-arms unable to move or move unevenly S-speech slurred or non-existent T-time-call 911 as soon as signs and symptoms begin-DO NOT go Back to bed or wait to see if you get better-TIME IS BRAIN. Warning Signs of HEART ATTACK Call 911 if you have these symptoms: 
? Chest discomfort. Most heart attacks involve discomfort in the center of the chest that lasts more than a few minutes, or that goes away and comes back. It can feel like uncomfortable pressure, squeezing, fullness, or pain. ? Discomfort in other areas of the upper body. Symptoms can include pain or discomfort in one or both arms, the back, neck, jaw, or stomach. ? Shortness of breath with or without chest discomfort. ? Other signs may include breaking out in a cold sweat, nausea, or lightheadedness. Don't wait more than five minutes to call [address]! Fast action can save your life. Calling 911 is almost always the fastest way to get lifesaving treatment. Emergency Medical Services staff can begin treatment when they arrive  up to an hour sooner than if someone gets to the hospital by car. The discharge information has been reviewed with the patient and spouse. The patient and spouse verbalized understanding. Discharge medications reviewed with the patient and spouse and appropriate educational materials and side effects teaching were provided. Discharge Orders None Introducing [facility]! Dear Victorino Galeas: Thank you for requesting a [employer] account. Our records indicate that you already have an active [employer] account. You can access your account anytime at [email or web address] Did you know that you can access your hospital and ER discharge instructions at any time in [employer]? You can also review all of your test results from your hospital stay or ER visit. Additional Information If you have questions, please visit the Frequently Asked Questions section of the [employer] website at [email or web address] Remember, [employer] is NOT to be used for urgent needs. For medical emergencies, dial 911. Now available from your iPhone and Android! General Information Please provide this summary of care documentation to your next provider. Patient Signature:  ____________________________________________________________ Date:  ____________________________________________________________  
  
Fayrene Retort Provider Signature:  ____________________________________________________________ Date:  ____________________________________________________________

## 2017-09-19 ENCOUNTER — OFFICE VISIT (OUTPATIENT)
Dept: FAMILY MEDICINE CLINIC | Age: 72
End: 2017-09-19

## 2017-09-19 VITALS
HEIGHT: 66 IN | WEIGHT: 207.4 LBS | RESPIRATION RATE: 20 BRPM | BODY MASS INDEX: 33.33 KG/M2 | DIASTOLIC BLOOD PRESSURE: 64 MMHG | SYSTOLIC BLOOD PRESSURE: 120 MMHG | HEART RATE: 112 BPM | OXYGEN SATURATION: 98 % | TEMPERATURE: 98.2 F

## 2017-09-19 DIAGNOSIS — L29.9 ITCHING: ICD-10-CM

## 2017-09-19 DIAGNOSIS — T50.905A MEDICATION REACTION, INITIAL ENCOUNTER: ICD-10-CM

## 2017-09-19 DIAGNOSIS — L27.0 DRUG EXANTHEM: Primary | ICD-10-CM

## 2017-09-19 RX ORDER — CETIRIZINE HCL 10 MG
10 TABLET ORAL
Qty: 30 TAB | Refills: 1 | Status: SHIPPED | OUTPATIENT
Start: 2017-09-19 | End: 2018-01-25 | Stop reason: ALTCHOICE

## 2017-09-19 RX ORDER — PREDNISONE 10 MG/1
TABLET ORAL
Qty: 21 TAB | Refills: 0 | Status: SHIPPED | OUTPATIENT
Start: 2017-09-19 | End: 2017-10-02 | Stop reason: ALTCHOICE

## 2017-09-19 NOTE — PROGRESS NOTES
Milan Saldivar is a 71 y.o. female  Chief Complaint   Patient presents with    Skin Problem    Nausea    Diarrhea     1. Have you been to the ER, urgent care clinic since your last visit? Hospitalized since your last visit? No    2. Have you seen or consulted any other health care providers outside of the [address] [place] [address] since your last visit? Include any pap smears or colon screening.  No

## 2017-09-19 NOTE — PROGRESS NOTES
1. Have you been to the ER, urgent care clinic since your last visit? Hospitalized since your last visit? No    2. Have you seen or consulted any other health care providers outside of the [address] [place] [address] since your last visit? Include any pap smears or colon screening. No   HISTORY OF PRESENT ILLNESS    Jonathan Don is a 71y.o. year old female here today for:  itching    Onset several weeks  Context every time she takes the alenodrate   Site chest and thorax  Duration  Several weeks   Type of pain or sensation no pain  Associated symptoms  none  Severity severe itching and rash  Exacerbating factors taking alenodrate   Relieving factors  Aveeno lotion, hydroxyzine helps with itching only    ROS:  Denies lip swelling no tongue swelling, no dysphage        Current Outpatient Prescriptions   Medication Sig Dispense Refill    cetirizine (ZYRTEC) 10 mg tablet Take 1 Tab by mouth daily as needed for Itching. 30 Tab 1    HYDROcodone-acetaminophen (VICODIN) 5-300 mg tablet Take 1 Tab by mouth every four (4) hours as needed.  PEG 3350-Electrolytes (GO-LYTELY) 236-22.74-6.74 -5.86 gram suspension Take as directed for bowel prep  Indications: BOWEL EVACUATION 1 Bottle 0    hydroCHLOROthiazide (HYDRODIURIL) 25 mg tablet TAKE 1 TABLET BY MOUTH DAILY 90 Tab 0    Cholecalciferol, Vitamin D3, (VITAMIN D3) 2,000 unit cap capsule Take  by Mouth.  cyanocobalamin (VITAMIN B-12) 1,000 mcg tablet Take 1,000 mcg by mouth daily.  amitriptyline (ELAVIL) 25 mg tablet Take 25 mg by mouth nightly. 6    triamcinolone (NASACORT AQ) 55 mcg nasal inhaler 2 Sprays daily.  Thyroid, Pork, (ARMOUR THYROID) 180 mg tab Take  by mouth.  biotin 2,500 mcg tab Take  by mouth daily.  MULTIVITAMINS (MULTI-VITAMIN PO) Take 1 Tab by mouth daily.  CALCIUM CITRATE (CITRACAL PO) Take 1 Tab by mouth daily.  FLAXSEED PO Take 1 Tab by mouth daily.        Patient Active Problem List   Diagnosis Code    HTN (hypertension) I10    Hypothyroid E03.9    Arthritis M19.90    Depression F32.9    Anxiety F41.9    Seasonal allergic rhinitis J30.2    Migraines G43.909    Insomnia G47.00    BPPV (benign paroxysmal positional vertigo) H81.10    Meningioma (HCC) D32.9    Spondylolisthesis of lumbar region M43.16    History of thyroidectomy E89.0    Post-menopausal osteoporosis M81.0    History of tobacco abuse Z87.891     Reviewed past medical, family and social history      OBJECTIVE:  Awake and alert in no acute distress  Lungs clear throughout  S1 S2 RRR without ectopy or murmur auscultated. Integumentary: scattered maculopapular on chest anterior or posterior  Upper buttocks  Visit Vitals    /80 (BP 1 Location: Left arm, BP Patient Position: Sitting)    Pulse (!) 112    Temp 98.2 °F (36.8 °C) (Oral)    Resp 20    Ht 5' 6\" (1.676 m)    Wt 207 lb 6.4 oz (94.1 kg)    SpO2 98%    BMI 33.48 kg/m2     Diagnoses and all orders for this visit:    1. Drug exanthem  -     predniSONE (STERAPRED DS) 10 mg dose pack; See administration instruction per 10mg dose pack  -     cetirizine (ZYRTEC) 10 mg tablet; Take 1 Tab by mouth daily as needed for Itching. 2. Medication reaction, initial encounter  -     cetirizine (ZYRTEC) 10 mg tablet; Take 1 Tab by mouth daily as needed for Itching. 3. Itching  -     cetirizine (ZYRTEC) 10 mg tablet; Take 1 Tab by mouth daily as needed for Itching.    stopped alendronate  Reviewed risks and benefits and common side effects of new medication  General comfort measures  Patient verbalizes understanding. I have discussed the diagnosis with the patient and the intended plan as seen in the above orders. The patient has received an after-visit summary and questions were answered concerning future plans. I have discussed medication side effects and warnings with the patient as well.     Follow-up Disposition:  Return in about 2 weeks (around 10/2/2017), or if symptoms worsen or fail to improve, for follow up medication reaction drug exanthem.

## 2017-09-19 NOTE — PATIENT INSTRUCTIONS
Drug Allergy: Care Instructions  Your Care Instructions  A drug allergy occurs when your immune system overreacts to something in a medicine. This causes an allergic reaction. You may have skin problems, such as hives or a rash. Your lips, mouth, and throat may swell. You may have trouble breathing. And you may have belly pain, nausea, vomiting, or diarrhea. A reaction can range from mild to life-threatening. After you have an allergic reaction to a medicine, you may always be allergic to that medicine and to others like it. Drug allergies are different than side effects and drug interactions. Side effects are bad reactions to a medicine. Drug interactions occur when two or more medicines that you take do not get along in your body. Some people may confuse these with drug allergies. Follow-up care is a key part of your treatment and safety. Be sure to make and go to all appointments, and call your doctor if you are having problems. It's also a good idea to know your test results and keep a list of the medicines you take. How can you care for yourself at home? · Your doctor may prescribe a shot of epinephrine to carry with you in case you have a severe reaction. Learn how to give yourself the shot, and keep it with you at all times. Make sure it has not . · Go to the emergency room every time you have a severe reaction. Go even if you have used your shot of epinephrine and are feeling better. Symptoms can come back after a shot. · Be safe with medicines. If you were given a medicine for your allergic reaction, take it exactly as directed. Call your doctor if you think you are having a problem with your medicine. · Avoid medicines like the one that caused your allergy. Ask your doctor or pharmacist if you think you may be taking a similar medicine. · If you have a mild skin rash or itching from your allergy:  ¨ Wear light clothing that does not irritate your skin. ¨ Use calamine lotion.  Or take an over-the-counter antihistamine, such as diphenhydramine (Benadryl) or loratadine (Claritin). Read and follow the instructions on the label. ¨ Take a cool shower or bath. ¨ Do not use strong soaps, detergents, and other chemicals. They can make itching worse. · Wear medical alert jewelry that lists your allergies. You can buy this at most drugstores. · Be sure that anyone treating you for any health problem knows that you are allergic to this medicine. When should you call for help? Give an epinephrine shot if:  · You think you are having a severe allergic reaction. After giving an epinephrine shot call 911, even if you feel better. Call 911 if:  · You have symptoms of a severe allergic reaction. These may include:  ¨ Sudden raised, red areas (hives) all over your body. ¨ Swelling of the throat, mouth, lips, or tongue. ¨ Trouble breathing. ¨ Passing out (losing consciousness). Or you may feel very lightheaded or suddenly feel weak, confused, or restless. · You have been given an epinephrine shot, even if you feel better. Call your doctor now or seek immediate medical care if:  · You have symptoms of an allergic reaction, such as:  ¨ A rash or hives (raised, red areas on the skin). ¨ Itching. ¨ Swelling. ¨ Belly pain, nausea, or vomiting. Watch closely for changes in your health, and be sure to contact your doctor if:  · You do not get better as expected. Where can you learn more? Go to http://alvin-rogelio.info/. Enter V196 in the search box to learn more about \"Drug Allergy: Care Instructions. \"  Current as of: April 3, 2017  Content Version: 11.3  © 5221-5263 [employer]. Care instructions adapted under license by [employer] (which disclaims liability or warranty for this information).  If you have questions about a medical condition or this instruction, always ask your healthcare professional. Erby [employer]ones disclaims any warranty or liability for your use of this information.

## 2017-09-19 NOTE — MR AVS SNAPSHOT
Visit Information Date & Time Provider Department Dept. Phone Encounter #  
 9/19/2017 10:40 AM Carson Gonzalez NP [facility] [phone] [id number] Follow-up Instructions Return in about 2 weeks (around 10/2/2017) for follow up medication reaction drug exanthem. Upcoming Health Maintenance Date Due  
 MEDICARE YEARLY EXAM 8/22/2018 BREAST CANCER SCRN MAMMOGRAM 6/6/2019 GLAUCOMA SCREENING Q2Y 8/21/2019 DTaP/Tdap/Td series (2 - Td) 3/21/2027 COLONOSCOPY 9/6/2027 Allergies as of 9/19/2017  Review Complete On: 9/19/2017 By: Carson Gonzalez NP Severity Noted Reaction Type Reactions Iodine Medium 04/19/2010   Systemic Hives, Rash, Other (comments) Feels like Chest caving in. Rash LL lumbosacral area Fosamax [Alendronate]  09/19/2017    Itching  
 rash Current Immunizations  Reviewed on 3/21/2017 Name Date Influenza High Dose Vaccine PF 1/21/2016 Influenza Vaccine PF 11/7/2013 Pneumococcal Conjugate (PCV-13) 11/7/2013 Not reviewed this visit You Were Diagnosed With   
  
 Codes Comments Drug exanthem    -  Primary ICD-10-CM: L27.0 ICD-9-CM: 693.0 Medication reaction, initial encounter     ICD-10-CM: T88. 7XXA ICD-9-CM: E947.9 Itching     ICD-10-CM: L29.9 ICD-9-CM: 698.9 Vitals BP Pulse Temp Resp Height(growth percentile) Weight(growth percentile) 120/64 (BP 1 Location: Left arm, BP Patient Position: Sitting) (!) 112 98.2 °F (36.8 °C) (Oral) 20 5' 6\" (1.676 m) 207 lb 6.4 oz (94.1 kg) LMP SpO2 BMI OB Status Smoking Status 01/01/1990 98% 33.48 kg/m2 Postmenopausal Former Smoker Vitals History BMI and BSA Data Body Mass Index Body Surface Area  
 33.48 kg/m 2 2.09 m 2 Preferred Pharmacy Pharmacy Name Phone [address], [address] [facility] [address] [phone] Your Updated Medication List  
  
 This list is accurate as of: 9/19/17 11:06 AM.  Always use your most recent med list.  
  
  
  
  
 amitriptyline 25 mg tablet Commonly known as:  ELAVIL Take 25 mg by mouth nightly. ARMOUR THYROID 180 mg Tab Generic drug:  Thyroid (Pork) Take  by mouth.  
  
 biotin 2,500 mcg Tab Take  by mouth daily. cetirizine 10 mg tablet Commonly known as:  ZYRTEC Take 1 Tab by mouth daily as needed for Itching. Cholecalciferol (Vitamin D3) 2,000 unit Cap capsule Commonly known as:  VITAMIN D3 Take  by Mouth. CITRACAL PO Take 1 Tab by mouth daily. FLAXSEED PO Take 1 Tab by mouth daily. hydroCHLOROthiazide 25 mg tablet Commonly known as:  HYDRODIURIL  
TAKE 1 TABLET BY MOUTH DAILY MULTI-VITAMIN PO Take 1 Tab by mouth daily. PEG 3350-Electrolytes 236-22.74-6.74 -5.86 gram suspension Commonly known as:  GO-LYTELY Take as directed for bowel prep  Indications: BOWEL EVACUATION  
  
 predniSONE 10 mg dose pack Commonly known as:  STERAPRED DS See administration instruction per 10mg dose pack  
  
 triamcinolone 55 mcg nasal inhaler Commonly known as:  NASACORT AQ  
2 Sprays daily. VICODIN 5-300 mg tablet Generic drug:  HYDROcodone-acetaminophen Take 1 Tab by mouth every four (4) hours as needed. VITAMIN B-12 1,000 mcg tablet Generic drug:  cyanocobalamin Take 1,000 mcg by mouth daily. Prescriptions Sent to Pharmacy Refills  
 predniSONE (STERAPRED DS) 10 mg dose pack 0 Sig: See administration instruction per 10mg dose pack Class: Normal  
 Pharmacy: [facility] [address] Ph #: [phone]  
 cetirizine (ZYRTEC) 10 mg tablet 1 Sig: Take 1 Tab by mouth daily as needed for Itching. Class: Normal  
 Pharmacy: [address] [place] [address] Ph #: [phone]  Route: Oral  
 Follow-up Instructions Return in about 2 weeks (around 10/2/2017) for follow up medication reaction drug exanthem. Patient Instructions Drug Allergy: Care Instructions Your Care Instructions A drug allergy occurs when your immune system overreacts to something in a medicine. This causes an allergic reaction. You may have skin problems, such as hives or a rash. Your lips, mouth, and throat may swell. You may have trouble breathing. And you may have belly pain, nausea, vomiting, or diarrhea. A reaction can range from mild to life-threatening. After you have an allergic reaction to a medicine, you may always be allergic to that medicine and to others like it. Drug allergies are different than side effects and drug interactions. Side effects are bad reactions to a medicine. Drug interactions occur when two or more medicines that you take do not get along in your body. Some people may confuse these with drug allergies. Follow-up care is a key part of your treatment and safety. Be sure to make and go to all appointments, and call your doctor if you are having problems. It's also a good idea to know your test results and keep a list of the medicines you take. How can you care for yourself at home? · Your doctor may prescribe a shot of epinephrine to carry with you in case you have a severe reaction. Learn how to give yourself the shot, and keep it with you at all times. Make sure it has not . · Go to the emergency room every time you have a severe reaction. Go even if you have used your shot of epinephrine and are feeling better. Symptoms can come back after a shot. · Be safe with medicines. If you were given a medicine for your allergic reaction, take it exactly as directed. Call your doctor if you think you are having a problem with your medicine. · Avoid medicines like the one that caused your allergy.  Ask your doctor or pharmacist if you think you may be taking a similar medicine. · If you have a mild skin rash or itching from your allergy: ¨ Wear light clothing that does not irritate your skin. ¨ Use calamine lotion. Or take an over-the-counter antihistamine, such as diphenhydramine (Benadryl) or loratadine (Claritin). Read and follow the instructions on the label. ¨ Take a cool shower or bath. ¨ Do not use strong soaps, detergents, and other chemicals. They can make itching worse. · Wear medical alert jewelry that lists your allergies. You can buy this at most [employer]. · Be sure that anyone treating you for any health problem knows that you are allergic to this medicine. When should you call for help? Give an epinephrine shot if: 
· You think you are having a severe allergic reaction. After giving an epinephrine shot call 911, even if you feel better. Call 911 if: 
· You have symptoms of a severe allergic reaction. These may include: 
¨ Sudden raised, red areas (hives) all over your body. ¨ Swelling of the throat, mouth, lips, or tongue. ¨ Trouble breathing. ¨ Passing out (losing consciousness). Or you may feel very lightheaded or suddenly feel weak, confused, or restless. · You have been given an epinephrine shot, even if you feel better. Call your doctor now or seek immediate medical care if: 
· You have symptoms of an allergic reaction, such as: ¨ A rash or hives (raised, red areas on the skin). ¨ Itching. ¨ Swelling. ¨ Belly pain, nausea, or vomiting. Watch closely for changes in your health, and be sure to contact your doctor if: 
· You do not get better as expected. Where can you learn more? Go to http://alvin-rogelio.info/. Enter X567 in the search box to learn more about \"Drug Allergy: Care Instructions. \" Current as of: April 3, 2017 Content Version: 11.3 © 3684-2872 [employer].  Care instructions adapted under license by [address] (which disclaims liability or warranty for this information). If you have questions about a medical condition or this instruction, always ask your healthcare professional. [address] any warranty or liability for your use of this information. Introducing [facility]! Dear John Alarcon: Thank you for requesting a InterEx account. Our records indicate that you already have an active InterEx account. You can access your account anytime at [email or web address] Did you know that you can access your hospital and ER discharge instructions at any time in InterEx? You can also review all of your test results from your hospital stay or ER visit. Additional Information If you have questions, please visit the Frequently Asked Questions section of the InterEx website at [email or web address] Remember, InterEx is NOT to be used for urgent needs. For medical emergencies, dial 911. Now available from your iPhone and Android! Please provide this summary of care documentation to your next provider. Your primary care clinician is listed as Kiley Barraza. If you have any questions after today's visit, please call [phone].

## 2017-10-02 ENCOUNTER — OFFICE VISIT (OUTPATIENT)
Dept: FAMILY MEDICINE CLINIC | Age: 72
End: 2017-10-02

## 2017-10-02 VITALS
HEIGHT: 66 IN | WEIGHT: 209 LBS | DIASTOLIC BLOOD PRESSURE: 64 MMHG | HEART RATE: 101 BPM | SYSTOLIC BLOOD PRESSURE: 130 MMHG | BODY MASS INDEX: 33.59 KG/M2 | OXYGEN SATURATION: 100 % | TEMPERATURE: 98.1 F | RESPIRATION RATE: 18 BRPM

## 2017-10-02 DIAGNOSIS — Z72.0 TOBACCO ABUSE: ICD-10-CM

## 2017-10-02 DIAGNOSIS — M89.9 RIB LESION: Primary | ICD-10-CM

## 2017-10-02 NOTE — PROGRESS NOTES
Yeimy Walden is a 71 y.o. female  Chief Complaint   Patient presents with    Allergic Reaction     \"Itching is well under control. \"    Request For New Medication     I HAVE NAPROXEN  AND HYDRO/ACET 5-300MG  CAN i TAKE EITHER OR PAIN      1. Have you been to the ER, urgent care clinic since your last visit? Hospitalized since your last visit? No    2. Have you seen or consulted any other health care providers outside of the [address] [place] [address] since your last visit? Include any pap smears or colon screening.  No

## 2017-10-02 NOTE — PROGRESS NOTES
1. Have you been to the ER, urgent care clinic since your last visit? Hospitalized since your last visit? No    2. Have you seen or consulted any other health care providers outside of the [address] [place] [address] [facility] since your last visit? Include any pap smears or colon screening. No  Subjective:   Ne Tse is a 71 y.o. female follow up rib sclerosis sixth rib left under care of oncology--diagnostics pending. Patient has had medication reaction to fosamax. She denies complaints today. She is living with her daughter now. She reports that she feels no pain and also reports that she is feeling stronger. She would like to drive. Advised no driving if taking pain medications. Patient verbalizes understanding. Continues to smoke 1 cigarette daily when stressed tobacco cessation counseling------ patient offers no commitment today    Current Outpatient Prescriptions   Medication Sig Dispense Refill    cetirizine (ZYRTEC) 10 mg tablet Take 1 Tab by mouth daily as needed for Itching. 30 Tab 1    HYDROcodone-acetaminophen (VICODIN) 5-300 mg tablet Take 1 Tab by mouth every four (4) hours as needed.  PEG 3350-Electrolytes (GO-LYTELY) 236-22.74-6.74 -5.86 gram suspension Take as directed for bowel prep  Indications: BOWEL EVACUATION 1 Bottle 0    hydroCHLOROthiazide (HYDRODIURIL) 25 mg tablet TAKE 1 TABLET BY MOUTH DAILY 90 Tab 0    Cholecalciferol, Vitamin D3, (VITAMIN D3) 2,000 unit cap capsule Take  by Mouth.  cyanocobalamin (VITAMIN B-12) 1,000 mcg tablet Take 1,000 mcg by mouth daily.  amitriptyline (ELAVIL) 25 mg tablet Take 25 mg by mouth nightly. 6    triamcinolone (NASACORT AQ) 55 mcg nasal inhaler 2 Sprays daily.  Thyroid, Pork, (ARMOUR THYROID) 180 mg tab Take  by mouth.  biotin 2,500 mcg tab Take  by mouth daily.  MULTIVITAMINS (MULTI-VITAMIN PO) Take 1 Tab by mouth daily.  CALCIUM CITRATE (CITRACAL PO) Take 1 Tab by mouth daily.       FLAXSEED PO Take 1 Tab by mouth daily. ROS: denies coughing, denies fatigue, denies rash denies itching   New concerns: none. Objective:   Awake and alert in no acute distress  Lungs clear throughout  S1 S2 RRR without ectopy or murmur auscultated. Musculoskeletal: no TTP ribs lateral left   Gait normal stable  Integumentary: normal skin turgor no rashes  Visit Vitals    /64 (BP 1 Location: Left arm, BP Patient Position: Sitting)    Pulse (!) 101    Temp 98.1 °F (36.7 °C) (Oral)    Resp 18    Ht 5' 6\" (1.676 m)    Wt 209 lb (94.8 kg)    LMP 01/01/1990    SpO2 100%    BMI 33.73 kg/m2      Diagnoses and all orders for this visit:    1. Rib lesion    2. Tobacco abuse    tobacco cessation counseling------ patient offers no commitment today  Keep appointment with oncology  Meals on wheels arranged   I have discussed the diagnosis with the patient and the intended plan as seen in the above orders. The patient has received an after-visit summary and questions were answered concerning future plans. I have discussed medication side effects and warnings with the patient as well. Follow-up Disposition:  Return if symptoms worsen or fail to improve.

## 2017-10-02 NOTE — PATIENT INSTRUCTIONS
Broken Rib: Care Instructions  Your Care Instructions    A broken rib is a crack or break in one of the bones of the rib cage. Breathing can be very painful because the muscles used for breathing pull on the rib. In most cases, a broken rib will heal on its own. You can take pain medicine while the rib mends. Pain relief allows you to take deep breaths. In the past, doctors recommended taping or wrapping broken ribs. This is no longer done because taping makes it hard for you to take deep breaths. You need to take deep breaths at least once an hour to prevent pneumonia or a partial collapse of a lung. Your rib will heal in about 6 weeks. You heal best when you take good care of yourself. Eat a variety of healthy foods, and don't smoke. Follow-up care is a key part of your treatment and safety. Be sure to make and go to all appointments, and call your doctor if you are having problems. It's also a good idea to know your test results and keep a list of the medicines you take. How can you care for yourself at home? · Be safe with medicines. Read and follow all instructions on the label. ¨ If the doctor gave you a prescription medicine for pain, take it as prescribed. ¨ If you are not taking a prescription pain medicine, ask your doctor if you can take an over-the-counter medicine. · Even if it hurts, cough or take the deepest breath you can at least once every hour. This will get air deeply into your lungs and reduce your chance of getting pneumonia or a partial collapse of a lung. Hold a pillow against your chest to make this less painful. · Put ice or a cold pack on the area for 10 to 20 minutes at a time. Put a thin cloth between the ice and your skin. When should you call for help? Call 911 anytime you think you may need emergency care. For example, call if:  · You have severe trouble breathing. Call your doctor now or seek immediate medical care if:  · You have some trouble breathing.   · You have a fever. · You have a new or worse cough. Watch closely for changes in your health, and be sure to contact your doctor if:  · You have pain even after taking your medicine. · You do not get better as expected. Where can you learn more? Go to http://alvin-rogelio.info/. Enter M135 in the search box to learn more about \"Broken Rib: Care Instructions. \"  Current as of: March 21, 2017  Content Version: 11.3  © 2100-0460 [employer]. Care instructions adapted under license by [email or web address][email or web address] (which disclaims liability or warranty for this information). If you have questions about a medical condition or this instruction, always ask your healthcare professional. [address] any warranty or liability for your use of this information. Pneumococcal Polysaccharide Vaccine: What You Need to Know  Why get vaccinated? Vaccination can protect older adults (and some children and younger adults) from pneumococcal disease. Pneumococcal disease is caused by bacteria that can spread from person to person through close contact. It can cause ear infections, and it can also lead to more serious infections of the:  · Lungs (pneumonia),  · Blood (bacteremia), and  · Covering of the brain and spinal cord (meningitis). Meningitis can cause deafness and brain damage, and it can be fatal.  Anyone can get pneumococcal disease, but children under 3years of age, people with certain medical conditions, adults over 72years of age, and cigarette smokers are at the highest risk. About 18,000 older adults die each year from pneumococcal disease in the [place]. Treatment of pneumococcal infections with penicillin and other drugs used to be more effective. But some strains of the disease have become resistant to these drugs. This makes prevention of the disease, through vaccination, even more important.   Pneumococcal polysaccharide vaccine (PPSV23)  Pneumococcal polysaccharide vaccine (PPSV23) protects against 23 types of pneumococcal bacteria. It will not prevent all pneumococcal disease. PPSV23 is recommended for:  · All adults 72years of age and older,  · Anyone 2 through 59years of age with certain long-term health problems,  · Anyone 2 through 59years of age with a weakened immune system,  · Adults 23 through 59years of age who smoke cigarettes or have asthma. Most people need only one dose of PPSV. A second dose is recommended for certain high-risk groups. People 72 and older should get a dose even if they have gotten one or more doses of the vaccine before they turned 65. Your healthcare provider can give you more information about these recommendations. Most healthy adults develop protection within 2 to 3 weeks of getting the shot. Some people should not get this vaccine  · Anyone who has had a life-threatening allergic reaction to PPSV should not get another dose. · Anyone who has a severe allergy to any component of PPSV should not receive it. Tell your provider if you have any severe allergies. · Anyone who is moderately or severely ill when the shot is scheduled may be asked to wait until they recover before getting the vaccine. Someone with a mild illness can usually be vaccinated. · Children less than 3years of age should not receive this vaccine. · There is no evidence that PPSV is harmful to either a pregnant woman or to her fetus. However, as a precaution, women who need the vaccine should be vaccinated before becoming pregnant, if possible. Risks of a vaccine reaction  With any medicine, including vaccines, there is a chance of side effects. These are usually mild and go away on their own, but serious reactions are also possible. About half of people who get PPSV have mild side effects, such as redness or pain where the shot is given, which go away within about two days.   Less than 1 out of 100 people develop a fever, muscle aches, or more severe local reactions. Problems that could happen after any vaccine:  · People sometimes faint after a medical procedure, including vaccination. Sitting or lying down for about 15 minutes can help prevent fainting, and injuries caused by a fall. Tell your doctor if you feel dizzy, or have vision changes or ringing in the ears. · Some people get severe pain in the shoulder and have difficulty moving the arm where a shot was given. This happens very rarely. · Any medication can cause a severe allergic reaction. Such reactions from a vaccine are very rare, estimated at about 1 in a million doses, and would happen within a few minutes to a few hours after the vaccination. As with any medicine, there is a very remote chance of a vaccine causing a serious injury or death. The safety of vaccines is always being monitored. For more information, visit: www.cdc.gov/vaccinesafety/  What if there is a serious reaction? What should I look for? Look for anything that concerns you, such as signs of a severe allergic reaction, very high fever, or unusual behavior. Signs of a severe allergic reaction can include hives, swelling of the face and throat, difficulty breathing, a fast heartbeat, dizziness, and weakness. These would usually start a few minutes to a few hours after the vaccination. What should I do? If you think it is a severe allergic reaction or other emergency that can't wait, call 9-1-1 or get to the nearest hospital. Otherwise, call your doctor. Afterward, the reaction should be reported to the Vaccine Adverse Event Reporting System (VAERS). Your doctor might file this report, or you can do it yourself through the VAERS web site at www.vaers. hhs.gov, or by calling 2-325.620.4025. VAERS does not give medical advice. How can I learn more? · Ask your doctor. He or she can give you the vaccine package insert or suggest other sources of information. · Call your local or state health department.   · Contact the Centers for Disease Control and Prevention (CDC):  ¨ Call 1-671.116.5197 (1-800-CDC-INFO) or  ¨ Visit CDC's website at www.cdc.gov/vaccines  Vaccine Information Statement  PPSV Vaccine  (04/24/2015)  Department of Health and Human Services  Centers for Disease Control and Prevention  Many Vaccine Information Statements are available in Ukrainian and other languages. See www.immunize.org/vis. Hojas de información Sobre Vacunas están disponibles en español y en muchos otros idiomas. Visite Terry.si. Care instructions adapted under license by [employer] (which disclaims liability or warranty for this information). If you have questions about a medical condition or this instruction, always ask your healthcare professional. [address] any warranty or liability for your use of this information.

## 2017-10-02 NOTE — MR AVS SNAPSHOT
Visit Information Date & Time Provider Department Dept. Phone Encounter #  
 10/2/2017 11:20 AM Jasmin Snider NP [facility] [phone] [id number] Follow-up Instructions Return if symptoms worsen or fail to improve. Upcoming Health Maintenance Date Due  
 MEDICARE YEARLY EXAM 8/22/2018 BREAST CANCER SCRN MAMMOGRAM 6/6/2019 GLAUCOMA SCREENING Q2Y 8/21/2019 DTaP/Tdap/Td series (2 - Td) 3/21/2027 COLONOSCOPY 9/6/2027 Allergies as of 10/2/2017  Review Complete On: 10/2/2017 By: Jasmin Snider NP Severity Noted Reaction Type Reactions Iodine Medium 04/19/2010   Systemic Hives, Rash, Other (comments) Feels like Chest caving in. Rash LL lumbosacral area Fosamax [Alendronate]  09/19/2017    Itching  
 rash Current Immunizations  Reviewed on 10/2/2017 Name Date Influenza High Dose Vaccine PF 1/21/2016 Influenza Vaccine PF 11/7/2013 Pneumococcal Conjugate (PCV-13) 11/7/2013 Pneumococcal Polysaccharide (PPSV-23)  Incomplete Reviewed by Jasmin Snider NP on 10/2/2017 at 12:36 PM  
You Were Diagnosed With   
  
 Codes Comments Rib lesion    -  Primary ICD-10-CM: M89.9 ICD-9-CM: 733.90 Tobacco abuse     ICD-10-CM: Z72.0 ICD-9-CM: 305.1 Encounter for immunization     ICD-10-CM: P59 ICD-9-CM: V03.89 Vitals BP Pulse Temp Resp Height(growth percentile) Weight(growth percentile) 130/64 (BP 1 Location: Left arm, BP Patient Position: Sitting) (!) 101 98.1 °F (36.7 °C) (Oral) 18 5' 6\" (1.676 m) 209 lb (94.8 kg) LMP SpO2 BMI OB Status Smoking Status 01/01/1990 100% 33.73 kg/m2 Postmenopausal Current Every Day Smoker Vitals History BMI and BSA Data Body Mass Index Body Surface Area  
 33.73 kg/m 2 2.1 m 2 Preferred Pharmacy Pharmacy Name Phone [address], [phone] [facility] [phone] Your Updated Medication List  
  
   
This list is accurate as of: 10/2/17 12:39 PM.  Always use your most recent med list.  
  
  
  
  
 amitriptyline 25 mg tablet Commonly known as:  ELAVIL Take 25 mg by mouth nightly. ARMOUR THYROID 180 mg Tab Generic drug:  Thyroid (Pork) Take  by mouth.  
  
 biotin 2,500 mcg Tab Take  by mouth daily. cetirizine 10 mg tablet Commonly known as:  ZYRTEC Take 1 Tab by mouth daily as needed for Itching. Cholecalciferol (Vitamin D3) 2,000 unit Cap capsule Commonly known as:  VITAMIN D3 Take  by Mouth. CITRACAL PO Take 1 Tab by mouth daily. FLAXSEED PO Take 1 Tab by mouth daily. hydroCHLOROthiazide 25 mg tablet Commonly known as:  HYDRODIURIL  
TAKE 1 TABLET BY MOUTH DAILY MULTI-VITAMIN PO Take 1 Tab by mouth daily. PEG 3350-Electrolytes 236-22.74-6.74 -5.86 gram suspension Commonly known as:  GO-LYTELY Take as directed for bowel prep  Indications: BOWEL EVACUATION  
  
 predniSONE 10 mg dose pack Commonly known as:  STERAPRED DS See administration instruction per 10mg dose pack  
  
 triamcinolone 55 mcg nasal inhaler Commonly known as:  NASACORT AQ  
2 Sprays daily. VICODIN 5-300 mg tablet Generic drug:  HYDROcodone-acetaminophen Take 1 Tab by mouth every four (4) hours as needed. VITAMIN B-12 1,000 mcg tablet Generic drug:  cyanocobalamin Take 1,000 mcg by mouth daily. We Performed the Following PNEUMOCOCCAL POLYSACCHARIDE VACCINE, 23-VALENT, ADULT OR IMMUNOSUPPRESSED PT DOSE, [97902 CPT(R)] Follow-up Instructions Return if symptoms worsen or fail to improve. Patient Instructions Broken Rib: Care Instructions Your Care Instructions A broken rib is a crack or break in one of the bones of the rib cage. Breathing can be very painful because the muscles used for breathing pull on the rib. In most cases, a broken rib will heal on its own. You can take pain medicine while the rib mends. Pain relief allows you to take deep breaths. In the past, doctors recommended taping or wrapping broken ribs. This is no longer done because taping makes it hard for you to take deep breaths. You need to take deep breaths at least once an hour to prevent pneumonia or a partial collapse of a lung. Your rib will heal in about 6 weeks. You heal best when you take good care of yourself. Eat a variety of healthy foods, and don't smoke. Follow-up care is a key part of your treatment and safety. Be sure to make and go to all appointments, and call your doctor if you are having problems. It's also a good idea to know your test results and keep a list of the medicines you take. How can you care for yourself at home? · Be safe with medicines. Read and follow all instructions on the label. ¨ If the doctor gave you a prescription medicine for pain, take it as prescribed. ¨ If you are not taking a prescription pain medicine, ask your doctor if you can take an over-the-counter medicine. · Even if it hurts, cough or take the deepest breath you can at least once every hour. This will get air deeply into your lungs and reduce your chance of getting pneumonia or a partial collapse of a lung. Hold a pillow against your chest to make this less painful. · Put ice or a cold pack on the area for 10 to 20 minutes at a time. Put a thin cloth between the ice and your skin. When should you call for help? Call 911 anytime you think you may need emergency care. For example, call if: 
· You have severe trouble breathing. Call your doctor now or seek immediate medical care if: 
· You have some trouble breathing. · You have a fever. · You have a new or worse cough. Watch closely for changes in your health, and be sure to contact your doctor if: 
· You have pain even after taking your medicine. · You do not get better as expected. Where can you learn more? Go to http://alvin-rogelio.info/. Enter M135 in the search box to learn more about \"Broken Rib: Care Instructions. \" Current as of: March 21, 2017 Content Version: 11.3 © 0394-5157 [employer]. Care instructions adapted under license by [employer] (which disclaims liability or warranty for this information). If you have questions about a medical condition or this instruction, always ask your healthcare professional. [address] any warranty or liability for your use of this information. Pneumococcal Polysaccharide Vaccine: What You Need to Know Why get vaccinated? Vaccination can protect older adults (and some children and younger adults) from pneumococcal disease. Pneumococcal disease is caused by bacteria that can spread from person to person through close contact. It can cause ear infections, and it can also lead to more serious infections of the: 
· Lungs (pneumonia), · Blood (bacteremia), and 
· Covering of the brain and spinal cord (meningitis). Meningitis can cause deafness and brain damage, and it can be fatal. 
Anyone can get pneumococcal disease, but children under 3years of age, people with certain medical conditions, adults over 72years of age, and cigarette smokers are at the highest risk. About 18,000 older adults die each year from pneumococcal disease in the [place]. Treatment of pneumococcal infections with penicillin and other drugs used to be more effective. But some strains of the disease have become resistant to these drugs. This makes prevention of the disease, through vaccination, even more important. Pneumococcal polysaccharide vaccine (PPSV23) Pneumococcal polysaccharide vaccine (PPSV23) protects against 23 types of pneumococcal bacteria. It will not prevent all pneumococcal disease. PPSV23 is recommended for: · All adults 72years of age and older, 
 · Anyone 2 through 59years of age with certain long-term health problems, 
· Anyone 2 through 59years of age with a weakened immune system, 
· Adults 23 through 59years of age who smoke cigarettes or have asthma. Most people need only one dose of PPSV. A second dose is recommended for certain high-risk groups. People 72 and older should get a dose even if they have gotten one or more doses of the vaccine before they turned 65. Your healthcare provider can give you more information about these recommendations. Most healthy adults develop protection within 2 to 3 weeks of getting the shot. Some people should not get this vaccine · Anyone who has had a life-threatening allergic reaction to PPSV should not get another dose. · Anyone who has a severe allergy to any component of PPSV should not receive it. Tell your provider if you have any severe allergies. · Anyone who is moderately or severely ill when the shot is scheduled may be asked to wait until they recover before getting the vaccine. Someone with a mild illness can usually be vaccinated. · Children less than 3years of age should not receive this vaccine. · There is no evidence that PPSV is harmful to either a pregnant woman or to her fetus. However, as a precaution, women who need the vaccine should be vaccinated before becoming pregnant, if possible. Risks of a vaccine reaction With any medicine, including vaccines, there is a chance of side effects. These are usually mild and go away on their own, but serious reactions are also possible. About half of people who get PPSV have mild side effects, such as redness or pain where the shot is given, which go away within about two days. Less than 1 out of 100 people develop a fever, muscle aches, or more severe local reactions. Problems that could happen after any vaccine: · People sometimes faint after a medical procedure, including vaccination. Sitting or lying down for about 15 minutes can help prevent fainting, and injuries caused by a fall. Tell your doctor if you feel dizzy, or have vision changes or ringing in the ears. · Some people get severe pain in the shoulder and have difficulty moving the arm where a shot was given. This happens very rarely. · Any medication can cause a severe allergic reaction. Such reactions from a vaccine are very rare, estimated at about 1 in a million doses, and would happen within a few minutes to a few hours after the vaccination. As with any medicine, there is a very remote chance of a vaccine causing a serious injury or death. The safety of vaccines is always being monitored. For more information, visit: www.cdc.gov/vaccinesafety/ What if there is a serious reaction? What should I look for? Look for anything that concerns you, such as signs of a severe allergic reaction, very high fever, or unusual behavior. Signs of a severe allergic reaction can include hives, swelling of the face and throat, difficulty breathing, a fast heartbeat, dizziness, and weakness. These would usually start a few minutes to a few hours after the vaccination. What should I do? If you think it is a severe allergic reaction or other emergency that can't wait, call 9-1-1 or get to the nearest hospital. Otherwise, call your doctor. Afterward, the reaction should be reported to the Vaccine Adverse Event Reporting System (VAERS). Your doctor might file this report, or you can do it yourself through the VAERS web site at www.vaers. hhs.gov, or by calling 1-335.922.5596. VAERS does not give medical advice. How can I learn more? · Ask your doctor. He or she can give you the vaccine package insert or suggest other sources of information. · Call your local or state health department. · Contact the Centers for Disease Control and Prevention (CDC): 
¨ Call 6-987.496.4692 (1-800-CDC-INFO) or ¨ Visit CDC's website at www.cdc.gov/vaccines Vaccine Information Statement PPSV Vaccine 
(04/24/2015) Department of Health and [employer] [place] Centers for Disease Control and Prevention Many Vaccine Information Statements are available in [place]ian and other languages. See www.immunize.org/vis. Hojas de información Sobre Vacunas están disponibles en español y en muchos otros idiomas. Visite Terry.si. Care instructions adapted under license by [employer] (which disclaims liability or warranty for this information). If you have questions about a medical condition or this instruction, always ask your healthcare professional. [address] any warranty or liability for your use of this information. Introducing \[facility] [place] [facility]\"" & HEALTH SERVICES! Dear Jennifer Blanco: Thank you for requesting a [employer] account. Our records indicate that you already have an active [employer] account. You can access your account anytime at [email or web address] Did you know that you can access your hospital and ER discharge instructions at any time in [employer]? You can also review all of your test results from your hospital stay or ER visit. Additional Information If you have questions, please visit the Frequently Asked Questions section of the [employer] website at [email or web address] Remember, [employer] is NOT to be used for urgent needs. For medical emergencies, dial 911. Now available from your iPhone and Android! Please provide this summary of care documentation to your next provider. Your primary care clinician is listed as Saniya Barraza. If you have any questions after today's visit, please call [phone].

## 2017-12-05 ENCOUNTER — TELEPHONE (OUTPATIENT)
Dept: FAMILY MEDICINE CLINIC | Age: 72
End: 2017-12-05

## 2017-12-05 NOTE — TELEPHONE ENCOUNTER
Patient called in and stated that she has been a patient with FRANCK [facility] since 2000 and just recently she was told that she need to find a new PCP that is closer to her home in [place] due to the fact that she can no longer drive. Patient stated that she is currently in [facility] for severe pain. Patient stated that she wants to speak with someone on what she should do. Patient can be reached at [phone]. Please advise.

## 2017-12-05 NOTE — TELEPHONE ENCOUNTER
Spoke with the patient. States she was admitted to [facility] 2 days ago for severe pain. States more fractures have been found. Comments she is unable to drive so she will need to find a PCP closer to home. Just wanted to inform the provider.

## 2017-12-11 ENCOUNTER — PATIENT OUTREACH (OUTPATIENT)
Dept: FAMILY MEDICINE CLINIC | Age: 72
End: 2017-12-11

## 2017-12-11 NOTE — PROGRESS NOTES
Chart review. Patient transferred from [facility] to [facility] on 12/6/17 for multiple pathologic rib and compression fractures. Patient recently diagnosed with multiple myeloma about a month ago. Will receive palliative radiation therapy. Will follow up once discharge but patient will most likely obtain new PCP closer to home.   Provider aware.

## 2017-12-13 ENCOUNTER — PATIENT OUTREACH (OUTPATIENT)
Dept: FAMILY MEDICINE CLINIC | Age: 72
End: 2017-12-13

## 2017-12-13 NOTE — PROGRESS NOTES
Left message for daughter to return phone call to office. Will try back after 5 PM as requested on perm to release info form. Need to know if patient has found PCP closer to home.   Patient current admitted to [facility].

## 2018-01-10 ENCOUNTER — PATIENT OUTREACH (OUTPATIENT)
Dept: FAMILY MEDICINE CLINIC | Age: 73
End: 2018-01-10

## 2018-01-10 NOTE — PROGRESS NOTES
Patient admitted to [facility] 1/4/18-1/7/18 for pneumonia. Attempted to contact patient via telephone. Voice message left.   Will continue to follow

## 2018-01-12 DIAGNOSIS — I10 ESSENTIAL HYPERTENSION: ICD-10-CM

## 2018-01-12 RX ORDER — HYDROCHLOROTHIAZIDE 25 MG/1
TABLET ORAL
Qty: 90 TAB | Refills: 0 | Status: SHIPPED | OUTPATIENT
Start: 2018-01-12 | End: 2018-11-21 | Stop reason: SDUPTHER

## 2018-01-15 ENCOUNTER — PATIENT OUTREACH (OUTPATIENT)
Dept: FAMILY MEDICINE CLINIC | Age: 73
End: 2018-01-15

## 2018-01-15 NOTE — PROGRESS NOTES
2nd attempt made to contact patient post [facility] hospitalization 1/4/18-1/7/18. Left voice message. Will continue to follow.

## 2018-01-17 ENCOUNTER — PATIENT OUTREACH (OUTPATIENT)
Dept: FAMILY MEDICINE CLINIC | Age: 73
End: 2018-01-17

## 2018-01-17 NOTE — LETTER
1/17/2018 11:12 AM 
 
Ms. Jermaine Escobar [address] \[facility]\"" [id number] Dear Ms. Moisés: 
 
I've made several attempts to reach you to follow up on your recent hospitalizations. Your health is very important. It is important that you follow up with your Primary Care office as soon as possible. Please contact me at [phone] Sincerely, Rivera Reese RN

## 2018-01-17 NOTE — PROGRESS NOTES
Attempted to contact patient post [facility] hospitalization. Left voice message. Letter sent.

## 2018-01-18 ENCOUNTER — PATIENT OUTREACH (OUTPATIENT)
Dept: FAMILY MEDICINE CLINIC | Age: 73
End: 2018-01-18

## 2018-01-18 NOTE — PROGRESS NOTES
Goals        Post Hospitalization     Attends follow-up appointments as directed.  Prevent complications post hospitalization. Kari Batista a 71 y.o. female was admitted to [facility] on 1/4/18 to 1/7/18 for nausea and vomiting with possible aspiration pneumonia. Hospital course:  Beba Acevedo is a 71 y.o. 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 Introduced self, role and reason for call. Verified 2 patient identifiers.  reports:  Patient is asleep  Patient has constant pain treated with meds   was adamant that patient wasn't treated for pneumonia   Patient has Home Health for SN and PT/OT      DME:   walker  Resources/Support:   Home health  Children   spouse  AMD:   Not on file  Reconciled home medications and reviewed allergies. Instructed to bring all medications with her to next appointment. Educated  to monitor and report the following Red flags: excessive nausea and vomiting, choking or any new or concerning symptoms.  verbalized understanding of information discussed and is aware of  when to seek medical attention from PCP, urgent care or ED. Opportunity to ask questions was provided. Has contact information for future reference or further questions. Appointments:  Dr. Mayank Mullins 1/25/18 1300  No earlier appointment available.  aware of appointments. He will provide transportation. Potential Barriers to care  Financial:none noted  Understanding of disease process:needs continuous reiforcement  Medications:None noted  Transportation:None noted    Adherence to previous treatment and likelihood for follow-up:   verbalized understanding of discharge instructions and need for follow up.      Plan of Care/Goals:  Patient will follow up with PCP office  Patient will be free from 30 day readmission  Patient will continue to receive home health resources as needed

## 2018-01-19 ENCOUNTER — TELEPHONE (OUTPATIENT)
Dept: FAMILY MEDICINE CLINIC | Age: 73
End: 2018-01-19

## 2018-01-19 NOTE — TELEPHONE ENCOUNTER
Eddi Mckee called to notify pcp that she will be moving the next medical social work eval to the next medicare eval week due to some scheduling issues.

## 2018-01-19 NOTE — TELEPHONE ENCOUNTER
2) Ike from [facility] called to notify pcp that they missed one day of PT this week, due to the pt having some stomach issues this week.

## 2018-01-25 ENCOUNTER — OFFICE VISIT (OUTPATIENT)
Dept: FAMILY MEDICINE CLINIC | Age: 73
End: 2018-01-25

## 2018-01-25 VITALS
RESPIRATION RATE: 16 BRPM | DIASTOLIC BLOOD PRESSURE: 92 MMHG | TEMPERATURE: 98.3 F | OXYGEN SATURATION: 95 % | SYSTOLIC BLOOD PRESSURE: 130 MMHG | WEIGHT: 184 LBS | BODY MASS INDEX: 29.57 KG/M2 | HEART RATE: 107 BPM | HEIGHT: 66 IN

## 2018-01-25 DIAGNOSIS — C90.00 MULTIPLE MYELOMA NOT HAVING ACHIEVED REMISSION (HCC): Primary | ICD-10-CM

## 2018-01-25 DIAGNOSIS — R11.15 NON-INTRACTABLE CYCLICAL VOMITING WITH NAUSEA: ICD-10-CM

## 2018-01-25 RX ORDER — DEXAMETHASONE 4 MG/1
TABLET ORAL
COMMUNITY
End: 2018-10-19 | Stop reason: ALTCHOICE

## 2018-01-25 RX ORDER — ONDANSETRON HYDROCHLORIDE 8 MG/1
8 TABLET, FILM COATED ORAL
COMMUNITY
End: 2020-01-01 | Stop reason: ALTCHOICE

## 2018-01-25 RX ORDER — CEFUROXIME AXETIL 500 MG/1
500 TABLET ORAL 2 TIMES DAILY
COMMUNITY
End: 2018-04-16

## 2018-01-25 RX ORDER — LEVOTHYROXINE AND LIOTHYRONINE 76; 18 UG/1; UG/1
90 TABLET ORAL DAILY
COMMUNITY
End: 2019-03-04 | Stop reason: DRUGHIGH

## 2018-01-25 RX ORDER — ACYCLOVIR 400 MG/1
400 TABLET ORAL
COMMUNITY
End: 2018-10-19

## 2018-01-25 RX ORDER — CYCLOPHOSPHAMIDE 50 MG/1
600 CAPSULE ORAL
COMMUNITY
End: 2019-03-04 | Stop reason: ALTCHOICE

## 2018-01-25 NOTE — PROGRESS NOTES
1. Have you been to the ER, urgent care clinic since your last visit? Hospitalized since your last visit? Yes Where: [facility]    2. Have you seen or consulted any other health care providers outside of the [address] [place] [address] since your last visit? Include any pap smears or colon screening.  Yes Where: [facility]

## 2018-01-25 NOTE — PATIENT INSTRUCTIONS
Nausea and Vomiting: Care Instructions  Your Care Instructions    When you are nauseated, you may feel weak and sweaty and notice a lot of saliva in your mouth. Nausea often leads to vomiting. Most of the time you do not need to worry about nausea and vomiting, but they can be signs of other illnesses. Two common causes of nausea and vomiting are stomach flu and food poisoning. Nausea and vomiting from viral stomach flu will usually start to improve within 24 hours. Nausea and vomiting from food poisoning may last from 12 to 48 hours. The doctor has checked you carefully, but problems can develop later. If you notice any problems or new symptoms, get medical treatment right away. Follow-up care is a key part of your treatment and safety. Be sure to make and go to all appointments, and call your doctor if you are having problems. It's also a good idea to know your test results and keep a list of the medicines you take. How can you care for yourself at home? · To prevent dehydration, drink plenty of fluids, enough so that your urine is light yellow or clear like water. Choose water and other caffeine-free clear liquids until you feel better. If you have kidney, heart, or liver disease and have to limit fluids, talk with your doctor before you increase the amount of fluids you drink. · Rest in bed until you feel better. · When you are able to eat, try clear soups, mild foods, and liquids until all symptoms are gone for 12 to 48 hours. Other good choices include dry toast, crackers, cooked cereal, and gelatin dessert, such as Jell-O. When should you call for help? Call 911 anytime you think you may need emergency care. For example, call if:  ? · You passed out (lost consciousness). ?Call your doctor now or seek immediate medical care if:  ? · You have symptoms of dehydration, such as:  ¨ Dry eyes and a dry mouth. ¨ Passing only a little dark urine.   ¨ Feeling thirstier than usual.   ? · You have new or worsening belly pain. ? · You have a new or higher fever. ? · You vomit blood or what looks like coffee grounds. ? Watch closely for changes in your health, and be sure to contact your doctor if:  ? · You have ongoing nausea and vomiting. ? · Your vomiting is getting worse. ? · Your vomiting lasts longer than 2 days. ? · You are not getting better as expected. Where can you learn more? Go to http://alvin-rogelio.info/. Enter 25 [id number] in the search box to learn more about \"Nausea and Vomiting: Care Instructions. \"  Current as of: March 20, 2017  Content Version: 11.4  © 5064-5711 [employer]. Care instructions adapted under license by [employer] (which disclaims liability or warranty for this information). If you have questions about a medical condition or this instruction, always ask your healthcare professional. [address] any warranty or liability for your use of this information.

## 2018-01-25 NOTE — MR AVS SNAPSHOT
Eric Huitron 
 
 
 [address] [address] 
[phone] Patient: Inez Chu MRN:  [id number] Visit Information Date & Time Provider Department Dept. Phone Encounter #  
 2018  1:00 PM Kalin Nicole, [address] [place] [address] [phone] [id number] Follow-up Instructions Return in about 3 months (around 2018), or if symptoms worsen or fail to improve, for MM nausea and vomting. Upcoming Health Maintenance Date Due  
 MEDICARE YEARLY EXAM 2018 GLAUCOMA SCREENING Q2Y 2019 BREAST CANCER SCRN MAMMOGRAM 2019 DTaP/Tdap/Td series (2 - Td) 3/21/2027 COLONOSCOPY 2027 Allergies as of 2018  Review Complete On: 2018 By: Leann Quispe LPN Severity Noted Reaction Type Reactions Iodine Medium 2010   Systemic Hives, Rash, Other (comments) Feels like Chest caving in. Rash LL lumbosacral area Fosamax [Alendronate]  2017    Itching  
 rash Current Immunizations  Reviewed on 10/2/2017 Name Date Influenza High Dose Vaccine PF 2016 Influenza Vaccine PF 2013 Pneumococcal Conjugate (PCV-13) 2013 Not reviewed this visit You Were Diagnosed With   
  
 Codes Comments Multiple myeloma not having achieved remission ([facility] [address]    -  Primary ICD-10-CM: C90.00 ICD-9-CM: 203.00 Non-intractable cyclical vomiting with nausea     ICD-10-CM: G43. A0 ICD-9-CM: 427. 2 Vitals BP Pulse Temp Resp Height(growth percentile) Weight(growth percentile) (!) 130/92 (!) 107 98.3 °F (36.8 °C) (Oral) 16 5' 6\" (1.676 m) 184 lb (83.5 kg) LMP SpO2 BMI OB Status Smoking Status 1990 95% 29.7 kg/m2 Postmenopausal Former Smoker Vitals History BMI and BSA Data Body Mass Index Body Surface Area  
 29.7 kg/m 2 1.97 m 2 Preferred Pharmacy Pharmacy Name Phone [facility] [address] [phone] Your Updated Medication List  
  
   
This list is accurate as of: 1/25/18  1:50 PM.  Always use your most recent med list.  
  
  
  
  
 acyclovir 400 mg tablet Commonly known as:  ZOVIRAX  
400 mg. ARMOUR THYROID 120 mg Tab Generic drug:  Thyroid (Pork) Take 120 mg by mouth daily. cefUROXime 500 mg tablet Commonly known as:  CEFTIN Take 500 mg by mouth two (2) times a day. Cholecalciferol (Vitamin D3) 2,000 unit Cap capsule Commonly known as:  VITAMIN D3 Take  by Mouth. CITRACAL PO Take 1 Tab by mouth daily. cyclophosphamide 50 mg capsule Commonly known as:  CYTOXAN Take 600 mg by mouth every seven (7) days. Take 12 capsules by mouth once a week. dexamethasone 4 mg tablet Commonly known as:  DECADRON Take  by mouth. Take 10 tablets by mouth once a week. FLAXSEED PO Take 1 Tab by mouth daily. hydroCHLOROthiazide 25 mg tablet Commonly known as:  HYDRODIURIL  
TAKE 1 TABLET BY MOUTH DAILY MULTI-VITAMIN PO Take 1 Tab by mouth daily. ondansetron hcl 8 mg tablet Commonly known as:  Melissa Shruthi Take 8 mg by mouth every eight (8) hours as needed for Nausea. triamcinolone 55 mcg nasal inhaler Commonly known as:  NASACORT AQ  
2 Sprays daily. VICODIN 5-300 mg tablet Generic drug:  HYDROcodone-acetaminophen Take 1 Tab by mouth every four (4) hours as needed. Follow-up Instructions Return in about 3 months (around 4/25/2018), or if symptoms worsen or fail to improve, for MM nausea and vomting. Patient Instructions Nausea and Vomiting: Care Instructions Your Care Instructions When you are nauseated, you may feel weak and sweaty and notice a lot of saliva in your mouth. Nausea often leads to vomiting.  Most of the time you do not need to worry about nausea and vomiting, but they can be signs of other illnesses. Two common causes of nausea and vomiting are stomach flu and food poisoning. Nausea and vomiting from viral stomach flu will usually start to improve within 24 hours. Nausea and vomiting from food poisoning may last from 12 to 48 hours. The doctor has checked you carefully, but problems can develop later. If you notice any problems or new symptoms, get medical treatment right away. Follow-up care is a key part of your treatment and safety. Be sure to make and go to all appointments, and call your doctor if you are having problems. It's also a good idea to know your test results and keep a list of the medicines you take. How can you care for yourself at home? · To prevent dehydration, drink plenty of fluids, enough so that your urine is light yellow or clear like water. Choose water and other caffeine-free clear liquids until you feel better. If you have kidney, heart, or liver disease and have to limit fluids, talk with your doctor before you increase the amount of fluids you drink. · Rest in bed until you feel better. · When you are able to eat, try clear soups, mild foods, and liquids until all symptoms are gone for 12 to 48 hours. Other good choices include dry toast, crackers, cooked cereal, and gelatin dessert, such as Jell-O. When should you call for help? Call 911 anytime you think you may need emergency care. For example, call if: 
? · You passed out (lost consciousness). ?Call your doctor now or seek immediate medical care if: 
? · You have symptoms of dehydration, such as: ¨ Dry eyes and a dry mouth. ¨ Passing only a little dark urine. ¨ Feeling thirstier than usual.  
? · You have new or worsening belly pain. ? · You have a new or higher fever. ? · You vomit blood or what looks like coffee grounds. ? Watch closely for changes in your health, and be sure to contact your doctor if: ? · You have ongoing nausea and vomiting. ? · Your vomiting is getting worse. ? · Your vomiting lasts longer than 2 days. ? · You are not getting better as expected. Where can you learn more? Go to http://alvin-rogelio.info/. Enter 25 [id number] in the search box to learn more about \"Nausea and Vomiting: Care Instructions. \" Current as of: March 20, 2017 Content Version: 11.4 © 0701-8692 [employer]. Care instructions adapted under license by [employer] (which disclaims liability or warranty for this information). If you have questions about a medical condition or this instruction, always ask your healthcare professional. [place] [address] any warranty or liability for your use of this information. Introducing [facility]! Dear Media Pro: Thank you for requesting a [employer] account. Our records indicate that you already have an active [employer] account. You can access your account anytime at [email or web address] Did you know that you can access your hospital and ER discharge instructions at any time in [employer]? You can also review all of your test results from your hospital stay or ER visit. Additional Information If you have questions, please visit the Frequently Asked Questions section of the [employer] website at [email or web address] Remember, [employer] is NOT to be used for urgent needs. For medical emergencies, dial 911. Now available from your iPhone and Android! Please provide this summary of care documentation to your next provider. Your primary care clinician is listed as Judy Barraza. If you have any questions after today's visit, please call [phone].

## 2018-01-25 NOTE — PROGRESS NOTES
HISTORY OF PRESENT ILLNESS  Forrest Hernandes is a 71 y.o. female. HPI  Patient is here today for evaluation and treatment of: Abdominal Pain    Abdominal Pain: Pt was recently hospitalized for nausea and Vomiting. Pt has a h/o mutiple myeloma. She is on chemo ; she gets this every Tuesday; ; she now gets a \" funny \" feeling in her abdomen. Its is not painful, there is not much nausea now that she is on zofran. She had some diarrhea yesterday. She thinks acyclovir may cause her to vomit. Had a recent viral URI; she was given Robitussin and this helped; She has had some cold intolerance. BP was controlled on Tuesday at [facility]. Current Outpatient Prescriptions:     cefUROXime (CEFTIN) 500 mg tablet, Take 500 mg by mouth two (2) times a day., Disp: , Rfl:     cyclophosphamide (CYTOXAN) 50 mg capsule, Take 600 mg by mouth every seven (7) days. Take 12 capsules by mouth once a week., Disp: , Rfl:     dexamethasone (DECADRON) 4 mg tablet, Take  by mouth. Take 10 tablets by mouth once a week., Disp: , Rfl:     Thyroid, Pork, (ARMOUR THYROID) 120 mg tab, Take 120 mg by mouth daily. , Disp: , Rfl:     ondansetron hcl (ZOFRAN) 8 mg tablet, Take 8 mg by mouth every eight (8) hours as needed for Nausea., Disp: , Rfl:     hydroCHLOROthiazide (HYDRODIURIL) 25 mg tablet, TAKE 1 TABLET BY MOUTH DAILY, Disp: 90 Tab, Rfl: 0    HYDROcodone-acetaminophen (VICODIN) 5-300 mg tablet, Take 1 Tab by mouth every four (4) hours as needed. , Disp: , Rfl:     Cholecalciferol, Vitamin D3, (VITAMIN D3) 2,000 unit cap capsule, Take  by Mouth., Disp: , Rfl:     triamcinolone (NASACORT AQ) 55 mcg nasal inhaler, 2 Sprays daily. , Disp: , Rfl:     MULTIVITAMINS (MULTI-VITAMIN PO), Take 1 Tab by mouth daily. , Disp: , Rfl:     CALCIUM CITRATE (CITRACAL PO), Take 1 Tab by mouth daily. , Disp: , Rfl:     FLAXSEED PO, Take 1 Tab by mouth daily. , Disp: , Rfl:     acyclovir (ZOVIRAX) 400 mg tablet, 400 mg., Disp: , Rfl:       PM,  Meds, Allergies, Family History, Social history reviewed    Review of Systems   Constitutional: Negative for chills and fever. Respiratory: Negative for shortness of breath and wheezing. Cardiovascular: Negative for chest pain and palpitations. Physical Exam   Constitutional: She appears well-developed and well-nourished. No distress. Cardiovascular: Normal rate and regular rhythm. Exam reveals no gallop and no friction rub. No murmur heard. Pulmonary/Chest: Breath sounds normal. No respiratory distress. She has no wheezes. She has no rales. Abdominal: Bowel sounds are normal. She exhibits no distension. There is no tenderness. There is no rebound and no guarding. Nursing note and vitals reviewed. Visit Vitals    BP (!) 130/92    Pulse (!) 107    Temp 98.3 °F (36.8 °C) (Oral)    Resp 16    Ht 5' 6\" (1.676 m)    Wt 184 lb (83.5 kg)    LMP 01/01/1990    SpO2 95%    BMI 29.7 kg/m2         ASSESSMENT and PLAN    ICD-10-CM ICD-9-CM    1. Multiple myeloma not having achieved remission (HCC) C90.00 203.00    2. Non-intractable cyclical vomiting with nausea G43. A0 536.2        As above, likely multifactorial; ? Related to acyclovir; advised pt to discuss with the prescribing provider; may need to stop med temporarily or try an alternative  [place] diet advised  Continue zofran  Follow up with oncology as scheduled    Follow-up Disposition:  Return in about 3 months (around 4/25/2018), or if symptoms worsen or fail to improve, for MM nausea and vomting. This has been fully explained to the patient, who indicates understanding.

## 2018-02-01 ENCOUNTER — TELEPHONE (OUTPATIENT)
Dept: FAMILY MEDICINE CLINIC | Age: 73
End: 2018-02-01

## 2018-02-01 NOTE — TELEPHONE ENCOUNTER
Returned call to the patient. States her blood pressure has been fine. Has been experiencing some chills,sweats and vomiting. Patient already scheduled for 2/5/2018 with Dr. Larry Contreras.

## 2018-02-01 NOTE — TELEPHONE ENCOUNTER
Pt called in to speak with a nurse because she is having high bp but also symptoms of the flu. Sweats, chills and vomiting.  She scheduled next acute apt available

## 2018-02-02 NOTE — TELEPHONE ENCOUNTER
Patient states her symptoms have gotten better. She has not had any nausea or vomiting today, sweats haven't been occurring as often. Advised patient that if her symptoms get any worse at any point, to go to the emergency room immediately. Patient verbalized understanding.

## 2018-02-05 ENCOUNTER — OFFICE VISIT (OUTPATIENT)
Dept: FAMILY MEDICINE CLINIC | Age: 73
End: 2018-02-05

## 2018-02-05 VITALS
RESPIRATION RATE: 18 BRPM | BODY MASS INDEX: 28.73 KG/M2 | WEIGHT: 178.8 LBS | HEART RATE: 104 BPM | DIASTOLIC BLOOD PRESSURE: 69 MMHG | OXYGEN SATURATION: 97 % | HEIGHT: 66 IN | SYSTOLIC BLOOD PRESSURE: 101 MMHG | TEMPERATURE: 98 F

## 2018-02-05 DIAGNOSIS — R68.83 CHILLS: ICD-10-CM

## 2018-02-05 DIAGNOSIS — R07.81 RIB PAIN ON LEFT SIDE: ICD-10-CM

## 2018-02-05 DIAGNOSIS — R53.83 FATIGUE, UNSPECIFIED TYPE: Primary | ICD-10-CM

## 2018-02-05 LAB
QUICKVUE INFLUENZA TEST: NEGATIVE
VALID INTERNAL CONTROL?: YES

## 2018-02-05 RX ORDER — MELOXICAM 15 MG/1
15 TABLET ORAL DAILY
Qty: 30 TAB | Refills: 0 | Status: SHIPPED | OUTPATIENT
Start: 2018-02-05 | End: 2018-02-05 | Stop reason: SDUPTHER

## 2018-02-05 RX ORDER — MELOXICAM 15 MG/1
15 TABLET ORAL DAILY
Qty: 30 TAB | Refills: 0 | Status: SHIPPED | OUTPATIENT
Start: 2018-02-05 | End: 2018-03-21 | Stop reason: ALTCHOICE

## 2018-02-05 NOTE — PROGRESS NOTES
Chief Complaint   Patient presents with    Chills    Sweats    Vomiting   Patient is here today due to chills sweats and vomiting x 1 wk. Pt also sts she has pain in the left rib due to off balance episode that caused her to fall up against the stairs in her home. Sts that she feels very sore. 1. Have you been to the ER, urgent care clinic since your last visit? Hospitalized since your last visit? No    2. Have you seen or consulted any other health care providers outside of the [address] [place] [address] since your last visit? Include any pap smears or colon screening.  No

## 2018-02-05 NOTE — PATIENT INSTRUCTIONS
Nausea and Vomiting: Care Instructions  Your Care Instructions    When you are nauseated, you may feel weak and sweaty and notice a lot of saliva in your mouth. Nausea often leads to vomiting. Most of the time you do not need to worry about nausea and vomiting, but they can be signs of other illnesses. Two common causes of nausea and vomiting are stomach flu and food poisoning. Nausea and vomiting from viral stomach flu will usually start to improve within 24 hours. Nausea and vomiting from food poisoning may last from 12 to 48 hours. The doctor has checked you carefully, but problems can develop later. If you notice any problems or new symptoms, get medical treatment right away. Follow-up care is a key part of your treatment and safety. Be sure to make and go to all appointments, and call your doctor if you are having problems. It's also a good idea to know your test results and keep a list of the medicines you take. How can you care for yourself at home? · To prevent dehydration, drink plenty of fluids, enough so that your urine is light yellow or clear like water. Choose water and other caffeine-free clear liquids until you feel better. If you have kidney, heart, or liver disease and have to limit fluids, talk with your doctor before you increase the amount of fluids you drink. · Rest in bed until you feel better. · When you are able to eat, try clear soups, mild foods, and liquids until all symptoms are gone for 12 to 48 hours. Other good choices include dry toast, crackers, cooked cereal, and gelatin dessert, such as Jell-O. When should you call for help? Call 911 anytime you think you may need emergency care. For example, call if:  ? · You passed out (lost consciousness). ?Call your doctor now or seek immediate medical care if:  ? · You have symptoms of dehydration, such as:  ¨ Dry eyes and a dry mouth. ¨ Passing only a little dark urine.   ¨ Feeling thirstier than usual.   ? · You have new or worsening belly pain. ? · You have a new or higher fever. ? · You vomit blood or what looks like coffee grounds. ? Watch closely for changes in your health, and be sure to contact your doctor if:  ? · You have ongoing nausea and vomiting. ? · Your vomiting is getting worse. ? · Your vomiting lasts longer than 2 days. ? · You are not getting better as expected. Where can you learn more? Go to http://alvin-rogelio.info/. Enter 25 [id number] in the search box to learn more about \"Nausea and Vomiting: Care Instructions. \"  Current as of: March 20, 2017  Content Version: 11.4  © 4753-2460 [employer]. Care instructions adapted under license by [employer] (which disclaims liability or warranty for this information). If you have questions about a medical condition or this instruction, always ask your healthcare professional. [place] [address] any warranty or liability for your use of this information. Fatigue: Care Instructions  Your Care Instructions    Fatigue is a feeling of tiredness, exhaustion, or lack of energy. You may feel fatigue because of too much or not enough activity. It can also come from stress, lack of sleep, boredom, and poor diet. Many medical problems, such as viral infections, can cause fatigue. Emotional problems, especially depression, are often the cause of fatigue. Fatigue is most often a symptom of another problem. Treatment for fatigue depends on the cause. For example, if you have fatigue because you have a certain health problem, treating this problem also treats your fatigue. If depression or anxiety is the cause, treatment may help. Follow-up care is a key part of your treatment and safety. Be sure to make and go to all appointments, and call your doctor if you are having problems. It's also a good idea to know your test results and keep a list of the medicines you take. How can you care for yourself at home?   · Get regular exercise. But don't overdo it. Go back and forth between rest and exercise. · Get plenty of rest.  · Eat a healthy diet. Do not skip meals, especially breakfast.  · Reduce your use of caffeine, tobacco, and alcohol. Caffeine is most often found in coffee, tea, cola drinks, and chocolate. · Limit medicines that can cause fatigue. This includes tranquilizers and cold and allergy medicines. When should you call for help? Watch closely for changes in your health, and be sure to contact your doctor if:  ? · You have new symptoms such as fever or a rash. ? · Your fatigue gets worse. ? · You have been feeling down, depressed, or hopeless. Or you may have lost interest in things that you usually enjoy. ? · You are not getting better as expected. Where can you learn more? Go to http://alvin-rogelio.info/. Enter S996 in the search box to learn more about \"Fatigue: Care Instructions. \"  Current as of: March 20, 2017  Content Version: 11.4  © 6515-1041 [employer]. Care instructions adapted under license by [employer] (which disclaims liability or warranty for this information). If you have questions about a medical condition or this instruction, always ask your healthcare professional. [address] any warranty or liability for your use of this information.

## 2018-02-05 NOTE — PROGRESS NOTES
Chief Complaint   Patient presents with    Chills    Sweats    Vomiting       HPI:  Patient is a 71year old female presents today for an acute visit with complaints of fatigue. Symptoms started about four days ago with chills, vomiting, and diarrhea now resolved. However, she has been feeling tired lately though she denies recent fall and she is currently receiving chemotherapy for multiple myeloma. She also recently started having discomfort on her left rib cage and thus she came in today for evaluation. Past Medical History:   Diagnosis Date    Anxiety 5/18/2010    Arthritis     OA spine, left knee    Depression 5/18/2010    Headache(784.0)     migraines    HTN (hypertension) 5/18/2010    Hypothyroid 5/18/2010    Osteopenia     Rib fractures 07/17/2017    r/t fall    Seasonal allergic rhinitis 5/18/2010    Seasonal allergies     Spondylolisthesis of lumbar region 1/21/2016    Dr. Laverne Rizo [facility]    Thyroid disease     hypothyroid,  thromeagaly     Allergies   Allergen Reactions    Iodine Hives, Rash and Other (comments)     Feels like Chest caving in. Rash LL lumbosacral area    Fosamax [Alendronate] Itching     rash     Current Outpatient Prescriptions   Medication Sig Dispense Refill    vit B12-levomefolate calcium-vit B6 (FOLTX) 2-1.13-25 mg tablet Take 1 Tab by mouth daily. 30 Tab 0    meloxicam (MOBIC) 15 mg tablet Take 1 Tab by mouth daily. 30 Tab 0    cefUROXime (CEFTIN) 500 mg tablet Take 500 mg by mouth two (2) times a day.  cyclophosphamide (CYTOXAN) 50 mg capsule Take 600 mg by mouth every seven (7) days. Take 12 capsules by mouth once a week.  dexamethasone (DECADRON) 4 mg tablet Take  by mouth. Take 10 tablets by mouth once a week.  Thyroid, Pork, (ARMOUR THYROID) 120 mg tab Take 120 mg by mouth daily.  ondansetron hcl (ZOFRAN) 8 mg tablet Take 8 mg by mouth every eight (8) hours as needed for Nausea.  acyclovir (ZOVIRAX) 400 mg tablet 400 mg.  hydroCHLOROthiazide (HYDRODIURIL) 25 mg tablet TAKE 1 TABLET BY MOUTH DAILY 90 Tab 0    HYDROcodone-acetaminophen (VICODIN) 5-300 mg tablet Take 1 Tab by mouth every four (4) hours as needed.  Cholecalciferol, Vitamin D3, (VITAMIN D3) 2,000 unit cap capsule Take  by Mouth.  triamcinolone (NASACORT AQ) 55 mcg nasal inhaler 2 Sprays daily.  MULTIVITAMINS (MULTI-VITAMIN PO) Take 1 Tab by mouth daily.  CALCIUM CITRATE (CITRACAL PO) Take 1 Tab by mouth daily.  FLAXSEED PO Take 1 Tab by mouth daily. ROS:  Pertinent as in HPI      Physical Exam:  Visit Vitals    /69 (BP 1 Location: Left arm, BP Patient Position: Sitting)    Pulse (!) 104    Temp 98 °F (36.7 °C) (Oral)    Resp 18    Ht 5' 6\" (1.676 m)    Wt 178 lb 12.8 oz (81.1 kg)    LMP 01/01/1990    SpO2 97%    BMI 28.86 kg/m2     General: a & o x 3, afebrile, well-nourished, interacting appropriately, in no acute distress  Respiratory: symmetrical chest expansion, lung sounds clear bilaterally  Cardiovascular: normal S1S2, regular rate and rhythm  Abdomen: non-distended, normoactive bowel sounds x 4 quadrants, soft, non-tender to palpation  Musculoskeletal: Mild tenderness on left rib cage      Assessment/Plan:    ICD-10-CM ICD-9-CM    1. Fatigue, unspecified type R53.83 780.79 vit [id number] calcium-vit B6 (FOLTX) 2-1.13-25 mg tablet      DISCONTINUED: vit B12-levomefolate calcium-vit B6 (FOLTX) 2-1.13-25 mg tablet   2. Rib pain on left side R07.81 786.50 meloxicam (MOBIC) 15 mg tablet      DISCONTINUED: meloxicam (MOBIC) 15 mg tablet         Orders Placed This Encounter    DISCONTD: vit B12-levomefolate calcium-vit B6 (FOLTX) 2-1.13-25 mg tablet     Sig: Take 1 Tab by mouth daily. Dispense:  30 Tab     Refill:  0    DISCONTD: meloxicam (MOBIC) 15 mg tablet     Sig: Take 1 Tab by mouth daily.      Dispense:  30 Tab     Refill:  0    vit B12-levomefolate calcium-vit B6 (FOLTX) 2-1.13-25 mg tablet Sig: Take 1 Tab by mouth daily. Dispense:  30 Tab     Refill:  0    meloxicam (MOBIC) 15 mg tablet     Sig: Take 1 Tab by mouth daily. Dispense:  30 Tab     Refill:  0         Additional Notes: Discussed today's diagnosis, treatment plans. Discussed medication indications and side effects. After Visit Summary: Discussed provided printed patient instructions. Answered questions accordingly.   Follow-up Disposition: As previously scheduled with PCP        Jone Alvarenga DO, MPH  Internal Medicine

## 2018-02-05 NOTE — MR AVS SNAPSHOT
Mp Castro 
 
 
 [address] [address] [address] [id number] [phone] Patient: Sonia Glass MRN:  [id number] Visit Information Date & Time Provider Department Dept. Phone Encounter #  
 2018  2:00  [address] [facility] [phone] [id number] Upcoming Health Maintenance Date Due Pneumococcal 65+ High/Highest Risk (2 of 2 - PPSV23) 2014 MEDICARE YEARLY EXAM 2018 GLAUCOMA SCREENING Q2Y 2019 BREAST CANCER SCRN MAMMOGRAM 2019 DTaP/Tdap/Td series (2 - Td) 3/21/2027 COLONOSCOPY 2027 Allergies as of 2018  Review Complete On: 2018 By: Aaron House LPN Severity Noted Reaction Type Reactions Iodine Medium 2010   Systemic Hives, Rash, Other (comments) Feels like Chest caving in. Rash LL lumbosacral area Fosamax [Alendronate]  2017    Itching  
 rash Current Immunizations  Reviewed on 10/2/2017 Name Date Influenza High Dose Vaccine PF 2016 Influenza Vaccine PF 2013 Pneumococcal Conjugate (PCV-13) 2013 Not reviewed this visit You Were Diagnosed With   
  
 Codes Comments Fatigue, unspecified type    -  Primary ICD-10-CM: R53.83 ICD-9-CM: 780.79 Rib pain on left side     ICD-10-CM: R07.81 ICD-9-CM: 786.50 Vitals BP Pulse Temp Resp Height(growth percentile) Weight(growth percentile) 101/69 (BP 1 Location: Left arm, BP Patient Position: Sitting) (!) 104 98 °F (36.7 °C) (Oral) 18 5' 6\" (1.676 m) 178 lb 12.8 oz (81.1 kg) LMP SpO2 BMI OB Status Smoking Status 1990 97% 28.86 kg/m2 Postmenopausal Former Smoker Vitals History BMI and BSA Data Body Mass Index Body Surface Area  
 28.86 kg/m 2 1.94 m 2 Preferred Pharmacy Pharmacy Name Phone  [facility] [address] [place] [address] [facility] [phone] Your Updated Medication List  
  
   
This list is accurate as of: 2/5/18  2:19 PM.  Always use your most recent med list.  
  
  
  
  
 acyclovir 400 mg tablet Commonly known as:  ZOVIRAX  
400 mg. ARMOUR THYROID 120 mg Tab Generic drug:  Thyroid (Pork) Take 120 mg by mouth daily. cefUROXime 500 mg tablet Commonly known as:  CEFTIN Take 500 mg by mouth two (2) times a day. Cholecalciferol (Vitamin D3) 2,000 unit Cap capsule Commonly known as:  VITAMIN D3 Take  by Mouth. CITRACAL PO Take 1 Tab by mouth daily. cyclophosphamide 50 mg capsule Commonly known as:  CYTOXAN Take 600 mg by mouth every seven (7) days. Take 12 capsules by mouth once a week. dexamethasone 4 mg tablet Commonly known as:  DECADRON Take  by mouth. Take 10 tablets by mouth once a week. FLAXSEED PO Take 1 Tab by mouth daily. hydroCHLOROthiazide 25 mg tablet Commonly known as:  HYDRODIURIL  
TAKE 1 TABLET BY MOUTH DAILY  
  
 meloxicam 15 mg tablet Commonly known as:  MOBIC Take 1 Tab by mouth daily. MULTI-VITAMIN PO Take 1 Tab by mouth daily. ondansetron hcl 8 mg tablet Commonly known as:  Normie Brisk Take 8 mg by mouth every eight (8) hours as needed for Nausea. triamcinolone 55 mcg nasal inhaler Commonly known as:  NASACORT AQ  
2 Sprays daily. VICODIN 5-300 mg tablet Generic drug:  HYDROcodone-acetaminophen Take 1 Tab by mouth every four (4) hours as needed. vit B12-levomefolate calcium-vit B6 2-1.13-25 mg tablet Commonly known as:  [place] Take 1 Tab by mouth daily. Prescriptions Sent to Pharmacy Refills  
 vit B12-levomefolate calcium-vit B6 (FOLTX) 2-1.13-25 mg tablet 0 Sig: Take 1 Tab by mouth daily. Class: Normal  
 Pharmacy: [address] [place] [address] #: [phone]  Route: Oral  
 meloxicam (MOBIC) 15 mg tablet 0 Sig: Take 1 Tab by mouth daily. Class: Normal  
 Pharmacy: [address] [place] [address] #: [phone] Route: Oral  
  
Patient Instructions Nausea and Vomiting: Care Instructions Your Care Instructions When you are nauseated, you may feel weak and sweaty and notice a lot of saliva in your mouth. Nausea often leads to vomiting. Most of the time you do not need to worry about nausea and vomiting, but they can be signs of other illnesses. Two common causes of nausea and vomiting are stomach flu and food poisoning. Nausea and vomiting from viral stomach flu will usually start to improve within 24 hours. Nausea and vomiting from food poisoning may last from 12 to 48 hours. The doctor has checked you carefully, but problems can develop later. If you notice any problems or new symptoms, get medical treatment right away. Follow-up care is a key part of your treatment and safety. Be sure to make and go to all appointments, and call your doctor if you are having problems. It's also a good idea to know your test results and keep a list of the medicines you take. How can you care for yourself at home? · To prevent dehydration, drink plenty of fluids, enough so that your urine is light yellow or clear like water. Choose water and other caffeine-free clear liquids until you feel better. If you have kidney, heart, or liver disease and have to limit fluids, talk with your doctor before you increase the amount of fluids you drink. · Rest in bed until you feel better. · When you are able to eat, try clear soups, mild foods, and liquids until all symptoms are gone for 12 to 48 hours. Other good choices include dry toast, crackers, cooked cereal, and gelatin dessert, such as Jell-O. When should you call for help? Call 911 anytime you think you may need emergency care. For example, call if: ? · You passed out (lost consciousness). ?Call your doctor now or seek immediate medical care if: 
? · You have symptoms of dehydration, such as: ¨ Dry eyes and a dry mouth. ¨ Passing only a little dark urine. ¨ Feeling thirstier than usual.  
? · You have new or worsening belly pain. ? · You have a new or higher fever. ? · You vomit blood or what looks like coffee grounds. ? Watch closely for changes in your health, and be sure to contact your doctor if: 
? · You have ongoing nausea and vomiting. ? · Your vomiting is getting worse. ? · Your vomiting lasts longer than 2 days. ? · You are not getting better as expected. Where can you learn more? Go to http://alvin-rogelio.info/. Enter 25 [id number] in the search box to learn more about \"Nausea and Vomiting: Care Instructions. \" Current as of: March 20, 2017 Content Version: 11.4 © 1258-3904 [email or web address]. Care instructions adapted under license by [employer] (which disclaims liability or warranty for this information). If you have questions about a medical condition or this instruction, always ask your healthcare professional. [place] [address] any warranty or liability for your use of this information. Fatigue: Care Instructions Your Care Instructions Fatigue is a feeling of tiredness, exhaustion, or lack of energy. You may feel fatigue because of too much or not enough activity. It can also come from stress, lack of sleep, boredom, and poor diet. Many medical problems, such as viral infections, can cause fatigue. Emotional problems, especially depression, are often the cause of fatigue. Fatigue is most often a symptom of another problem. Treatment for fatigue depends on the cause. For example, if you have fatigue because you have a certain health problem, treating this problem also treats your fatigue. If depression or anxiety is the cause, treatment may help. Follow-up care is a key part of your treatment and safety. Be sure to make and go to all appointments, and call your doctor if you are having problems. It's also a good idea to know your test results and keep a list of the medicines you take. How can you care for yourself at home? · Get regular exercise. But don't overdo it. Go back and forth between rest and exercise. · Get plenty of rest. 
· Eat a healthy diet. Do not skip meals, especially breakfast. 
· Reduce your use of caffeine, tobacco, and alcohol. Caffeine is most often found in coffee, tea, cola drinks, and chocolate. · Limit medicines that can cause fatigue. This includes tranquilizers and cold and allergy medicines. When should you call for help? Watch closely for changes in your health, and be sure to contact your doctor if: 
? · You have new symptoms such as fever or a rash. ? · Your fatigue gets worse. ? · You have been feeling down, depressed, or hopeless. Or you may have lost interest in things that you usually enjoy. ? · You are not getting better as expected. Where can you learn more? Go to http://alvin-rogelio.info/. Enter B243 in the search box to learn more about \"Fatigue: Care Instructions. \" Current as of: March 20, 2017 Content Version: 11.4 © 3572-1276 [employer]. Care instructions adapted under license by [employer] (which disclaims liability or warranty for this information). If you have questions about a medical condition or this instruction, always ask your healthcare professional. [place] [address] any warranty or liability for your use of this information. Introducing [facility] & HEALTH SERVICES! Dear Jennifer Murphy: Thank you for requesting a [employer] account. Our records indicate that you already have an active [employer] account. You can access your account anytime at [email or web address] Did you know that you can access your hospital and ER discharge instructions at any time in [place]? You can also review all of your test results from your hospital stay or ER visit. Additional Information If you have questions, please visit the Frequently Asked Questions section of the [place] website at [email or web address] Remember, [place] is NOT to be used for urgent needs. For medical emergencies, dial 911. Now available from your iPhone and Android! Please provide this summary of care documentation to your next provider. Your primary care clinician is listed as Arik Barraza. If you have any questions after today's visit, please call [phone].

## 2018-02-13 ENCOUNTER — TELEPHONE (OUTPATIENT)
Dept: FAMILY MEDICINE CLINIC | Age: 73
End: 2018-02-13

## 2018-02-13 NOTE — TELEPHONE ENCOUNTER
Patient is requesting to have a letter stating she is ok to have dental work done to have a tooth removed. She needs this due to her Cancer diagnosis. Please advise [phone].

## 2018-02-14 NOTE — TELEPHONE ENCOUNTER
Called patient at [phone] (home)  (non-secure line) and did not leave a detailed message. Patient needs to be informed to request this letter from oncologist that is treating patient.

## 2018-03-21 ENCOUNTER — OFFICE VISIT (OUTPATIENT)
Dept: FAMILY MEDICINE CLINIC | Age: 73
End: 2018-03-21

## 2018-03-21 VITALS
SYSTOLIC BLOOD PRESSURE: 123 MMHG | HEIGHT: 66 IN | RESPIRATION RATE: 16 BRPM | BODY MASS INDEX: 28.61 KG/M2 | OXYGEN SATURATION: 97 % | TEMPERATURE: 97.4 F | WEIGHT: 178 LBS | HEART RATE: 100 BPM | DIASTOLIC BLOOD PRESSURE: 80 MMHG

## 2018-03-21 DIAGNOSIS — I10 ESSENTIAL HYPERTENSION: ICD-10-CM

## 2018-03-21 DIAGNOSIS — Z01.818 PREOPERATIVE GENERAL PHYSICAL EXAMINATION: ICD-10-CM

## 2018-03-21 DIAGNOSIS — C90.00 MULTIPLE MYELOMA NOT HAVING ACHIEVED REMISSION (HCC): Primary | ICD-10-CM

## 2018-03-21 DIAGNOSIS — F32.89 OTHER DEPRESSION: ICD-10-CM

## 2018-03-21 RX ORDER — SERTRALINE HYDROCHLORIDE 50 MG/1
50 TABLET, FILM COATED ORAL DAILY
Qty: 30 TAB | Refills: 6 | Status: SHIPPED | OUTPATIENT
Start: 2018-03-21 | End: 2018-04-16 | Stop reason: ALTCHOICE

## 2018-03-21 NOTE — PATIENT INSTRUCTIONS
Learning About Dental Care for Older Adults  Dental care for older people is much the same as for younger adults. But older adults do have concerns that younger adults do not. Older adults may have problems with gum disease and decay on the roots of their teeth. They may need missing teeth replaced or broken fillings fixed. Or they may have dentures that need to be cared for. Some older adults may have trouble holding a toothbrush. You can help remind the person you are caring for to brush and floss their teeth or to clean their dentures. In some cases, you may need to do the brushing and other dental care tasks. People who have trouble using their hands or who have dementia may need this extra help. How can you help with dental care? Normal dental care  To keep the teeth and gums healthy:  · Brush the teeth with fluoride toothpaste twice a day-in the morning and at night-and floss at least once a day. Plaque can quickly build up on the teeth of older adults. · Watch for the signs of gum disease. These signs include gums that bleed after brushing or after eating hard foods, such as apples. · See a dentist regularly. Many experts recommend checkups every 6 months. · Keep the dentist up to date on any new medications the person is taking. · Encourage a balanced diet that includes whole grains, vegetables, and fruits, and that is low in saturated fat and sodium. · Encourage the person you're caring for not to use tobacco products. They can affect dental and general health. Many older adults have a fixed income and feel that they can't afford dental care. But most towns and cities have programs in which dentists help older adults by lowering fees. Contact your area's public health offices or  for information about dental care in your area. Using a toothbrush  Older adults with arthritis sometimes have trouble brushing their teeth because they can't easily hold the toothbrush.  Their hands and fingers may be stiff, painful, or weak. If this is the case, you can:  · Offer an electric toothbrush. · Enlarge the handle of a non-electric toothbrush by wrapping a sponge, an elastic bandage, or adhesive tape around it. · Push the toothbrush handle through a ball made of rubber or soft foam.  · Make the handle longer and thicker by taping Popsicle sticks or tongue depressors to it. You may also be able to buy special toothbrushes, toothpaste dispensers, and floss holders. Your doctor may recommend a soft-bristle toothbrush if the person you care for bleeds easily. Bleeding can happen because of a health problem or from certain medicines. A toothpaste for sensitive teeth may help if the person you care for has sensitive teeth. How do you brush and floss someone's teeth? If the person you are caring for has a hard time cleaning their teeth on their own, you may need to brush and floss their teeth for them. It may be easiest to have the person sit and face away from you, and to sit or stand behind them. That way you can steady their head against your arm as you reach around to floss and brush their teeth. Choose a place that has good lighting and is comfortable for both of you. Before you begin, gather your supplies. You will need gloves, floss, a toothbrush, and a container to hold water if you are not near a sink. Wash and dry your hands well and put on gloves. Start by flossing:  · Gently work a piece of floss between each of the teeth toward the gums. A plastic flossing tool may make this easier, and they are available at most [facility]. · Curve the floss around each tooth into a U-shape and gently slide it under the gum line. · Move the floss firmly up and down several times to scrape off the plaque. After you've finished flossing, throw away the used floss and begin brushing:  · Wet the brush and apply toothpaste. · Place the brush at a 45-degree angle where the teeth meet the gums.  Press firmly, and move the brush in small circles over the surface of the teeth. · Be careful not to brush too hard. Vigorous brushing can make the gums pull away from the teeth and can scratch the tooth enamel. · Brush all surfaces of the teeth, on the tongue side and on the cheek side. Pay special attention to the front teeth and all surfaces of the back teeth. · Brush chewing surfaces with short back-and-forth strokes. After you've finished, help the person rinse the remaining toothpaste from their mouth. Where can you learn more? Go to http://alvin-rogelio.info/. Enter G464 in the search box to learn more about \"Learning About Dental Care for Older Adults. \"  Current as of: September 24, 2016  Content Version: 11.4  © 8522-4825 [employer]. Care instructions adapted under license by [employer] (which disclaims liability or warranty for this information). If you have questions about a medical condition or this instruction, always ask your healthcare professional. [address] [address] any warranty or liability for your use of this information.

## 2018-03-21 NOTE — MR AVS SNAPSHOT
[facility] 
 
 
 [address] [address] [address] [facility] [place] [address] 
[phone] Patient: Vianca Hooker MRN:  [id number] Visit Information Date & Time Provider Department Dept. Phone Encounter #  
 3/21/2018 10:20 AM Nusrat Stephens, [address] [place] [address] [id number] Follow-up Instructions Return in about 4 months (around 2018) for htn/MM. Upcoming Health Maintenance Date Due Pneumococcal 65+ High/Highest Risk (2 of 2 - PPSV23) 2014 MEDICARE YEARLY EXAM 2018 GLAUCOMA SCREENING Q2Y 2019 BREAST CANCER SCRN MAMMOGRAM 2019 DTaP/Tdap/Td series (2 - Td) 3/21/2027 COLONOSCOPY 2027 Allergies as of 3/21/2018  Review Complete On: 3/21/2018 By: Saran Caballero LPN Severity Noted Reaction Type Reactions Iodine Medium 2010   Systemic Hives, Rash, Other (comments) Feels like Chest caving in. Rash LL lumbosacral area Fosamax [Alendronate]  2017    Itching  
 rash Current Immunizations  Reviewed on 3/21/2018 Name Date Influenza High Dose Vaccine PF 2016 Influenza Vaccine PF 2013 Pneumococcal Conjugate (PCV-13) 2013 Reviewed by Nusrat Stephens MD on 3/21/2018 at 12:01 PM  
You Were Diagnosed With   
  
 Codes Comments Multiple myel[facility] not having achieved [facility] ([facility]    -  Primary ICD-10-CM: C90.00 ICD-9-CM: 203.00 Essential hypertension     ICD-10-CM: I10 
ICD-9-CM: 401.9 Other depression     ICD-10-CM: F32.89 ICD-9-CM: 327 Preoperative general physical examination     ICD-10-CM: K77.596 ICD-9-CM: V72.83 Vitals BP Pulse Temp Resp Height(growth percentile) Weight(growth percentile) 123/80 (BP 1 Location: Right arm, BP Patient Position: Sitting) 100 97.4 °F (36.3 °C) (Oral) 16 5' 6\" (1.676 m) 178 lb (80.7 kg) LMP SpO2 BMI OB Status Smoking Status 01/01/1990 97% 28.73 kg/m2 Postmenopausal Former Smoker BMI and BSA Data Body Mass Index Body Surface Area 28.73 kg/m 2 1.94 m 2 Preferred Pharmacy Pharmacy Name Phone [facility] [address] [phone] Your Updated Medication List  
  
   
This list is accurate as of 3/21/18 12:04 PM.  Always use your most recent med list.  
  
  
  
  
 acyclovir 400 mg tablet Commonly known as:  ZOVIRAX  
400 mg. ARMOUR THYROID 120 mg Tab Generic drug:  Thyroid (Pork) Take 120 mg by mouth daily. cefUROXime 500 mg tablet Commonly known as:  CEFTIN Take 500 mg by mouth two (2) times a day. Cholecalciferol (Vitamin D3) 2,000 unit Cap capsule Commonly known as:  VITAMIN D3 Take  by Mouth. CITRACAL PO Take 1 Tab by mouth daily. cyclophosphamide 50 mg capsule Commonly known as:  CYTOXAN Take 600 mg by mouth every seven (7) days. Take 12 capsules by mouth once a week. dexamethasone 4 mg tablet Commonly known as:  DECADRON Take  by mouth. Take 10 tablets by mouth once a week. FLAXSEED PO Take 1 Tab by mouth daily. hydroCHLOROthiazide 25 mg tablet Commonly known as:  HYDRODIURIL  
TAKE 1 TABLET BY MOUTH DAILY MULTI-VITAMIN PO Take 1 Tab by mouth daily. ondansetron hcl 8 mg tablet Commonly known as:  Pamula Huger Take 8 mg by mouth every eight (8) hours as needed for Nausea. sertraline 50 mg tablet Commonly known as:  ZOLOFT Take 1 Tab by mouth daily. triamcinolone 55 mcg nasal inhaler Commonly known as:  NASACORT AQ  
2 Sprays daily. VICODIN 5-300 mg tablet Generic drug:  HYDROcodone-acetaminophen Take 1 Tab by mouth every four (4) hours as needed. vit B12-levomefolate calcium-vit B6 2-1.13-25 mg tablet Commonly known as:  Stevphen Ashley Take 1 Tab by mouth daily. Prescriptions Sent to Pharmacy Refills  
 sertraline (ZOLOFT) 50 mg tablet 6 Sig: Take 1 Tab by mouth daily. Class: Normal  
 Pharmacy: [facility] [address], [place] [address]  #: [phone] Route: Oral  
  
Follow-up Instructions Return in about 4 months (around 7/21/2018) for htn/MM. Patient Instructions Learning About Dental Care for Older Adults Dental care for older people is much the same as for younger adults. But older adults do have concerns that younger adults do not. Older adults may have problems with gum disease and decay on the roots of their teeth. They may need missing teeth replaced or broken fillings fixed. Or they may have dentures that need to be cared for. Some older adults may have trouble holding a toothbrush. You can help remind the person you are caring for to brush and floss their teeth or to clean their dentures. In some cases, you may need to do the brushing and other dental care tasks. People who have trouble using their hands or who have dementia may need this extra help. How can you help with dental care? Normal dental care To keep the teeth and gums healthy: · Brush the teeth with fluoride toothpaste twice a day-in the morning and at night-and floss at least once a day. Plaque can quickly build up on the teeth of older adults. · Watch for the signs of gum disease. These signs include gums that bleed after brushing or after eating hard foods, such as apples. · See a dentist regularly. Many experts recommend checkups every 6 months. · Keep the dentist up to date on any new medications the person is taking. · Encourage a balanced diet that includes whole grains, vegetables, and fruits, and that is low in saturated fat and sodium. · Encourage the person you're caring for not to use tobacco products. They can affect dental and general health.  
Many older adults have a fixed income and feel that they can't afford dental care. But most [facility] and [facility] have programs in which dentists help older adults by lowering fees. Contact your area's public health offices or  for information about dental care in your area. Using a toothbrush Older adults with arthritis sometimes have trouble brushing their teeth because they can't easily hold the toothbrush. Their hands and fingers may be stiff, painful, or weak. If this is the case, you can: · Offer an electric toothbrush. · Enlarge the handle of a non-electric toothbrush by wrapping a sponge, an elastic bandage, or adhesive tape around it. · Push the toothbrush handle through a ball made of rubber or soft foam. 
· Make the handle longer and thicker by taping Popsicle sticks or tongue depressors to it. You may also be able to buy special toothbrushes, toothpaste dispensers, and floss holders. Your doctor may recommend a soft-bristle toothbrush if the person you care for bleeds easily. Bleeding can happen because of a health problem or from certain medicines. A toothpaste for sensitive teeth may help if the person you care for has sensitive teeth. How do you brush and floss someone's teeth? If the person you are caring for has a hard time cleaning their teeth on their own, you may need to brush and floss their teeth for them. It may be easiest to have the person sit and face away from you, and to sit or stand behind them. That way you can steady their head against your arm as you reach around to floss and brush their teeth. Choose a place that has good lighting and is comfortable for both of you. Before you begin, gather your supplies. You will need gloves, floss, a toothbrush, and a container to hold water if you are not near a sink. Wash and dry your hands well and put on gloves. Start by flossing: · Gently work a piece of floss between each of the teeth toward the gums. A plastic flossing tool may make this easier, and they are available at most [facility]. · Curve the floss around each tooth into a U-shape and gently slide it under the gum line. · Move the floss firmly up and down several times to scrape off the plaque. After you've finished flossing, throw away the used floss and begin brushing: · Wet the brush and apply toothpaste. · Place the brush at a 45-degree angle where the teeth meet the gums. Press firmly, and move the brush in small circles over the surface of the teeth. · Be careful not to brush too hard. Vigorous brushing can make the gums pull away from the teeth and can scratch the tooth enamel. · Brush all surfaces of the teeth, on the tongue side and on the cheek side. Pay special attention to the front teeth and all surfaces of the back teeth. · Brush chewing surfaces with short back-and-forth strokes. After you've finished, help the person rinse the remaining toothpaste from their mouth. Where can you learn more? Go to http://alvin-rogelio.info/. Enter L590 in the search box to learn more about \"Learning About Dental Care for Older Adults. \" Current as of: September 24, 2016 Content Version: 11.4 © 1870-1585 [employer]. Care instructions adapted under license by [employer] (which disclaims liability or warranty for this information). If you have questions about a medical condition or this instruction, always ask your healthcare professional. [address] [address] any warranty or liability for your use of this information. Introducing [facility]! Dear Damien Parekh: Thank you for requesting a [employer] account. Our records indicate that you already have an active [employer] account. You can access your account anytime at [email or web address] Did you know that you can access your hospital and ER discharge instructions at any time in [employer]? You can also review all of your test results from your hospital stay or ER visit. Additional Information If you have questions, please visit the Frequently Asked Questions section of the [employer] website at [email or web address] Remember, Phloronol is NOT to be used for urgent needs. For medical emergencies, dial 911. Now available from your iPhone and Android! Please provide this summary of care documentation to your next provider. Your primary care clinician is listed as Anderson Barraza. If you have any questions after today's visit, please call [phone].

## 2018-03-21 NOTE — PROGRESS NOTES
Preoperative Evaluation    Date of Exam: 3/21/2018    Radha Fish is a 71 y.o. female (:1945) who presents for preoperative evaluation. Procedure/Surgery: tooth extraction  Date of Procedure/Surgery: TBD  Surgeon[facility]  Primary Physician: Velvet Knapp NP/Kathryn Hanson MD  Latex Allergy: no    Pt is scheduled for tooth extraction; She has multiple myeloma,  She has HTN;    She feels depressed and wants to resume her zoloft. Has situational stressors    Medical History:     Past Medical History:   Diagnosis Date    Anxiety 2010    Arthritis     OA spine, left knee    Depression 2010    Headache(784.0)     migraines    HTN (hypertension) 2010    Hypothyroid 2010    [facility] ([facility]     Osteopenia     Rib fractures 2017    r/t fall    Seasonal allergic rhinitis 2010    Seasonal allergies     Spondylolisthesis of lumbar region 2016    Dr. Campbell [facility]    Thyroid disease     hypothyroid,  thromeagaly     Allergies: Allergies   Allergen Reactions    Iodine Hives, Rash and Other (comments)     Feels like Chest caving in. Rash LL lumbosacral area    Fosamax [Alendronate] Itching     rash      Medications:     Current Outpatient Prescriptions   Medication Sig    vit B12-levomefolate calcium-vit B6 (FOLTX) 2-1.13-25 mg tablet Take 1 Tab by mouth daily.  cyclophosphamide (CYTOXAN) 50 mg capsule Take 600 mg by mouth every seven (7) days. Take 12 capsules by mouth once a week.  dexamethasone (DECADRON) 4 mg tablet Take  by mouth. Take 10 tablets by mouth once a week.  Thyroid, Pork, (ARMOUR THYROID) 120 mg tab Take 120 mg by mouth daily.  ondansetron hcl (ZOFRAN) 8 mg tablet Take 8 mg by mouth every eight (8) hours as needed for Nausea.     hydroCHLOROthiazide (HYDRODIURIL) 25 mg tablet TAKE 1 TABLET BY MOUTH DAILY    Cholecalciferol, Vitamin D3, (VITAMIN D3) 2,000 unit cap capsule Take  by Mouth.  triamcinolone (NASACORT AQ) 55 mcg nasal inhaler 2 Sprays daily.  MULTIVITAMINS (MULTI-VITAMIN PO) Take 1 Tab by mouth daily.  CALCIUM CITRATE (CITRACAL PO) Take 1 Tab by mouth daily.  FLAXSEED PO Take 1 Tab by mouth daily.  meloxicam (MOBIC) 15 mg tablet Take 1 Tab by mouth daily.  cefUROXime (CEFTIN) 500 mg tablet Take 500 mg by mouth two (2) times a day.  acyclovir (ZOVIRAX) 400 mg tablet 400 mg.    HYDROcodone-acetaminophen (VICODIN) 5-300 mg tablet Take 1 Tab by mouth every four (4) hours as needed. No current facility-administered medications for this visit. Surgical History:     Past Surgical History:   Procedure Laterality Date    BREAST SURGERY PROCEDURE UNLISTED      breast cyst s/p biopsy 2008    COLONOSCOPY N/A 9/6/2017    COLONOSCOPY performed by Uvaldo Cornejo MD at [address] [place] [address]  8/2011    left eye two weeks apart from [facility] Dr Maximilian Murdock.  HX CATARACT REMOVAL  8/2011    right eye 2 weeks apart from [facility].  Dr. Hutchinson Frames HX COLONOSCOPY  2007    HX HEENT      partical parathyroidectomy     HX ORTHOPAEDIC      rotator cuff repqir 10/1996    NEUROLOGICAL PROCEDURE UNLISTED  5-20-13    meninginoma Dr. Irene Mike     Social History:     Social History     Social History    Marital status: LEGALLY      Spouse name: N/A    Number of children: N/A    Years of education: N/A     Social History Main Topics    Smoking status: Former Smoker     Packs/day: 0.25     Years: 40.00     Types: Cigarettes     Start date: 8/11/1970     Quit date: 8/17/2017    Smokeless tobacco: Never Used      Comment: patient is smoking one cigarette periodically    Alcohol use Yes      Comment: wine for communion only    Drug use: No    Sexual activity: Yes     Partners: Male     Other Topics Concern    None     Social History Narrative       Recent use of: No recent use of aspirin (ASA), NSAIDS or steroids    Tetanus up to date: tetanus status unknown to the patient      Anesthesia Complications: None  History of abnormal bleeding : None  History of Blood Transfusions: no      REVIEW OF SYSTEMS:  A comprehensive review of systems was negative except for that written in the HPI.     EXAM:   Visit Vitals    /80 (BP 1 Location: Right arm, BP Patient Position: Sitting)    Pulse 100    Temp 97.4 °F (36.3 °C) (Oral)    Resp 16    Ht 5' 6\" (1.676 m)    Wt 178 lb (80.7 kg)    LMP 01/01/1990    SpO2 97%    BMI 28.73 kg/m2     General appearance - alert, well appearing, and in no distress  Eyes - pupils equal and reactive, extraocular eye movements intact  Ears - bilateral TM's and external ear canals normal  Nose - normal and patent, no erythema, discharge or polyps  Mouth - mucous membranes moist, pharynx normal without lesions  Neck - supple, no significant adenopathy  Lymphatics - no palpable lymphadenopathy, no hepatosplenomegaly  Chest - clear to auscultation, no wheezes, rales or rhonchi, symmetric air entry  Heart - normal rate, regular rhythm, normal S1, S2, no murmurs, rubs, clicks or gallops  Abdomen - soft, nontender, nondistended, no masses or organomegaly  Breasts - breasts appear normal, no suspicious masses, no skin or nipple changes or axillary nodes  Musculoskeletal - no joint tenderness, deformity or swelling  Extremities - no pedal edema noted  Skin - normal coloration and turgor, no rashes, no suspicious skin lesions noted          IMPRESSION:   Multiple Myeloma  HTN  depression  Poor dentition    No contraindications to planned surgery    Fani Sands MD   3/21/2018

## 2018-03-21 NOTE — PROGRESS NOTES
1. Have you been to the ER, urgent care clinic since your last visit? Hospitalized since your last visit? No    2. Have you seen or consulted any other health care providers outside of the [address] [place] [address] since your last visit? Include any pap smears or colon screening.  No

## 2018-04-10 ENCOUNTER — TELEPHONE (OUTPATIENT)
Dept: FAMILY MEDICINE CLINIC | Age: 73
End: 2018-04-10

## 2018-04-10 NOTE — TELEPHONE ENCOUNTER
Swati from Dr. Kimberlee Butler [facility]) called to ask about the med clr form that was to be faxed over after pt last appt 3/21 from Dr. Jessica Myles office for pt to have a tooth extraction completed. I asked Swati if the note would be sufficient without the form. Swati gave Dr. Jessica Myles number [phone] for me to see if it would be ok. Called over to Dr. Jessica Myles office and spoke with Jessica Mooney and she provided me with the fax number [phone] to fax over the med clr note from 3/21/18. Jessica Mooney stated that if there is a problem she would call over to the office to notify. Med clr note was faxed and confirmation scanned into media. Please be advised.

## 2018-04-16 ENCOUNTER — OFFICE VISIT (OUTPATIENT)
Dept: FAMILY MEDICINE CLINIC | Age: 73
End: 2018-04-16

## 2018-04-16 ENCOUNTER — TELEPHONE (OUTPATIENT)
Dept: FAMILY MEDICINE CLINIC | Age: 73
End: 2018-04-16

## 2018-04-16 ENCOUNTER — HOSPITAL ENCOUNTER (OUTPATIENT)
Dept: LAB | Age: 73
Discharge: HOME | End: 2018-04-16
Payer: MEDICARE

## 2018-04-16 VITALS
BODY MASS INDEX: 26.52 KG/M2 | WEIGHT: 165 LBS | RESPIRATION RATE: 18 BRPM | DIASTOLIC BLOOD PRESSURE: 75 MMHG | OXYGEN SATURATION: 98 % | HEIGHT: 66 IN | SYSTOLIC BLOOD PRESSURE: 113 MMHG | HEART RATE: 106 BPM | TEMPERATURE: 98.3 F

## 2018-04-16 DIAGNOSIS — R31.9 HEMATURIA, UNSPECIFIED TYPE: ICD-10-CM

## 2018-04-16 DIAGNOSIS — N30.01 ACUTE CYSTITIS WITH HEMATURIA: Primary | ICD-10-CM

## 2018-04-16 DIAGNOSIS — F32.A DEPRESSION, UNSPECIFIED DEPRESSION TYPE: ICD-10-CM

## 2018-04-16 DIAGNOSIS — Z87.440 HISTORY OF UTI: ICD-10-CM

## 2018-04-16 DIAGNOSIS — B02.9 HERPES ZOSTER WITHOUT COMPLICATION: ICD-10-CM

## 2018-04-16 DIAGNOSIS — C90.00 MULTIPLE MYELOMA, REMISSION STATUS UNSPECIFIED (HCC): ICD-10-CM

## 2018-04-16 LAB
BILIRUB UR QL STRIP: NORMAL
GLUCOSE UR-MCNC: NEGATIVE MG/DL
KETONES P FAST UR STRIP-MCNC: NORMAL MG/DL
PH UR STRIP: 7.5 [PH] (ref 4.6–8)
PROT UR QL STRIP: NORMAL
SP GR UR STRIP: 1.02 (ref 1–1.03)
UA UROBILINOGEN AMB POC: NORMAL (ref 0.2–1)
URINALYSIS CLARITY POC: NORMAL
URINALYSIS COLOR POC: YELLOW
URINE BLOOD POC: NORMAL
URINE LEUKOCYTES POC: NORMAL
URINE NITRITES POC: NEGATIVE

## 2018-04-16 PROCEDURE — 87086 URINE CULTURE/COLONY COUNT: CPT | Performed by: NURSE PRACTITIONER

## 2018-04-16 RX ORDER — NITROFURANTOIN 25; 75 MG/1; MG/1
100 CAPSULE ORAL 2 TIMES DAILY
Qty: 14 CAP | Refills: 0 | Status: SHIPPED | OUTPATIENT
Start: 2018-04-16 | End: 2018-04-23

## 2018-04-16 RX ORDER — VALACYCLOVIR HYDROCHLORIDE 1 G/1
1000 TABLET, FILM COATED ORAL 3 TIMES DAILY
Qty: 21 TAB | Refills: 0 | Status: SHIPPED | OUTPATIENT
Start: 2018-04-16 | End: 2018-04-23

## 2018-04-16 RX ORDER — SERTRALINE HYDROCHLORIDE 100 MG/1
100 TABLET, FILM COATED ORAL DAILY
Qty: 90 TAB | Refills: 1 | Status: SHIPPED | OUTPATIENT
Start: 2018-04-16 | End: 2018-10-31 | Stop reason: SDUPTHER

## 2018-04-16 RX ORDER — NYSTATIN 100000 [USP'U]/ML
SUSPENSION ORAL
Refills: 1 | COMMUNITY
Start: 2018-03-27 | End: 2019-03-04 | Stop reason: ALTCHOICE

## 2018-04-16 NOTE — MR AVS SNAPSHOT
[address] [facility] 
 
 
 [address] [facility] [address] [address] 
[phone] Patient: Cathie Urena MRN:  [id number] Visit Information Date & Time Provider Department Dept. Phone Encounter #  
 2018 11:40 AM FRANCK Galicia [address] [place] [address] [phone] [id number] Follow-up Instructions Return in about 2 weeks (around 2018) for follow up UTI, herpes zoster, depression. Your Appointments 2018 11:00 AM  
Office Visit with MD L[facility]) Appt Note: 4 m fu  
 2613 819 [facility],[address], [place] [address]  
[phone]  
  
   
 [address],  [address] Upcoming Health Maintenance Date Due Pneumococcal 65+ High/Highest Risk (2 of 2 - PPSV23) 2018* MEDICARE YEARLY EXAM 2018 GLAUCOMA SCREENING Q2Y 2019 BREAST CANCER SCRN MAMMOGRAM 2019 DTaP/Tdap/Td series (2 - Td) 3/21/2027 COLONOSCOPY 2027 *Topic was postponed. The date shown is not the original due date. Allergies as of 2018  Review Complete On: 2018 By: Torsten Song NP Severity Noted Reaction Type Reactions Iodine Medium 2010   Systemic Hives, Rash, Other (comments) Feels like Chest caving in. Rash LL lumbosacral area Fosamax [Alendronate]  2017    Itching  
 rash Current Immunizations  Reviewed on 3/21/2018 Name Date Influenza High Dose Vaccine PF 2016 Influenza Vaccine PF 2013 Pneumococcal Conjugate (PCV-13) 2013 Not reviewed this visit You Were Diagnosed With   
  
 Codes Comments Acute cystitis with hematuria    -  Primary ICD-10-CM: N30.01 
ICD-9-CM: 595.0 History of UTI     ICD-10-CM: Z87.440 ICD-9-CM: V13.02 Hematuria, unspecified type     ICD-10-CM: R31.9 ICD-9-CM: 599.70   
 Depression, unspecified depression type     ICD-10-CM: F32.9 ICD-9-CM: 807 Herpes zoster without complication     DMW-61-AV: B02.9 ICD-9-CM: 053.9 Multiple myeloma, remission status unspecified (HCC)     ICD-10-CM: C90.00 ICD-9-CM: 203.00 Vitals BP Pulse Temp Resp Height(growth percentile) Weight(growth percentile) 113/75 (BP 1 Location: Left arm, BP Patient Position: Sitting) (!) 106 98.3 °F (36.8 °C) (Oral) 18 5' 6\" (1.676 m) 165 lb (74.8 kg) LMP SpO2 BMI OB Status Smoking Status 01/01/1990 98% 26.63 kg/m2 Postmenopausal Former Smoker BMI and BSA Data Body Mass Index Body Surface Area  
 26.63 kg/m 2 1.87 m 2 Preferred Pharmacy Pharmacy Name Phone [address], [address] [place] [address] [phone] [facility] [phone] Your Updated Medication List  
  
   
This list is accurate as of 4/16/18 12:20 PM.  Always use your most recent med list.  
  
  
  
  
 acyclovir 400 mg tablet Commonly known as:  ZOVIRAX  
400 mg. ARMOUR THYROID 120 mg Tab Generic drug:  Thyroid (Pork) Take 120 mg by mouth daily. Cholecalciferol (Vitamin D3) 2,000 unit Cap capsule Commonly known as:  VITAMIN D3 Take  by Mouth. CITRACAL PO Take 1 Tab by mouth daily. cyclophosphamide 50 mg capsule Commonly known as:  CYTOXAN Take 600 mg by mouth every seven (7) days. Take 12 capsules by mouth once a week. dexamethasone 4 mg tablet Commonly known as:  DECADRON Take  by mouth. Take 10 tablets by mouth once a week. FLAXSEED PO Take 1 Tab by mouth daily. hydroCHLOROthiazide 25 mg tablet Commonly known as:  HYDRODIURIL  
TAKE 1 TABLET BY MOUTH DAILY MULTI-VITAMIN PO Take 1 Tab by mouth daily. nitrofurantoin (macrocrystal-monohydrate) 100 mg capsule Commonly known as:  MACROBID Take 1 Cap by mouth two (2) times a day for 7 days. nystatin 100,000 unit/mL suspension Commonly known as:  MYCOSTATIN  
SAS 5 ML PO QID  
  
 ondansetron hcl 8 mg tablet Commonly known as:  Zannie Alto Take 8 mg by mouth every eight (8) hours as needed for Nausea. sertraline 100 mg tablet Commonly known as:  ZOLOFT Take 1 Tab by mouth daily. triamcinolone 55 mcg nasal inhaler Commonly known as:  NASACORT AQ  
2 Sprays daily. valACYclovir 1 gram tablet Commonly known as:  VALTREX Take 1 Tab by mouth three (3) times daily for 7 days. Indications: herpes zoster VICODIN 5-300 mg tablet Generic drug:  HYDROcodone-acetaminophen Take 1 Tab by mouth every four (4) hours as needed. vit B12-levomefolate calcium-vit B6 2-1.13-25 mg tablet Commonly known as:  Molinda Dunk Take 1 Tab by mouth daily. Prescriptions Sent to Pharmacy Refills  
 sertraline (ZOLOFT) 100 mg tablet 1 Sig: Take 1 Tab by mouth daily. Class: Normal  
 Pharmacy: [address] [place] [address] Ph #: [phone] Route: Oral  
 valACYclovir (VALTREX) 1 gram tablet 0 Sig: Take 1 Tab by mouth three (3) times daily for 7 days. Indications: herpes zoster Class: Normal  
 Pharmacy: [address] [place] [address] Ph #: [phone] Route: Oral  
 nitrofurantoin, macrocrystal-monohydrate, (MACROBID) 100 mg capsule 0 Sig: Take 1 Cap by mouth two (2) times a day for 7 days. Class: Normal  
 Pharmacy: [address] [place] [address] Ph #: [phone] Route: Oral  
  
We Performed the Following AMB POC URINALYSIS DIP STICK AUTO W/O MICRO [78864 CPT(R)] Follow-up Instructions Return in about 2 weeks (around 4/30/2018) for follow up UTI, herpes zoster, depression. To-Do List   
 04/16/2018 Microbiology:  CULTURE, URINE Patient Instructions Shingles: Care Instructions Your Care Instructions Shingles (herpes zoster) causes pain and a blistered rash. The rash can appear anywhere on the body but will be on only one side of the body, the left or right. It will be in a band, a strip, or a small area. The pain can be very severe. Shingles can also cause tingling or itching in the area of the rash. The blisters scab over after a few days and heal in 2 to 4 weeks. Medicines can help you feel better and may help prevent more serious problems caused by shingles. Shingles is caused by the same virus that causes chickenpox. When you have chickenpox, the virus gets into your nerve roots and stays there (becomes dormant) long after you get over the chickenpox. If the virus becomes active again, it can cause shingles. Follow-up care is a key part of your treatment and safety. Be sure to make and go to all appointments, and call your doctor if you are having problems. It's also a good idea to know your test results and keep a list of the medicines you take. How can you care for yourself at home? · Be safe with medicines. Take your medicines exactly as prescribed. Call your doctor if you think you are having a problem with your medicine. Antiviral medicine helps you get better faster. · Try not to scratch or pick at the blisters. They will crust over and fall off on their own if you leave them alone. · Put cool, wet cloths on the area to relieve pain and itching. You can also use calamine lotion. Try not to use so much lotion that it cakes and is hard to get off. · Put cornstarch or baking soda on the sores to help dry them out so they heal faster. · Do not use thick ointment, such as petroleum jelly, on the sores. This will keep them from drying and healing. · To help remove loose crusts, soak them in tap water. This can help decrease oozing, and dry and soothe the skin.  
· Take an over-the-counter pain medicine, such as acetaminophen (Tylenol), ibuprofen (Advil, Motrin), or naproxen (Aleve). Read and follow all instructions on the label. · Avoid close contact with people until the blisters have healed. It is very important for you to avoid contact with anyone who has never had chickenpox or the chickenpox vaccine. Pregnant women, young babies, and anyone else who has a hard time fighting infection (such as someone with HIV, diabetes, or cancer) is especially at risk. When should you call for help? Call your doctor now or seek immediate medical care if: 
? · You have a new or higher fever. ? · You have a severe headache and a stiff neck. ? · You lose the ability to think clearly. ? · The rash spreads to your forehead, nose, eyes, or eyelids. ? · You have eye pain, or your vision gets worse. ? · You have new pain in your face, or you cannot move the muscles in your face. ? · Blisters spread to new parts of your body. ? Watch closely for changes in your health, and be sure to contact your doctor if: 
? · The rash has not healed after 2 to 4 weeks. ? · You still have pain after the rash has healed. Where can you learn more? Go to http://alvinSciences-U.info/. Mark Acevedo in the search box to learn more about \"Shingles: Care Instructions. \" Current as of: March 3, 2017 Content Version: 11.4 © 2989-9301 [employer]. Care instructions adapted under license by [employer] (which disclaims liability or warranty for this information). If you have questions about a medical condition or this instruction, always ask your healthcare professional. [place] [address] any warranty or liability for your use of this information. Valacyclovir (Valtrex) - (By mouth) Why this medicine is used:  
Treats herpes virus infections including chickenpox. This will not cure herpes, but may prevent a herpes breakout. Contact a nurse or doctor right away if you have: · Decrease in how much or how often you urinate · Confusion, agitation, behavior changes · Unusual bleeding or bruising · Pinpoint red spots on your skin Common side effects: 
· Headache, tiredness · Nausea, vomiting, stomach pain © 2017 [facility] Information is for End User's use only and may not be sold, redistributed or otherwise used for commercial purposes. Urinary Tract Infection in Women: Care Instructions Your Care Instructions A urinary tract infection, or UTI, is a general term for an infection anywhere between the kidneys and the urethra (where urine comes out). Most UTIs are bladder infections. They often cause pain or burning when you urinate. UTIs are caused by bacteria and can be cured with antibiotics. Be sure to complete your treatment so that the infection goes away. Follow-up care is a key part of your treatment and safety. Be sure to make and go to all appointments, and call your doctor if you are having problems. It's also a good idea to know your test results and keep a list of the medicines you take. How can you care for yourself at home? · Take your antibiotics as directed. Do not stop taking them just because you feel better. You need to take the full course of antibiotics. · Drink extra water and other fluids for the next day or two. This may help wash out the bacteria that are causing the infection. (If you have kidney, heart, or liver disease and have to limit fluids, talk with your doctor before you increase your fluid intake.) · Avoid drinks that are carbonated or have caffeine. They can irritate the bladder. · Urinate often. Try to empty your bladder each time. · To relieve pain, take a hot bath or lay a heating pad set on low over your lower belly or genital area. Never go to sleep with a heating pad in place. To prevent UTIs · Drink plenty of water each day.  This helps you urinate often, which clears bacteria from your system. (If you have kidney, heart, or liver disease and have to limit fluids, talk with your doctor before you increase your fluid intake.) · Urinate when you need to. · Urinate right after you have sex. · Change sanitary pads often. · Avoid douches, bubble baths, feminine hygiene sprays, and other feminine hygiene products that have deodorants. · After going to the bathroom, wipe from front to back. When should you call for help? Call your doctor now or seek immediate medical care if: 
? · Symptoms such as fever, chills, nausea, or vomiting get worse or appear for the first time. ? · You have new pain in your back just below your rib cage. This is called flank pain. ? · There is new blood or pus in your urine. ? · You have any problems with your antibiotic medicine. ? Watch closely for changes in your health, and be sure to contact your doctor if: 
? · You are not getting better after taking an antibiotic for 2 days. ? · Your symptoms go away but then come back. Where can you learn more? Go to http://alvin-rogelio.info/. Enter U693 in the search box to learn more about \"Urinary Tract Infection in Women: Care Instructions. \" Current as of: May 12, 2017 Content Version: 11.4 © 3359-0974 [employer]. Care instructions adapted under license by [employer] (which disclaims liability or warranty for this information). If you have questions about a medical condition or this instruction, always ask your healthcare professional. [place] [address] any warranty or liability for your use of this information. Nitrofurantoin Combination (By mouth) Nitrofurantoin Monohydrate (pre-xsnp-tmzc-AN-toyn fro-ec-JIT-drate), Nitrofurantoin, Macrocrystals (ova-kbha-ives-AN-toyn UAZ-xrg-wehi-tals) Treats urinary tract infections caused by bacteria. This medicine is an antibiotic. Brand Name(s): Macrobid There may be other brand names for this medicine. When This Medicine Should Not Be Used: You should not use this medicine if you have had an allergic reaction to nitrofurantoin or if you are in your last weeks of pregnancy (week 38 or later). You should not use this medicine if you have severe kidney disease, if you are unable to urinate, or if you have a decreased amount of urine. Do not use this medicine if you have a history of liver disease with nitrofurantoin. How to Use This Medicine:  
Capsule · Your doctor will tell you how much medicine to use. Do not use more than directed. · It is best to take this medicine with food or milk. · Take all of the medicine in your prescription to clear up your infection, even if you feel better after the first few doses. If a dose is missed: · Take a dose as soon as you remember. If it is almost time for your next dose, wait until then and take a regular dose. Do not take extra medicine to make up for a missed dose. How to Store and Dispose of This Medicine: · Store the medicine in a closed container at room temperature, away from heat, moisture, and direct light. · Ask your pharmacist, doctor, or health caregiver about the best way to dispose of any outdated medicine or medicine no longer needed. · Keep all medicine out of the reach of children. Never share your medicine with anyone. Drugs and Foods to Avoid: Ask your doctor or pharmacist before using any other medicine, including over-the-counter medicines, vitamins, and herbal products. · Make sure your doctor knows if you are also using probenecid (Benemid®) or sulfinpyrazone (Anturane®). · It is best not to use antacids containing magnesium trisilicate (such as Foamicon® or Gaviscon®) while you are using nitrofurantoin. Warnings While Using This Medicine: · Make sure your doctor knows if you are pregnant or breastfeeding, or if you have liver disease, heart disease, lung disease, anemia, diabetes, a mineral imbalance in the blood, or vitamin B deficiency. Make sure your doctor knows if you have a condition called [id number]. · This medicine may cause your urine to be a brown color. This is normal and will not affect how the medicine works. · This medicine can cause diarrhea. Call your doctor if the diarrhea becomes severe, does not stop, or is bloody. Do not take any medicine to stop diarrhea until you have talked to your doctor. Diarrhea can occur 2 months or more after you stop taking this medicine. · Use this medicine only to treat the infection you now have. This medicine will not work for colds, flu, or other virus infections. Possible Side Effects While Using This Medicine:  
Call your doctor right away if you notice any of these side effects: · Allergic reaction: Itching or hives, swelling in your face or hands, swelling or tingling in your mouth or throat, chest tightness, trouble breathing · Blisters, peeling, or red skin rash. · Cough, fever, chills, weakness, shortness of breath, or chest pain. · Dark-colored urine or pale stools. · Diarrhea or loose, watery stools that may contain blood. · Nausea, vomiting, loss of appetite, or pain in your upper stomach. · Numbness, tingling, or burning pain in your hands, arms, legs, or feet. · Yellowing of your skin or the whites of your eyes. If you notice these less serious side effects, talk with your doctor: · Dizziness, headache, or blurred vision. · Hair loss. · Mild nausea, vomiting, constipation, stomach upset, or pain. · Vaginal itching or fluids. If you notice other side effects that you think are caused by this medicine, tell your doctor. Call your doctor for medical advice about side effects. You may report side effects to FDA at 0-492-FDA-6915 © 2017 [address] Information is for End User's use only and may not be sold, redistributed or otherwise used for commercial purposes. The above information is an  only. It is not intended as medical advice for individual conditions or treatments. Talk to your doctor, nurse or pharmacist before following any medical regimen to see if it is safe and effective for you. Introducing [facility]! Dear Елена Trevino: Thank you for requesting a [employer] account. Our records indicate that you already have an active [employer] account. You can access your account anytime at [email or web address] Did you know that you can access your hospital and ER discharge instructions at any time in [employer]? You can also review all of your test results from your hospital stay or ER visit. Additional Information If you have questions, please visit the Frequently Asked Questions section of the [employer] website at [email or web address] Remember, [employer] is NOT to be used for urgent needs. For medical emergencies, dial 911. Now available from your iPhone and Android! Please provide this summary of care documentation to your next provider. Your primary care clinician is listed as Julianne Barraza. If you have any questions after today's visit, please call [phone].

## 2018-04-16 NOTE — TELEPHONE ENCOUNTER
Patient called office to make an appointment for spotting and she feels like she is going to pass out. No nurse available for triage.  Patient was scheduled for 11:40am.

## 2018-04-16 NOTE — PROGRESS NOTES
Subjective:   Gurdeep Natarajan is a 71 y.o. female accompanied by her  today for follow up hemorrhagic cystitis diagnosed on 4-6-18 at [facility] ED. Prescribed Bactrim DS bid for five days. She reports that she finished the medications. Patient also reports that she felt a bit lightheaded yesterday and guided herself to sit on her knees yesterday. She denies any further lightheadedness but reports that she saw blood when she urinated this morning. She also reports that she has been staying with her former  and he helped her to dress and noted a cluster of blisters on her back on left. Patient reports that it slightly itches and is tender. She also starts to cry and states \"I am depressed\". She is taking sertraline 50 mg daily. Her  reports that she has not been drinking enough fluids and has not eaten. She has been prescribed by oncology nystatin suspension by oncology for thrush and is using as directed no complaints of oral pain or irritation. She is currently in treatment for multiple myeloma with oncology in [address]. ROS: +pelvic pain denies vaginal discharge, denies back pain, denies suicidal ideations, denies panic attacks, +dysuria. PMH: reviewed medications and allergy lists and medical and family history. Wt Readings from Last 3 Encounters:   04/16/18 165 lb (74.8 kg)   03/21/18 178 lb (80.7 kg)   02/05/18 178 lb 12.8 oz (81.1 kg)     BP Readings from Last 3 Encounters:   04/16/18 113/75   03/21/18 123/80   02/05/18 101/69      Objective:   Awake and alert in no acute distress  Neck supple without lymphadenopathy, no carotid artery bruits auscultated bilaterally. Lungs clear throughout  S1 S2 RRR without ectopy or murmur auscultated. Abdomen: normoactive bowel sounds all quadrants, no tenderness to abdomen upper and lower quadrants.  No hepatosplenomegaly  Pelvic exam: normal external genitalia, vulva, vagina, cervix, uterus and adnexa. Integumentary: 0.5 cm cluster of clear fluid---vesicles left of mid-thoracic  No erythema mild TTP  Visit Vitals    /75 (BP 1 Location: Left arm, BP Patient Position: Sitting)    Pulse (!) 106    Temp 98.3 °F (36.8 °C) (Oral)    Resp 18    Ht 5' 6\" (1.676 m)    Wt 165 lb (74.8 kg)    LMP 01/01/1990    SpO2 98%    BMI 26.63 kg/m2      Diagnoses and all orders for this visit:    1. Acute cystitis with hematuria  -     nitrofurantoin, macrocrystal-monohydrate, (MACROBID) 100 mg capsule; Take 1 Cap by mouth two (2) times a day for 7 days. 2. History of UTI  -     AMB POC URINALYSIS DIP STICK AUTO W/O MICRO    3. Hematuria, unspecified type  -     CULTURE, URINE; Future    4. Depression, unspecified depression type  -     sertraline (ZOLOFT) 100 mg tablet; Take 1 Tab by mouth daily. 5. Herpes zoster without complication  -     valACYclovir (VALTREX) 1 gram tablet; Take 1 Tab by mouth three (3) times daily for 7 days. Indications: herpes zoster    6. Multiple myeloma, remission status unspecified (HCC)--managed by oncology    Reviewed risks and benefits and common side effects of new medication  General comfort measures  Increase water 8 cups daily  Try to increase protein  Patient verbalizes understanding. I have discussed the diagnosis with the patient and the intended plan as seen in the above orders. The patient has received an after-visit summary and questions were answered concerning future plans. I have discussed medication side effects and warnings with the patient as well. Follow-up Disposition:  Return in about 2 weeks (around 4/30/2018), or if symptoms worsen or fail to improve, for follow up UTI, herpes zoster, depression.

## 2018-04-16 NOTE — TELEPHONE ENCOUNTER
Spoke with the patient. States she has experienced an episode of being off balance. States she went down to her knees. No LOC changes noted, no injuries. Patient denies any chest pain, shortness of breath or heart palpitations at this time. Does comment she has also been experiencing some vaginal spotting. Patient advised to keep appointment. If symptoms worsen, she will need to go to the emergency room. She verbalized understanding.

## 2018-04-16 NOTE — PROGRESS NOTES
1. Have you been to the ER, urgent care clinic since your last visit? Hospitalized since your last visit? Yes [facility] ER    2. Have you seen or consulted any other health care providers outside of the [address] [place] [address] since your last visit?   Include any pap smears or colon screening Followed Oncologist

## 2018-04-16 NOTE — PATIENT INSTRUCTIONS
Shingles: Care Instructions  Your Care Instructions    Shingles (herpes zoster) causes pain and a blistered rash. The rash can appear anywhere on the body but will be on only one side of the body, the left or right. It will be in a band, a strip, or a small area. The pain can be very severe. Shingles can also cause tingling or itching in the area of the rash. The blisters scab over after a few days and heal in 2 to 4 weeks. Medicines can help you feel better and may help prevent more serious problems caused by shingles. Shingles is caused by the same virus that causes chickenpox. When you have chickenpox, the virus gets into your nerve roots and stays there (becomes dormant) long after you get over the chickenpox. If the virus becomes active again, it can cause shingles. Follow-up care is a key part of your treatment and safety. Be sure to make and go to all appointments, and call your doctor if you are having problems. It's also a good idea to know your test results and keep a list of the medicines you take. How can you care for yourself at home? · Be safe with medicines. Take your medicines exactly as prescribed. Call your doctor if you think you are having a problem with your medicine. Antiviral medicine helps you get better faster. · Try not to scratch or pick at the blisters. They will crust over and fall off on their own if you leave them alone. · Put cool, wet cloths on the area to relieve pain and itching. You can also use calamine lotion. Try not to use so much lotion that it cakes and is hard to get off. · Put cornstarch or baking soda on the sores to help dry them out so they heal faster. · Do not use thick ointment, such as petroleum jelly, on the sores. This will keep them from drying and healing. · To help remove loose crusts, soak them in tap water. This can help decrease oozing, and dry and soothe the skin.   · Take an over-the-counter pain medicine, such as acetaminophen (Tylenol), ibuprofen (Advil, Motrin), or naproxen (Aleve). Read and follow all instructions on the label. · Avoid close contact with people until the blisters have healed. It is very important for you to avoid contact with anyone who has never had chickenpox or the chickenpox vaccine. Pregnant women, young babies, and anyone else who has a hard time fighting infection (such as someone with HIV, diabetes, or cancer) is especially at risk. When should you call for help? Call your doctor now or seek immediate medical care if:  ? · You have a new or higher fever. ? · You have a severe headache and a stiff neck. ? · You lose the ability to think clearly. ? · The rash spreads to your forehead, nose, eyes, or eyelids. ? · You have eye pain, or your vision gets worse. ? · You have new pain in your face, or you cannot move the muscles in your face. ? · Blisters spread to new parts of your body. ? Watch closely for changes in your health, and be sure to contact your doctor if:  ? · The rash has not healed after 2 to 4 weeks. ? · You still have pain after the rash has healed. Where can you learn more? Go to http://alvinFlipswap.info/. Bertha Montague in the search box to learn more about \"Shingles: Care Instructions. \"  Current as of: March 3, 2017  Content Version: 11.4  © 0367-2631 [employer]. Care instructions adapted under license by [employer] (which disclaims liability or warranty for this information). If you have questions about a medical condition or this instruction, always ask your healthcare professional. [place] [address] any warranty or liability for your use of this information. Valacyclovir (Valtrex) - (By mouth)   Why this medicine is used:   Treats herpes virus infections including chickenpox. This will not cure herpes, but may prevent a herpes breakout.   Contact a nurse or doctor right away if you have:  · Decrease in how much or how often you urinate  · Confusion, agitation, behavior changes  · Unusual bleeding or bruising  · Pinpoint red spots on your skin     Common side effects:  · Headache, tiredness  · Nausea, vomiting, stomach pain  © 2017 [address] Information is for End User's use only and may not be sold, redistributed or otherwise used for commercial purposes. Urinary Tract Infection in Women: Care Instructions  Your Care Instructions    A urinary tract infection, or UTI, is a general term for an infection anywhere between the kidneys and the urethra (where urine comes out). Most UTIs are bladder infections. They often cause pain or burning when you urinate. UTIs are caused by bacteria and can be cured with antibiotics. Be sure to complete your treatment so that the infection goes away. Follow-up care is a key part of your treatment and safety. Be sure to make and go to all appointments, and call your doctor if you are having problems. It's also a good idea to know your test results and keep a list of the medicines you take. How can you care for yourself at home? · Take your antibiotics as directed. Do not stop taking them just because you feel better. You need to take the full course of antibiotics. · Drink extra water and other fluids for the next day or two. This may help wash out the bacteria that are causing the infection. (If you have kidney, heart, or liver disease and have to limit fluids, talk with your doctor before you increase your fluid intake.)  · Avoid drinks that are carbonated or have caffeine. They can irritate the bladder. · Urinate often. Try to empty your bladder each time. · To relieve pain, take a hot bath or lay a heating pad set on low over your lower belly or genital area. Never go to sleep with a heating pad in place. To prevent UTIs  · Drink plenty of water each day. This helps you urinate often, which clears bacteria from your system.  (If you have kidney, heart, or liver disease and have to limit fluids, talk with your doctor before you increase your fluid intake.)  · Urinate when you need to. · Urinate right after you have sex. · Change sanitary pads often. · Avoid douches, bubble baths, feminine hygiene sprays, and other feminine hygiene products that have deodorants. · After going to the bathroom, wipe from front to back. When should you call for help? Call your doctor now or seek immediate medical care if:  ? · Symptoms such as fever, chills, nausea, or vomiting get worse or appear for the first time. ? · You have new pain in your back just below your rib cage. This is called flank pain. ? · There is new blood or pus in your urine. ? · You have any problems with your antibiotic medicine. ? Watch closely for changes in your health, and be sure to contact your doctor if:  ? · You are not getting better after taking an antibiotic for 2 days. ? · Your symptoms go away but then come back. Where can you learn more? Go to http://alvin-rogelio.info/. Enter A207 in the search box to learn more about \"Urinary Tract Infection in Women: Care Instructions. \"  Current as of: May 12, 2017  Content Version: 11.4  © 9996-6941 [employer]. Care instructions adapted under license by [employer] (which disclaims liability or warranty for this information). If you have questions about a medical condition or this instruction, always ask your healthcare professional. [place] [address] any warranty or liability for your use of this information. Nitrofurantoin Combination (By mouth)   Nitrofurantoin Monohydrate (czw-deve-vdeo-AN-toyn zhm-gg-QNN-drate), Nitrofurantoin, Macrocrystals (pfe-tsjm-ogzq-AN-toyn AIK-oou-gsul-tals)  Treats urinary tract infections caused by bacteria. This medicine is an antibiotic. Brand Name(s): Macrobid   There may be other brand names for this medicine. When This Medicine Should Not Be Used:    You should not use this medicine if you have had an allergic reaction to nitrofurantoin or if you are in your last weeks of pregnancy (week 38 or later). You should not use this medicine if you have severe kidney disease, if you are unable to urinate, or if you have a decreased amount of urine. Do not use this medicine if you have a history of liver disease with nitrofurantoin. How to Use This Medicine:   Capsule  · Your doctor will tell you how much medicine to use. Do not use more than directed. · It is best to take this medicine with food or milk. · Take all of the medicine in your prescription to clear up your infection, even if you feel better after the first few doses. If a dose is missed:   · Take a dose as soon as you remember. If it is almost time for your next dose, wait until then and take a regular dose. Do not take extra medicine to make up for a missed dose. How to Store and Dispose of This Medicine:   · Store the medicine in a closed container at room temperature, away from heat, moisture, and direct light. · Ask your pharmacist, doctor, or health caregiver about the best way to dispose of any outdated medicine or medicine no longer needed. · Keep all medicine out of the reach of children. Never share your medicine with anyone. Drugs and Foods to Avoid:   Ask your doctor or pharmacist before using any other medicine, including over-the-counter medicines, vitamins, and herbal products. · Make sure your doctor knows if you are also using probenecid (Benemid®) or sulfinpyrazone (Anturane®). · It is best not to use antacids containing magnesium trisilicate (such as Foamicon® or Gaviscon®) while you are using nitrofurantoin. Warnings While Using This Medicine:   · Make sure your doctor knows if you are pregnant or breastfeeding, or if you have liver disease, heart disease, lung disease, anemia, diabetes, a mineral imbalance in the blood, or vitamin B deficiency.  Make sure your doctor knows if you have a condition called [id number]. · This medicine may cause your urine to be a brown color. This is normal and will not affect how the medicine works. · This medicine can cause diarrhea. Call your doctor if the diarrhea becomes severe, does not stop, or is bloody. Do not take any medicine to stop diarrhea until you have talked to your doctor. Diarrhea can occur 2 months or more after you stop taking this medicine. · Use this medicine only to treat the infection you now have. This medicine will not work for colds, flu, or other virus infections. Possible Side Effects While Using This Medicine:   Call your doctor right away if you notice any of these side effects:  · Allergic reaction: Itching or hives, swelling in your face or hands, swelling or tingling in your mouth or throat, chest tightness, trouble breathing  · Blisters, peeling, or red skin rash. · Cough, fever, chills, weakness, shortness of breath, or chest pain. · Dark-colored urine or pale stools. · Diarrhea or loose, watery stools that may contain blood. · Nausea, vomiting, loss of appetite, or pain in your upper stomach. · Numbness, tingling, or burning pain in your hands, arms, legs, or feet. · Yellowing of your skin or the whites of your eyes. If you notice these less serious side effects, talk with your doctor:   · Dizziness, headache, or blurred vision. · Hair loss. · Mild nausea, vomiting, constipation, stomach upset, or pain. · Vaginal itching or fluids. If you notice other side effects that you think are caused by this medicine, tell your doctor. Call your doctor for medical advice about side effects. You may report side effects to FDA at 3-913-FDA-6122  © 2017 [facility] Information is for End User's use only and may not be sold, redistributed or otherwise used for commercial purposes. The above information is an  only. It is not intended as medical advice for individual conditions or treatments. Talk to your doctor, nurse or pharmacist before following any medical regimen to see if it is safe and effective for you.

## 2018-04-18 ENCOUNTER — TELEPHONE (OUTPATIENT)
Dept: FAMILY MEDICINE CLINIC | Age: 73
End: 2018-04-18

## 2018-04-18 LAB
BACTERIA SPEC CULT: NORMAL
SERVICE CMNT-IMP: NORMAL

## 2018-04-18 NOTE — TELEPHONE ENCOUNTER
Delia Gross called from dentist office to inform you that pt is going to be under general anesthesia, and don't know if you are aware. In notes by tooth extraction put with general anesthesia so that it will be documented in chart and to see if you are okaying the patient to be under general anesthesia.  Please fax updated information to [facility]all[facility] fax, number is [phone] if any questions

## 2018-04-24 NOTE — TELEPHONE ENCOUNTER
Pt has had a UTI since her clearance. She has follow up UTI on 4/30. Can be cleared as appropriate at that time.

## 2018-04-25 NOTE — TELEPHONE ENCOUNTER
Called patient at [phone] (home)  (non-secure line) and did not leave a detailed message. Patient needs to be reminded to please keep upcoming appointment for follow up and pre op.

## 2018-04-25 NOTE — TELEPHONE ENCOUNTER
Spoke with Beth Tovar at [facility] to inform that patient is having another pre op done on 04/30/2018 to possibly be cleared for surgery. Beth Tovar verbalized understanding.

## 2018-04-30 ENCOUNTER — OFFICE VISIT (OUTPATIENT)
Dept: FAMILY MEDICINE CLINIC | Age: 73
End: 2018-04-30

## 2018-04-30 VITALS
WEIGHT: 164 LBS | OXYGEN SATURATION: 98 % | RESPIRATION RATE: 18 BRPM | HEART RATE: 97 BPM | BODY MASS INDEX: 26.36 KG/M2 | HEIGHT: 66 IN | DIASTOLIC BLOOD PRESSURE: 75 MMHG | TEMPERATURE: 97.7 F | SYSTOLIC BLOOD PRESSURE: 115 MMHG

## 2018-04-30 DIAGNOSIS — Z86.19 HISTORY OF HERPES ZOSTER VIRUS: ICD-10-CM

## 2018-04-30 DIAGNOSIS — C90.00 MULTIPLE MYELOMA, REMISSION STATUS UNSPECIFIED (HCC): ICD-10-CM

## 2018-04-30 DIAGNOSIS — I10 ESSENTIAL HYPERTENSION: Primary | ICD-10-CM

## 2018-04-30 DIAGNOSIS — Z87.440 HISTORY OF CYSTITIS: ICD-10-CM

## 2018-04-30 DIAGNOSIS — Z01.818 PREOPERATIVE GENERAL PHYSICAL EXAMINATION: ICD-10-CM

## 2018-04-30 DIAGNOSIS — E03.9 ACQUIRED HYPOTHYROIDISM: ICD-10-CM
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
## 2018-04-30 LAB
BILIRUB UR QL STRIP: NORMAL
GLUCOSE UR-MCNC: NEGATIVE MG/DL
KETONES P FAST UR STRIP-MCNC: NORMAL MG/DL
PH UR STRIP: 6 [PH] (ref 4.6–8)
PROT UR QL STRIP: NORMAL
SP GR UR STRIP: 1.02 (ref 1–1.03)
UA UROBILINOGEN AMB POC: NORMAL (ref 0.2–1)
URINALYSIS CLARITY POC: CLEAR
URINALYSIS COLOR POC: NORMAL
URINE BLOOD POC: NEGATIVE
URINE LEUKOCYTES POC: NEGATIVE
URINE NITRITES POC: NEGATIVE

## 2018-04-30 RX ORDER — OXYCODONE HYDROCHLORIDE 5 MG/1
TABLET ORAL
Refills: 0 | COMMUNITY
Start: 2018-04-18 | End: 2018-10-19 | Stop reason: ALTCHOICE

## 2018-04-30 NOTE — PATIENT INSTRUCTIONS
Preventing Falls: Care Instructions  Your Care Instructions    Getting around your home safely can be a challenge if you have injuries or health problems that make it easy for you to fall. Loose rugs and furniture in walkways are among the dangers for many older people who have problems walking or who have poor eyesight. People who have conditions such as arthritis, osteoporosis, or dementia also have to be careful not to fall. You can make your home safer with a few simple measures. Follow-up care is a key part of your treatment and safety. Be sure to make and go to all appointments, and call your doctor if you are having problems. It's also a good idea to know your test results and keep a list of the medicines you take. How can you care for yourself at home? Taking care of yourself  · You may get dizzy if you do not drink enough water. To prevent dehydration, drink plenty of fluids, enough so that your urine is light yellow or clear like water. Choose water and other caffeine-free clear liquids. If you have kidney, heart, or liver disease and have to limit fluids, talk with your doctor before you increase the amount of fluids you drink. · Exercise regularly to improve your strength, muscle tone, and balance. Walk if you can. Swimming may be a good choice if you cannot walk easily. · Have your vision and hearing checked each year or any time you notice a change. If you have trouble seeing and hearing, you might not be able to avoid objects and could lose your balance. · Know the side effects of the medicines you take. Ask your doctor or pharmacist whether the medicines you take can affect your balance. Sleeping pills or sedatives can affect your balance. · Limit the amount of alcohol you drink. Alcohol can impair your balance and other senses. · Ask your doctor whether calluses or corns on your feet need to be removed.  If you wear loose-fitting shoes because of calluses or corns, you can lose your balance and fall. · Talk to your doctor if you have numbness in your feet. Preventing falls at home  · Remove raised doorway thresholds, throw rugs, and clutter. Repair loose carpet or raised areas in the floor. · Move furniture and electrical cords to keep them out of walking paths. · Use nonskid floor wax, and wipe up spills right away, especially on ceramic tile floors. · If you use a walker or cane, put rubber tips on it. If you use crutches, clean the bottoms of them regularly with an abrasive pad, such as steel wool. · Keep your house well lit, especially Katie [place], and outside walkways. Use night-lights in areas such as hallways and bathrooms. Add extra light switches or use remote switches (such as switches that go on or off when you clap your hands) to make it easier to turn lights on if you have to get up during the night. · Install sturdy handrails on stairways. · Move items in your cabinets so that the things you use a lot are on the lower shelves (about waist level). · Keep a cordless phone and a flashlight with new batteries by your bed. If possible, put a phone in each of the main rooms of your house, or carry a cell phone in case you fall and cannot reach a phone. Or, you can wear a device around your neck or wrist. You push a button that sends a signal for help. · Wear low-heeled shoes that fit well and give your feet good support. Use footwear with nonskid soles. Check the heels and soles of your shoes for wear. Repair or replace worn heels or soles. · Do not wear socks without shoes on wood floors. · Walk on the grass when the sidewalks are slippery. If you live in an area that gets snow and ice in the winter, sprinkle salt on slippery steps and sidewalks. Preventing falls in the bath  · Install grab bars and nonskid mats inside and outside your shower or tub and near the toilet and sinks. · Use shower chairs and bath benches.   · Use a hand-held shower head that will allow you to sit while showering. · Get into a tub or shower by putting the weaker leg in first. Get out of a tub or shower with your strong side first.  · Repair loose toilet seats and consider installing a raised toilet seat to make getting on and off the toilet easier. · Keep your bathroom door unlocked while you are in the shower. Where can you learn more? Go to http://alvin-rogelio.info/. Enter 0476 [id number] in the search box to learn more about \"Preventing Falls: Care Instructions. \"  Current as of: May 12, 2017  Content Version: 11.4  © 7066-6370 [employer]. Care instructions adapted under license by [employer] (which disclaims liability or warranty for this information). If you have questions about a medical condition or this instruction, always ask your healthcare professional. [place] [address] any warranty or liability for your use of this information. Preventing Outdoor Falls: Care Instructions  Your Care Instructions    Worries about falls don't need to keep you indoors. Outdoor activities like walking have big benefits for your health. You will need to watch your step and learn a few safety measures. If you are worried about having a fall outdoors, ask your doctor about exercises, classes, or physical therapy that may help. You can learn ways to gain strength, flexibility, and balance. Ask if it might help to use a cane or walker. You can make your time outdoors safer with a few simple measures. Follow-up care is a key part of your treatment and safety. Be sure to make and go to all appointments, and call your doctor if you are having problems. It's also a good idea to know your test results and keep a list of the medicines you take. How can you prevent falls outdoors? · Wear shoes with firm soles and low heels. If you have to walk on an icy surface, use grippers that can be worn over your shoes in bad weather.   · Be extra careful if weather is bad. Walk on the grass when the sidewalks are slick. If you live in a place that gets snow and ice in the winter, sprinkle salt on slippery stairs and sidewalks. · Be careful getting on or off buses and trains or getting in and out of cars. If handrails are available, use them. · Be careful when you cross the street. Look for crosswalks or places where curb cuts or ramps are present. · Try not to hurry, especially if you are carrying something. · Be cautious in parking lots or garages. There may be curbs or changes in pavement, or the height of the pavement may vary. · Make sure to wear the correct eyeglasses, if you need them. Reading glasses or bifocals can make it harder to see hazards that might be in your way. · If you are walking outdoors for exercise, try to:  ¨ Walk in well-lighted, well-maintained areas. These include high school or college tracks, shopping malls, and public spaces. ¨ Walk with a partner. ¨ Watch out for cracked sidewalks, curbs, changes in the height of the pavement, exposed tree roots, and debris such as fallen leaves or branches. Where can you learn more? Go to http://alvin-rogelio.info/. Enter T117 in the search box to learn more about \"Preventing Outdoor Falls: Care Instructions. \"  Current as of: May 12, 2017  Content Version: 11.4  © 3003-1450 [employer]. Care instructions adapted under license by [employer] (which disclaims liability or warranty for this information). If you have questions about a medical condition or this instruction, always ask your healthcare professional. [place] [address] any warranty or liability for your use of this information.

## 2018-04-30 NOTE — PROGRESS NOTES
Preoperative Evaluation    Date of Exam: 2018    Mark Ordoñez is a 71 y.o. female (:1945) who presents for preoperative evaluation. Procedure/Surgery:Tooth Extraction  Date of Procedure/Surgery: 2018  Surgeon: Dr. Mingo Ludwig: [place], [place]  Primary Physician: FRANCK Su. Joce Martinez MD    Latex Allergy: no    Problem List:     Patient Active Problem List    Diagnosis Date Noted    [facility] ([facility]     Fatigue 2018    Rib pain on left side 2018    History of tobacco abuse 2017    Post-menopausal osteoporosis 2017    Spondylolisthesis of lumbar region 2016    History of thyroidectomy 2013    Meningioma ([facility] 2013    BPPV (benign paroxysmal positional vertigo) 2010    Insomnia 2010    Migraines 2010    HTN (hypertension) 2010    Hypothyroid 2010    Arthritis 2010    Depression 2010    Anxiety 2010    Seasonal allergic rhinitis 2010     Medical History:     Past Medical History:   Diagnosis Date    Anxiety 2010    Arthritis     OA spine, left knee    Depression 2010    Headache(784.0)     migraines    HTN (hypertension) 2010    Hypothyroid 2010    Multiple myeloma ([facility]     Osteopenia     Rib fractures 2017    r/t fall    Seasonal allergic rhinitis 2010    Seasonal allergies     Spondylolisthesis of lumbar region 2016    Dr. Maximilian Mccord [facility] orth[facility]    Thyroid disease     hypothyroid,  thromeagaly     Allergies: Allergies   Allergen Reactions    Iodine Hives, Rash and Other (comments)     Feels like Chest caving in.   Rash LL lumbosacral area    Fosamax [Alendronate] Itching     rash      Medications:     Current Outpatient Prescriptions   Medication Sig    nystatin (MYCOSTATIN) 100,000 unit/mL suspension SAS 5 ML PO QID    sertraline (ZOLOFT) 100 mg tablet Take 1 Tab by mouth daily.  vit B12-levomefolate calcium-vit B6 (FOLTX) 2-1.13-25 mg tablet Take 1 Tab by mouth daily.  cyclophosphamide (CYTOXAN) 50 mg capsule Take 600 mg by mouth every seven (7) days. Take 12 capsules by mouth once a week.  dexamethasone (DECADRON) 4 mg tablet Take  by mouth. Take 10 tablets by mouth once a week.  Thyroid, Pork, (ARMOUR THYROID) 120 mg tab Take 120 mg by mouth daily.  ondansetron hcl (ZOFRAN) 8 mg tablet Take 8 mg by mouth every eight (8) hours as needed for Nausea.  acyclovir (ZOVIRAX) 400 mg tablet 400 mg.  hydroCHLOROthiazide (HYDRODIURIL) 25 mg tablet TAKE 1 TABLET BY MOUTH DAILY    HYDROcodone-acetaminophen (VICODIN) 5-300 mg tablet Take 1 Tab by mouth every four (4) hours as needed.  Cholecalciferol, Vitamin D3, (VITAMIN D3) 2,000 unit cap capsule Take  by Mouth.  triamcinolone (NASACORT AQ) 55 mcg nasal inhaler 2 Sprays daily.  MULTIVITAMINS (MULTI-VITAMIN PO) Take 1 Tab by mouth daily.  CALCIUM CITRATE (CITRACAL PO) Take 1 Tab by mouth daily.  FLAXSEED PO Take 1 Tab by mouth daily.  oxyCODONE IR (ROXICODONE) 5 mg immediate release tablet TK 1 T PO Q 6 H PRN. No current facility-administered medications for this visit. Surgical History:     Past Surgical History:   Procedure Laterality Date    BREAST SURGERY PROCEDURE UNLISTED      breast cyst s/p biopsy 2008    COLONOSCOPY N/A 9/6/2017    COLONOSCOPY performed by Karo Rivera MD at [address] [place] [address]  8/2011    [facility]  HX CATARACT REMOVAL  8/2011    right eye 2 weeks apart from left [facility].  Dr. Ramos Davis    HX COLONOSCOPY  2007    HX HEENT      partical parathyroidectomy     HX ORTHOPAEDIC      rotator cuff repqir 10/1996    NEUROLOGICAL PROCEDURE UNLISTED  5-20-13    meninginoma Dr. Lanette Cabrera     Social History:     Social History     Social History    Marital status: LEGALLY      Spouse name: N/A    Number of children: N/A    Years of education: N/A     Social History Main Topics    Smoking status: Former Smoker     Packs/day: 0.25     Years: 40.00     Types: Cigarettes     Start date: 8/11/1970     Quit date: 8/17/2017    Smokeless tobacco: Never Used      Comment: patient is smoking one cigarette periodically    Alcohol use Yes      Comment: wine for communion only    Drug use: No    Sexual activity: Yes     Partners: Male     Other Topics Concern    None     Social History Narrative       Recent use of: No recent use of aspirin (ASA), NSAIDS or steroids    Tetanus up to date: tetanus status unknown to the patient      Anesthesia Complications: None  History of abnormal bleeding : None  History of Blood Transfusions: no  Health Care Directive or Living Will: no    REVIEW OF SYSTEMS:  A comprehensive review of systems was negative except for that written in the HPI. Recent PMH: ED visit (3/18) after fall, reviewed hand xray bilaterally patient is wearing a splint on right forearm and denies any pain. Reviewed diagnostics with patient and her former  in the room with her today. Visit Vitals    /75 (BP 1 Location: Left arm, BP Patient Position: Sitting)    Pulse 97    Temp 97.7 °F (36.5 °C) (Oral)    Resp 18    Ht 5' 6\" (1.676 m)    Wt 164 lb (74.4 kg)    LMP 01/01/1990    SpO2 98%    BMI 26.47 kg/m2   OBJECTIVE:  Awake and alert in no acute distress  HEENT:  Head normocephalic atraumatic, Eyes PERRLA, Ears: TMS bilaterally pearly grey, Nares patent without erythema or edema, Pharynx without erythema. Dentition fair  Neck supple without lymphadenopathy, no carotid artery bruits auscultated bilaterally. Lungs clear throughout  S1 S2 RRR without ectopy or murmur auscultated. Abdomen: normoactive bowel sounds all quadrants, no tenderness to abdomen upper and lower quadrants.  No hepatosplenomegaly  Neuro: cranial nerves II-XII tested and intact. No gait abnormalities. Musculoskeletal: normal examination upper and lower extremities head and neck, no warmth to joints, no edema to joints, no gait abnormalities, motor strength +5/5 upper and lower extremities head and neck. DTRs brisk +2 bilaterally. Integumentary: no rashes  No suspicious skin lesions scab lesions right thoracic region no erythema no rashes  Normal skin turgor  Pelvic exam: examination not indicated. Breasts: not indicated. DIAGNOSTICS:   1. EKG: EKG FINDINGS - NSR without ectopy rate 89 no change from previous EKG 3-18  2. CXR:  last performed at [facility] March 2018 no acute cardiopulmonary process, new compression deformity T9 when compared to 1- film  3. Labs: cmp, cbc ordered   Results for orders placed or performed in visit on 04/30/18   AMB POC URINALYSIS DIP STICK AUTO W/O MICRO   Result Value Ref Range    Color (UA POC) Orange     Clarity (UA POC) Clear     Glucose (UA POC) Negative Negative    Bilirubin (UA POC) 1+ Negative    Ketones (UA POC) Trace Negative    Specific gravity (UA POC) 1.020 1.001 - 1.035    Blood (UA POC) Negative Negative    pH (UA POC) 6.0 4.6 - 8.0    Protein (UA POC) 1+ Negative    Urobilinogen (UA POC) 1 mg/dL 0.2 - 1    Nitrites (UA POC) Negative Negative    Leukocyte esterase (UA POC) Negative Negative     Diagnoses and all orders for this visit:    1. Essential hypertension  -     CBC W/O DIFF; Future  -     METABOLIC PANEL, COMPREHENSIVE; Future  -     AMB POC EKG ROUTINE W/ 12 LEADS, INTER & REP    2. Preoperative general physical examination  -     AMB POC EKG ROUTINE W/ 12 LEADS, INTER & REP    3. History of cystitis  -     AMB POC URINALYSIS DIP STICK AUTO W/O MICRO    4. Multiple myeloma, remission status unspecified ([facility]    5. Acquired hypothyroidism    6. Fall, subsequent encounter    7.  History of herpes zoster virus      I have discussed the diagnosis with the patient and the intended plan as seen in the above orders. The patient has received an after-visit summary and questions were answered concerning future plans. I have discussed medication side effects and warnings with the patient as well. Follow-up Disposition:  Return if symptoms worsen or fail to improve. IMPRESSION:     No contraindications to planned dental surgery    Harry Barraza NP   4/30/2018    Roland Bolanos.  Roderick Wray MD

## 2018-04-30 NOTE — MR AVS SNAPSHOT
[facility] 
 
 
 [address] [facility] [place], [place] [address] [address] 
[phone] Patient: Shannan Johnson MRN:  [id number] Visit Information Date & Time Provider Department Dept. Phone Encounter #  
 2018 11:00 AM FRANCK Wong [address] [place] [address] [phone] [id number] Follow-up Instructions Return if symptoms worsen or fail to improve. Your Appointments 2018 11:00 AM  
Office Visit with MD Rock Rosalie [facility]) Appt Note: 4 m fu  
 [address] [facility],[address], [facility] [address] [address]  
[phone]  
  
   
 [address] Upcoming Health Maintenance Date Due Pneumococcal 65+ High/Highest Risk (2 of 2 - PPSV23) 2018* Influenza Age 5 to Adult 2018 MEDICARE YEARLY EXAM 2018 GLAUCOMA SCREENING Q2Y 2019 BREAST CANCER SCRN MAMMOGRAM 2019 DTaP/Tdap/Td series (2 - Td) 3/21/2027 COLONOSCOPY 2027 *Topic was postponed. The date shown is not the original due date. Allergies as of 2018  Review Complete On: 2018 By: Shannan Mercedes NP Severity Noted Reaction Type Reactions Iodine Medium 2010   Systemic Hives, Rash, Other (comments) Feels like Chest caving in. Rash LL lumbosacral area Fosamax [Alendronate]  2017    Itching  
 rash Current Immunizations  Reviewed on 2018 Name Date Influenza High Dose Vaccine PF 2016 Influenza Vaccine PF 2013 Pneumococcal Conjugate (PCV-13) 2013 Reviewed by Shannan Mercedes NP on 2018 at 11:39 AM  
You Were Diagnosed With   
  
 Codes Comments Essential hypertension    -  Primary ICD-10-CM: I10 
ICD-9-CM: 401.9 Preoperative general physical examination     ICD-10-CM: D49.568 ICD-9-CM: V72.83 History of cystitis     ICD-10-CM: Z87.440 ICD-9-CM: V13.02   
 Multiple myeloma, remission status unspecified (HCC)     ICD-10-CM: C90.00 ICD-9-CM: 203.00 Acquired hypothyroidism     ICD-10-CM: E03.9 ICD-9-CM: 244.9 Fall, subsequent encounter     ICD-10-CM: W19Kary Saldivar ICD-9-CM: V58.89, E888.9 History of herpes zoster virus     ICD-10-CM: Z86.19 ICD-9-CM: V12.09 Vitals BP Pulse Temp Resp Height(growth percentile) Weight(growth percentile) 115/75 (BP 1 Location: Left arm, BP Patient Position: Sitting) 97 97.7 °F (36.5 °C) (Oral) 18 5' 6\" (1.676 m) 164 lb (74.4 kg) LMP SpO2 BMI OB Status Smoking Status 01/01/1990 98% 26.47 kg/m2 Postmenopausal Former Smoker Vitals History BMI and BSA Data Body Mass Index Body Surface Area  
 26.47 kg/m 2 1.86 m 2 Preferred Pharmacy Pharmacy Name Phone [facility] [address] [facility] [place] [address] [phone] Your Updated Medication List  
  
   
This list is accurate as of 4/30/18 12:36 PM.  Always use your most recent med list.  
  
  
  
  
 acyclovir 400 mg tablet Commonly known as:  ZOVIRAX  
400 mg. ARMOUR THYROID 120 mg Tab Generic drug:  Thyroid (Pork) Take 120 mg by mouth daily. Cholecalciferol (Vitamin D3) 2,000 unit Cap capsule Commonly known as:  VITAMIN D3 Take  by Mouth. CITRACAL PO Take 1 Tab by mouth daily. cyclophosphamide 50 mg capsule Commonly known as:  CYTOXAN Take 600 mg by mouth every seven (7) days. Take 12 capsules by mouth once a week. dexamethasone 4 mg tablet Commonly known as:  DECADRON Take  by mouth. Take 10 tablets by mouth once a week. FLAXSEED PO Take 1 Tab by mouth daily. hydroCHLOROthiazide 25 mg tablet Commonly known as:  HYDRODIURIL  
TAKE 1 TABLET BY MOUTH DAILY MULTI-VITAMIN PO Take 1 Tab by mouth daily. nystatin 100,000 unit/mL suspension Commonly known as:  MYCOSTATIN  
SAS 5 ML PO QID  
  
 ondansetron hcl 8 mg tablet Commonly known as:  Patricia Coker Take 8 mg by mouth every eight (8) hours as needed for Nausea. oxyCODONE IR 5 mg immediate release tablet Commonly known as:  ROXICODONE  
TK 1 T PO Q 6 H PRN. sertraline 100 mg tablet Commonly known as:  ZOLOFT Take 1 Tab by mouth daily. triamcinolone 55 mcg nasal inhaler Commonly known as:  NASACORT AQ  
2 Sprays daily. VICODIN 5-300 mg tablet Generic drug:  HYDROcodone-acetaminophen Take 1 Tab by mouth every four (4) hours as needed. vit B12-levomefolate calcium-vit B6 2-1.13-25 mg tablet Commonly known as:  Delberta Chris Take 1 Tab by mouth daily. We Performed the Following AMB POC EKG ROUTINE W/ 12 LEADS, INTER & REP [95131 CPT(R)] AMB POC URINALYSIS DIP STICK AUTO W/O MICRO [14358 CPT(R)] Follow-up Instructions Return if symptoms worsen or fail to improve. To-Do List   
 04/30/2018 Lab:  CBC W/O DIFF   
  
 04/30/2018 Lab:  METABOLIC PANEL, COMPREHENSIVE Patient Instructions Preventing Falls: Care Instructions Your Care Instructions Getting around your home safely can be a challenge if you have injuries or health problems that make it easy for you to fall. Loose rugs and furniture in walkways are among the dangers for many older people who have problems walking or who have poor eyesight. People who have conditions such as arthritis, osteoporosis, or dementia also have to be careful not to fall. You can make your home safer with a few simple measures. Follow-up care is a key part of your treatment and safety. Be sure to make and go to all appointments, and call your doctor if you are having problems. It's also a good idea to know your test results and keep a list of the medicines you take. How can you care for yourself at home? Taking care of yourself · You may get dizzy if you do not drink enough water.  To prevent dehydration, drink plenty of fluids, enough so that your urine is light yellow or clear like water. Choose water and other caffeine-free clear liquids. If you have kidney, heart, or liver disease and have to limit fluids, talk with your doctor before you increase the amount of fluids you drink. · Exercise regularly to improve your strength, muscle tone, and balance. Walk if you can. Swimming may be a good choice if you cannot walk easily. · Have your vision and hearing checked each year or any time you notice a change. If you have trouble seeing and hearing, you might not be able to avoid objects and could lose your balance. · Know the side effects of the medicines you take. Ask your doctor or pharmacist whether the medicines you take can affect your balance. Sleeping pills or sedatives can affect your balance. · Limit the amount of alcohol you drink. Alcohol can impair your balance and other senses. · Ask your doctor whether calluses or corns on your feet need to be removed. If you wear loose-fitting shoes because of calluses or corns, you can lose your balance and fall. · Talk to your doctor if you have numbness in your feet. Preventing falls at home · Remove raised doorway thresholds, throw rugs, and clutter. Repair loose carpet or raised areas in the floor. · Move furniture and electrical cords to keep them out of walking paths. · Use nonskid floor wax, and wipe up spills right away, especially on ceramic tile floors. · If you use a walker or cane, put rubber tips on it. If you use crutches, clean the bottoms of them regularly with an abrasive pad, such as steel wool. · Keep your house well lit, especially [facility], and outside walkways. Use night-lights in areas such as hallways and bathrooms. Add extra light switches or use remote switches (such as switches that go on or off when you clap your hands) to make it easier to turn lights on if you have to get up during the night. · Install sturdy handrails on stairways. · Move items in your cabinets so that the things you use a lot are on the lower shelves (about waist level). · Keep a cordless phone and a flashlight with new batteries by your bed. If possible, put a phone in each of the main rooms of your house, or carry a cell phone in case you fall and cannot reach a phone. Or, you can wear a device around your neck or wrist. You push a button that sends a signal for help. · Wear low-heeled shoes that fit well and give your feet good support. Use footwear with nonskid soles. Check the heels and soles of your shoes for wear. Repair or replace worn heels or soles. · Do not wear socks without shoes on wood floors. · Walk on the grass when the sidewalks are slippery. If you live in an area that gets snow and ice in the winter, sprinkle salt on slippery steps and sidewalks. Preventing falls in the bath · Install grab bars and nonskid mats inside and outside your shower or tub and near the toilet and sinks. · Use shower chairs and bath benches. · Use a hand-held shower head that will allow you to sit while showering. · Get into a tub or shower by putting the weaker leg in first. Get out of a tub or shower with your strong side first. 
· Repair loose toilet seats and consider installing a raised toilet seat to make getting on and off the toilet easier. · Keep your bathroom door unlocked while you are in the shower. Where can you learn more? Go to http://alvin-rogelio.info/. Enter 0476 [id number] 69 71 in the search box to learn more about \"Preventing Falls: Care Instructions. \" Current as of: May 12, 2017 Content Version: 11.4 © 5735-8098 Healthwise, [employer]Bridge. Care instructions adapted under license by [employer] (which disclaims liability or warranty for this information).  If you have questions about a medical condition or this instruction, always ask your healthcare professional. [employer] disclaims any warranty or liability for your use of this information. Preventing Outdoor Falls: Care Instructions Your Care Instructions Worries about falls don't need to keep you indoors. Outdoor activities like walking have big benefits for your health. You will need to watch your step and learn a few safety measures. If you are worried about having a fall outdoors, ask your doctor about exercises, classes, or physical therapy that may help. You can learn ways to gain strength, flexibility, and balance. Ask if it might help to use a cane or walker. You can make your time outdoors safer with a few simple measures. Follow-up care is a key part of your treatment and safety. Be sure to make and go to all appointments, and call your doctor if you are having problems. It's also a good idea to know your test results and keep a list of the medicines you take. How can you prevent falls outdoors? · Wear shoes with firm soles and low heels. If you have to walk on an icy surface, use grippers that can be worn over your shoes in bad weather. · Be extra careful if weather is bad. Walk on the grass when the sidewalks are slick. If you live in a place that gets snow and ice in the winter, sprinkle salt on slippery stairs and sidewalks. · Be careful getting on or off buses and trains or getting in and out of cars. If handrails are available, use them. · Be careful when you cross the street. Look for crosswalks or places where curb cuts or ramps are present. · Try not to hurry, especially if you are carrying something. · Be cautious in parking lots or garages. There may be curbs or changes in pavement, or the height of the pavement may vary. · Make sure to wear the correct eyeglasses, if you need them. Reading glasses or bifocals can make it harder to see hazards that might be in your way. · If you are walking outdoors for exercise, try to: ¨ Walk in well-lighted, well-maintained areas. These include high school or college tracks, shopping malls, and public spaces. ¨ Walk with a partner. ¨ Watch out for cracked sidewalks, curbs, changes in the height of the pavement, exposed tree roots, and debris such as fallen leaves or branches. Where can you learn more? Go to http://alvin-rogelio.info/. Enter A486 in the search box to learn more about \"Preventing Outdoor Falls: Care Instructions. \" Current as of: May 12, 2017 Content Version: 11.4 © 9324-8611 [employer]. Care instructions adapted under license by [employer] (which disclaims liability or warranty for this information). If you have questions about a medical condition or this instruction, always ask your healthcare professional. [address] any warranty or liability for your use of this information. Introducing [facility]! Dear Fei Singer: Thank you for requesting a Qranio account. Our records indicate that you already have an active Qranio account. You can access your account anytime at [email or web address] Did you know that you can access your hospital and ER discharge instructions at any time in Qranio? You can also review all of your test results from your hospital stay or ER visit. Additional Information If you have questions, please visit the Frequently Asked Questions section of the Qranio website at [email or web address] Remember, Qranio is NOT to be used for urgent needs. For medical emergencies, dial 911. Now available from your iPhone and Android! Please provide this summary of care documentation to your next provider. Your primary care clinician is listed as Onelia Barraza. If you have any questions after today's visit, please call [phone].

## 2018-06-28 ENCOUNTER — TELEPHONE (OUTPATIENT)
Dept: FAMILY MEDICINE CLINIC | Age: 73
End: 2018-06-28

## 2018-06-29 NOTE — TELEPHONE ENCOUNTER
Spoke with patient to inform that [employer] is ready for . Patient verbalized understanding.

## 2018-07-06 ENCOUNTER — NURSE NAVIGATOR (OUTPATIENT)
Dept: OTHER | Age: 73
End: 2018-07-06

## 2018-07-06 NOTE — NURSE NAVIGATOR
Ms. Gardenia Jones reports that she no longer see Dr. Lila Juárez and that she has been [place] [facility] [place]. She states that she will not be having the test done and she will discuss it with her current at her next appointment.       Raina cOasio, ODALISN, RN, OCN  Lung Health Nurse Navigator

## 2018-07-11 ENCOUNTER — TELEPHONE (OUTPATIENT)
Dept: FAMILY MEDICINE CLINIC | Age: 73
End: 2018-07-11

## 2018-07-11 NOTE — TELEPHONE ENCOUNTER
Left message for patient to call back office. Paperwork is completed, patient can  or sign a release for it to be faxed.

## 2018-10-19 ENCOUNTER — OFFICE VISIT (OUTPATIENT)
Dept: FAMILY MEDICINE CLINIC | Age: 73
End: 2018-10-19

## 2018-10-19 ENCOUNTER — HOSPITAL ENCOUNTER (OUTPATIENT)
Dept: GENERAL RADIOLOGY | Age: 73
Discharge: HOME | End: 2018-10-19
Payer: MEDICARE

## 2018-10-19 VITALS
TEMPERATURE: 98.2 F | HEIGHT: 66 IN | OXYGEN SATURATION: 99 % | HEART RATE: 98 BPM | RESPIRATION RATE: 20 BRPM | BODY MASS INDEX: 28.25 KG/M2 | DIASTOLIC BLOOD PRESSURE: 64 MMHG | SYSTOLIC BLOOD PRESSURE: 128 MMHG | WEIGHT: 175.8 LBS

## 2018-10-19 DIAGNOSIS — J22 LOWER RESPIRATORY INFECTION: ICD-10-CM

## 2018-10-19 DIAGNOSIS — H61.21 IMPACTED CERUMEN OF RIGHT EAR: ICD-10-CM

## 2018-10-19 DIAGNOSIS — R05.9 COUGHING: Primary | ICD-10-CM

## 2018-10-19 DIAGNOSIS — C90.00 MULTIPLE MYELOMA, REMISSION STATUS UNSPECIFIED (HCC): ICD-10-CM

## 2018-10-19 DIAGNOSIS — R05.9 COUGHING: ICD-10-CM

## 2018-10-19 PROCEDURE — 71046 X-RAY EXAM CHEST 2 VIEWS: CPT

## 2018-10-19 RX ORDER — PROMETHAZINE HYDROCHLORIDE AND CODEINE PHOSPHATE 6.25; 1 MG/5ML; MG/5ML
1 SOLUTION ORAL
Qty: 120 ML | Refills: 0 | Status: SHIPPED | OUTPATIENT
Start: 2018-10-19 | End: 2018-12-03 | Stop reason: ALTCHOICE

## 2018-10-19 RX ORDER — VALACYCLOVIR HYDROCHLORIDE 500 MG/1
500 TABLET, FILM COATED ORAL 2 TIMES DAILY
COMMUNITY
Start: 2018-10-18 | End: 2019-03-04 | Stop reason: DRUGHIGH

## 2018-10-19 RX ORDER — AZITHROMYCIN 250 MG/1
TABLET, FILM COATED ORAL
Qty: 6 TAB | Refills: 0 | Status: SHIPPED | OUTPATIENT
Start: 2018-10-19 | End: 2018-10-24

## 2018-10-19 RX ORDER — OMEPRAZOLE 10 MG/1
20 CAPSULE, DELAYED RELEASE ORAL DAILY
COMMUNITY
End: 2018-10-19 | Stop reason: ALTCHOICE

## 2018-10-19 RX ORDER — OMEPRAZOLE 20 MG/1
40 CAPSULE, DELAYED RELEASE ORAL 2 TIMES DAILY
Refills: 3 | COMMUNITY
Start: 2018-09-28 | End: 2020-02-18 | Stop reason: DRUGHIGH

## 2018-10-19 NOTE — PROGRESS NOTES
Subjective:   Marilee Dias is a 68 y.o. female with multiple myeloma presents today with concern over a persistent nonproductive cough for the past week and also that she is unable to hear out of her right ear. She reports that her coughing is paroxysmal throughout the day and worse at night. Review of Systems   Constitutional: Negative. HENT: Negative for congestion, ear discharge, ear pain, sinus pain and sore throat. Respiratory: Negative for hemoptysis, sputum production, shortness of breath and wheezing. Cardiovascular: Negative. Current Outpatient Medications   Medication Sig Dispense Refill    DEXAMETHASONE PO Take 40 mg by mouth.  azithromycin (ZITHROMAX) 250 mg tablet Take 2 tablets today, then take 1 tablet daily 6 Tab 0    promethazine-codeine (PHENERGAN WITH CODEINE) 6.25-10 mg/5 mL syrup Take 5 mL by mouth every six (6) hours as needed for Cough. Max Daily Amount: 20 mL. 120 mL 0    sertraline (ZOLOFT) 100 mg tablet Take 1 Tab by mouth daily. 90 Tab 1    vit B12-levomefolate calcium-vit B6 (FOLTX) 2-1.13-25 mg tablet Take 1 Tab by mouth daily. 30 Tab 0    Thyroid, Pork, (ARMOUR THYROID) 120 mg tab Take 120 mg by mouth daily.  ondansetron hcl (ZOFRAN) 8 mg tablet Take 8 mg by mouth every eight (8) hours as needed for Nausea.  hydroCHLOROthiazide (HYDRODIURIL) 25 mg tablet TAKE 1 TABLET BY MOUTH DAILY 90 Tab 0    Cholecalciferol, Vitamin D3, (VITAMIN D3) 2,000 unit cap capsule Take  by Mouth.  triamcinolone (NASACORT AQ) 55 mcg nasal inhaler 2 Sprays daily.  MULTIVITAMINS (MULTI-VITAMIN PO) Take 1 Tab by mouth daily.  CALCIUM CITRATE (CITRACAL PO) Take 1 Tab by mouth daily.  FLAXSEED PO Take 1 Tab by mouth daily.  valACYclovir (VALTREX) 500 mg tablet Take 500 mg by mouth two (2) times a day.  omeprazole (PRILOSEC) 20 mg capsule Take 20 mg by mouth daily.   3    nystatin (MYCOSTATIN) 100,000 unit/mL suspension SAS 5 ML PO QID  1    cyclophosphamide (CYTOXAN) 50 mg capsule Take 600 mg by mouth every seven (7) days. Take 12 capsules by mouth once a week. PMH: reviewed medications and allergy lists and medical and family history. OBJECTIVE:  Awake and alert in no acute distress  HEENT:  Head normocephalic atraumatic, Eyes PERRLA, Ears: leftTM  pearly grey, right TM with dark cerumen in external canal unable to visualize TM , Nares patent without erythema or edema, Pharynx without erythema. Lungs scattered crackles, intermittent coughing clears partially with cough no wheezing  S1 S2 RRR without ectopy or murmur auscultated. Integumentary: no rashes  Normal skin turgor  Visit Vitals  /64 (BP 1 Location: Left arm, BP Patient Position: Sitting)   Pulse 98   Temp 98.2 °F (36.8 °C) (Oral)   Resp 20   Ht 5' 6\" (1.676 m)   Wt 175 lb 12.8 oz (79.7 kg)   SpO2 99%   BMI 28.37 kg/m²     Diagnoses and all orders for this visit:    Coughing  -     XR CHEST PA LAT; Future  -     promethazine-codeine (PHENERGAN WITH CODEINE) 6.25-10 mg/5 mL syrup; Take 5 mL by mouth every six (6) hours as needed for Cough. Max Daily Amount: 20 mL., Print, Disp-120 mL, R-0    Impacted cerumen of right ear    Lower respiratory infection  -     azithromycin (ZITHROMAX) 250 mg tablet; Take 2 tablets today, then take 1 tablet daily, Normal, Disp-6 Tab, R-0    Multiple myeloma, remission status unspecified (HCC)      General comfort measures  Debrox for impacted cerumen right ear (follow over the counter package dosing instructions.)    Reviewed risks and benefits and common side effects of new medication  Patient agrees with plan and verbalizes understanding. I have discussed the diagnosis with the patient and the intended plan as seen in the above orders. The patient has received an after-visit summary and questions were answered concerning future plans. I have discussed medication side effects and warnings with the patient as well.     Follow-up Disposition:  Return in about 2 weeks (around 11/2/2018), or if symptoms worsen or fail to improve, for follow up lower respiratory infection.

## 2018-10-19 NOTE — PROGRESS NOTES
Chief Complaint   Patient presents with    Hearing Loss     right ear for the past few months \"comes and goes\"    Cough     c/o dry cough for the past week     1. Have you been to the ER, urgent care clinic since your last visit? Hospitalized since your last visit? No     2. Have you seen or consulted any other health care providers outside of the [address] [place] [address] since your last visit? Include any pap smears or colon screening.   No

## 2018-10-25 ENCOUNTER — OFFICE VISIT (OUTPATIENT)
Dept: FAMILY MEDICINE CLINIC | Age: 73
End: 2018-10-25

## 2018-10-25 VITALS
DIASTOLIC BLOOD PRESSURE: 62 MMHG | BODY MASS INDEX: 28.93 KG/M2 | HEART RATE: 96 BPM | WEIGHT: 180 LBS | SYSTOLIC BLOOD PRESSURE: 127 MMHG | OXYGEN SATURATION: 99 % | RESPIRATION RATE: 20 BRPM | HEIGHT: 66 IN | TEMPERATURE: 97.3 F

## 2018-10-25 DIAGNOSIS — Z23 ENCOUNTER FOR IMMUNIZATION: ICD-10-CM

## 2018-10-25 DIAGNOSIS — J44.9 CHRONIC OBSTRUCTIVE PULMONARY DISEASE, UNSPECIFIED COPD TYPE (HCC): Primary | ICD-10-CM

## 2018-10-25 RX ORDER — ALBUTEROL SULFATE 90 UG/1
2 AEROSOL, METERED RESPIRATORY (INHALATION)
Qty: 1 INHALER | Refills: 2 | Status: SHIPPED | OUTPATIENT
Start: 2018-10-25 | End: 2020-02-06

## 2018-10-25 RX ORDER — BUDESONIDE AND FORMOTEROL FUMARATE DIHYDRATE 80; 4.5 UG/1; UG/1
2 AEROSOL RESPIRATORY (INHALATION) 2 TIMES DAILY
Qty: 1 INHALER | Refills: 2 | Status: SHIPPED | OUTPATIENT
Start: 2018-10-25 | End: 2019-04-30 | Stop reason: SDUPTHER

## 2018-10-25 NOTE — PATIENT INSTRUCTIONS
Influenza (Flu) Vaccine (Inactivated or Recombinant): What You Need to Know  Why get vaccinated? Influenza (\"flu\") is a contagious disease that spreads around the [place] every winter, usually between October and May. Flu is caused by influenza viruses and is spread mainly by coughing, sneezing, and close contact. Anyone can get flu. Flu strikes suddenly and can last several days. Symptoms vary by age, but can include:  · Fever/chills. · Sore throat. · Muscle aches. · Fatigue. · Cough. · Headache. · Runny or stuffy nose. Flu can also lead to pneumonia and blood infections, and cause diarrhea and seizures in children. If you have a medical condition, such as heart or lung disease, flu can make it worse. Flu is more dangerous for some people. Infants and young children, people 72years of age and older, pregnant women, and people with certain health conditions or a weakened immune system are at greatest risk. Each year thousands of people in the [facility] die from flu, and many more are hospitalized. Flu vaccine can:  · Keep you from getting flu. · Make flu less severe if you do get it. · Keep you from spreading flu to your family and other people. Inactivated and recombinant flu vaccines  A dose of flu vaccine is recommended every flu season. Children 6 months through 6years of age may need two doses during the same flu season. Everyone else needs only one dose each flu season. Some inactivated flu vaccines contain a very small amount of a mercury-based preservative called thimerosal. Studies have not shown thimerosal in vaccines to be harmful, but flu vaccines that do not contain thimerosal are available. There is no live flu virus in flu shots. They cannot cause the flu. There are many flu viruses, and they are always changing. Each year a new flu vaccine is made to protect against three or four viruses that are likely to cause disease in the upcoming flu season.  But even when the vaccine doesn't exactly match these viruses, it may still provide some protection. Flu vaccine cannot prevent:  · Flu that is caused by a virus not covered by the vaccine. · Illnesses that look like flu but are not. Some people should not get this vaccine  Tell the person who is giving you the vaccine:  · If you have any severe (life-threatening) allergies. If you ever had a life-threatening allergic reaction after a dose of flu vaccine, or have a severe allergy to any part of this vaccine, you may be advised not to get vaccinated. Most, but not all, types of flu vaccine contain a small amount of egg protein. · If you ever had Guillain-Barré syndrome (also called GBS) Some people with a history of GBS should not get this vaccine. This should be discussed with your doctor. · If you are not feeling well. It is usually okay to get flu vaccine when you have a mild illness, but you might be asked to come back when you feel better. Risks of a vaccine reaction  With any medicine, including vaccines, there is a chance of reactions. These are usually mild and go away on their own, but serious reactions are also possible. Most people who get a flu shot do not have any problems with it. Minor problems following a flu shot include:  · Soreness, redness, or swelling where the shot was given  · Hoarseness  · Sore, red or itchy eyes  · Cough  · Fever  · Aches  · Headache  · Itching  · Fatigue  If these problems occur, they usually begin soon after the shot and last 1 or 2 days. More serious problems following a flu shot can include the following:  · There may be a small increased risk of Guillain-Barré Syndrome (GBS) after inactivated flu vaccine. This risk has been estimated at 1 or 2 additional cases per million people vaccinated. This is much lower than the risk of severe complications from flu, which can be prevented by flu vaccine.   · Winda Dash children who get the flu shot along with pneumococcal vaccine (PCV13) and/or DTaP vaccine at the same time might be slightly more likely to have a seizure caused by fever. Ask your doctor for more information. Tell your doctor if a child who is getting flu vaccine has ever had a seizure  Problems that could happen after any injected vaccine:  · People sometimes faint after a medical procedure, including vaccination. Sitting or lying down for about 15 minutes can help prevent fainting, and injuries caused by a fall. Tell your doctor if you feel dizzy, or have vision changes or ringing in the ears. · Some people get severe pain in the shoulder and have difficulty moving the arm where a shot was given. This happens very rarely. · Any medication can cause a severe allergic reaction. Such reactions from a vaccine are very rare, estimated at about 1 in a million doses, and would happen within a few minutes to a few hours after the vaccination. As with any medicine, there is a very remote chance of a vaccine causing a serious injury or death. The safety of vaccines is always being monitored. For more information, visit: www.cdc.gov/vaccinesafety/. What if there is a serious reaction? What should I look for? · Look for anything that concerns you, such as signs of a severe allergic reaction, very high fever, or unusual behavior. Signs of a severe allergic reaction can include hives, swelling of the face and throat, difficulty breathing, a fast heartbeat, dizziness, and weakness - usually within a few minutes to a few hours after the vaccination. What should I do? · If you think it is a severe allergic reaction or other emergency that can't wait, call 9-1-1 and get the person to the nearest hospital. Otherwise, call your doctor. · Reactions should be reported to the \"Vaccine Adverse Event Reporting System\" (VAERS). Your doctor should file this report, or you can do it yourself through the VAERS website at www.vaers. [facility].gov, or by calling 5-171.101.5212.   [employer] [employer] does not give medical advice. The National Vaccine Injury Compensation Program  The National Vaccine Injury Compensation Program (VICP) is a federal program that was created to compensate people who may have been injured by certain vaccines. Persons who believe they may have been injured by a vaccine can learn about the program and about filing a claim by calling 0-310.774.3237 or visiting the [address] [employer] website at www.[facility].gov/vaccinecompensation. There is a time limit to file a claim for compensation. How can I learn more? · Ask your healthcare provider. He or she can give you the vaccine package insert or suggest other sources of information. · Call your local or state health department. · Contact the Centers for Disease Control and Prevention (CDC):  ? Call 1-716.605.1584 (1-800-CDC-INFO) or  ? Visit CDC's website at www.cdc.gov/flu  Vaccine Information Statement  Inactivated Influenza Vaccine  8/7/2015)  42 CARLYN Cordero Ej [id number]  Department of Health and Human Services  Centers for Disease Control and Prevention  Many Vaccine Information Statements are available in Maltese and other languages. See www.immunize.org/vis. Muchas hojas de información sobre vacunas están disponibles en español y en otros idiomas. Visite www.immunize.org/vis. Care instructions adapted under license by [employer] (which disclaims liability or warranty for this information). If you have questions about a medical condition or this instruction, always ask your healthcare professional. [place] [address] any warranty or liability for your use of this information. Pneumococcal Polysaccharide Vaccine: What You Need to Know  Why get vaccinated? Vaccination can protect older adults (and some children and younger adults) from pneumococcal disease. Pneumococcal disease is caused by bacteria that can spread from person to person through close contact.  It can cause ear infections, and it can also lead to more serious infections of the:  · Lungs (pneumonia),  · Blood (bacteremia), and  · Covering of the brain and spinal cord (meningitis). Meningitis can cause deafness and brain damage, and it can be fatal.  Anyone can get pneumococcal disease, but children under 3years of age, people with certain medical conditions, adults over 72years of age, and cigarette smokers are at the highest risk. About 18,000 older adults die each year from pneumococcal disease in the [place]. Treatment of pneumococcal infections with penicillin and other drugs used to be more effective. But some strains of the disease have become resistant to these drugs. This makes prevention of the disease, through vaccination, even more important. Pneumococcal polysaccharide vaccine (PPSV23)  Pneumococcal polysaccharide vaccine (PPSV23) protects against 23 types of pneumococcal bacteria. It will not prevent all pneumococcal disease. PPSV23 is recommended for:  · All adults 72years of age and older,  · Anyone 2 through 59years of age with certain long-term health problems,  · Anyone 2 through 59years of age with a weakened immune system,  · Adults 23 through 59years of age who smoke cigarettes or have asthma. Most people need only one dose of PPSV. A second dose is recommended for certain high-risk groups. People 72 and older should get a dose even if they have gotten one or more doses of the vaccine before they turned 65. Your healthcare provider can give you more information about these recommendations. Most healthy adults develop protection within 2 to 3 weeks of getting the shot. Some people should not get this vaccine  · Anyone who has had a life-threatening allergic reaction to PPSV should not get another dose. · Anyone who has a severe allergy to any component of PPSV should not receive it. Tell your provider if you have any severe allergies.   · Anyone who is moderately or severely ill when the shot is scheduled may be asked to wait until they recover before getting the vaccine. Someone with a mild illness can usually be vaccinated. · Children less than 3years of age should not receive this vaccine. · There is no evidence that PPSV is harmful to either a pregnant woman or to her fetus. However, as a precaution, women who need the vaccine should be vaccinated before becoming pregnant, if possible. Risks of a vaccine reaction  With any medicine, including vaccines, there is a chance of side effects. These are usually mild and go away on their own, but serious reactions are also possible. About half of people who get PPSV have mild side effects, such as redness or pain where the shot is given, which go away within about two days. Less than 1 out of 100 people develop a fever, muscle aches, or more severe local reactions. Problems that could happen after any vaccine:  · People sometimes faint after a medical procedure, including vaccination. Sitting or lying down for about 15 minutes can help prevent fainting, and injuries caused by a fall. Tell your doctor if you feel dizzy, or have vision changes or ringing in the ears. · Some people get severe pain in the shoulder and have difficulty moving the arm where a shot was given. This happens very rarely. · Any medication can cause a severe allergic reaction. Such reactions from a vaccine are very rare, estimated at about 1 in a million doses, and would happen within a few minutes to a few hours after the vaccination. As with any medicine, there is a very remote chance of a vaccine causing a serious injury or death. The safety of vaccines is always being monitored. For more information, visit: www.cdc.gov/vaccinesafety/  What if there is a serious reaction? What should I look for? Look for anything that concerns you, such as signs of a severe allergic reaction, very high fever, or unusual behavior.   Signs of a severe allergic reaction can include hives, swelling of the face and throat, difficulty breathing, a fast heartbeat, dizziness, and weakness. These would usually start a few minutes to a few hours after the vaccination. What should I do? If you think it is a severe allergic reaction or other emergency that can't wait, call 9-1-1 or get to the nearest hospital. Otherwise, call your doctor. Afterward, the reaction should be reported to the Vaccine Adverse Event Reporting System (VAERS). Your doctor might file this report, or you can do it yourself through the VAERS web site at www.vaers. [facility].gov, or by calling 0-339.521.6190. VAERS does not give medical advice. How can I learn more? · Ask your doctor. He or she can give you the vaccine package insert or suggest other sources of information. · Call your local or state health department. · Contact the Centers for Disease Control and Prevention (CDC):  ? Call 9-696.715.1776 (1-800-CDC-INFO) or  ? Visit CDC's website at www.cdc.gov/vaccines  Vaccine Information Statement  PPSV Vaccine  (04/24/2015)  Department of Health and Human Services  Centers for Disease Control and Prevention  Many Vaccine Information Statements are available in [place]ian and other languages. See www.immunize.org/vis. Hojas de información Sobre Vacunas están disponibles en español y en muchos otros idiomas. Visite [employer]si. Care instructions adapted under license by [employer] (which disclaims liability or warranty for this information). If you have questions about a medical condition or this instruction, always ask your healthcare professional. [place] [address] any warranty or liability for your use of this information.

## 2018-10-25 NOTE — PROGRESS NOTES
Chief Complaint   Patient presents with    Cough     complaints of cough and congestion staying the same since last visit     Urinary Frequency     c/o frequency the past two weeks      1. Have you been to the ER, urgent care clinic since your last visit? Hospitalized since your last visit? No     2. Have you seen or consulted any other health care providers outside of the [address] [place] [address] since your last visit? Include any pap smears or colon screening.   No

## 2018-10-25 NOTE — PROGRESS NOTES
Subjective:   Tessy Peters is a 68 y.o. female follow up chest xray results, moderate COPD accompanied by her former . Still coughing intermittently  Currently treated for multiple myeloma  Review of Systems   Constitutional: Negative. Respiratory: Negative for hemoptysis, sputum production, shortness of breath and wheezing. Cardiovascular: Negative. Current Outpatient Medications   Medication Sig Dispense Refill    budesonide-formoterol (SYMBICORT) 80-4.5 mcg/actuation HFAA Take 2 Puffs by inhalation two (2) times a day. 1 Inhaler 2    albuterol (PROVENTIL HFA, VENTOLIN HFA, PROAIR HFA) 90 mcg/actuation inhaler Take 2 Puffs by inhalation every four (4) hours as needed for Wheezing. 1 Inhaler 2    valACYclovir (VALTREX) 500 mg tablet Take 500 mg by mouth two (2) times a day.  omeprazole (PRILOSEC) 20 mg capsule Take 20 mg by mouth daily. 3    DEXAMETHASONE PO Take 40 mg by mouth.  promethazine-codeine (PHENERGAN WITH CODEINE) 6.25-10 mg/5 mL syrup Take 5 mL by mouth every six (6) hours as needed for Cough. Max Daily Amount: 20 mL. 120 mL 0    sertraline (ZOLOFT) 100 mg tablet Take 1 Tab by mouth daily. 90 Tab 1    vit B12-levomefolate calcium-vit B6 (FOLTX) 2-1.13-25 mg tablet Take 1 Tab by mouth daily. 30 Tab 0    cyclophosphamide (CYTOXAN) 50 mg capsule Take 600 mg by mouth every seven (7) days. Take 12 capsules by mouth once a week.  Thyroid, Pork, (ARMOUR THYROID) 120 mg tab Take 120 mg by mouth daily.  ondansetron hcl (ZOFRAN) 8 mg tablet Take 8 mg by mouth every eight (8) hours as needed for Nausea.  hydroCHLOROthiazide (HYDRODIURIL) 25 mg tablet TAKE 1 TABLET BY MOUTH DAILY 90 Tab 0    Cholecalciferol, Vitamin D3, (VITAMIN D3) 2,000 unit cap capsule Take  by Mouth.  triamcinolone (NASACORT AQ) 55 mcg nasal inhaler 2 Sprays daily.  MULTIVITAMINS (MULTI-VITAMIN PO) Take 1 Tab by mouth daily.       CALCIUM CITRATE (CITRACAL PO) Take 1 Tab by mouth daily.  FLAXSEED PO Take 1 Tab by mouth daily.  nystatin (MYCOSTATIN) 100,000 unit/mL suspension SAS 5 ML PO QID  1      PMH: reviewed medications and allergy lists and medical and family history. Wt Readings from Last 3 Encounters:   10/25/18 180 lb (81.6 kg)   10/19/18 175 lb 12.8 oz (79.7 kg)   04/30/18 164 lb (74.4 kg)     BP Readings from Last 3 Encounters:   10/25/18 127/62   10/19/18 128/64   04/30/18 115/75     OBJECTIVE:  Awake and alert in no acute distress  Lungs clear throughout  S1 S2 RRR without ectopy or murmur auscultated. Extremities: no clubbing, cyanosis, peripheral edema  Visit Vitals  /62 (BP 1 Location: Right arm, BP Patient Position: Sitting)   Pulse 96   Temp 97.3 °F (36.3 °C) (Oral)   Resp 20   Ht 5' 6\" (1.676 m)   Wt 180 lb (81.6 kg)   SpO2 99%   BMI 29.05 kg/m²     Diagnoses and all orders for this visit:    Chronic obstructive pulmonary disease, unspecified COPD type ([facility] [address]  -     budesonide-formoterol (SYMBICORT) 80-4.5 mcg/actuation HFAA; Take 2 Puffs by inhalation two (2) times a day., Normal, Disp-1 Inhaler, R-2  -     albuterol (PROVENTIL HFA, VENTOLIN HFA, PROAIR HFA) 90 mcg/actuation inhaler; Take 2 Puffs by inhalation every four (4) hours as needed for Wheezing., Normal, Disp-1 Inhaler, R-2    Encounter for immunization  -     INFLUENZA VIRUS VACCINE, HIGH DOSE SEASONAL, PRESERVATIVE FREE  -     PNEUMOCOCCAL POLYSACCHARIDE VACCINE, 23-VALENT, ADULT OR IMMUNOSUPPRESSED PT DOSE,      Action plan  Reviewed risks and benefits and common side effects of new medication  Patient agrees with plan and verbalizes understanding. I have discussed the diagnosis with the patient and the intended plan as seen in the above orders. The patient has received an after-visit summary and questions were answered concerning future plans. I have discussed medication side effects and warnings with the patient as well.     Follow-up Disposition:  Return in about 4 weeks (around 11/22/2018) for COPD .

## 2018-10-31 DIAGNOSIS — F32.A DEPRESSION, UNSPECIFIED DEPRESSION TYPE: ICD-10-CM

## 2018-10-31 RX ORDER — SERTRALINE HYDROCHLORIDE 100 MG/1
100 TABLET, FILM COATED ORAL DAILY
Qty: 90 TAB | Refills: 1 | Status: SHIPPED | OUTPATIENT
Start: 2018-10-31 | End: 2018-11-02 | Stop reason: SDUPTHER

## 2018-10-31 NOTE — TELEPHONE ENCOUNTER
Requested Prescriptions     Pending Prescriptions Disp Refills    sertraline (ZOLOFT) 100 mg tablet 90 Tab 1     Sig: Take 1 Tab by mouth daily.

## 2018-11-02 DIAGNOSIS — F32.A DEPRESSION, UNSPECIFIED DEPRESSION TYPE: ICD-10-CM

## 2018-11-02 RX ORDER — SERTRALINE HYDROCHLORIDE 100 MG/1
100 TABLET, FILM COATED ORAL DAILY
Qty: 90 TAB | Refills: 1 | Status: SHIPPED | OUTPATIENT
Start: 2018-11-02 | End: 2019-12-16 | Stop reason: SDUPTHER

## 2018-11-14 DIAGNOSIS — I10 ESSENTIAL HYPERTENSION: ICD-10-CM

## 2018-11-15 RX ORDER — HYDROCHLOROTHIAZIDE 25 MG/1
TABLET ORAL
Qty: 90 TAB | Refills: 0 | OUTPATIENT
Start: 2018-11-15

## 2018-11-16 NOTE — TELEPHONE ENCOUNTER
Called and left message with patient regarding medication refill request patient was instructed to call back.  -Guzman Daniel LPN

## 2018-11-21 DIAGNOSIS — I10 ESSENTIAL HYPERTENSION: ICD-10-CM

## 2018-11-21 RX ORDER — HYDROCHLOROTHIAZIDE 25 MG/1
TABLET ORAL
Qty: 90 TAB | Refills: 0 | Status: SHIPPED | OUTPATIENT
Start: 2018-11-21 | End: 2019-03-25 | Stop reason: SDUPTHER

## 2018-11-21 NOTE — TELEPHONE ENCOUNTER
Called and let patient know that we would be refilling but we do need to see her for ED follow up early next week, the patient had a fall 11/20.   Patient verbalized understanding and said she would be calling later to schedule.  -Abby Suarez LPN

## 2018-11-21 NOTE — TELEPHONE ENCOUNTER
Patient called and said she had been taking the HCTZ 25 mg since January. Patient is requesting a refill?

## 2018-12-03 ENCOUNTER — OFFICE VISIT (OUTPATIENT)
Dept: FAMILY MEDICINE CLINIC | Age: 73
End: 2018-12-03

## 2018-12-03 VITALS
RESPIRATION RATE: 18 BRPM | HEART RATE: 108 BPM | HEIGHT: 66 IN | OXYGEN SATURATION: 99 % | TEMPERATURE: 97.9 F | WEIGHT: 179.8 LBS | BODY MASS INDEX: 28.9 KG/M2 | DIASTOLIC BLOOD PRESSURE: 70 MMHG | SYSTOLIC BLOOD PRESSURE: 138 MMHG

## 2018-12-03 DIAGNOSIS — Z91.81 HISTORY OF FALL: ICD-10-CM

## 2018-12-03 DIAGNOSIS — C90.00 MULTIPLE MYELOMA, REMISSION STATUS UNSPECIFIED (HCC): ICD-10-CM

## 2018-12-03 DIAGNOSIS — R35.0 URINARY FREQUENCY: Primary | ICD-10-CM

## 2018-12-03 LAB
BILIRUB UR QL STRIP: NEGATIVE
GLUCOSE UR-MCNC: NEGATIVE MG/DL
KETONES P FAST UR STRIP-MCNC: NEGATIVE MG/DL
PH UR STRIP: 7.5 [PH] (ref 4.6–8)
PROT UR QL STRIP: NEGATIVE
SP GR UR STRIP: 1.01 (ref 1–1.03)
UA UROBILINOGEN AMB POC: NORMAL (ref 0.2–1)
URINALYSIS CLARITY POC: NORMAL
URINALYSIS COLOR POC: NORMAL
URINE BLOOD POC: NEGATIVE
URINE LEUKOCYTES POC: NEGATIVE
URINE NITRITES POC: NEGATIVE

## 2018-12-03 NOTE — PROGRESS NOTES
Subjective:  
Jose Luis Llamas is a 68 y.o. female with multiple myeloma (managed by oncology) and accompanied by her mentally challenged daughter and former  today for concern over urinary frequency in the evening and in the middle of the night. She is also here for follow up emergency department visit on 12-03-18 for right shoulder, left hip and right hip pain after a fall three days prior. She was trying to pick a package on the stairs and lost balance and bumped her head but did not lose consciousness and is not on blood thinners. She was able to pick her self up but was experiencing severe right hip pain. She does have a history of arthritis in right hip and is managed by orthopedics. Reviewed diagnostics with patient and family: chest xray normal findings, left scapula xray no acute findings, pelvic xray no acute findings. Patient ambulating with cane stable no complaints of pain today but is complaining of urinary frequency and would like her urine to be tested Review of Systems Constitutional: Negative. Respiratory: Negative. Cardiovascular: Negative. Genitourinary: Negative for dysuria, flank pain, hematuria and urgency. Musculoskeletal: Negative for back pain, falls, joint pain, myalgias and neck pain. Does take acetaminophen prn pain. Slight pain in left shoulder with palpation on posterior aspect. No limited ROM per patient Current Outpatient Medications Medication Sig Dispense Refill  hydroCHLOROthiazide (HYDRODIURIL) 25 mg tablet TAKE 1 TABLET BY MOUTH DAILY 90 Tab 0  
 sertraline (ZOLOFT) 100 mg tablet Take 1 Tab by mouth daily. 90 Tab 1  
 budesonide-formoterol (SYMBICORT) 80-4.5 mcg/actuation HFAA Take 2 Puffs by inhalation two (2) times a day. 1 Inhaler 2  
 albuterol (PROVENTIL HFA, VENTOLIN HFA, PROAIR HFA) 90 mcg/actuation inhaler Take 2 Puffs by inhalation every four (4) hours as needed for Wheezing.  1 Inhaler 2  
  valACYclovir (VALTREX) 500 mg tablet Take 500 mg by mouth two (2) times a day.  omeprazole (PRILOSEC) 20 mg capsule Take 20 mg by mouth daily. 3  
 DEXAMETHASONE PO Take 40 mg by mouth.  vit B12-levomefolate calcium-vit B6 (FOLTX) 2-1.13-25 mg tablet Take 1 Tab by mouth daily. 30 Tab 0  Cholecalciferol, Vitamin D3, (VITAMIN D3) 2,000 unit cap capsule Take  by Mouth.  triamcinolone (NASACORT AQ) 55 mcg nasal inhaler 2 Sprays daily.  MULTIVITAMINS (MULTI-VITAMIN PO) Take 1 Tab by mouth daily.  CALCIUM CITRATE (CITRACAL PO) Take 1 Tab by mouth daily.  FLAXSEED PO Take 1 Tab by mouth daily.  nystatin (MYCOSTATIN) 100,000 unit/mL suspension SAS 5 ML PO QID  1  cyclophosphamide (CYTOXAN) 50 mg capsule Take 600 mg by mouth every seven (7) days. Take 12 capsules by mouth once a week.  Thyroid, Pork, (ARMOUR THYROID) 120 mg tab Take 120 mg by mouth daily.  ondansetron hcl (ZOFRAN) 8 mg tablet Take 8 mg by mouth every eight (8) hours as needed for Nausea. PMH: reviewed medications and allergy lists and medical and family history. OBJECTIVE: 
Awake and alert in no acute distress Lungs clear throughout S1 S2 RRR without ectopy or murmur auscultated. Abdomen: normoactive bowel sounds all quadrants, no tenderness to abdomen upper and lower quadrants. No hepatosplenomegaly M/S: Examined standing and supine. Shoulder exam: 
Right shoulder: No erythema, edema, or bruising. Nontender to acromioclavicular joint. Nontender to subacromial bursa. No impingement signs. Left shoulder: No erythema, edema, or bruising. Nontender to subacromial bursa. No impingement signs. Hips: no erythema edema or hematomas, nontender to anterior, lateral posterior aspects No limited ROM Lumbar: nontender paraspinals bilaterally Gait with cane stable Visit Vitals /70 (BP 1 Location: Left arm, BP Patient Position: Sitting) Pulse (!) 108 Temp 97.9 °F (36.6 °C) (Oral) Resp 18 Ht 5' 6\" (1.676 m) Wt 179 lb 12.8 oz (81.6 kg) SpO2 99% BMI 29.02 kg/m² Results for orders placed or performed in visit on 12/03/18 AMB POC URINALYSIS DIP STICK AUTO W/O MICRO Result Value Ref Range Color (UA POC) Clarity (UA POC) Glucose (UA POC) Negative Negative Bilirubin (UA POC) Negative Negative Ketones (UA POC) Negative Negative Specific gravity (UA POC) 1.015 1.001 - 1.035 Blood (UA POC) Negative Negative pH (UA POC) 7.5 4.6 - 8.0 Protein (UA POC) Negative Negative Urobilinogen (UA POC) 0.2 mg/dL 0.2 - 1 Nitrites (UA POC) Negative Negative Leukocyte esterase (UA POC) Negative Negative Diagnoses and all orders for this visit: 
 
Urinary frequency -     AMB POC URINALYSIS DIP STICK AUTO W/O MICRO History of fall, fall risks and prevention reviewed Multiple myeloma, [facility] status unspecified [facility] managed by oncology Anticipatory guidance given Patient agrees with plan and verbalizes understanding. I have discussed the diagnosis with the patient and the intended plan as seen in the above orders. The patient has received an after-visit summary and questions were answered concerning future plans. I have discussed medication side effects and warnings with the patient as well. Follow-up Disposition: 
Return in about 3 months (around 3/3/2019), or if symptoms worsen or fail to improve, for htn.

## 2018-12-03 NOTE — PROGRESS NOTES
Chief Complaint Patient presents with  ED Follow-up  
  post fall 1. Have you been to the ER, urgent care clinic since your last visit? Hospitalized since your last visit? ED for fall 2. Have you seen or consulted any other health care providers outside of the [facility] since your last visit? Include any pap smears or colon screening.  No

## 2018-12-03 NOTE — PATIENT INSTRUCTIONS
Preventing Falls: Care Instructions Your Care Instructions Getting around your home safely can be a challenge if you have injuries or health problems that make it easy for you to fall. Loose rugs and furniture in walkways are among the dangers for many older people who have problems walking or who have poor eyesight. People who have conditions such as arthritis, osteoporosis, or dementia also have to be careful not to fall. You can make your home safer with a few simple measures. Follow-up care is a key part of your treatment and safety. Be sure to make and go to all appointments, and call your doctor if you are having problems. It's also a good idea to know your test results and keep a list of the medicines you take. How can you care for yourself at home? Taking care of yourself · You may get dizzy if you do not drink enough water. To prevent dehydration, drink plenty of fluids, enough so that your urine is light yellow or clear like water. Choose water and other caffeine-free clear liquids. If you have kidney, heart, or liver disease and have to limit fluids, talk with your doctor before you increase the amount of fluids you drink. · Exercise regularly to improve your strength, muscle tone, and balance. Walk if you can. Swimming may be a good choice if you cannot walk easily. · Have your vision and hearing checked each year or any time you notice a change. If you have trouble seeing and hearing, you might not be able to avoid objects and could lose your balance. · Know the side effects of the medicines you take. Ask your doctor or pharmacist whether the medicines you take can affect your balance. Sleeping pills or sedatives can affect your balance. · Limit the amount of alcohol you drink. Alcohol can impair your balance and other senses. · Ask your doctor whether calluses or corns on your feet need to be removed.  If you wear loose-fitting shoes because of calluses or corns, you can lose your balance and fall. · Talk to your doctor if you have numbness in your feet. Preventing falls at home · Remove raised doorway thresholds, throw rugs, and clutter. Repair loose carpet or raised areas in the floor. · Move furniture and electrical cords to keep them out of walking paths. · Use nonskid floor wax, and wipe up spills right away, especially on ceramic tile floors. · If you use a walker or cane, put rubber tips on it. If you use crutches, clean the bottoms of them regularly with an abrasive pad, such as steel wool. · Keep your house well lit, especially [place]ha [place], and outside walkways. Use night-lights in areas such as hallways and bathrooms. Add extra light switches or use remote switches (such as switches that go on or off when you clap your hands) to make it easier to turn lights on if you have to get up during the night. · Install sturdy handrails on stairways. · Move items in your cabinets so that the things you use a lot are on the lower shelves (about waist level). · Keep a cordless phone and a flashlight with new batteries by your bed. If possible, put a phone in each of the main rooms of your house, or carry a cell phone in case you fall and cannot reach a phone. Or, you can wear a device around your neck or wrist. You push a button that sends a signal for help. · Wear low-heeled shoes that fit well and give your feet good support. Use footwear with nonskid soles. Check the heels and soles of your shoes for wear. Repair or replace worn heels or soles. · Do not wear socks without shoes on wood floors. · Walk on the grass when the sidewalks are slippery. If you live in an area that gets snow and ice in the winter, sprinkle salt on slippery steps and sidewalks. Preventing falls in the bath · Install grab bars and nonskid mats inside and outside your shower or tub and near the toilet and sinks. · Use shower chairs and bath benches. · Use a hand-held shower head that will allow you to sit while showering. · Get into a tub or shower by putting the weaker leg in first. Get out of a tub or shower with your strong side first. 
· Repair loose toilet seats and consider installing a raised toilet seat to make getting on and off the toilet easier. · Keep your bathroom door unlocked while you are in the shower. Where can you learn more? Go to http://alvin-rogelio.info/. Enter 0476 [id number] in the search box to learn more about \"Preventing Falls: Care Instructions. \" Current as of: March 16, 2018 Content Version: 11.8 © 0458-3269 [employer]. Care instructions adapted under license by [employer] (which disclaims liability or warranty for this information). If you have questions about a medical condition or this instruction, always ask your healthcare professional. [place] [address] any warranty or liability for your use of this information. Preventing Outdoor Falls: Care Instructions Your Care Instructions Worries about falls don't need to keep you indoors. Outdoor activities like walking have big benefits for your health. You will need to watch your step and learn a few safety measures. If you are worried about having a fall outdoors, ask your doctor about exercises, classes, or physical therapy that may help. You can learn ways to gain strength, flexibility, and balance. Ask if it might help to use a cane or walker. You can make your time outdoors safer with a few simple measures. Follow-up care is a key part of your treatment and safety. Be sure to make and go to all appointments, and call your doctor if you are having problems. It's also a good idea to know your test results and keep a list of the medicines you take. How can you prevent falls outdoors? · Wear shoes with firm soles and low heels.  If you have to walk on an icy surface, use grippers that can be worn over your shoes in bad weather. · Be extra careful if weather is bad. Walk on the grass when the sidewalks are slick. If you live in a place that gets snow and ice in the winter, sprinkle salt on slippery stairs and sidewalks. · Be careful getting on or off buses and trains or getting in and out of cars. If handrails are available, use them. · Be careful when you cross the street. Look for crosswalks or places where curb cuts or ramps are present. · Try not to hurry, especially if you are carrying something. · Be cautious in parking lots or garages. There may be curbs or changes in pavement, or the height of the pavement may vary. · Make sure to wear the correct eyeglasses, if you need them. Reading glasses or bifocals can make it harder to see hazards that might be in your way. · If you are walking outdoors for exercise, try to: 
? Walk in well-lighted, well-maintained areas. These include high school or college tracks, shopping malls, and public spaces. ? Walk with a partner. ? Watch out for cracked sidewalks, curbs, changes in the height of the pavement, exposed tree roots, and debris such as fallen leaves or branches. Where can you learn more? Go to http://alvin-rogelio.info/. Enter J625 in the search box to learn more about \"Preventing Outdoor Falls: Care Instructions. \" Current as of: March 16, 2018 Content Version: 11.8 © 8054-6088 [employer]. Care instructions adapted under license by [employer] (which disclaims liability or warranty for this information). If you have questions about a medical condition or this instruction, always ask your healthcare professional. [address] any warranty or liability for your use of this information.

## 2019-01-04 ENCOUNTER — DOCUMENTATION ONLY (OUTPATIENT)
Dept: FAMILY MEDICINE CLINIC | Age: 74
End: 2019-01-04

## 2019-01-04 NOTE — PROGRESS NOTES
Pt just wanted to make the provider aware that she does not need a shingles shot, and she is off her chemo and off all her medication.

## 2019-01-08 NOTE — PROGRESS NOTES
Called and clarified with patient what medications patient stopped taking, patient stated she is still taking all matenience  med[email or web address], but she stopped taking her cancer medications per oncologist and wanted to make the provider aware.

## 2019-02-08 ENCOUNTER — OFFICE VISIT (OUTPATIENT)
Dept: FAMILY MEDICINE CLINIC | Age: 74
End: 2019-02-08

## 2019-02-08 VITALS
WEIGHT: 199.2 LBS | HEIGHT: 66 IN | HEART RATE: 113 BPM | BODY MASS INDEX: 32.02 KG/M2 | RESPIRATION RATE: 16 BRPM | OXYGEN SATURATION: 95 % | TEMPERATURE: 98.5 F | SYSTOLIC BLOOD PRESSURE: 130 MMHG | DIASTOLIC BLOOD PRESSURE: 68 MMHG

## 2019-02-08 DIAGNOSIS — J44.1 COPD WITH ACUTE EXACERBATION (HCC): Primary | ICD-10-CM

## 2019-02-08 RX ORDER — BENZONATATE 200 MG/1
200 CAPSULE ORAL
Qty: 21 CAP | Refills: 0 | Status: SHIPPED | OUTPATIENT
Start: 2019-02-08 | End: 2019-02-08 | Stop reason: SDUPTHER

## 2019-02-08 RX ORDER — PREDNISONE 10 MG/1
TABLET ORAL
Qty: 21 TAB | Refills: 0 | Status: SHIPPED | OUTPATIENT
Start: 2019-02-08 | End: 2019-03-04 | Stop reason: ALTCHOICE

## 2019-02-08 RX ORDER — DOXYCYCLINE 100 MG/1
100 TABLET ORAL 2 TIMES DAILY
Qty: 20 TAB | Refills: 0 | Status: SHIPPED | OUTPATIENT
Start: 2019-02-08 | End: 2019-02-18

## 2019-02-08 RX ORDER — BENZONATATE 200 MG/1
200 CAPSULE ORAL
Qty: 21 CAP | Refills: 0 | Status: SHIPPED | OUTPATIENT
Start: 2019-02-08 | End: 2019-02-15

## 2019-02-08 RX ORDER — PREDNISONE 10 MG/1
TABLET ORAL
Qty: 21 TAB | Refills: 0 | Status: SHIPPED | OUTPATIENT
Start: 2019-02-08 | End: 2019-02-08 | Stop reason: SDUPTHER

## 2019-02-08 NOTE — PATIENT INSTRUCTIONS
Chronic Obstructive Pulmonary Disease (COPD) Flare-Ups: Care Instructions  Your Care Instructions    Chronic obstructive pulmonary disease (COPD) is a lung disease that makes it hard to breathe. It is caused by damage to the lungs over many years, usually from smoking. COPD is often a mix of two diseases:  · Chronic bronchitis: The airways that carry air to the lungs (bronchial tubes) get inflamed and make a lot of mucus. This can narrow or block the airways. · Emphysema: In a healthy person, the tiny air sacs in the lungs are like balloons. As you breathe in and out, they get bigger and smaller to move air through your lungs. But with emphysema, these air sacs are damaged and lose their stretch. Less air gets in and out of the lungs. Many people with COPD have attacks called flare-ups or exacerbations. This is when your usual symptoms quickly get worse and stay worse. The doctor has checked you carefully. But problems can develop later. If you notice any problems or new symptoms, get medical treatment right away. Follow-up care is a key part of your treatment and safety. Be sure to make and go to all appointments, and call your doctor if you are having problems. It's also a good idea to know your test results and keep a list of the medicines you take. How can you care for yourself at home? · Be safe with medicines. Take your medicines exactly as prescribed. Call your doctor if you think you are having a problem with your medicine. You may be taking medicines such as:  ? Bronchodilators. These help open your airways and make breathing easier. ? Corticosteroids. These reduce airway inflammation. They may be given as pills, in a vein, or in an inhaled form. You may go home with pills in addition to an inhaler that you already use. · A spacer may help you get more inhaled medicine to your lungs. Ask your doctor or pharmacist if a spacer is right for you. If it is, ask how to use it properly.   · If your doctor prescribed antibiotics, take them as directed. Do not stop taking them just because you feel better. You need to take the full course of antibiotics. · If your doctor prescribed oxygen, use the flow rate your doctor has recommended. Do not change it without talking to your doctor first.  · Do not smoke. Smoking makes COPD worse. If you need help quitting, talk to your doctor about stop-smoking programs and medicines. These can increase your chances of quitting for good. When should you call for help? Call 911 anytime you think you may need emergency care. For example, call if:    · You have severe trouble breathing.    Call your doctor now or seek immediate medical care if:    · You have new or worse trouble breathing.     · Your coughing or wheezing gets worse.     · You cough up dark brown or bloody mucus (sputum).     · You have a new or higher fever.    Watch closely for changes in your health, and be sure to contact your doctor if:    · You notice more mucus or a change in the color of your mucus.     · You need to use your antibiotic or steroid pills.     · You do not get better as expected. Where can you learn more? Go to http://alvin-rogelio.info/. Enter C957 in the search box to learn more about \"Chronic Obstructive Pulmonary Disease (COPD) Flare-Ups: Care Instructions. \"  Current as of: September 5, 2018  Content Version: 11.9  © 6652-0716 Healthwise, Incorporated. Care instructions adapted under license by [employer] (which disclaims liability or warranty for this information). If you have questions about a medical condition or this instruction, always ask your healthcare professional. [place] [address] any warranty or liability for your use of this information.          COPD Exacerbation Plan: Care Instructions  Your Care Instructions    If you have chronic obstructive pulmonary disease (COPD), your usual shortness of breath could suddenly get worse. You may start coughing more and have more mucus. This flare-up is called a COPD exacerbation (say \"lb-DNQ-[id number]-BAY-brayden\"). A lung infection or air pollution could set off an exacerbation. Sometimes it can happen after a quick change in temperature or being around chemicals. Work with your doctor to make a plan for dealing with an exacerbation. You can better manage it if you plan ahead. Follow-up care is a key part of your treatment and safety. Be sure to make and go to all appointments, and call your doctor if you are having problems. It's also a good idea to know your test results and keep a list of the medicines you take. How can you care for yourself at home? During an exacerbation  · Do not panic if you start to have one. Quick treatment at home may help you prevent serious breathing problems. If you have a COPD exacerbation plan that you developed with your doctor, follow it. · Take your medicines exactly as your doctor tells you.  ? Use your inhaler as directed by your doctor. If your symptoms do not get better after you use your medicine, have someone take you to the emergency room. Call an ambulance if necessary. ? With inhaled medicines, a spacer or a nebulizer may help you get more medicine to your lungs. Ask your doctor or pharmacist how to use them properly. Practice using the spacer in front of a mirror before you have an exacerbation. This may help you get the medicine into your lungs quickly. ? If your doctor has given you steroid pills, take them as directed. ? Your doctor may have given you a prescription for antibiotics, which you can fill if you need it. ? Talk to your doctor if you have any problems with your medicine. And call your doctor if you have to use your antibiotic or steroid pills. Preventing an exacerbation  · Do not smoke. This is the most important step you can take to prevent more damage to your lungs and prevent problems.  If you already smoke, it is never too late to stop. If you need help quitting, talk to your doctor about stop-smoking programs and medicines. These can increase your chances of quitting for good. · Take your daily medicines as prescribed. · Avoid colds and flu. ? Get a pneumococcal vaccine. ? Get a flu vaccine each year, as soon as it is available. Ask those you live or work with to do the same, so they will not get the flu and infect you. ? Try to stay away from people with colds or the flu. ? Wash your hands often. · Avoid secondhand smoke; air pollution; cold, dry air; hot, humid air; and high altitudes. Stay at home with your windows closed when air pollution is bad. · Learn breathing techniques for COPD, such as breathing through pursed lips. These techniques can help you breathe easier during an exacerbation. When should you call for help? Call 911 anytime you think you may need emergency care. For example, call if:    · You have severe trouble breathing.     · You have severe chest pain.    Call your doctor now or seek immediate medical care if:    · You have new or worse shortness of breath.     · You develop new chest pain.     · You are coughing more deeply or more often, especially if you notice more mucus or a change in the color of your mucus.     · You cough up blood.     · You have new or increased swelling in your legs or belly.     · You have a fever.    Watch closely for changes in your health, and be sure to contact your doctor if:    · You need to use your antibiotic or steroid pills.     · Your symptoms are getting worse. Where can you learn more? Go to http://alvin-rogelio.info/. Enter L843 in the search box to learn more about \"COPD Exacerbation Plan: Care Instructions. \"  Current as of: September 5, 2018  Content Version: 11.9  © 5977-3406 [employer]. Care instructions adapted under license by [employer] (which disclaims liability or warranty for this information).  If you have questions about a medical condition or this instruction, always ask your healthcare professional. [place] [address] any warranty or liability for your use of this information.

## 2019-02-08 NOTE — PROGRESS NOTES
Pt is here for cough & congestion x 1 week. 1. Have you been to the ER, urgent care clinic since your last visit? Hospitalized since your last visit? No    2. Have you seen or consulted any other health care providers outside of the [address] [place] [address] since your last visit? Include any pap smears or colon screening.  No

## 2019-02-08 NOTE — PROGRESS NOTES
HISTORY OF PRESENT ILLNESS  Chantal Sow is a 68 y.o. female. Cold Symptoms   The history is provided by the patient. This is a new problem. The cough is non-productive. There has been no fever. Pertinent negatives include no chest pain, no chills, no ear pain, no headaches, no sore throat and no shortness of breath. Associated symptoms comments: Congestion . She has tried cough syrup (delysm) for the symptoms. Improvement on treatment: improvement in cough. She is not a smoker. Her past medical history is significant for COPD. Allergies   Allergen Reactions    Iodine Hives, Rash and Other (comments)     Feels like Chest caving in. Rash LL lumbosacral area    Fosamax [Alendronate] Itching     rash     Current Outpatient Medications   Medication Sig Dispense Refill    hydroCHLOROthiazide (HYDRODIURIL) 25 mg tablet TAKE 1 TABLET BY MOUTH DAILY 90 Tab 0    sertraline (ZOLOFT) 100 mg tablet Take 1 Tab by mouth daily. 90 Tab 1    budesonide-formoterol (SYMBICORT) 80-4.5 mcg/actuation HFAA Take 2 Puffs by inhalation two (2) times a day. 1 Inhaler 2    albuterol (PROVENTIL HFA, VENTOLIN HFA, PROAIR HFA) 90 mcg/actuation inhaler Take 2 Puffs by inhalation every four (4) hours as needed for Wheezing. 1 Inhaler 2    valACYclovir (VALTREX) 500 mg tablet Take 500 mg by mouth two (2) times a day.  omeprazole (PRILOSEC) 20 mg capsule Take 20 mg by mouth daily. 3    DEXAMETHASONE PO Take 40 mg by mouth.  nystatin (MYCOSTATIN) 100,000 unit/mL suspension SAS 5 ML PO QID  1    vit B12-levomefolate calcium-vit B6 (FOLTX) 2-1.13-25 mg tablet Take 1 Tab by mouth daily. 30 Tab 0    cyclophosphamide (CYTOXAN) 50 mg capsule Take 600 mg by mouth every seven (7) days. Take 12 capsules by mouth once a week.  Thyroid, Pork, (ARMOUR THYROID) 120 mg tab Take 120 mg by mouth daily.  ondansetron hcl (ZOFRAN) 8 mg tablet Take 8 mg by mouth every eight (8) hours as needed for Nausea.       Cholecalciferol, Vitamin D3, (VITAMIN D3) 2,000 unit cap capsule Take  by Mouth.  triamcinolone (NASACORT AQ) 55 mcg nasal inhaler 2 Sprays daily.  MULTIVITAMINS (MULTI-VITAMIN PO) Take 1 Tab by mouth daily.  CALCIUM CITRATE (CITRACAL PO) Take 1 Tab by mouth daily.  FLAXSEED PO Take 1 Tab by mouth daily. Past Medical History:   Diagnosis Date    Anxiety 2010    Arthritis     OA spine, left knee    Depression 2010    Headache(784.0)     migraines    HTN (hypertension) 2010    Hypothyroid 2010    Multiple myeloma ([facility] [address]     Osteopenia     Rib fractures 2017    r/t fall    Seasonal allergic rhinitis 2010    Seasonal allergies     Spondylolisthesis of lumbar region 2016    Dr. Claribel Hay [facility]    Thyroid disease     hypothyroid,  thromeagaly     Social History     Socioeconomic History    Marital status: LEGALLY      Spouse name: Not on file    Number of children: Not on file    Years of education: Not on file    Highest education level: Not on file   Social Needs    Financial resource strain: Not on file    Food insecurity - worry: Not on file    Food insecurity - inability: Not on file   [employer] needs - medical: Not on file   [employer] needs - non-medical: Not on file   Occupational History    Not on file   Tobacco Use    Smoking status: Former Smoker     Packs/day: 0.25     Years: 40.00     Pack years: 10.00     Types: Cigarettes     Start date: 1970     Last attempt to quit: 2017     Years since quittin.4    Smokeless tobacco: Never Used    Tobacco comment: patient is smoking one cigarette periodically   Substance and Sexual Activity    Alcohol use: Yes     Comment: wine for communion only    Drug use: No    Sexual activity: Yes     Partners: Male   Other Topics Concern    Not on file   Social History Narrative    Not on file     Review of Systems   Constitutional: Negative for chills and fever.    HENT: Positive for congestion. Negative for ear pain and sore throat. Respiratory: Positive for cough. Negative for shortness of breath. Cardiovascular: Negative for chest pain and palpitations. Neurological: Negative for headaches. /68 (BP 1 Location: Left arm)   Pulse (!) 113   Temp 98.5 °F (36.9 °C) (Oral)   Resp 16   Ht 5' 6\" (1.676 m)   Wt 199 lb 3.2 oz (90.4 kg)   LMP 01/01/1990   SpO2 95%   BMI 32.15 kg/m²   Physical Exam   Constitutional: She appears well-developed and well-nourished. HENT:   Head: Normocephalic and atraumatic. Right Ear: No middle ear effusion. Left Ear:  No middle ear effusion. Nose: Mucosal edema present. Right sinus exhibits no maxillary sinus tenderness and no frontal sinus tenderness. Left sinus exhibits no maxillary sinus tenderness and no frontal sinus tenderness. Mouth/Throat: Uvula is midline, oropharynx is clear and moist and mucous membranes are normal.   Neck: Normal range of motion. Cardiovascular: Normal rate, regular rhythm and normal heart sounds. Pulmonary/Chest: Effort normal and breath sounds normal. She has no wheezes. She has no rhonchi. She has no rales. Lymphadenopathy:     She has no cervical adenopathy. ASSESSMENT and PLAN    ICD-10-CM ICD-9-CM    1. COPD [facility] exacerbation ([facility] J44.1 491.21      Orders Placed This Encounter    DISCONTD: predniSONE (STERAPRED DS) 10 mg dose pack    DISCONTD: benzonatate (TESSALON) 200 mg capsule    predniSONE (STERAPRED DS) 10 mg dose pack    benzonatate (TESSALON) 200 mg capsule    doxycycline (ADOXA) 100 mg tablet     alarm signs when to seek emergent care provided and reviewed   I have discussed the diagnosis with the patient and the intended plan as seen in the above orders. The patient has received an after-visit summary and questions were answered concerning future plans. I have discussed medication side effects and warnings with the patient as well.  Patient agreeable with above plan and verbalizes understanding. Follow-up Disposition:  Return in about 2 weeks (around 2/22/2019) for COPD.

## 2019-02-21 ENCOUNTER — TELEPHONE (OUTPATIENT)
Dept: FAMILY MEDICINE CLINIC | Age: 74
End: 2019-02-21

## 2019-02-21 NOTE — TELEPHONE ENCOUNTER
Pt calling request a return call regarding duplicate medication was sent to both [address] [place] [address]. Prednisone and benzonatate    She just assumed it was her regular RX when she got a call from the pharmacy that medication was ready. She had her   the medication for her. The pharmacy won't take it back.  She does not think it is fair for her to have to pay for duplicate medication

## 2019-02-22 NOTE — TELEPHONE ENCOUNTER
Unfortunately there is nothing we can do regarding this on our end. During her appt medication was originally sent to her routine pharmacy. However, patient requested medication be sent to [facility] instead. Informed patient at that time not to  medication from [place]. She verbalized understanding at that time.   Thanks, Hui Lira, FRANCK-C

## 2019-02-22 NOTE — TELEPHONE ENCOUNTER
Pt was seen on 2/8/19. Prednisone & Laron Koby were sent to [facility] on [address] first & then reordered & sent to [facility].

## 2019-02-22 NOTE — TELEPHONE ENCOUNTER
Pt aware of TREVOR Vilchis message. Pt stated \"maybe she did say that & maybe she didn't\", in reference to being told by TREVOR Vilchis not to  the RX at her primary pharmacy at her [address]

## 2019-03-04 ENCOUNTER — OFFICE VISIT (OUTPATIENT)
Dept: FAMILY MEDICINE CLINIC | Age: 74
End: 2019-03-04

## 2019-03-04 VITALS
BODY MASS INDEX: 32.95 KG/M2 | DIASTOLIC BLOOD PRESSURE: 76 MMHG | TEMPERATURE: 97.7 F | WEIGHT: 205 LBS | HEIGHT: 66 IN | HEART RATE: 88 BPM | SYSTOLIC BLOOD PRESSURE: 134 MMHG | OXYGEN SATURATION: 97 % | RESPIRATION RATE: 17 BRPM

## 2019-03-04 DIAGNOSIS — C90.00 MULTIPLE MYELOMA, REMISSION STATUS UNSPECIFIED (HCC): ICD-10-CM

## 2019-03-04 DIAGNOSIS — E03.9 ACQUIRED HYPOTHYROIDISM: ICD-10-CM

## 2019-03-04 DIAGNOSIS — I10 ESSENTIAL HYPERTENSION: Primary | ICD-10-CM

## 2019-03-04 RX ORDER — LEVOTHYROXINE AND LIOTHYRONINE 57; 13.5 UG/1; UG/1
90 TABLET ORAL DAILY
Qty: 90 TAB | Refills: 2
Start: 2019-03-04 | End: 2019-06-04 | Stop reason: DRUGHIGH

## 2019-03-04 RX ORDER — LENALIDOMIDE 10 MG/1
10 CAPSULE ORAL
Qty: 90 CAP | Refills: 0
Start: 2019-03-04 | End: 2020-01-01 | Stop reason: ALTCHOICE

## 2019-03-04 RX ORDER — FERROUS GLUCONATE 324(37.5)
324 TABLET ORAL
Qty: 90 TAB | Refills: 1
Start: 2019-03-04

## 2019-03-04 RX ORDER — ACYCLOVIR 400 MG/1
400 TABLET ORAL 2 TIMES DAILY
COMMUNITY
End: 2020-01-01 | Stop reason: ALTCHOICE

## 2019-03-04 RX ORDER — LENALIDOMIDE 10 MG/1
CAPSULE ORAL
COMMUNITY
End: 2019-03-04

## 2019-03-04 NOTE — PATIENT INSTRUCTIONS
Body Mass Index: Care Instructions  Your Care Instructions    Body mass index (BMI) can help you see if your weight is raising your risk for health problems. It uses a formula to compare how much you weigh with how tall you are. · A BMI lower than 18.5 is considered underweight. · A BMI between 18.5 and 24.9 is considered healthy. · A BMI between 25 and 29.9 is considered overweight. A BMI of 30 or higher is considered obese. If your BMI is in the normal range, it means that you have a lower risk for weight-related health problems. If your BMI is in the overweight or obese range, you may be at increased risk for weight-related health problems, such as high blood pressure, heart disease, stroke, arthritis or joint pain, and diabetes. If your BMI is in the underweight range, you may be at increased risk for health problems such as fatigue, lower protection (immunity) against illness, muscle loss, bone loss, hair loss, and hormone problems. BMI is just one measure of your risk for weight-related health problems. You may be at higher risk for health problems if you are not active, you eat an unhealthy diet, or you drink too much alcohol or use tobacco products. Follow-up care is a key part of your treatment and safety. Be sure to make and go to all appointments, and call your doctor if you are having problems. It's also a good idea to know your test results and keep a list of the medicines you take. How can you care for yourself at home? · Practice healthy eating habits. This includes eating plenty of fruits, vegetables, whole grains, lean protein, and low-fat dairy. · If your doctor recommends it, get more exercise. Walking is a good choice. Bit by bit, increase the amount you walk every day. Try for at least 30 minutes on most days of the week. · Do not smoke. Smoking can increase your risk for health problems. If you need help quitting, talk to your doctor about stop-smoking programs and medicines. These can increase your chances of quitting for good. · Limit alcohol to 2 drinks a day for men and 1 drink a day for women. Too much alcohol can cause health problems. If you have a BMI higher than 25  · Your doctor may do other tests to check your risk for weight-related health problems. This may include measuring the distance around your waist. A waist measurement of more than 40 inches in men or 35 inches in women can increase the risk of weight-related health problems. · Talk with your doctor about steps you can take to stay healthy or improve your health. You may need to make lifestyle changes to lose weight and stay healthy, such as changing your diet and getting regular exercise. If you have a BMI lower than 18.5  · Your doctor may do other tests to check your risk for health problems. · Talk with your doctor about steps you can take to stay healthy or improve your health. You may need to make lifestyle changes to gain or maintain weight and stay healthy, such as getting more healthy foods in your diet and doing exercises to build muscle. Where can you learn more? Go to http://alvin-rogelio.info/. Enter S176 in the search box to learn more about \"Body Mass Index: Care Instructions. \"  Current as of: June 25, 2018  Content Version: 11.9  © 0943-7100 [employer]. Care instructions adapted under license by [employer] (which disclaims liability or warranty for this information). If you have questions about a medical condition or this instruction, always ask your healthcare professional. [place] [address] any warranty or liability for your use of this information. Learning About the [address] Diet  What is the Mediterranean diet? The Mediterranean diet is a style of eating rather than a diet plan. It features foods eaten in [place], [place], [place] and [place], and other countries along the [place].  It emphasizes eating foods like fish, fruits, vegetables, beans, high-fiber breads and whole grains, nuts, and olive oil. This style of eating includes limited red meat, cheese, and sweets. Why choose the Mediterranean diet? A Mediterranean-style diet may improve heart health. It contains more fat than other heart-healthy diets. But the fats are mainly from nuts, unsaturated oils (such as fish oils and olive oil), and certain nut or seed oils (such as canola, soybean, or flaxseed oil). These fats may help protect the heart and blood vessels. How can you get started on the Mediterranean diet? Here are some things you can do to switch to a more Mediterranean way of eating. What to eat  · Eat a variety of fruits and vegetables each day, such as grapes, blueberries, tomatoes, broccoli, peppers, figs, olives, spinach, eggplant, beans, lentils, and chickpeas. · Eat a variety of whole-grain foods each day, such as oats, brown rice, and whole wheat bread, pasta, and couscous. · Eat fish at least 2 times a week. Try tuna, salmon, mackerel, lake trout, herring, or sardines. · Eat moderate amounts of low-fat dairy products, such as milk, cheese, or yogurt. · Eat moderate amounts of poultry and eggs. · Choose healthy (unsaturated) fats, such as nuts, olive oil, and certain nut or seed oils like canola, soybean, and flaxseed. · Limit unhealthy (saturated) fats, such as butter, palm oil, and coconut oil. And limit fats found in animal products, such as meat and dairy products made with whole milk. Try to eat red meat only a few times a month in very small amounts. · Limit sweets and desserts to only a few times a week. This includes sugar-sweetened drinks like soda. The Mediterranean diet may also include red wine with your meal--1 glass each day for women and up to 2 glasses a day for men. Tips for eating at home  · Use herbs, spices, garlic, lemon zest, and citrus juice instead of salt to add flavor to foods.   · Add avocado slices to your sandwich instead of randall. · Have fish for lunch or dinner instead of red meat. Brush the fish with olive oil, and broil or grill it. · Sprinkle your salad with seeds or nuts instead of cheese. · Cook with olive or canola oil instead of butter or oils that are high in saturated fat. · Switch from 2% milk or whole milk to 1% or fat-free milk. · Dip raw vegetables in a vinaigrette dressing or hummus instead of dips made from mayonnaise or sour cream.  · Have a piece of fruit for dessert instead of a piece of cake. Try baked apples, or have some dried fruit. Tips for eating out  · Try broiled, grilled, baked, or poached fish instead of having it fried or breaded. · Ask your  to have your meals prepared with olive oil instead of butter. · Order dishes made with marinara sauce or sauces made from olive oil. Avoid sauces made from cream or mayonnaise. · Choose whole-grain breads, whole wheat pasta and pizza crust, brown rice, beans, and lentils. · Cut back on butter or margarine on bread. Instead, you can dip your bread in a small amount of olive oil. · Ask for a side salad or grilled vegetables instead of french fries or chips. Where can you learn more? Go to http://alvin-rogelio.info/. Enter 103-955-3147 in the search box to learn more about \"Learning About the Mediterranean Diet. \"  Current as of: March 28, 2018  Content Version: 11.9  © 0596-0567 [employer]. Care instructions adapted under license by [employer] (which disclaims liability or warranty for this information). If you have questions about a medical condition or this instruction, always ask your healthcare professional. [address] any warranty or liability for your use of this information.

## 2019-03-04 NOTE — PROGRESS NOTES
Chief Complaint   Patient presents with    Hypertension     1. Have you been to the ER, urgent care clinic since your last visit? Hospitalized since your last visit? No     2. Have you seen or consulted any other health care providers outside of the [address] [place] [address] since your last visit? Include any pap smears or colon screening.  No

## 2019-03-25 ENCOUNTER — TELEPHONE (OUTPATIENT)
Dept: FAMILY MEDICINE CLINIC | Age: 74
End: 2019-03-25

## 2019-03-25 DIAGNOSIS — I10 ESSENTIAL HYPERTENSION: ICD-10-CM

## 2019-03-25 RX ORDER — HYDROCHLOROTHIAZIDE 25 MG/1
TABLET ORAL
Qty: 90 TAB | Refills: 0 | Status: SHIPPED | OUTPATIENT
Start: 2019-03-25 | End: 2019-07-11 | Stop reason: SDUPTHER

## 2019-04-03 DIAGNOSIS — I10 ESSENTIAL HYPERTENSION: ICD-10-CM

## 2019-04-30 DIAGNOSIS — J44.9 CHRONIC OBSTRUCTIVE PULMONARY DISEASE, UNSPECIFIED COPD TYPE (HCC): ICD-10-CM

## 2019-05-01 RX ORDER — BUDESONIDE AND FORMOTEROL FUMARATE DIHYDRATE 80; 4.5 UG/1; UG/1
AEROSOL RESPIRATORY (INHALATION)
Qty: 1 INHALER | Refills: 3 | Status: SHIPPED | OUTPATIENT
Start: 2019-05-01 | End: 2020-01-01 | Stop reason: SDUPTHER

## 2019-06-04 ENCOUNTER — OFFICE VISIT (OUTPATIENT)
Dept: FAMILY MEDICINE CLINIC | Age: 74
End: 2019-06-04

## 2019-06-04 VITALS
SYSTOLIC BLOOD PRESSURE: 136 MMHG | TEMPERATURE: 98 F | OXYGEN SATURATION: 99 % | DIASTOLIC BLOOD PRESSURE: 70 MMHG | BODY MASS INDEX: 32.5 KG/M2 | WEIGHT: 202.2 LBS | RESPIRATION RATE: 17 BRPM | HEIGHT: 66 IN | HEART RATE: 96 BPM

## 2019-06-04 DIAGNOSIS — D17.9 LIPOMA, UNSPECIFIED SITE: ICD-10-CM

## 2019-06-04 DIAGNOSIS — Z00.00 MEDICARE ANNUAL WELLNESS VISIT, SUBSEQUENT: Primary | ICD-10-CM

## 2019-06-04 DIAGNOSIS — F43.9 STRESS AT HOME: ICD-10-CM

## 2019-06-04 DIAGNOSIS — M94.9 DISORDER OF BONE AND CARTILAGE: ICD-10-CM

## 2019-06-04 DIAGNOSIS — I10 ESSENTIAL HYPERTENSION: ICD-10-CM

## 2019-06-04 DIAGNOSIS — Z12.31 ENCOUNTER FOR SCREENING MAMMOGRAM FOR MALIGNANT NEOPLASM OF BREAST: ICD-10-CM

## 2019-06-04 DIAGNOSIS — M89.9 DISORDER OF BONE AND CARTILAGE: ICD-10-CM

## 2019-06-04 RX ORDER — THYROID, PORCINE 120 MG/1
90 TABLET ORAL DAILY
Refills: 3 | COMMUNITY
Start: 2019-04-19

## 2019-06-04 NOTE — PROGRESS NOTES
HISTORY OF PRESENT ILLNESS    Surinder Grey is a 68y.o. year old female here today for:  Red eye     Onset one week  Context none  Site eyes  Duration  One week   Type of pain or sensation none  Associated symptoms not sleeping well  Severity intermittent   Exacerbating factors stress with mentally challenged and her former   Relieving factors none  Also over due for follow up for hypertension  Wt Readings from Last 3 Encounters:   06/04/19 202 lb 3.2 oz (91.7 kg)   03/04/19 205 lb (93 kg)   02/08/19 199 lb 3.2 oz (90.4 kg)         Current Outpatient Medications   Medication Sig Dispense Refill    SYMBICORT 80-4.5 mcg/actuation HFAA INHALE 2 PUFFS BY MOUTH TWICE DAILY 1 Inhaler 3    hydroCHLOROthiazide (HYDRODIURIL) 25 mg tablet TAKE 1 TABLET BY MOUTH DAILY 90 Tab 0    thyroid, Pork, (ARMOUR THYROID) 90 mg tablet Take 1 Tab by mouth daily. 90 Tab 2    lenalidomide (REVLIMID) 10 mg cap Take 1 Cap by mouth nightly. 90 Cap 0    sertraline (ZOLOFT) 100 mg tablet Take 1 Tab by mouth daily. 90 Tab 1    albuterol (PROVENTIL HFA, VENTOLIN HFA, PROAIR HFA) 90 mcg/actuation inhaler Take 2 Puffs by inhalation every four (4) hours as needed for Wheezing. 1 Inhaler 2    omeprazole (PRILOSEC) 20 mg capsule Take 20 mg by mouth daily. 3    vit B12-levomefolate calcium-vit B6 (FOLTX) 2-1.13-25 mg tablet Take 1 Tab by mouth daily. 30 Tab 0    Cholecalciferol, Vitamin D3, (VITAMIN D3) 2,000 unit cap capsule Take  by Mouth.  triamcinolone (NASACORT AQ) 55 mcg nasal inhaler 2 Sprays daily.  CALCIUM CITRATE (CITRACAL PO) Take 1 Tab by mouth daily.  FLAXSEED PO Take 1 Tab by mouth daily.  acyclovir (ZOVIRAX) 400 mg tablet Take 400 mg by mouth two (2) times a day.  ferrous gluconate 324 mg (37.5 mg iron) tablet Take 1 Tab by mouth Daily (before breakfast). 90 Tab 1    ondansetron hcl (ZOFRAN) 8 mg tablet Take 8 mg by mouth every eight (8) hours as needed for Nausea.        Patient Active Problem List Diagnosis Code    HTN (hypertension) I10    Hypothyroid E03.9    Arthritis M19.90    Depression F32.9    Anxiety F41.9    Seasonal allergic rhinitis J30.2    Migraines G43.909    Insomnia G47.00    BPPV (benign paroxysmal positional vertigo) H81.10    Meningioma (HCC) D32.9    Spondylolisthesis of lumbar region M43.16    History of thyroidectomy Z98.890    Post-menopausal osteoporosis M81.0    History of tobacco abuse Z87.891    Fatigue R53.83    Rib pain on left side R07.81    Multiple myeloma (HCC) C90.00     Reviewed past medical, family and social history    Review of Systems   Constitutional: Negative. HENT: Negative. Eyes: Negative. Respiratory: Negative. Cardiovascular: Negative. Gastrointestinal: Negative. Genitourinary: Negative. Musculoskeletal: Negative. Skin: Negative. Neurological: Negative. Endo/Heme/Allergies: Negative. Psychiatric/Behavioral: Negative.         +stress at home with her former  and mentally challenged daughter   Frequently awakening from sleep. OBJECTIVE:  Awake and alert in no acute distress  HEENT:  Head normocephalic atraumatic, Eyes PERRLA, Ears: TMS bilaterally pearly grey, Nares patent without erythema or edema, Pharynx without erythema. Lungs clear throughout  S1 S2 RRR without ectopy or murmur auscultated. Integumentary: palpable non-tender lipoma flesh colored left buttock  Visit Vitals  /70   Pulse 96   Temp 98 °F (36.7 °C) (Oral)   Resp 17   Ht 5' 6\" (1.676 m)   Wt 202 lb 3.2 oz (91.7 kg)   SpO2 99%   BMI 32.64 kg/m²     Diagnoses and all orders for this visit:      Lipoma, unspecified site  -     REFERRAL TO GENERAL SURGERY    Stress at home    BMI 32.0-32.9,adult    Essential hypertension  -     LIPID PANEL; Future  -     METABOLIC PANEL, COMPREHENSIVE; Future  -     CBC W/O DIFF; Future      Stress reducing techniques handouts given  I have reviewed/discussed the above normal BMI with the patient.   I have recommended the following interventions: dietary management education, guidance, and counseling, encourage exercise, monitor weight and prescribed dietary intake . [place] Handouts given  I have discussed the diagnosis with the patient and the intended plan as seen in the above orders. The patient has received an after-visit summary and questions were answered concerning future plans. I have discussed medication side effects and warnings with the patient as well. Follow-up and Dispositions    · Return in about 3 months (around 9/4/2019) for htn. This is the Subsequent Medicare Annual Wellness Exam, performed 12 months or more after the Initial AWV or the last Subsequent AWV    I have reviewed the patient's medical history in detail and updated the computerized patient record. History     Past Medical History:   Diagnosis Date    Anxiety 5/18/2010    Arthritis     OA spine, left knee    Depression 5/18/2010    Headache(784.0)     migraines    HTN (hypertension) 5/18/2010    Hypothyroid 5/18/2010    Multiple myeloma ([address]     Osteopenia     Rib fractures 07/17/2017    r/t fall    Seasonal allergic rhinitis 5/18/2010    Seasonal allergies     Spondylolisthesis of lumbar region 1/21/2016    Dr. Carol Jung [facility]    Thyroid disease     hypothyroid,  thromeagaly      Past Surgical History:   Procedure Laterality Date    BREAST SURGERY PROCEDURE UNLISTED      breast cyst s/p biopsy 2008    COLONOSCOPY N/A 9/6/2017    COLONOSCOPY performed by Toan Coronado MD at [address] HX CATARACT REMOVAL  8[facility]2011    [facility]  HX CATARACT REMOVAL  8/2011    right eye 2 weeks apart from left .  Dr. Aaliyah Reddy    HX COLONOSCOPY  2007    HX HEENT      partical parathyroidectomy     HX ORTHOPAEDIC      rotator cuff repqir 10/1996    NEUROLOGICAL PROCEDURE UNLISTED  5-20-13    meninginoma Dr. Roger Peters     Current Outpatient Medications   Medication Sig Dispense Refill    SYMBICORT 80-4.5 mcg/actuation HFAA INHALE 2 PUFFS BY MOUTH TWICE DAILY 1 Inhaler 3    hydroCHLOROthiazide (HYDRODIURIL) 25 mg tablet TAKE 1 TABLET BY MOUTH DAILY 90 Tab 0    lenalidomide (REVLIMID) 10 mg cap Take 1 Cap by mouth nightly. 90 Cap 0    sertraline (ZOLOFT) 100 mg tablet Take 1 Tab by mouth daily. 90 Tab 1    albuterol (PROVENTIL HFA, VENTOLIN HFA, PROAIR HFA) 90 mcg/actuation inhaler Take 2 Puffs by inhalation every four (4) hours as needed for Wheezing. 1 Inhaler 2    omeprazole (PRILOSEC) 20 mg capsule Take 20 mg by mouth daily. 3    vit B12-levomefolate calcium-vit B6 (FOLTX) 2-1.13-25 mg tablet Take 1 Tab by mouth daily. 30 Tab 0    Cholecalciferol, Vitamin D3, (VITAMIN D3) 2,000 unit cap capsule Take  by Mouth.  triamcinolone (NASACORT AQ) 55 mcg nasal inhaler 2 Sprays daily.  CALCIUM CITRATE (CITRACAL PO) Take 1 Tab by mouth daily.  FLAXSEED PO Take 1 Tab by mouth daily.  ARMOUR THYROID 120 mg tab Take 120 mg by mouth daily. 3    acyclovir (ZOVIRAX) 400 mg tablet Take 400 mg by mouth two (2) times a day.  ferrous gluconate 324 mg (37.5 mg iron) tablet Take 1 Tab by mouth Daily (before breakfast). 90 Tab 1    ondansetron hcl (ZOFRAN) 8 mg tablet Take 8 mg by mouth every eight (8) hours as needed for Nausea. Allergies   Allergen Reactions    Iodine Hives, Rash and Other (comments)     Feels like Chest caving in.   Rash LL lumbosacral area    Fosamax [Alendronate] Itching     rash     Family History   Problem Relation Age of Onset    Cancer Brother         brain     Social History     Tobacco Use    Smoking status: Former Smoker     Packs/day: 0.25     Years: 40.00     Pack years: 10.00     Types: Cigarettes     Start date: 1970     Last attempt to quit: 2017     Years since quittin.7    Smokeless tobacco: Never Used    Tobacco comment: patient is smoking one cigarette periodically Substance Use Topics    Alcohol use: Yes     Comment: wine for communion only     Patient Active Problem List   Diagnosis Code    HTN (hypertension) I10    Hypothyroid E03.9    Arthritis M19.90    Depression F32.9    Anxiety F41.9    Seasonal allergic rhinitis J30.2    Migraines G43.909    Insomnia G47.00    BPPV (benign paroxysmal positional vertigo) H81.10    Meningioma (HCC) D32.9    Spondylolisthesis of lumbar region M43.16    History of thyroidectomy Z98.890    Post-menopausal osteoporosis M81.0    History of tobacco abuse Z87.891    Fatigue R53.83    Rib pain on left side R07.81    Multiple myeloma (HCC) C90.00       Depression Risk Factor Screening:     3 most recent PHQ Screens 8/30/2017   PHQ Not Done Medical Reason (indicate in comments)   Little interest or pleasure in doing things Several days   Feeling down, depressed, irritable, or hopeless More than half the days   Total Score PHQ 2 3   Trouble falling or staying asleep, or sleeping too much More than half the days   Feeling tired or having little energy More than half the days   Poor appetite, weight loss, or overeating Several days   Feeling bad about yourself - or that you are a failure or have let yourself or your family down Several days   Trouble concentrating on things such as school, work, reading, or watching TV More than half the days   Moving or speaking so slowly that other people could have noticed; or the opposite being so fidgety that others notice Not at all   Thoughts of being better off dead, or hurting yourself in some way Not at all   PHQ 9 Score 11   How difficult have these problems made it for you to do your work, take care of your home and get along with others Somewhat difficult     Alcohol Risk Factor Screening: You do not drink alcohol or very rarely. Functional Ability and Level of Safety:   Hearing Loss  Hearing is good.     Activities of Daily Living  The home contains: no safety equipment. Patient does total self care    Fall Risk  Fall Risk Assessment, last 12 mths 3/4/2019   Able to walk? Yes   Fall in past 12 months? No   Fall with injury? -   Number of falls in past 12 months -   Fall Risk Score -       Abuse Screen  Patient is not abused    Cognitive Screening   Evaluation of Cognitive Function:  Has your family/caregiver stated any concerns about your memory: no  Normal    Patient Care Team   Patient Care Team:  Rishi Williamson NP as PCP - General (Family Practice)  Jackie Merritt MD (Pulmonary Disease)  ROS:   Constitutional negative   Skin +lump on left buttock  No skin rashes   HEENT negative  GI negative   negative  Cardiology negative  Lungs negative  Musculoskeletal negative  Neuro negative  Endocrine negative  Psychiatry: +stress  Negative for depression  Anxiety   Suicidal ideations  Illicit substances  Insomnia   +stress at home  Frequently awakens from sleep  OBJECTIVE:  Awake and alert in no acute distress  HEENT:  Head normocephalic atraumatic, Eyes PERRLA, Ears: TMS bilaterally pearly grey, Nares patent without erythema or edema, Pharynx without erythema. dentures  Neck supple without lymphadenopathy, no carotid artery bruits auscultated bilaterally. Lungs clear throughout  S1 S2 RRR without ectopy or murmur auscultated. Abdomen: normoactive bowel sounds all quadrants, no tenderness to abdomen upper and lower quadrants. No hepatosplenomegaly  Neuro: cranial nerves II-XII tested and intact. No gait abnormalities. Musculoskeletal: normal examination upper and lower extremities head and neck, no warmth to joints, no edema to joints, no gait abnormalities, motor strength +5/5 upper and lower extremities head and neck. DTRs brisk +2 bilaterally. Integumentary: no rashes  No suspicious skin lesions   Palpable lipoma flesh colored easily moveable left buttock approximately 2 cm in size   Normal skin turgor  Pelvic exam: examination not indicated.   Breasts: breasts appear normal, no suspicious masses, no skin or nipple changes or axillary nodes, risk and benefit of breast self-exam was discussed. Visit Vitals  /70   Pulse 96   Temp 98 °F (36.7 °C) (Oral)   Resp 17   Ht 5' 6\" (1.676 m)   Wt 202 lb 3.2 oz (91.7 kg)   SpO2 99%   BMI 32.64 kg/m²     Diagnoses and all orders for this visit:    Medicare annual wellness visit, subsequent    Lipoma, unspecified site  -     REFERRAL TO GENERAL SURGERY    Stress at home    BMI 32.0-32.9,adult    Essential hypertension  -     LIPID PANEL; Future  -     METABOLIC PANEL, COMPREHENSIVE; Future  -     CBC W/O DIFF; Future    Encounter for screening mammogram for malignant neoplasm of breast  -     [facility] MAMMO BI SCREENING INCL CAD; Future    Disorder of bone and cartilage  -     DEXA BONE DENSITY STUDY AXIAL; Future      Anticipatory guidance regarding health promotion for this age range and patient verbalizes understanding. General comfort measures  Health maintenance reviewed  Stress reduction techniques reviewed   Patient agrees with plan and verbalizes understanding. I have discussed the diagnosis with the patient and the intended plan as seen in the above orders. The patient has received an after-visit summary and questions were answered concerning future plans. I have discussed medication side effects and warnings with the patient as well. Follow-up and Dispositions    · Return in about 3 months (around 9/4/2019) for htn.

## 2019-06-04 NOTE — PROGRESS NOTES
Chief Complaint   Patient presents with    Hypertension     1. Have you been to the ER, urgent care clinic since your last visit? Hospitalized since your last visit? No     2. Have you seen or consulted any other health care providers outside of the [address] [place] [address] since your last visit? Include any pap smears or colon screening.  No

## 2019-06-04 NOTE — PATIENT INSTRUCTIONS
Body Mass Index: Care Instructions  Your Care Instructions    Body mass index (BMI) can help you see if your weight is raising your risk for health problems. It uses a formula to compare how much you weigh with how tall you are. · A BMI lower than 18.5 is considered underweight. · A BMI between 18.5 and 24.9 is considered healthy. · A BMI between 25 and 29.9 is considered overweight. A BMI of 30 or higher is considered obese. If your BMI is in the normal range, it means that you have a lower risk for weight-related health problems. If your BMI is in the overweight or obese range, you may be at increased risk for weight-related health problems, such as high blood pressure, heart disease, stroke, arthritis or joint pain, and diabetes. If your BMI is in the underweight range, you may be at increased risk for health problems such as fatigue, lower protection (immunity) against illness, muscle loss, bone loss, hair loss, and hormone problems. BMI is just one measure of your risk for weight-related health problems. You may be at higher risk for health problems if you are not active, you eat an unhealthy diet, or you drink too much alcohol or use tobacco products. Follow-up care is a key part of your treatment and safety. Be sure to make and go to all appointments, and call your doctor if you are having problems. It's also a good idea to know your test results and keep a list of the medicines you take. How can you care for yourself at home? · Practice healthy eating habits. This includes eating plenty of fruits, vegetables, whole grains, lean protein, and low-fat dairy. · If your doctor recommends it, get more exercise. Walking is a good choice. Bit by bit, increase the amount you walk every day. Try for at least 30 minutes on most days of the week. · Do not smoke. Smoking can increase your risk for health problems. If you need help quitting, talk to your doctor about stop-smoking programs and medicines. These can increase your chances of quitting for good. · Limit alcohol to 2 drinks a day for men and 1 drink a day for women. Too much alcohol can cause health problems. If you have a BMI higher than 25  · Your doctor may do other tests to check your risk for weight-related health problems. This may include measuring the distance around your waist. A waist measurement of more than 40 inches in men or 35 inches in women can increase the risk of weight-related health problems. · Talk with your doctor about steps you can take to stay healthy or improve your health. You may need to make lifestyle changes to lose weight and stay healthy, such as changing your diet and getting regular exercise. If you have a BMI lower than 18.5  · Your doctor may do other tests to check your risk for health problems. · Talk with your doctor about steps you can take to stay healthy or improve your health. You may need to make lifestyle changes to gain or maintain weight and stay healthy, such as getting more healthy foods in your diet and doing exercises to build muscle. Where can you learn more? Go to http://alvin-rogelio.info/. Enter S176 in the search box to learn more about \"Body Mass Index: Care Instructions. \"  Current as of: June 25, 2018  Content Version: 11.9  © 6833-7080 [employer]. Care instructions adapted under license by [employer] (which disclaims liability or warranty for this information). If you have questions about a medical condition or this instruction, always ask your healthcare professional. [place] [address] any warranty or liability for your use of this information. Lipoma: Care Instructions  Your Care Instructions  A lipoma is a growth of fat just below the skin. It may feel soft and rubbery. Lipomas can occur anywhere on the body. But they are most common on the torso, neck, upper thighs, upper arms, and armpits.  A lipoma does not turn into cancer. Lipomas usually are not treated, because most of them don't hurt or cause problems. But your doctor may remove a lipoma if it is painful, gets infected, or bothers you. Follow-up care is a key part of your treatment and safety. Be sure to make and go to all appointments, and call your doctor if you are having problems. It's also a good idea to know your test results and keep a list of the medicines you take. How can you care for yourself at home? · A lipoma usually needs no care at home unless your doctor made a cut (incision) to remove it. · If your doctor told you how to care for your incision, follow your doctor's instructions. If you did not get instructions, follow this general advice:  ? Wash around the incision with clean water 2 times a day. Don't use hydrogen peroxide or alcohol. These can slow healing. ? You may cover the incision with a thin layer of petroleum jelly, such as Vaseline, and a nonstick bandage. ? Apply more petroleum jelly and replace the bandage as needed. When should you call for help? Call your doctor now or seek immediate medical care if:    · You have signs of infection, such as:  ? Increased pain, swelling, warmth, or redness. ? Red streaks leading from the lipoma. ? Pus draining from the lipoma. ? A fever.    Watch closely for changes in your health, and be sure to contact your doctor if:    · The lipoma is growing or changing.     · You do not get better as expected. Where can you learn more? Go to http://alvin-rogelio.info/. Enter G295 in the search box to learn more about \"Lipoma: Care Instructions. \"  Current as of: April 17, 2018  Content Version: 11.9  © 3237-0508 "[employer]. Care instructions adapted under license by [employer] (which disclaims liability or warranty for this information).  If you have questions about a medical condition or this instruction, always ask your healthcare professional. [employer], [facility] disclaims any warranty or liability for your use of this information. Learning About Stress  What is stress? Stress is what you feel when you have to handle more than you are used to. Stress is a fact of life for most people, and it affects everyone differently. What causes stress for you may not be stressful for someone else. A lot of things can cause stress. You may feel stress when you go on a job interview, take a test, or run a race. This kind of short-term stress is normal and even useful. It can help you if you need to work hard or react quickly. For example, stress can help you finish an important job on time. Stress also can last a long time. Long-term stress is caused by stressful situations or events. Examples of long-term stress include long-term health problems, ongoing problems at work, or conflicts in your family. Long-term stress can harm your health. How does stress affect your health? When you are stressed, your body responds as though you are in danger. It makes hormones that speed up your heart, make you breathe faster, and give you a burst of energy. This is called the fight-or-flight stress response. If the stress is over quickly, your body goes back to normal and no harm is done. But if stress happens too often or lasts too long, it can have bad effects. Long-term stress can make you more likely to get sick, and it can make symptoms of some diseases worse. If you tense up when you are stressed, you may develop neck, shoulder, or low back pain. Stress is linked to high blood pressure and heart disease. Stress also harms your emotional health. It can make you preciado, tense, or depressed. Your relationships may suffer, and you may not do well at work or school. What can you do to manage stress? How to relax your mind  · Write. It may help to write about things that are bothering you.  This helps you find out how much stress you feel and what is causing it. When you know this, you can find better ways to cope. · Let your feelings out. Talk, laugh, cry, and express anger when you need to. Talking with friends, family, a counselor, or a member of the clergy about your feelings is a healthy way to relieve stress. · Do something you enjoy. For example, listen to music or go to a movie. Practice your hobby or do volunteer work. · Meditate. This can help you relax, because you are not worrying about what happened before or what may happen in the future. · Do guided imagery. Imagine yourself in any setting that helps you feel calm. You can use audiotapes, books, or a teacher to guide you. How to relax your body  · Do something active. Exercise or activity can help reduce stress. Walking is a great way to get started. Even everyday activities such as housecleaning or yard work can help. · Do breathing exercises. For example:  ? From a standing position, bend forward from the waist with your knees slightly bent. Let your arms dangle close to the floor. ? Breathe in slowly and deeply as you return to a standing position. Roll up slowly and lift your head last.  ? Hold your breath for just a few seconds in the standing position. ? Breathe out slowly and bend forward from the waist.  · Try yoga or magalie chi. These techniques combine exercise and meditation. You may need some training at first to learn them. What can you do to prevent stress? · Manage your time. This helps you find time to do the things you want and need to do. · Get enough sleep. Your body recovers from the stresses of the day while you are sleeping. · Get support. Your family, friends, and community can make a difference in how you experience stress. Where can you learn more? Go to http://alvin-rogelio.info/. Enter G170 in the search box to learn more about \"Learning About Stress. \"  Current as of: June 28, 2018  Content Version: 11.9  © 1149-8619 [employer], [facility].  Care instructions adapted under license by [employer] (which disclaims liability or warranty for this information). If you have questions about a medical condition or this instruction, always ask your healthcare professional. [address] any warranty or liability for your use of this information. Learning About Guided Imagery for Stress  What are guided imagery and stress? Stress is what you feel when you have to handle more than you are used to. A lot of things can cause stress. You may feel stress when you go on a job interview, take a test, or run a race. This kind of short-term stress is normal and even useful. It can help you if you need to work hard or react quickly. Stress also can last a long time. Long-term stress is caused by stressful situations or events. Examples of long-term stress include long-term health problems, ongoing problems at work, and conflicts in your family. Long-term stress can harm your health. Guided imagery is a technique of directed thoughts and suggestions that guide your mind toward a relaxed, focused state. This technique helps you use your mind to take you to a calm, peaceful place. You can use it to relax, which can lower blood pressure and reduce other problems related to stress. How does guided imagery help to relieve stress? Because of the way the mind and body are connected, guided imagery can make you feel like you are experiencing something just by imagining it. You can achieve a relaxed state when you imagine all the details of a safe, comfortable place, such as a beach or a garden. This relaxed state may help you feel more in control of your emotions and thought processes. Feeling in control may improve your attitude, health, and sense of well-being. How do you do guided imagery? You can use a smartphone scott or a video to lead you through the process. You use all of your senses in guided imagery.  For example, if you want a tropical setting, you can imagine the warm breeze on your skin, the bright blue of the water, the sound of the surf, the sweet scent of tropical flowers, and the taste of coconut. Imagining those things can make you actually feel like you're there. But you don't have to imagine the tropics to feel peace. Guided imagery can take you to your own restful place. To give guided imagery a try, follow these steps:  · Lean back comfortably in your chair. If you can, close your eyes. Put your arms on the armrests, or fold your hands in your lap. · Take a deep breath through your nose. Breathe in slowly, and then let the air out completely through your mouth. · Do it again slowly. Keep breathing like this. Gather up any tension in your body, and send it out with every breath. · Let a feeling of warmth spread from your lungs to your neck and head, down your arms to your fingertips, through your body and into your legs, all the way down to your toes. Stay this way for a moment. · Now imagine a pleasant day. You're at a park or at a place you've visited in the past where you felt at peace. · In your mind's eye, look at what lies in front of you. Maybe you see the sun, filtered through trees. Maybe clouds are drifting by. · Look to one side, and then the other. Notice the feel of the air around you. Notice how it feels on your face and on your arms. · Stay here for a while. Let it become real for you. Follow-up care is a key part of your treatment and safety. Be sure to make and go to all appointments, and call your doctor if you are having problems. It's also a good idea to know your test results and keep a list of the medicines you take. Where can you learn more? Go to http://alvin-rogelio.info/. Enter N291 in the search box to learn more about \"Learning About Guided Imagery for Stress. \"  Current as of: June 28, 2018  Content Version: 11.9  © 6647-8271 [employer].  Care instructions adapted under license by [employer] (which disclaims liability or warranty for this information). If you have questions about a medical condition or this instruction, always ask your healthcare professional. [address] any warranty or liability for your use of this information. Well Visit, Over 72: Care Instructions  Your Care Instructions    Physical exams can help you stay healthy. Your doctor has checked your overall health and may have suggested ways to take good care of yourself. He or she also may have recommended tests. At home, you can help prevent illness with healthy eating, regular exercise, and other steps. Follow-up care is a key part of your treatment and safety. Be sure to make and go to all appointments, and call your doctor if you are having problems. It's also a good idea to know your test results and keep a list of the medicines you take. How can you care for yourself at home? · Reach and stay at a healthy weight. This will lower your risk for many problems, such as obesity, diabetes, heart disease, and high blood pressure. · Get at least 30 minutes of exercise on most days of the week. Walking is a good choice. You also may want to do other activities, such as running, swimming, cycling, or playing tennis or team sports. · Do not smoke. Smoking can make health problems worse. If you need help quitting, talk to your doctor about stop-smoking programs and medicines. These can increase your chances of quitting for good. · Protect your skin from too much sun. When you're outdoors from 10 a.m. to 4 p.m., stay in the shade or cover up with clothing and a hat with a wide brim. Wear sunglasses that block UV rays. Even when it's cloudy, put broad-spectrum sunscreen (SPF 30 or higher) on any exposed skin. · See a dentist one or two times a year for checkups and to have your teeth cleaned. · Wear a seat belt in the car.   · Limit alcohol to 2 drinks a day for men and 1 drink a day for women. Too much alcohol can cause health problems. Follow your doctor's advice about when to have certain tests. These tests can spot problems early. For men and women  · Cholesterol. Your doctor will tell you how often to have this done based on your overall health and other things that can increase your risk for heart attack and stroke. · Blood pressure. Have your blood pressure checked during a routine doctor visit. Your doctor will tell you how often to check your blood pressure based on your age, your blood pressure results, and other factors. · Diabetes. Ask your doctor whether you should have tests for diabetes. · Vision. Experts recommend that you have yearly exams for glaucoma and other age-related eye problems. · Hearing. Tell your doctor if you notice any change in your hearing. You can have tests to find out how well you hear. · Colon cancer tests. Keep having colon cancer tests as your doctor recommends. You can have one of several types of tests. · Heart attack and stroke risk. At least every 4 to 6 years, you should have your risk for heart attack and stroke assessed. Your doctor uses factors such as your age, blood pressure, cholesterol, and whether you smoke or have diabetes to show what your risk for a heart attack or stroke is over the next 10 years. · Osteoporosis. Talk to your doctor about whether you should have a bone density test to find out whether you have thinning bones. Also ask your doctor about whether you should take calcium and vitamin D supplements. For women  · Pap test and pelvic exam. You may no longer need a Pap test. Talk with your doctor about whether to stop or continue to have Pap tests. · Breast exam and mammogram. Ask how often you should have a mammogram, which is an X-ray of your breasts. A mammogram can spot breast cancer before it can be felt and when it is easiest to treat. · Thyroid disease.  Talk to your doctor about whether to have your thyroid checked as part of a regular physical exam. Women have an increased chance of a thyroid problem. For men  · Prostate exam. Talk to your doctor about whether you should have a blood test (called a PSA test) for prostate cancer. Experts disagree on whether men should have this test. Some experts recommend that you discuss the benefits and risks of the test with your doctor. · Abdominal aortic aneurysm. Ask your doctor whether you should have a test to check for an aneurysm. You may need a test if you ever smoked or if your parent, brother, sister, or child has had an aneurysm. When should you call for help? Watch closely for changes in your health, and be sure to contact your doctor if you have any problems or symptoms that concern you. Where can you learn more? Go to http://alvin-rogelio.info/. Enter B178 in the search box to learn more about \"Well Visit, Over 65: Care Instructions. \"  Current as of: March 28, 2018  Content Version: 11.9  © 0633-9140 [employer]. Care instructions adapted under license by "[employer] (which disclaims liability or warranty for this information). If you have questions about a medical condition or this instruction, always ask your healthcare professional. [place] [address] any warranty or liability for your use of this information. Medicare Wellness Visit, Female     The best way to live healthy is to have a lifestyle where you eat a well-balanced diet, exercise regularly, limit alcohol use, and quit all forms of tobacco/nicotine, if applicable. Regular preventive services are another way to keep healthy. Preventive services (vaccines, screening tests, monitoring & exams) can help personalize your care plan, which helps you manage your own care. Screening tests can find health problems at the earliest stages, when they are easiest to treat.    [facility] follows the current, evidence-based guidelines published by the [facility] (USPSTF) when recommending preventive services for our patients. Because we follow these guidelines, sometimes recommendations change over time as research supports it. (For example, mammograms used to be recommended annually. Even though Medicare will still pay for an annual mammogram, the newer guidelines recommend a mammogram every two years for women of average risk.)  Of course, you and your doctor may decide to screen more often for some diseases, based on your risk and your health status. Preventive services for you include:  - Medicare offers their members a free annual wellness visit, which is time for you and your primary care provider to discuss and plan for your preventive service needs. Take advantage of this benefit every year!  -All adults over the age of 72 should receive the recommended pneumonia vaccines. Current USPSTF guidelines recommend a series of two vaccines for the best pneumonia protection.   -All adults should have a flu vaccine yearly and a tetanus vaccine every 10 years. All adults age 61 and older should receive a shingles vaccine once in their lifetime.    -A bone mass density test is recommended when a woman turns 65 to screen for osteoporosis. This test is only recommended one time, as a screening. Some providers will use this same test as a disease monitoring tool if you already have osteoporosis.   -All adults age 38-68 who are overweight should have a diabetes screening test once every three years.   -Other screening tests and preventive services for persons with diabetes include: an eye exam to screen for diabetic retinopathy, a kidney function test, a foot exam, and stricter control over your cholesterol.   -Cardiovascular screening for adults with routine risk involves an electrocardiogram (ECG) at intervals determined by your doctor.   -Colorectal cancer screenings should be done for adults age 54-65 with no increased risk factors for colorectal cancer. There are a number of acceptable methods of screening for this type of cancer. Each test has its own benefits and drawbacks. Discuss with your doctor what is most appropriate for you during your annual wellness visit. The different tests include: colonoscopy (considered the best screening method), a fecal occult blood test, a fecal DNA test, and sigmoidoscopy. -Breast cancer screenings are recommended every other year for women of normal risk, age 54-69.  -Cervical cancer screenings for women over age 72 are only recommended with certain risk factors.   -All adults born between [facility] should be screened once for Hepatitis C.      Here is a list of your current Health Maintenance items (your personalized list of preventive services) with a due date:  Health Maintenance Due   Topic Date Due    Shingles Vaccine (1 of 2) 08/22/1996    Mammogram  09/26/2019

## 2019-07-11 DIAGNOSIS — I10 ESSENTIAL HYPERTENSION: ICD-10-CM

## 2019-07-11 RX ORDER — HYDROCHLOROTHIAZIDE 25 MG/1
TABLET ORAL
Qty: 90 TAB | Refills: 0 | Status: SHIPPED | OUTPATIENT
Start: 2019-07-11 | End: 2019-12-16 | Stop reason: SDUPTHER

## 2019-08-01 DIAGNOSIS — Z12.31 ENCOUNTER FOR SCREENING MAMMOGRAM FOR MALIGNANT NEOPLASM OF BREAST: ICD-10-CM

## 2019-08-08 ENCOUNTER — TELEPHONE (OUTPATIENT)
Dept: FAMILY MEDICINE CLINIC | Age: 74
End: 2019-08-08

## 2019-09-05 ENCOUNTER — OFFICE VISIT (OUTPATIENT)
Dept: FAMILY MEDICINE CLINIC | Age: 74
End: 2019-09-05

## 2019-09-05 VITALS
SYSTOLIC BLOOD PRESSURE: 138 MMHG | TEMPERATURE: 97.7 F | HEIGHT: 66 IN | BODY MASS INDEX: 31.82 KG/M2 | RESPIRATION RATE: 17 BRPM | WEIGHT: 198 LBS | HEART RATE: 102 BPM | DIASTOLIC BLOOD PRESSURE: 76 MMHG | OXYGEN SATURATION: 98 %

## 2019-09-05 DIAGNOSIS — C90.00 MULTIPLE MYELOMA, REMISSION STATUS UNSPECIFIED (HCC): ICD-10-CM

## 2019-09-05 DIAGNOSIS — I10 ESSENTIAL HYPERTENSION: Primary | ICD-10-CM

## 2019-09-05 DIAGNOSIS — R35.0 FREQUENT URINATION: ICD-10-CM

## 2019-09-05 DIAGNOSIS — F33.0 MILD EPISODE OF RECURRENT MAJOR DEPRESSIVE DISORDER (HCC): ICD-10-CM

## 2019-09-05 NOTE — PROGRESS NOTES
Chief Complaint   Patient presents with    Hypertension     1. Have you been to the ER, urgent care clinic since your last visit? Hospitalized since your last visit? No    2. Have you seen or consulted any other health care providers outside of the [address] [place] [address] since your last visit? Include any pap smears or colon screening. Follow with oncology     Subjective:   Franklin Womack is a 69 y.o. female with hypertension, multiple myeloma managed by heme/oncology, depression, hypothyroid (managed by Dr. Isabella Rivera). Review of Systems   Respiratory: Negative. Cardiovascular: Negative. Genitourinary: Positive for frequency. Negative for dysuria, flank pain, hematuria and urgency. Psychiatric/Behavioral: Negative. Taking sertraline and she reports doing well  She is staying with her former  while she waits to be place in an extended care facility. Current Outpatient Medications   Medication Sig Dispense Refill    hydroCHLOROthiazide (HYDRODIURIL) 25 mg tablet TAKE 1 TABLET BY MOUTH DAILY 90 Tab 0    ARMOUR THYROID 120 mg tab Take 120 mg by mouth daily. 3    SYMBICORT 80-4.5 mcg/actuation HFAA INHALE 2 PUFFS BY MOUTH TWICE DAILY 1 Inhaler 3    acyclovir (ZOVIRAX) 400 mg tablet Take 400 mg by mouth two (2) times a day.  ferrous gluconate 324 mg (37.5 mg iron) tablet Take 1 Tab by mouth Daily (before breakfast). 90 Tab 1    lenalidomide (REVLIMID) 10 mg cap Take 1 Cap by mouth nightly. 90 Cap 0    sertraline (ZOLOFT) 100 mg tablet Take 1 Tab by mouth daily. 90 Tab 1    albuterol (PROVENTIL HFA, VENTOLIN HFA, PROAIR HFA) 90 mcg/actuation inhaler Take 2 Puffs by inhalation every four (4) hours as needed for Wheezing. 1 Inhaler 2    omeprazole (PRILOSEC) 20 mg capsule Take 20 mg by mouth daily. 3    vit B12-levomefolate calcium-vit B6 (FOLTX) 2-1.13-25 mg tablet Take 1 Tab by mouth daily.  30 Tab 0    ondansetron hcl (ZOFRAN) 8 mg tablet Take 8 mg by mouth every eight (8) hours as needed for Nausea.  Cholecalciferol, Vitamin D3, (VITAMIN D3) 2,000 unit cap capsule Take  by Mouth.  triamcinolone (NASACORT AQ) 55 mcg nasal inhaler 2 Sprays daily.  CALCIUM CITRATE (CITRACAL PO) Take 1 Tab by mouth daily.  FLAXSEED PO Take 1 Tab by mouth daily. PMH: reviewed medications and allergy lists and medical and family history. OBJECTIVE:  Awake and alert in no acute distress  Neck supple without lymphadenopathy, no carotid artery bruits auscultated bilaterally. No thyromegaly  Lungs clear throughout  S1 S2 RRR without ectopy or murmur auscultated. Abdomen: normoactive bowel sounds all quadrants, no suprapubic tenderness, negative CVAT bilaterally. Extremities: no clubbing, cyanosis, peripheral edema    Visit Vitals  /76 (BP 1 Location: Left arm, BP Patient Position: Sitting)   Pulse (!) 102   Temp 97.7 °F (36.5 °C) (Oral)   Resp 17   Ht 5' 6\" (1.676 m)   Wt 198 lb (89.8 kg)   SpO2 98%   BMI 31.96 kg/m²     Diagnoses and all orders for this visit:    1. Essential hypertension Stable continue current treatment plan    2. Frequent urination    3. Multiple myeloma, remission status unspecified ([facility]     4. Mild episode of recurrent major depressive disorder ([facility] Stable continue current treatment plan      General comfort measures  Health maintenance reviewed  Labs patient will return for lab collection   Patient will return with urinalysis   I have discussed the diagnosis with the patient and the intended plan as seen in the above orders. The patient has received an after-visit summary and questions were answered concerning future plans. I have discussed medication side effects and warnings with the patient as well. Follow-up and Dispositions    · Return in about 2 months (around 11/5/2019) for depression/htn .

## 2019-09-12 DIAGNOSIS — R35.0 URINARY FREQUENCY: Primary | ICD-10-CM

## 2019-12-16 DIAGNOSIS — F32.A DEPRESSION, UNSPECIFIED DEPRESSION TYPE: ICD-10-CM

## 2019-12-16 DIAGNOSIS — I10 ESSENTIAL HYPERTENSION: ICD-10-CM

## 2019-12-16 RX ORDER — HYDROCHLOROTHIAZIDE 25 MG/1
TABLET ORAL
Qty: 90 TAB | Refills: 0 | Status: SHIPPED | OUTPATIENT
Start: 2019-12-16 | End: 2020-01-01 | Stop reason: SDUPTHER

## 2019-12-16 RX ORDER — SERTRALINE HYDROCHLORIDE 100 MG/1
100 TABLET, FILM COATED ORAL DAILY
Qty: 90 TAB | Refills: 1 | Status: SHIPPED | OUTPATIENT
Start: 2019-12-16 | End: 2021-01-01 | Stop reason: SDUPTHER

## 2019-12-16 NOTE — TELEPHONE ENCOUNTER
Requested Prescriptions     Pending Prescriptions Disp Refills    hydroCHLOROthiazide (HYDRODIURIL) 25 mg tablet 90 Tab 0     Sig: TAKE 1 TABLET BY MOUTH DAILY    sertraline (ZOLOFT) 100 mg tablet 90 Tab 1     Sig: Take 1 Tab by mouth daily. Pt is out of BP medication, she has 5 Zoloft left. She made a f/u appt for 12/30/2019    She request please process refill or enough to hold until her appt.     Pharmacy is [facility], [address] [place] [address]

## 2019-12-30 ENCOUNTER — OFFICE VISIT (OUTPATIENT)
Dept: FAMILY MEDICINE CLINIC | Age: 74
End: 2019-12-30

## 2019-12-30 VITALS
SYSTOLIC BLOOD PRESSURE: 134 MMHG | HEART RATE: 100 BPM | DIASTOLIC BLOOD PRESSURE: 78 MMHG | HEIGHT: 66 IN | OXYGEN SATURATION: 98 % | WEIGHT: 192 LBS | TEMPERATURE: 98.3 F | RESPIRATION RATE: 17 BRPM | BODY MASS INDEX: 30.86 KG/M2

## 2019-12-30 DIAGNOSIS — J22 LOWER RESPIRATORY INFECTION (E.G., BRONCHITIS, PNEUMONIA, PNEUMONITIS, PULMONITIS): Primary | ICD-10-CM

## 2019-12-30 DIAGNOSIS — R05.9 COUGH: ICD-10-CM

## 2019-12-30 RX ORDER — DOXYCYCLINE 100 MG/1
100 TABLET ORAL 2 TIMES DAILY
Qty: 20 TAB | Refills: 0 | Status: SHIPPED | OUTPATIENT
Start: 2019-12-30 | End: 2020-01-09

## 2019-12-30 RX ORDER — PROMETHAZINE HYDROCHLORIDE AND CODEINE PHOSPHATE 6.25; 1 MG/5ML; MG/5ML
1 SOLUTION ORAL
Qty: 60 ML | Refills: 0 | Status: SHIPPED | OUTPATIENT
Start: 2019-12-30 | End: 2020-01-02

## 2019-12-30 NOTE — PROGRESS NOTES
Chief Complaint   Patient presents with    Cough     for the past 5 days     Breathing Problem     1. Have you been to the ER, urgent care clinic since your last visit? Hospitalized since your last visit? [facility] 12[facility]     2. Have you seen or consulted any other health care providers outside of the [address] [place] [address] since your last visit? Include any pap smears or colon screening.  Follow with hematology

## 2019-12-30 NOTE — PROGRESS NOTES
HISTORY OF PRESENT ILLNESS    Shayan Rodriguez is a 69y.o. year old female with multiple myeloma and accompanied by her estranged  today for complaints of coughing productively for about four days    Onset four days   Context patient was recently seen by [id number] for pancytopenia and shortness of breath. She received 2 units PRBCs with good results reported on 12-  Patient also has had dental infection and took antibiotics (not certain what type of antibiotics) for this and completed on 12- no complaints of dental pain today.  and chest   Duration  Four days    Type of pain or sensation no pain with coughing   Associated symptoms some head congestion per patient   Severity moderate coughing   Exacerbating factors none   Relieving factors none   Review of Systems   Constitutional: Negative for chills, diaphoresis and fever. HENT: Negative for sinus pain and sore throat. Respiratory: Negative for hemoptysis, shortness of breath and wheezing. Cardiovascular: Negative for chest pain. Skin: Negative. Current Outpatient Medications   Medication Sig Dispense Refill    hydroCHLOROthiazide (HYDRODIURIL) 25 mg tablet TAKE 1 TABLET BY MOUTH DAILY 90 Tab 0    sertraline (ZOLOFT) 100 mg tablet Take 1 Tab by mouth daily. 90 Tab 1    ARMOUR THYROID 120 mg tab Take 120 mg by mouth daily. 3    SYMBICORT 80-4.5 mcg/actuation HFAA INHALE 2 PUFFS BY MOUTH TWICE DAILY 1 Inhaler 3    acyclovir (ZOVIRAX) 400 mg tablet Take 400 mg by mouth two (2) times a day.  ferrous gluconate 324 mg (37.5 mg iron) tablet Take 1 Tab by mouth Daily (before breakfast). 90 Tab 1    lenalidomide (REVLIMID) 10 mg cap Take 1 Cap by mouth nightly. 90 Cap 0    albuterol (PROVENTIL HFA, VENTOLIN HFA, PROAIR HFA) 90 mcg/actuation inhaler Take 2 Puffs by inhalation every four (4) hours as needed for Wheezing. 1 Inhaler 2    omeprazole (PRILOSEC) 20 mg capsule Take 20 mg by mouth daily.   3    vit B12-levomefolate calcium-vit B6 (FOLTX) 2-1.13-25 mg tablet Take 1 Tab by mouth daily. 30 Tab 0    ondansetron hcl (ZOFRAN) 8 mg tablet Take 8 mg by mouth every eight (8) hours as needed for Nausea.  Cholecalciferol, Vitamin D3, (VITAMIN D3) 2,000 unit cap capsule Take  by Mouth.  triamcinolone (NASACORT AQ) 55 mcg nasal inhaler 2 Sprays daily.  CALCIUM CITRATE (CITRACAL PO) Take 1 Tab by mouth daily.  FLAXSEED PO Take 1 Tab by mouth daily. Patient Active Problem List   Diagnosis Code    HTN (hypertension) I10    Hypothyroid E03.9    Arthritis M19.90    Depression F32.9    Anxiety F41.9    Seasonal allergic rhinitis J30.2    Migraines G43.909    Insomnia G47.00    BPPV (benign paroxysmal positional vertigo) H81.10    Meningioma (HCC) D32.9    Spondylolisthesis of lumbar region M43.16    History of thyroidectomy Z90.09    Post-menopausal osteoporosis M81.0    History of tobacco abuse Z87.891    Fatigue R53.83    Rib pain on left side R07.81    Multiple myeloma (HCC) C90.00     Reviewed past medical, family and social history  Wt Readings from Last 3 Encounters:   12/30/19 192 lb (87.1 kg)   09/05/19 198 lb (89.8 kg)   06/04/19 202 lb 3.2 oz (91.7 kg)     BP Readings from Last 3 Encounters:   12/30/19 134/78   09/05/19 138/76   06/04/19 136/70       OBJECTIVE:  Awake and alert in no acute distress  HEENT: nares patent with erythema, boggy, clear secretions bilaterally. TMs retracted bilaterally, pharynx without erythema, neck supple with shotty lymphadenopathy. No tenderness to palpation over sinus passages bilaterally  Lungs with no rales or rhonchi intermittent coughing nonproductively no labored respirations no dyspnea upon exertion   S1 S2 RRR without ectopy or murmur auscultated.   Extremities: no clubbing, cyanosis, peripheral edema  Integumentary: no rashes  Normal skin turgor  Visit Vitals  /78 (BP 1 Location: Left arm, BP Patient Position: Sitting)   Pulse 100 Temp 98.3 °F (36.8 °C) (Oral)   Resp 17   Ht 5' 6\" (1.676 m)   Wt 192 lb (87.1 kg)   SpO2 98%   BMI 30.99 kg/m²     Diagnoses and all orders for this visit:    1. Lower respiratory infection (e.g., bronchitis, pneumonia, pneumonitis, pulmonitis)  -     XR CHEST PA LAT; Future  -     doxycycline (ADOXA) 100 mg tablet; Take 1 Tab by mouth two (2) times a day for 10 days. 2. Cough  -     promethazine-codeine (PHENERGAN WITH CODEINE) 6.25-10 mg/5 mL syrup; Take 5 mL by mouth every six (6) hours as needed for Cough for up to 3 days. Max Daily Amount: 20 mL. Reviewed risks and benefits and common side effects of new medication  General comfort measures  Patient and estranged  agree with plan and verbalizes understanding. I have discussed the diagnosis with the patient and  and the intended plan as seen in the above orders. The patient and estranged  have received an after-visit summary and questions were answered concerning future plans. I have discussed medication side effects and warnings with the patient and estranged  as well. Follow-up and Dispositions    · Return in about 4 weeks (around 1/27/2020), or if symptoms worsen or fail to improve, for follow up lower respiratory infection .

## 2019-12-31 ENCOUNTER — HOSPITAL ENCOUNTER (OUTPATIENT)
Dept: GENERAL RADIOLOGY | Age: 74
Discharge: HOME | End: 2019-12-31
Payer: MEDICARE

## 2019-12-31 DIAGNOSIS — J22 LOWER RESPIRATORY INFECTION (E.G., BRONCHITIS, PNEUMONIA, PNEUMONITIS, PULMONITIS): ICD-10-CM

## 2019-12-31 PROCEDURE — 71046 X-RAY EXAM CHEST 2 VIEWS: CPT

## 2020-01-01 ENCOUNTER — OFFICE VISIT (OUTPATIENT)
Dept: SURGERY | Age: 75
End: 2020-01-01
Payer: MEDICARE

## 2020-01-01 ENCOUNTER — TELEPHONE (OUTPATIENT)
Dept: FAMILY MEDICINE CLINIC | Age: 75
End: 2020-01-01

## 2020-01-01 ENCOUNTER — ANESTHESIA (OUTPATIENT)
Dept: SURGERY | Age: 75
End: 2020-01-01
Payer: MEDICARE

## 2020-01-01 ENCOUNTER — VIRTUAL VISIT (OUTPATIENT)
Dept: FAMILY MEDICINE CLINIC | Age: 75
End: 2020-01-01
Payer: MEDICARE

## 2020-01-01 ENCOUNTER — HOSPITAL ENCOUNTER (OUTPATIENT)
Dept: MAMMOGRAPHY | Age: 75
Discharge: HOME | End: 2020-11-03
Attending: SURGERY
Payer: MEDICARE

## 2020-01-01 ENCOUNTER — HOSPITAL ENCOUNTER (OUTPATIENT)
Dept: NON INVASIVE DIAGNOSTICS | Age: 75
Discharge: HOME | End: 2020-11-24
Attending: FAMILY MEDICINE
Payer: MEDICARE

## 2020-01-01 ENCOUNTER — HOSPITAL ENCOUNTER (OUTPATIENT)
Dept: GENERAL RADIOLOGY | Age: 75
Discharge: HOME | End: 2020-11-06
Payer: MEDICARE

## 2020-01-01 ENCOUNTER — OFFICE VISIT (OUTPATIENT)
Dept: FAMILY MEDICINE CLINIC | Age: 75
End: 2020-01-01

## 2020-01-01 ENCOUNTER — VIRTUAL VISIT (OUTPATIENT)
Dept: FAMILY MEDICINE CLINIC | Age: 75
End: 2020-01-01

## 2020-01-01 ENCOUNTER — ANESTHESIA EVENT (OUTPATIENT)
Dept: SURGERY | Age: 75
End: 2020-01-01
Payer: MEDICARE

## 2020-01-01 ENCOUNTER — HOSPITAL ENCOUNTER (OUTPATIENT)
Dept: ULTRASOUND IMAGING | Age: 75
Discharge: HOME | End: 2020-11-03
Attending: SURGERY
Payer: MEDICARE

## 2020-01-01 ENCOUNTER — HOSPITAL ENCOUNTER (OUTPATIENT)
Age: 75
Discharge: HOME | End: 2020-11-12
Attending: SURGERY | Admitting: SURGERY
Payer: MEDICARE

## 2020-01-01 ENCOUNTER — APPOINTMENT (OUTPATIENT)
Dept: MAMMOGRAPHY | Age: 75
End: 2020-01-01
Attending: SURGERY
Payer: MEDICARE

## 2020-01-01 ENCOUNTER — TELEPHONE (OUTPATIENT)
Dept: SURGERY | Age: 75
End: 2020-01-01

## 2020-01-01 ENCOUNTER — HOSPITAL ENCOUNTER (OUTPATIENT)
Dept: PREADMISSION TESTING | Age: 75
Discharge: HOME | End: 2020-11-06
Payer: MEDICARE

## 2020-01-01 VITALS
TEMPERATURE: 97.7 F | RESPIRATION RATE: 20 BRPM | WEIGHT: 227.13 LBS | HEIGHT: 64 IN | DIASTOLIC BLOOD PRESSURE: 71 MMHG | SYSTOLIC BLOOD PRESSURE: 124 MMHG | BODY MASS INDEX: 38.77 KG/M2 | HEART RATE: 83 BPM | OXYGEN SATURATION: 94 %

## 2020-01-01 VITALS
BODY MASS INDEX: 38.24 KG/M2 | SYSTOLIC BLOOD PRESSURE: 124 MMHG | HEART RATE: 104 BPM | TEMPERATURE: 97.3 F | DIASTOLIC BLOOD PRESSURE: 72 MMHG | RESPIRATION RATE: 18 BRPM | WEIGHT: 224 LBS | OXYGEN SATURATION: 100 % | HEIGHT: 64 IN

## 2020-01-01 VITALS
BODY MASS INDEX: 38.76 KG/M2 | WEIGHT: 227 LBS | DIASTOLIC BLOOD PRESSURE: 71 MMHG | SYSTOLIC BLOOD PRESSURE: 124 MMHG | HEIGHT: 64 IN

## 2020-01-01 VITALS
OXYGEN SATURATION: 97 % | BODY MASS INDEX: 32.95 KG/M2 | HEIGHT: 66 IN | RESPIRATION RATE: 18 BRPM | DIASTOLIC BLOOD PRESSURE: 73 MMHG | HEART RATE: 90 BPM | TEMPERATURE: 97.8 F | WEIGHT: 205 LBS | SYSTOLIC BLOOD PRESSURE: 139 MMHG

## 2020-01-01 VITALS
BODY MASS INDEX: 35.51 KG/M2 | OXYGEN SATURATION: 98 % | HEART RATE: 76 BPM | DIASTOLIC BLOOD PRESSURE: 62 MMHG | RESPIRATION RATE: 19 BRPM | WEIGHT: 208 LBS | TEMPERATURE: 97.7 F | HEIGHT: 64 IN | SYSTOLIC BLOOD PRESSURE: 105 MMHG

## 2020-01-01 DIAGNOSIS — D36.9 PAPILLOMA: ICD-10-CM

## 2020-01-01 DIAGNOSIS — N63.0 LUMP OR MASS IN BREAST: ICD-10-CM

## 2020-01-01 DIAGNOSIS — F33.0 MILD EPISODE OF RECURRENT MAJOR DEPRESSIVE DISORDER (HCC): ICD-10-CM

## 2020-01-01 DIAGNOSIS — D24.2 PAPILLOMA OF LEFT BREAST: ICD-10-CM

## 2020-01-01 DIAGNOSIS — D24.2 PAPILLOMA OF LEFT BREAST: Primary | ICD-10-CM

## 2020-01-01 DIAGNOSIS — D36.9 INTRADUCTAL PAPILLOMA: ICD-10-CM

## 2020-01-01 DIAGNOSIS — E66.01 SEVERE OBESITY (HCC): ICD-10-CM

## 2020-01-01 DIAGNOSIS — R60.9 PERIPHERAL EDEMA: ICD-10-CM

## 2020-01-01 DIAGNOSIS — Z87.19 H/O: UGI BLEED: ICD-10-CM

## 2020-01-01 DIAGNOSIS — J44.9 CHRONIC OBSTRUCTIVE PULMONARY DISEASE, UNSPECIFIED COPD TYPE (HCC): ICD-10-CM

## 2020-01-01 DIAGNOSIS — D36.9 INTRADUCTAL PAPILLOMA: Primary | ICD-10-CM

## 2020-01-01 DIAGNOSIS — R06.09 DOE (DYSPNEA ON EXERTION): ICD-10-CM

## 2020-01-01 DIAGNOSIS — R60.9 PERIPHERAL EDEMA: Primary | ICD-10-CM

## 2020-01-01 DIAGNOSIS — G89.18 POSTOPERATIVE PAIN: Primary | ICD-10-CM

## 2020-01-01 DIAGNOSIS — Z01.818 PRE-OP TESTING: ICD-10-CM

## 2020-01-01 DIAGNOSIS — R06.09 DOE (DYSPNEA ON EXERTION): Primary | ICD-10-CM

## 2020-01-01 DIAGNOSIS — N32.81 OAB (OVERACTIVE BLADDER): Primary | ICD-10-CM

## 2020-01-01 DIAGNOSIS — N32.81 OVERACTIVE BLADDER: Primary | ICD-10-CM

## 2020-01-01 DIAGNOSIS — R92.8 ABNORMAL MAMMOGRAM: ICD-10-CM

## 2020-01-01 DIAGNOSIS — Z98.890 STATUS POST LEFT BREAST BIOPSY: ICD-10-CM

## 2020-01-01 DIAGNOSIS — I10 ESSENTIAL HYPERTENSION: Primary | ICD-10-CM

## 2020-01-01 DIAGNOSIS — E66.01 SEVERE OBESITY (HCC): Primary | ICD-10-CM

## 2020-01-01 DIAGNOSIS — N63.20 LEFT BREAST MASS: ICD-10-CM

## 2020-01-01 LAB
ANION GAP SERPL CALC-SCNC: 4 MMOL/L (ref 3–18)
ATRIAL RATE: 88 BPM
BASOPHILS # BLD: 0 K/UL (ref 0–0.1)
BASOPHILS NFR BLD: 0 % (ref 0–2)
BILIRUB UR QL STRIP: NEGATIVE
BUN SERPL-MCNC: 18 MG/DL (ref 7–18)
BUN/CREAT SERPL: 27 (ref 12–20)
CALCIUM SERPL-MCNC: 9.9 MG/DL (ref 8.5–10.1)
CALCULATED R AXIS, ECG10: 3 DEGREES
CALCULATED T AXIS, ECG11: 30 DEGREES
CHLORIDE SERPL-SCNC: 105 MMOL/L (ref 100–111)
CO2 SERPL-SCNC: 33 MMOL/L (ref 21–32)
CREAT SERPL-MCNC: 0.66 MG/DL (ref 0.6–1.3)
DIAGNOSIS, 93000: NORMAL
DIFFERENTIAL METHOD BLD: ABNORMAL
ECHO AO ROOT DIAM: 2.59 CM
ECHO LA AREA 4C: 18.89 CM2
ECHO LA VOL 2C: 43.63 ML (ref 22–52)
ECHO LA VOL 4C: 52.9 ML (ref 22–52)
ECHO LA VOL BP: 52.22 ML (ref 22–52)
ECHO LA VOL/BSA BIPLANE: 25.3 ML/M2 (ref 16–28)
ECHO LA VOLUME INDEX A2C: 21.14 ML/M2 (ref 16–28)
ECHO LA VOLUME INDEX A4C: 25.63 ML/M2 (ref 16–28)
ECHO LV INTERNAL DIMENSION DIASTOLIC: 4.34 CM (ref 3.9–5.3)
ECHO LV INTERNAL DIMENSION SYSTOLIC: 3.13 CM
ECHO LV IVSD: 1.11 CM (ref 0.6–0.9)
ECHO LV MASS 2D: 153.8 G (ref 67–162)
ECHO LV MASS INDEX 2D: 74.5 G/M2 (ref 43–95)
ECHO LV POSTERIOR WALL DIASTOLIC: 0.98 CM (ref 0.6–0.9)
ECHO LVOT DIAM: 1.96 CM
ECHO LVOT PEAK GRADIENT: 3.64 MMHG
ECHO LVOT PEAK VELOCITY: 95.38 CM/S
ECHO LVOT SV: 67.5 ML
ECHO LVOT VTI: 22.37 CM
ECHO MV A VELOCITY: 104.24 CM/S
ECHO MV E DECELERATION TIME (DT): 193.08 MS
ECHO MV E VELOCITY: 84.83 CM/S
ECHO MV E/A RATIO: 0.81
ECHO TV REGURGITANT MAX VELOCITY: 213.81 CM/S
ECHO TV REGURGITANT PEAK GRADIENT: 18.29 MMHG
EOSINOPHIL # BLD: 0.1 K/UL (ref 0–0.4)
EOSINOPHIL NFR BLD: 1 % (ref 0–5)
ERYTHROCYTE [DISTWIDTH] IN BLOOD BY AUTOMATED COUNT: 17.3 % (ref 11.6–14.5)
GLUCOSE SERPL-MCNC: 93 MG/DL (ref 74–99)
GLUCOSE UR-MCNC: NEGATIVE MG/DL
HCT VFR BLD AUTO: 39.5 % (ref 35–45)
HGB BLD-MCNC: 13.3 G/DL (ref 12–16)
KETONES P FAST UR STRIP-MCNC: NEGATIVE MG/DL
LVOT MG: 2.17 MMHG
LYMPHOCYTES # BLD: 1 K/UL (ref 0.9–3.6)
LYMPHOCYTES NFR BLD: 28 % (ref 21–52)
MCH RBC QN AUTO: 31.4 PG (ref 24–34)
MCHC RBC AUTO-ENTMCNC: 33.7 G/DL (ref 31–37)
MCV RBC AUTO: 93.4 FL (ref 74–97)
MONOCYTES # BLD: 1.3 K/UL (ref 0.05–1.2)
MONOCYTES NFR BLD: 35 % (ref 3–10)
NEUTS SEG # BLD: 1.3 K/UL (ref 1.8–8)
NEUTS SEG NFR BLD: 36 % (ref 40–73)
P-R INTERVAL, ECG05: 156 MS
PH UR STRIP: 7 [PH] (ref 4.6–8)
PLATELET # BLD AUTO: 158 K/UL (ref 135–420)
PMV BLD AUTO: 12.3 FL (ref 9.2–11.8)
POTASSIUM SERPL-SCNC: 4.3 MMOL/L (ref 3.5–5.5)
PROT UR QL STRIP: NEGATIVE
Q-T INTERVAL, ECG07: 348 MS
QRS DURATION, ECG06: 66 MS
QTC CALCULATION (BEZET), ECG08: 421 MS
RBC # BLD AUTO: 4.23 M/UL (ref 4.2–5.3)
SARS-COV-2, COV2NT: NOT DETECTED
SODIUM SERPL-SCNC: 142 MMOL/L (ref 136–145)
SP GR UR STRIP: 1.02 (ref 1–1.03)
UA UROBILINOGEN AMB POC: NORMAL (ref 0.2–1)
URINALYSIS CLARITY POC: CLEAR
URINALYSIS COLOR POC: YELLOW
URINE BLOOD POC: NEGATIVE
URINE LEUKOCYTES POC: NEGATIVE
URINE NITRITES POC: NEGATIVE
VENTRICULAR RATE, ECG03: 88 BPM
WBC # BLD AUTO: 3.6 K/UL (ref 4.6–13.2)

## 2020-01-01 PROCEDURE — 99214 OFFICE O/P EST MOD 30 MIN: CPT | Performed by: FAMILY MEDICINE

## 2020-01-01 PROCEDURE — G9717 DOC PT DX DEP/BP F/U NT REQ: HCPCS | Performed by: FAMILY MEDICINE

## 2020-01-01 PROCEDURE — 3017F COLORECTAL CA SCREEN DOC REV: CPT | Performed by: SURGERY

## 2020-01-01 PROCEDURE — G8417 CALC BMI ABV UP PARAM F/U: HCPCS | Performed by: FAMILY MEDICINE

## 2020-01-01 PROCEDURE — 3017F COLORECTAL CA SCREEN DOC REV: CPT | Performed by: FAMILY MEDICINE

## 2020-01-01 PROCEDURE — G0463 HOSPITAL OUTPT CLINIC VISIT: HCPCS | Performed by: FAMILY MEDICINE

## 2020-01-01 PROCEDURE — 19285 PERQ DEV BREAST 1ST US IMAG: CPT

## 2020-01-01 PROCEDURE — 88307 TISSUE EXAM BY PATHOLOGIST: CPT

## 2020-01-01 PROCEDURE — 76210000006 HC OR PH I REC 0.5 TO 1 HR: Performed by: SURGERY

## 2020-01-01 PROCEDURE — 99024 POSTOP FOLLOW-UP VISIT: CPT | Performed by: SURGERY

## 2020-01-01 PROCEDURE — A4648 IMPLANTABLE TISSUE MARKER: HCPCS

## 2020-01-01 PROCEDURE — 77030002933 HC SUT MCRYL J&J -A: Performed by: SURGERY

## 2020-01-01 PROCEDURE — G9899 SCRN MAM PERF RSLTS DOC: HCPCS | Performed by: FAMILY MEDICINE

## 2020-01-01 PROCEDURE — 2709999900 HC NON-CHARGEABLE SUPPLY: Performed by: SURGERY

## 2020-01-01 PROCEDURE — 77030040361 HC SLV COMPR DVT MDII -B: Performed by: SURGERY

## 2020-01-01 PROCEDURE — G8536 NO DOC ELDER MAL SCRN: HCPCS | Performed by: FAMILY MEDICINE

## 2020-01-01 PROCEDURE — 85025 COMPLETE CBC W/AUTO DIFF WBC: CPT

## 2020-01-01 PROCEDURE — 76210000022 HC REC RM PH II 1.5 TO 2 HR: Performed by: SURGERY

## 2020-01-01 PROCEDURE — 74011250636 HC RX REV CODE- 250/636: Performed by: NURSE ANESTHETIST, CERTIFIED REGISTERED

## 2020-01-01 PROCEDURE — 71046 X-RAY EXAM CHEST 2 VIEWS: CPT

## 2020-01-01 PROCEDURE — 77030002996 HC SUT SLK J&J -A: Performed by: SURGERY

## 2020-01-01 PROCEDURE — 76060000033 HC ANESTHESIA 1 TO 1.5 HR: Performed by: SURGERY

## 2020-01-01 PROCEDURE — 1101F PT FALLS ASSESS-DOCD LE1/YR: CPT | Performed by: FAMILY MEDICINE

## 2020-01-01 PROCEDURE — 77030031139 HC SUT VCRL2 J&J -A: Performed by: SURGERY

## 2020-01-01 PROCEDURE — G8427 DOCREV CUR MEDS BY ELIG CLIN: HCPCS | Performed by: SURGERY

## 2020-01-01 PROCEDURE — 77030040201 HC PRB SET LOCALIZER DISP BIPT -D: Performed by: SURGERY

## 2020-01-01 PROCEDURE — 74011250636 HC RX REV CODE- 250/636: Performed by: SURGERY

## 2020-01-01 PROCEDURE — 77030031753 HC SHR ENDO COAG HARM J&J -E: Performed by: SURGERY

## 2020-01-01 PROCEDURE — 1090F PRES/ABSN URINE INCON ASSESS: CPT | Performed by: FAMILY MEDICINE

## 2020-01-01 PROCEDURE — 77065 DX MAMMO INCL CAD UNI: CPT

## 2020-01-01 PROCEDURE — G8756 NO BP MEASURE DOC: HCPCS | Performed by: FAMILY MEDICINE

## 2020-01-01 PROCEDURE — 76010000149 HC OR TIME 1 TO 1.5 HR: Performed by: SURGERY

## 2020-01-01 PROCEDURE — G8752 SYS BP LESS 140: HCPCS | Performed by: SURGERY

## 2020-01-01 PROCEDURE — 77030040922 HC BLNKT HYPOTHRM STRY -A: Performed by: SURGERY

## 2020-01-01 PROCEDURE — G9899 SCRN MAM PERF RSLTS DOC: HCPCS | Performed by: SURGERY

## 2020-01-01 PROCEDURE — G9717 DOC PT DX DEP/BP F/U NT REQ: HCPCS | Performed by: SURGERY

## 2020-01-01 PROCEDURE — 74011000250 HC RX REV CODE- 250: Performed by: NURSE ANESTHETIST, CERTIFIED REGISTERED

## 2020-01-01 PROCEDURE — 00400 ANES INTEGUMENTARY SYS NOS: CPT | Performed by: ANESTHESIOLOGY

## 2020-01-01 PROCEDURE — G8427 DOCREV CUR MEDS BY ELIG CLIN: HCPCS | Performed by: FAMILY MEDICINE

## 2020-01-01 PROCEDURE — 77030003028 HC SUT VCRL J&J -A: Performed by: SURGERY

## 2020-01-01 PROCEDURE — G8536 NO DOC ELDER MAL SCRN: HCPCS | Performed by: SURGERY

## 2020-01-01 PROCEDURE — 77030013079 HC BLNKT BAIR HGGR 3M -A: Performed by: NURSE ANESTHETIST, CERTIFIED REGISTERED

## 2020-01-01 PROCEDURE — G8417 CALC BMI ABV UP PARAM F/U: HCPCS | Performed by: SURGERY

## 2020-01-01 PROCEDURE — 1090F PRES/ABSN URINE INCON ASSESS: CPT | Performed by: SURGERY

## 2020-01-01 PROCEDURE — 77030010512 HC APPL CLP LIG J&J -C: Performed by: SURGERY

## 2020-01-01 PROCEDURE — 1101F PT FALLS ASSESS-DOCD LE1/YR: CPT | Performed by: SURGERY

## 2020-01-01 PROCEDURE — 74011000250 HC RX REV CODE- 250: Performed by: SURGERY

## 2020-01-01 PROCEDURE — 87635 SARS-COV-2 COVID-19 AMP PRB: CPT

## 2020-01-01 PROCEDURE — 19125 EXCISION BREAST LESION: CPT | Performed by: SURGERY

## 2020-01-01 PROCEDURE — 93306 TTE W/DOPPLER COMPLETE: CPT

## 2020-01-01 PROCEDURE — G8754 DIAS BP LESS 90: HCPCS | Performed by: SURGERY

## 2020-01-01 PROCEDURE — 80048 BASIC METABOLIC PNL TOTAL CA: CPT

## 2020-01-01 PROCEDURE — 36415 COLL VENOUS BLD VENIPUNCTURE: CPT

## 2020-01-01 PROCEDURE — 99100 ANES PT EXTEME AGE<1 YR&>70: CPT | Performed by: ANESTHESIOLOGY

## 2020-01-01 PROCEDURE — 99205 OFFICE O/P NEW HI 60 MIN: CPT | Performed by: SURGERY

## 2020-01-01 PROCEDURE — 77030010509 HC AIRWY LMA MSK TELE -A: Performed by: NURSE ANESTHETIST, CERTIFIED REGISTERED

## 2020-01-01 PROCEDURE — 93005 ELECTROCARDIOGRAM TRACING: CPT

## 2020-01-01 RX ORDER — DIPHENHYDRAMINE HYDROCHLORIDE 50 MG/ML
12.5 INJECTION, SOLUTION INTRAMUSCULAR; INTRAVENOUS
Status: DISCONTINUED | OUTPATIENT
Start: 2020-01-01 | End: 2020-01-01 | Stop reason: HOSPADM

## 2020-01-01 RX ORDER — SODIUM CHLORIDE 0.9 % (FLUSH) 0.9 %
5-40 SYRINGE (ML) INJECTION EVERY 8 HOURS
Status: CANCELLED | OUTPATIENT
Start: 2020-01-01

## 2020-01-01 RX ORDER — SODIUM CHLORIDE 0.9 % (FLUSH) 0.9 %
5-40 SYRINGE (ML) INJECTION EVERY 8 HOURS
Status: DISCONTINUED | OUTPATIENT
Start: 2020-01-01 | End: 2020-01-01 | Stop reason: HOSPADM

## 2020-01-01 RX ORDER — AZITHROMYCIN 250 MG/1
TABLET, FILM COATED ORAL
Qty: 6 TAB | Refills: 0 | Status: SHIPPED | OUTPATIENT
Start: 2020-01-01 | End: 2020-01-01 | Stop reason: ALTCHOICE

## 2020-01-01 RX ORDER — ONDANSETRON 2 MG/ML
INJECTION INTRAMUSCULAR; INTRAVENOUS AS NEEDED
Status: DISCONTINUED | OUTPATIENT
Start: 2020-01-01 | End: 2020-01-01 | Stop reason: HOSPADM

## 2020-01-01 RX ORDER — SODIUM CHLORIDE 0.9 % (FLUSH) 0.9 %
5-40 SYRINGE (ML) INJECTION AS NEEDED
Status: CANCELLED | OUTPATIENT
Start: 2020-01-01

## 2020-01-01 RX ORDER — SODIUM CHLORIDE, SODIUM LACTATE, POTASSIUM CHLORIDE, CALCIUM CHLORIDE 600; 310; 30; 20 MG/100ML; MG/100ML; MG/100ML; MG/100ML
50 INJECTION, SOLUTION INTRAVENOUS CONTINUOUS
Status: DISCONTINUED | OUTPATIENT
Start: 2020-01-01 | End: 2020-01-01 | Stop reason: HOSPADM

## 2020-01-01 RX ORDER — FENTANYL CITRATE 50 UG/ML
25 INJECTION, SOLUTION INTRAMUSCULAR; INTRAVENOUS AS NEEDED
Status: DISCONTINUED | OUTPATIENT
Start: 2020-01-01 | End: 2020-01-01 | Stop reason: HOSPADM

## 2020-01-01 RX ORDER — FUROSEMIDE 20 MG/1
20 TABLET ORAL DAILY
Qty: 5 TAB | Refills: 0 | Status: SHIPPED | OUTPATIENT
Start: 2020-01-01 | End: 2020-01-01

## 2020-01-01 RX ORDER — OXYCODONE AND ACETAMINOPHEN 5; 325 MG/1; MG/1
1 TABLET ORAL AS NEEDED
Status: DISCONTINUED | OUTPATIENT
Start: 2020-01-01 | End: 2020-01-01 | Stop reason: HOSPADM

## 2020-01-01 RX ORDER — ACETAMINOPHEN 325 MG/1
650 TABLET ORAL EVERY 6 HOURS
Qty: 56 TAB | Refills: 1 | Status: SHIPPED | OUTPATIENT
Start: 2020-01-01 | End: 2021-01-01 | Stop reason: ALTCHOICE

## 2020-01-01 RX ORDER — OXYBUTYNIN CHLORIDE 10 MG/1
10 TABLET, EXTENDED RELEASE ORAL DAILY
Qty: 30 TAB | Refills: 6 | Status: SHIPPED | OUTPATIENT
Start: 2020-01-01 | End: 2020-01-01 | Stop reason: ALTCHOICE

## 2020-01-01 RX ORDER — DEXTROSE 50 % IN WATER (D50W) INTRAVENOUS SYRINGE
25-50 AS NEEDED
Status: DISCONTINUED | OUTPATIENT
Start: 2020-01-01 | End: 2020-01-01 | Stop reason: HOSPADM

## 2020-01-01 RX ORDER — MAGNESIUM SULFATE 100 %
4 CRYSTALS MISCELLANEOUS AS NEEDED
Status: DISCONTINUED | OUTPATIENT
Start: 2020-01-01 | End: 2020-01-01 | Stop reason: HOSPADM

## 2020-01-01 RX ORDER — POMALIDOMIDE 3 MG/1
CAPSULE ORAL
COMMUNITY
Start: 2020-01-01

## 2020-01-01 RX ORDER — MONTELUKAST SODIUM 10 MG/1
10 TABLET ORAL DAILY
Qty: 90 TAB | Refills: 0 | Status: SHIPPED | OUTPATIENT
Start: 2020-01-01

## 2020-01-01 RX ORDER — SODIUM CHLORIDE, SODIUM LACTATE, POTASSIUM CHLORIDE, CALCIUM CHLORIDE 600; 310; 30; 20 MG/100ML; MG/100ML; MG/100ML; MG/100ML
100 INJECTION, SOLUTION INTRAVENOUS CONTINUOUS
Status: DISCONTINUED | OUTPATIENT
Start: 2020-01-01 | End: 2020-01-01 | Stop reason: HOSPADM

## 2020-01-01 RX ORDER — BUDESONIDE AND FORMOTEROL FUMARATE DIHYDRATE 80; 4.5 UG/1; UG/1
2 AEROSOL RESPIRATORY (INHALATION) 2 TIMES DAILY
Qty: 1 INHALER | Refills: 3 | Status: SHIPPED | OUTPATIENT
Start: 2020-01-01

## 2020-01-01 RX ORDER — ALBUTEROL SULFATE 90 UG/1
2 AEROSOL, METERED RESPIRATORY (INHALATION)
Qty: 18 G | Refills: 0 | Status: SHIPPED | OUTPATIENT
Start: 2020-01-01

## 2020-01-01 RX ORDER — TOLTERODINE 4 MG/1
4 CAPSULE, EXTENDED RELEASE ORAL DAILY
Qty: 30 CAP | Refills: 6 | Status: SHIPPED | OUTPATIENT
Start: 2020-01-01 | End: 2020-01-01

## 2020-01-01 RX ORDER — IBUPROFEN 600 MG/1
600 TABLET ORAL EVERY 6 HOURS
Qty: 28 TAB | Refills: 0 | Status: SHIPPED | OUTPATIENT
Start: 2020-01-01 | End: 2021-01-01 | Stop reason: ALTCHOICE

## 2020-01-01 RX ORDER — PROPOFOL 10 MG/ML
INJECTION, EMULSION INTRAVENOUS AS NEEDED
Status: DISCONTINUED | OUTPATIENT
Start: 2020-01-01 | End: 2020-01-01 | Stop reason: HOSPADM

## 2020-01-01 RX ORDER — LIDOCAINE HYDROCHLORIDE AND EPINEPHRINE 10; 10 MG/ML; UG/ML
1.5 INJECTION, SOLUTION INFILTRATION; PERINEURAL
Status: COMPLETED | OUTPATIENT
Start: 2020-01-01 | End: 2020-01-01

## 2020-01-01 RX ORDER — SODIUM CHLORIDE 0.9 % (FLUSH) 0.9 %
5-40 SYRINGE (ML) INJECTION AS NEEDED
Status: DISCONTINUED | OUTPATIENT
Start: 2020-01-01 | End: 2020-01-01 | Stop reason: HOSPADM

## 2020-01-01 RX ORDER — ACYCLOVIR 400 MG/1
400 TABLET ORAL 2 TIMES DAILY
COMMUNITY

## 2020-01-01 RX ORDER — INSULIN LISPRO 100 [IU]/ML
INJECTION, SOLUTION INTRAVENOUS; SUBCUTANEOUS ONCE
Status: DISCONTINUED | OUTPATIENT
Start: 2020-01-01 | End: 2020-01-01 | Stop reason: HOSPADM

## 2020-01-01 RX ORDER — FENTANYL CITRATE 50 UG/ML
INJECTION, SOLUTION INTRAMUSCULAR; INTRAVENOUS AS NEEDED
Status: DISCONTINUED | OUTPATIENT
Start: 2020-01-01 | End: 2020-01-01 | Stop reason: HOSPADM

## 2020-01-01 RX ORDER — ONDANSETRON 2 MG/ML
4 INJECTION INTRAMUSCULAR; INTRAVENOUS ONCE
Status: DISCONTINUED | OUTPATIENT
Start: 2020-01-01 | End: 2020-01-01 | Stop reason: HOSPADM

## 2020-01-01 RX ORDER — FAMOTIDINE 20 MG/1
20 TABLET, FILM COATED ORAL ONCE
Status: DISCONTINUED | OUTPATIENT
Start: 2020-01-01 | End: 2020-01-01 | Stop reason: HOSPADM

## 2020-01-01 RX ORDER — LIDOCAINE HYDROCHLORIDE 10 MG/ML
5 INJECTION, SOLUTION EPIDURAL; INFILTRATION; INTRACAUDAL; PERINEURAL
Status: COMPLETED | OUTPATIENT
Start: 2020-01-01 | End: 2020-01-01

## 2020-01-01 RX ORDER — TRAVOPROST OPHTHALMIC SOLUTION 0.04 MG/ML
1 SOLUTION OPHTHALMIC EVERY EVENING
COMMUNITY

## 2020-01-01 RX ORDER — LIDOCAINE HYDROCHLORIDE 20 MG/ML
INJECTION, SOLUTION EPIDURAL; INFILTRATION; INTRACAUDAL; PERINEURAL AS NEEDED
Status: DISCONTINUED | OUTPATIENT
Start: 2020-01-01 | End: 2020-01-01 | Stop reason: HOSPADM

## 2020-01-01 RX ORDER — METHYLPREDNISOLONE 4 MG/1
TABLET ORAL
Qty: 1 DOSE PACK | Refills: 0 | Status: SHIPPED | OUTPATIENT
Start: 2020-01-01 | End: 2021-01-01 | Stop reason: ALTCHOICE

## 2020-01-01 RX ORDER — DOCUSATE SODIUM 100 MG/1
100 CAPSULE, LIQUID FILLED ORAL 2 TIMES DAILY
Qty: 60 CAP | Refills: 2 | Status: SHIPPED | OUTPATIENT
Start: 2020-01-01 | End: 2021-01-01

## 2020-01-01 RX ORDER — OMEPRAZOLE 40 MG/1
40 CAPSULE, DELAYED RELEASE ORAL 2 TIMES DAILY
Qty: 60 CAP | Refills: 1 | Status: SHIPPED | OUTPATIENT
Start: 2020-01-01 | End: 2020-01-01

## 2020-01-01 RX ORDER — DEXAMETHASONE SODIUM PHOSPHATE 4 MG/ML
INJECTION, SOLUTION INTRA-ARTICULAR; INTRALESIONAL; INTRAMUSCULAR; INTRAVENOUS; SOFT TISSUE AS NEEDED
Status: DISCONTINUED | OUTPATIENT
Start: 2020-01-01 | End: 2020-01-01 | Stop reason: HOSPADM

## 2020-01-01 RX ORDER — HYDROCHLOROTHIAZIDE 25 MG/1
TABLET ORAL
Qty: 90 TAB | Refills: 3 | Status: SHIPPED | OUTPATIENT
Start: 2020-01-01 | End: 2021-01-01

## 2020-01-01 RX ORDER — BUPIVACAINE HYDROCHLORIDE AND EPINEPHRINE 5; 5 MG/ML; UG/ML
INJECTION, SOLUTION EPIDURAL; INTRACAUDAL; PERINEURAL AS NEEDED
Status: DISCONTINUED | OUTPATIENT
Start: 2020-01-01 | End: 2020-01-01 | Stop reason: HOSPADM

## 2020-01-01 RX ORDER — OXYCODONE HYDROCHLORIDE 5 MG/1
5 TABLET ORAL
Qty: 20 TAB | Refills: 0 | Status: SHIPPED | OUTPATIENT
Start: 2020-01-01 | End: 2020-01-01

## 2020-01-01 RX ORDER — GLUCOSAMINE/CHONDR SU A SOD 750-600 MG
2500 TABLET ORAL DAILY
COMMUNITY

## 2020-01-01 RX ORDER — CEFAZOLIN SODIUM 2 G/50ML
2 SOLUTION INTRAVENOUS ONCE
Status: COMPLETED | OUTPATIENT
Start: 2020-01-01 | End: 2020-01-01

## 2020-01-01 RX ADMIN — FENTANYL CITRATE 50 MCG: 50 INJECTION, SOLUTION INTRAMUSCULAR; INTRAVENOUS at 09:04

## 2020-01-01 RX ADMIN — PROPOFOL 150 MG: 10 INJECTION, EMULSION INTRAVENOUS at 07:34

## 2020-01-01 RX ADMIN — FENTANYL CITRATE 25 MCG: 50 INJECTION, SOLUTION INTRAMUSCULAR; INTRAVENOUS at 08:05

## 2020-01-01 RX ADMIN — DEXAMETHASONE SODIUM PHOSPHATE 4 MG: 4 INJECTION, SOLUTION INTRAMUSCULAR; INTRAVENOUS at 07:46

## 2020-01-01 RX ADMIN — SODIUM CHLORIDE, SODIUM LACTATE, POTASSIUM CHLORIDE, AND CALCIUM CHLORIDE: 600; 310; 30; 20 INJECTION, SOLUTION INTRAVENOUS at 07:29

## 2020-01-01 RX ADMIN — ONDANSETRON 4 MG: 2 INJECTION INTRAMUSCULAR; INTRAVENOUS at 07:45

## 2020-01-01 RX ADMIN — LIDOCAINE HYDROCHLORIDE AND EPINEPHRINE 15 MG: 10; 10 INJECTION, SOLUTION INFILTRATION; PERINEURAL at 11:07

## 2020-01-01 RX ADMIN — SODIUM CHLORIDE, SODIUM LACTATE, POTASSIUM CHLORIDE, AND CALCIUM CHLORIDE 50 ML/HR: 600; 310; 30; 20 INJECTION, SOLUTION INTRAVENOUS at 06:39

## 2020-01-01 RX ADMIN — LIDOCAINE HYDROCHLORIDE 100 MG: 20 INJECTION, SOLUTION EPIDURAL; INFILTRATION; INTRACAUDAL; PERINEURAL at 07:34

## 2020-01-01 RX ADMIN — FENTANYL CITRATE 25 MCG: 50 INJECTION, SOLUTION INTRAMUSCULAR; INTRAVENOUS at 08:10

## 2020-01-01 RX ADMIN — LIDOCAINE HYDROCHLORIDE 5 ML: 10 INJECTION, SOLUTION EPIDURAL; INFILTRATION; INTRACAUDAL; PERINEURAL at 11:07

## 2020-01-01 RX ADMIN — CEFAZOLIN SODIUM 2 G: 2 SOLUTION INTRAVENOUS at 07:40

## 2020-01-14 ENCOUNTER — TELEPHONE (OUTPATIENT)
Dept: FAMILY MEDICINE CLINIC | Age: 75
End: 2020-01-14

## 2020-01-23 ENCOUNTER — TELEPHONE (OUTPATIENT)
Dept: FAMILY MEDICINE CLINIC | Age: 75
End: 2020-01-23

## 2020-01-23 NOTE — TELEPHONE ENCOUNTER
Pt calling to make NP JAMES Neal aware she is being discharged from [facility] today. She wanted you to know her red and white blood cells are at issue. She has been diagnosed with leukemia and they are giving her a 50/50 chance. She declined making a hospital follow up appointment. She said they don't recommend her getting out and she said she lives about 25 miles away in [facility]. The said she will be needing home health     I asked her again about scheduling a follow up appt and she declined.

## 2020-02-05 DIAGNOSIS — J44.9 CHRONIC OBSTRUCTIVE PULMONARY DISEASE, UNSPECIFIED COPD TYPE (HCC): ICD-10-CM

## 2020-02-06 RX ORDER — ALBUTEROL SULFATE 90 UG/1
AEROSOL, METERED RESPIRATORY (INHALATION)
Qty: 18 G | Refills: 0 | Status: SHIPPED | OUTPATIENT
Start: 2020-02-06 | End: 2020-03-06 | Stop reason: SDUPTHER

## 2020-02-18 ENCOUNTER — OFFICE VISIT (OUTPATIENT)
Dept: FAMILY MEDICINE CLINIC | Age: 75
End: 2020-02-18

## 2020-02-18 VITALS
RESPIRATION RATE: 16 BRPM | WEIGHT: 191 LBS | HEIGHT: 66 IN | TEMPERATURE: 97.8 F | DIASTOLIC BLOOD PRESSURE: 64 MMHG | HEART RATE: 96 BPM | OXYGEN SATURATION: 98 % | SYSTOLIC BLOOD PRESSURE: 110 MMHG | BODY MASS INDEX: 30.7 KG/M2

## 2020-02-18 DIAGNOSIS — C90.00 MULTIPLE MYELOMA, REMISSION STATUS UNSPECIFIED (HCC): Primary | ICD-10-CM

## 2020-02-18 DIAGNOSIS — Z87.19 H/O: UGI BLEED: ICD-10-CM

## 2020-02-18 DIAGNOSIS — Z09 HOSPITAL DISCHARGE FOLLOW-UP: ICD-10-CM

## 2020-02-18 DIAGNOSIS — D69.6 THROMBOCYTOPENIA (HCC): ICD-10-CM

## 2020-02-18 RX ORDER — MONTELUKAST SODIUM 10 MG/1
10 TABLET ORAL DAILY
COMMUNITY
End: 2020-01-01 | Stop reason: SDUPTHER

## 2020-02-18 RX ORDER — FUROSEMIDE 20 MG/1
20 TABLET ORAL
COMMUNITY
Start: 2020-01-15 | End: 2020-01-01 | Stop reason: ALTCHOICE

## 2020-02-18 RX ORDER — ACETAMINOPHEN 500 MG
TABLET ORAL
COMMUNITY
End: 2020-01-01

## 2020-02-18 RX ORDER — OMEPRAZOLE 40 MG/1
40 CAPSULE, DELAYED RELEASE ORAL 2 TIMES DAILY
Qty: 60 CAP | Refills: 1
Start: 2020-02-18 | End: 2020-01-01 | Stop reason: SDUPTHER

## 2020-02-18 RX ORDER — DEXAMETHASONE 4 MG/1
TABLET ORAL
COMMUNITY
Start: 2020-01-28 | End: 2021-01-01

## 2020-02-18 NOTE — PROGRESS NOTES
Chief Complaint   Patient presents with   [facility] Follow Up     1. Have you been to the ER, urgent care clinic since your last visit? Hospitalized since your last visit? Follow with Oncology     2. Have you seen or consulted any other health care providers outside of the [address] [place] [address] since your last visit? Include any pap smears or colon screening.   [facility] 2/14/20

## 2020-02-18 NOTE — PROGRESS NOTES
Subjective:   Eladio Guerra is a 69 y.o. female with multiple myeloma (under management of Dr. Dajuan Vasquez) is here today for hospital follow up February 06, 2020 for gastrointestinal bleeding and 2- emergency room thrombocytopenia given platelets and PRBC after which she reported feeling better and sent home  She is leaving this appointment to have another platelet transfusion to increase her platelets so that she may start on Pamalyst   Patient is accompanied by her former  today. She is currently living with him. She reports that she feels good today   Review of Systems   HENT: Negative for nosebleeds. Respiratory: Negative for hemoptysis. Gastrointestinal: Negative for blood in stool and melena. Genitourinary: Negative for hematuria. Neurological: Negative for dizziness. Endo/Heme/Allergies: Does not bruise/bleed easily. [facility])  Admit Date: 2/6/2020  Length of stay: 5 day(s)  Discharge Date: February 12, 2020    Hospital Course:   Patient admitted and started on Protonix gtt. She was also transfused see below. GI was consulted and held off on EGD unless patient manifested aggressive GI bleed. Patient did not have any further episodes of bloody bowel movements  Oncology was following given history of multiple myeloma and recommended additional unit of platelets for discharge given claudio of 23. Patient without any active stigmata of bleeding. She received 1 dose of Granix the day of discharge per oncology. She will follow-up with Dr. Brian Mike as an outpatient    Discharge Diagnosis:   1. Severe acute on chronic anemia 2/2 blood loss  1. hgb 4, was previously 7.2 on 2/1. Now 6.6  2. S/p 4 units pRBCs 02/07  3. 1 unit prbc 02/09  4. 1 unit prbc 02/11  2. Severe acute on chronic thrombocytopenia  1. Plts 13, previously 60 on 2/1. Now 53  2. S/p 3 units platelets  3. Dark stools presumed melena  4. DEEPTI 2/2 above   5.  Multiple myeloma with pancytopenia, transfusion dependent. Follows with Dr. Darin Anderson  1. Started chemotherapy 2/4  6. Mild hypotension  7. Chronic Diastolic HF  8. Hypothyroidism    Current Outpatient Medications   Medication Sig Dispense Refill    montelukast (SINGULAIR) 10 mg tablet Take 10 mg by mouth daily.  dexAMETHasone (DECADRON) 4 mg tablet CYCLE 1 TAKE ON DAYS 9 16 AND 23. CYCLE 2 TAKE ON DAYS 2 19 16 AND 23. TOTAL DAILY DOSE IS 40MG      furosemide (LASIX) 20 mg tablet Take 20 mg by mouth.  acetaminophen (TYLENOL) 500 mg tablet Take  by mouth every six (6) hours as needed for Pain.  VENTOLIN HFA 90 mcg/actuation inhaler INHALE 2 PUFFS BY MOUTH EVERY 4 HOURS AS NEEDED FOR WHEEZING 18 g 0    sertraline (ZOLOFT) 100 mg tablet Take 1 Tab by mouth daily. 90 Tab 1    ARMOUR THYROID 120 mg tab Take 90 mg by mouth daily. 3    SYMBICORT 80-4.5 mcg/actuation HFAA INHALE 2 PUFFS BY MOUTH TWICE DAILY 1 Inhaler 3    acyclovir (ZOVIRAX) 400 mg tablet Take 400 mg by mouth two (2) times a day.  ferrous gluconate 324 mg (37.5 mg iron) tablet Take 1 Tab by mouth Daily (before breakfast). 90 Tab 1    omeprazole (PRILOSEC) 20 mg capsule Take 40 mg by mouth two (2) times a day. 3    Cholecalciferol, Vitamin D3, (VITAMIN D3) 2,000 unit cap capsule Take  by Mouth.  hydroCHLOROthiazide (HYDRODIURIL) 25 mg tablet TAKE 1 TABLET BY MOUTH DAILY 90 Tab 0    lenalidomide (REVLIMID) 10 mg cap Take 1 Cap by mouth nightly. 90 Cap 0    vit B12-levomefolate calcium-vit B6 (FOLTX) 2-1.13-25 mg tablet Take 1 Tab by mouth daily. 30 Tab 0    ondansetron hcl (ZOFRAN) 8 mg tablet Take 8 mg by mouth every eight (8) hours as needed for Nausea.  triamcinolone (NASACORT AQ) 55 mcg nasal inhaler 2 Sprays daily.  CALCIUM CITRATE (CITRACAL PO) Take 1 Tab by mouth daily.  FLAXSEED PO Take 1 Tab by mouth daily. PMH: reviewed medications and allergy lists and medical and family history.   OBJECTIVE:  Awake and alert in no acute distress  Lungs clear throughout  S1 S2 RRR without ectopy or murmur auscultated. Abdomen: normoactive bowel sounds all quadrants, no tenderness to abdomen upper and lower quadrants. No hepatosplenomegaly  Integumentary: no rashes  Normal skin turgor  Gait with walker   Visit Vitals  /64 (BP 1 Location: Left arm, BP Patient Position: Sitting)   Pulse 96   Temp 97.8 °F (36.6 °C) (Oral)   Resp 16   Ht 5' 6\" (1.676 m)   Wt 191 lb (86.6 kg)   SpO2 98%   BMI 30.83 kg/m²     Diagnoses and all orders for this visit:    Multiple myeloma, remission status unspecified (HCC)    Thrombocytopenia [facility])    Hospital discharge follow-up    H/O: UGI bleed  -     omeprazole (PRILOSEC) 40 mg capsule; Take 1 Cap by mouth two (2) times a day., No Print, Disp-60 Cap, R-1    Anticipatory guidance regarding emergency care if symptoms worsen and patient verbalizes understanding. Patient agrees with plan and verbalizes understanding. I have discussed the diagnosis with the patient and the intended plan as seen in the above orders. The patient has received an after-visit summary and questions were answered concerning future plans. I have discussed medication side effects and warnings with the patient as well. Follow-up and Dispositions    · Return in about 3 months (around 5/18/2020), or if symptoms worsen or fail to improve, for htn.

## 2020-03-06 DIAGNOSIS — J44.9 CHRONIC OBSTRUCTIVE PULMONARY DISEASE, UNSPECIFIED COPD TYPE (HCC): ICD-10-CM

## 2020-03-06 RX ORDER — ALBUTEROL SULFATE 90 UG/1
2 AEROSOL, METERED RESPIRATORY (INHALATION)
Qty: 18 G | Refills: 0 | Status: SHIPPED | OUTPATIENT
Start: 2020-03-06 | End: 2020-01-01 | Stop reason: SDUPTHER

## 2020-03-06 NOTE — TELEPHONE ENCOUNTER
Last Visit: 2/18/20 with FRANCK Neal  Next Appointment: return in 3 months  Previous Refill Encounter(s): 2/6/20 #18g    Requested Prescriptions     Pending Prescriptions Disp Refills    VENTOLIN HFA 90 mcg/actuation inhaler 18 g 0     Sig: Take 2 Puffs by inhalation every four (4) hours as needed for Wheezing.

## 2020-06-18 NOTE — TELEPHONE ENCOUNTER
Last Visit: 2/18/20 with FRANCK Neal  Next Appointment: 6/23/20 with MD Vamshi Wong  Previous Refill Encounter(s): 3/6/20 Ventolin #1, 5/1/19 Symbicort #1 with 3 refills    Requested Prescriptions     Pending Prescriptions Disp Refills    Ventolin HFA 90 mcg/actuation inhaler 18 g 0     Sig: Take 2 Puffs by inhalation every four (4) hours as needed for Wheezing.  budesonide-formoteroL (Symbicort) 80-4.5 mcg/actuation HFAA 1 Inhaler 3     Sig: Take 2 Puffs by inhalation two (2) times a day.

## 2020-06-23 NOTE — PROGRESS NOTES
Lv Perkins is a 69 y.o. female who was seen by synchronous (real-time) audio technology on 6/23/2020. Consent: Lv Perkins, who was seen by synchronous (real-time) audio technology, and/or her healthcare decision maker, is aware that this patient-initiated, Telehealth encounter on 6/23/2020 is a billable service, with coverage as determined by her insurance carrier. She is aware that she may receive a bill and has provided verbal consent to proceed: Yes. I affirm this is a pt requested service and the following was discussed:       Assessment & Plan:   Diagnoses and all orders for this visit:    1. Essential hypertension  -     hydroCHLOROthiazide (HYDRODIURIL) 25 mg tablet; TAKE 1 TABLET BY MOUTH DAILY    2. Chronic obstructive pulmonary disease, unspecified COPD type ([facility] [address]        As above,   New   treatment plan as listed below  Orders Placed This Encounter    hydroCHLOROthiazide (HYDRODIURIL) 25 mg tablet     Refilled HCTZ  Follow-up and Dispositions    · Return in about 4 months (around 10/23/2020) for htn. This has been fully explained to the patient, who indicates understanding. Time 11-20 minutes    712  Subjective:   Lv Perkins is a 69 y.o. female who was seen for Hypertension and COPD      She is really unsure of her meds; difficult to reconcile. She does think her current  Med list is accurate. She does request a refill on HCTZ. She has a h/o Multiple Myeloma; she is under the care of oncology; she has no complaints relative to that. She has no other new complaints. She is on inhalers for COPD; Breathing is stable today. Prior to Admission medications    Medication Sig Start Date End Date Taking?  Authorizing Provider   Ventolin HFA 90 mcg/actuation inhaler Take 2 Puffs by inhalation every four (4) hours as needed for Wheezing. 6/22/20  Yes Sriram Salazar MD   budesonide-formoteroL (Symbicort) 80-4.5 mcg/actuation HFAA Take 2 Puffs by inhalation two (2) times a day. 6/22/20  Yes Patrick Price MD   montelukast (SINGULAIR) 10 mg tablet Take 10 mg by mouth daily. Yes Provider, Historical   acetaminophen (TYLENOL) 500 mg tablet Take  by mouth every six (6) hours as needed for Pain. Yes Provider, Historical   omeprazole (PRILOSEC) 40 mg capsule Take 1 Cap by mouth two (2) times a day. 2/18/20  Yes Lazarus Neal Cap, NP   sertraline (ZOLOFT) 100 mg tablet Take 1 Tab by mouth daily. 12/16/19  Yes Lazarus Neal Cap, NP   ARMOUR THYROID 120 mg tab Take 90 mg by mouth daily. 4/19/19  Yes Provider, Historical   lenalidomide (REVLIMID) 10 mg cap Take 1 Cap by mouth nightly. 3/4/19  Yes Lazarus Neal Cap, NP   vit [id number] calcium-vit B6 (FOLTX) 2-1.13-25 mg tablet Take 1 Tab by mouth daily. 2/5/18  Yes Ehsan Rojas,    ondansetron hcl (ZOFRAN) 8 mg tablet Take 8 mg by mouth every eight (8) hours as needed for Nausea. Yes Provider, Historical   Cholecalciferol, Vitamin D3, (VITAMIN D3) 2,000 unit cap capsule Take  by Mouth. Yes Provider, Historical   triamcinolone (NASACORT AQ) 55 mcg nasal inhaler 2 Sprays daily. Yes Provider, Historical   FLAXSEED PO Take 1 Tab by mouth daily. 4/19/10  Yes Provider, Historical   hydroCHLOROthiazide (HYDRODIURIL) 25 mg tablet TAKE 1 TABLET BY MOUTH DAILY 6/23/20   Patrick Price MD   dexAMETHasone (DECADRON) 4 mg tablet CYCLE 1 TAKE ON DAYS 9 16 AND 23. CYCLE 2 TAKE ON DAYS 2 19 16 AND 23. TOTAL DAILY DOSE IS 40MG 1/28/20   Provider, Historical   furosemide (LASIX) 20 mg tablet Take 20 mg by mouth. 1/15/20   Provider, Historical   acyclovir (ZOVIRAX) 400 mg tablet Take 400 mg by mouth two (2) times a day. Provider, Historical   ferrous gluconate 324 mg (37.5 mg iron) tablet Take 1 Tab by mouth Daily (before breakfast). 3/4/19   Lazarus Neal Cap, NP   CALCIUM CITRATE (CITRACAL PO) Take 1 Tab by mouth daily.  4/19/10   Provider, Historical     Allergies   Allergen Reactions    Iodine Hives, Rash and Other (comments)     Feels like Chest caving in. Rash LL lumbosacral area    Fosamax [Alendronate] Itching     rash       Patient Active Problem List    Diagnosis Date Noted    [facility] myel[facility] ([facility]     Fatigue 02/05/2018    Rib pain on left side 02/05/2018    History of tobacco abuse 08/21/2017    Post-menopausal osteoporosis 04/27/2017    Spondylolisthesis of lumbar region 01/21/2016    History of thyroidectomy 05/20/2013    Meningioma ([facility] [address] 05/06/2013    BPPV (benign paroxysmal positional vertigo) 08/18/2010    Insomnia 08/03/2010    Migraines 06/22/2010    HTN (hypertension) 05/18/2010    Hypothyroid 05/18/2010    Arthritis 05/18/2010    Depression 05/18/2010    Anxiety 05/18/2010    Seasonal allergic rhinitis 05/18/2010     Current Outpatient Medications   Medication Sig Dispense Refill    Ventolin HFA 90 mcg/actuation inhaler Take 2 Puffs by inhalation every four (4) hours as needed for Wheezing. 18 g 0    budesonide-formoteroL (Symbicort) 80-4.5 mcg/actuation HFAA Take 2 Puffs by inhalation two (2) times a day. 1 Inhaler 3    montelukast (SINGULAIR) 10 mg tablet Take 10 mg by mouth daily.  acetaminophen (TYLENOL) 500 mg tablet Take  by mouth every six (6) hours as needed for Pain.  omeprazole (PRILOSEC) 40 mg capsule Take 1 Cap by mouth two (2) times a day. 60 Cap 1    sertraline (ZOLOFT) 100 mg tablet Take 1 Tab by mouth daily. 90 Tab 1    ARMOUR THYROID 120 mg tab Take 90 mg by mouth daily. 3    lenalidomide (REVLIMID) 10 mg cap Take 1 Cap by mouth nightly. 90 Cap 0    vit B12-levomefolate calcium-vit B6 (FOLTX) 2-1.13-25 mg tablet Take 1 Tab by mouth daily. 30 Tab 0    ondansetron hcl (ZOFRAN) 8 mg tablet Take 8 mg by mouth every eight (8) hours as needed for Nausea.  Cholecalciferol, Vitamin D3, (VITAMIN D3) 2,000 unit cap capsule Take  by Mouth.  triamcinolone (NASACORT AQ) 55 mcg nasal inhaler 2 Sprays daily.  FLAXSEED PO Take 1 Tab by mouth daily.       hydroCHLOROthiazide (HYDRODIURIL) 25 mg tablet TAKE 1 TABLET BY MOUTH DAILY 90 Tab 3    dexAMETHasone (DECADRON) 4 mg tablet CYCLE 1 TAKE ON DAYS 9 16 AND 23. CYCLE 2 TAKE ON DAYS 2 19 16 AND 23. TOTAL DAILY DOSE IS 40MG      furosemide (LASIX) 20 mg tablet Take 20 mg by mouth.  acyclovir (ZOVIRAX) 400 mg tablet Take 400 mg by mouth two (2) times a day.  ferrous gluconate 324 mg (37.5 mg iron) tablet Take 1 Tab by mouth Daily (before breakfast). 90 Tab 1    CALCIUM CITRATE (CITRACAL PO) Take 1 Tab by mouth daily. Allergies   Allergen Reactions    Iodine Hives, Rash and Other (comments)     Feels like Chest caving in. Rash LL lumbosacral area    Fosamax [Alendronate] Itching     rash     Past Medical History:   Diagnosis Date    Anxiety 5/18/2010    Arthritis     OA spine, left knee    Depression 5/18/2010    Headache(784.0)     migraines    HTN (hypertension) 5/18/2010    Hypothyroid 5/18/2010    Multiple myeloma ([facility] [address]     Osteopenia     Rib fractures 07/17/2017    r/t fall    Seasonal allergic rhinitis 5/18/2010    Seasonal allergies     Spondylolisthesis of lumbar region 1/21/2016    Dr. Wise [place] [facility]    Thyroid disease     hypothyroid,  thromeagaly     Past Surgical History:   Procedure Laterality Date    BREAST SURGERY PROCEDURE UNLISTED      breast cyst s/p biopsy 2008    COLONOSCOPY N/A 9/6/2017    COLONOSCOPY performed by Benjamín Mera MD at [address] HX CATARACT REM[facility]  HX CATARACT REMOVAL  8/2011    right eye 2 weeks apart from left [facility].  Dr. Eliza Marvin HX COLONOSCOPY  2007    HX HEENT      partical parathyroidectomy     HX ORTHOPAEDIC      rotator cuff repqir 10/1996    NEUROLOGICAL PROCEDURE UNLISTED  5-20-13    meninginoma Dr. Thalia Monteiro     Family History   Problem Relation Age of Onset    Cancer Brother         brain     Social History     Tobacco Use    Smoking status: Former Smoker     Packs/day: 0.25     Years: 40.00     Pack years: 10.00     Types: Cigarettes     Start date: 1970     Last attempt to quit: 2017     Years since quittin.8    Smokeless tobacco: Never Used    Tobacco comment: patient is smoking one cigarette periodically   Substance Use Topics    Alcohol use: Yes     Comment: wine for communion only       Review of Systems   Constitutional: Negative for chills and fever. Respiratory: Negative for shortness of breath and wheezing. Cardiovascular: Negative for chest pain and palpitations. All other systems reviewed and are negative. Objective:     Visit Vitals  LMP 1990      General: alert, cooperative, no distress   Mental  status: normal mood, behavior, speech, dress, motor activity, and thought processes, able to follow commands   HENT: NCAT   Neck: no visualized mass   Resp: no respiratory distress   Neuro: no gross deficits   Skin: no discoloration or lesions of concern on visible areas   Psychiatric: normal affect, consistent with stated mood, no evidence of hallucinations     Additional exam findings: none      We discussed the expected course, resolution and complications of the diagnosis(es) in detail. Medication risks, benefits, costs, interactions, and alternatives were discussed as indicated. I advised her to contact the office if her condition worsens, changes or fails to improve as anticipated. She expressed understanding with the diagnosis(es) and plan. Shanell Cid is a 69 y.o. female who was evaluated by a video visit encounter for concerns as above. Patient identification was verified prior to start of the visit. A caregiver was present when appropriate. Due to this being a TeleHealth encounter ([facility] evaluation of the following organ systems was limited: Vitals/Constitutional/EENT/Resp/CV/GI//MS/Neuro/Skin/Heme-Lymph-Imm.   Pursuant to the emergency declaration under the [facility] waiver authority and the [employer] Act, this Virtual  Visit was conducted, with patient's (and/or legal guardian's) consent, to reduce the patient's risk of exposure to COVID-19 and provide necessary medical care. Services were provided through a video synchronous discussion virtually to substitute for in-person clinic visit. Patient was at home and provider was at the office.     Sue Cuevas MD

## 2020-06-28 NOTE — PATIENT INSTRUCTIONS

## 2020-07-20 NOTE — PROGRESS NOTES
Jennifer Mayo is a  69 y.o. female presents today for office visit for incontrollable urination    1. Have you been to the ER, urgent care clinic or hospitalized since your last visit? NO     2. Have you seen or consulted any other health care providers outside of the [address] [place] [address] since your last visit (Include any pap smears or colon screening)?   NO

## 2020-07-20 NOTE — PROGRESS NOTES
HPI:  Geronimo Powers is a 69 y.o. female who presents today with   Chief Complaint   Patient presents with    Bladder Infection     bladder issues      Pt c/o urinary frequency and urgency. She has had sx for about one year. Pt urinates close to 8 times a day. Has no pain with urination, no fever    No blood in urine      Current Outpatient Medications:     Biotin 2,500 mcg cap, Take  by mouth., Disp: , Rfl:     pomalidomide (POMALYST PO), Take  by mouth., Disp: , Rfl:       hydroCHLOROthiazide (HYDRODIURIL) 25 mg tablet, TAKE 1 TABLET BY MOUTH DAILY, Disp: 90 Tab, Rfl: 3    Ventolin HFA 90 mcg/actuation inhaler, Take 2 Puffs by inhalation every four (4) hours as needed for Wheezing., Disp: 18 g, Rfl: 0    budesonide-formoteroL (Symbicort) 80-4.5 mcg/actuation HFAA, Take 2 Puffs by inhalation two (2) times a day., Disp: 1 Inhaler, Rfl: 3    montelukast (SINGULAIR) 10 mg tablet, Take 10 mg by mouth daily. , Disp: , Rfl:     dexAMETHasone (DECADRON) 4 mg tablet, CYCLE 1 TAKE ON DAYS 9 16 AND 23. CYCLE 2 TAKE ON DAYS 2 19 16 AND 23. TOTAL DAILY DOSE IS 40MG, Disp: , Rfl:     omeprazole (PRILOSEC) 40 mg capsule, Take 1 Cap by mouth two (2) times a day., Disp: 60 Cap, Rfl: 1    sertraline (ZOLOFT) 100 mg tablet, Take 1 Tab by mouth daily. , Disp: 90 Tab, Rfl: 1    ARMOUR THYROID 120 mg tab, Take 90 mg by mouth daily. , Disp: , Rfl: 3    ferrous gluconate 324 mg (37.5 mg iron) tablet, Take 1 Tab by mouth Daily (before breakfast). , Disp: 90 Tab, Rfl: 1    vit B12-levomefolate calcium-vit B6 (FOLTX) 2-1.13-25 mg tablet, Take 1 Tab by mouth daily. , Disp: 30 Tab, Rfl: 0    Cholecalciferol, Vitamin D3, (VITAMIN D3) 2,000 unit cap capsule, Take  by Mouth., Disp: , Rfl:     CALCIUM CITRATE (CITRACAL PO), Take 1 Tab by mouth daily. , Disp: , Rfl:     FLAXSEED PO, Take 1 Tab by mouth daily. , Disp: , Rfl:     acetaminophen (TYLENOL) 500 mg tablet, Take  by mouth every six (6) hours as needed for Pain., Disp: , Rfl:               3 most recent PHQ Screens 8/30/2017   PHQ Not Done Medical Reason (indicate in comments)   Little interest or pleasure in doing things Several days   Feeling down, depressed, irritable, or hopeless More than half the days   Total Score PHQ 2 3   Trouble falling or staying asleep, or sleeping too much More than half the days   Feeling tired or having little energy More than half the days   Poor appetite, weight loss, or overeating Several days   Feeling bad about yourself - or that you are a failure or have let yourself or your family down Several days   Trouble concentrating on things such as school, work, reading, or watching TV More than half the days   Moving or speaking so slowly that other people could have noticed; or the opposite being so fidgety that others notice Not at all   Thoughts of being better off dead, or hurting yourself in some way Not at all   PHQ 9 Score 11   How difficult have these problems made it for you to do your work, take care of your home and get along with others Somewhat difficult               PMH,  Meds, Allergies, Family History, Social history reviewed      Current Outpatient Medications   Medication Sig Dispense Refill    Biotin 2,500 mcg cap Take  by mouth.  pomalidomide (POMALYST PO) Take  by mouth.         hydroCHLOROthiazide (HYDRODIURIL) 25 mg tablet TAKE 1 TABLET BY MOUTH DAILY 90 Tab 3    Ventolin HFA 90 mcg/actuation inhaler Take 2 Puffs by inhalation every four (4) hours as needed for Wheezing. 18 g 0    budesonide-formoteroL (Symbicort) 80-4.5 mcg/actuation HFAA Take 2 Puffs by inhalation two (2) times a day. 1 Inhaler 3    montelukast (SINGULAIR) 10 mg tablet Take 10 mg by mouth daily.  dexAMETHasone (DECADRON) 4 mg tablet CYCLE 1 TAKE ON DAYS 9 16 AND 23. CYCLE 2 TAKE ON DAYS 2 19 16 AND 23. TOTAL DAILY DOSE IS 40MG      omeprazole (PRILOSEC) 40 mg capsule Take 1 Cap by mouth two (2) times a day.  60 Cap 1    sertraline (ZOLOFT) 100 mg tablet Take 1 Tab by mouth daily. 90 Tab 1    ARMOUR THYROID 120 mg tab Take 90 mg by mouth daily. 3    ferrous gluconate 324 mg (37.5 mg iron) tablet Take 1 Tab by mouth Daily (before breakfast). 90 Tab 1    vit B12-levomefolate calcium-vit B6 (FOLTX) 2-1.13-25 mg tablet Take 1 Tab by mouth daily. 30 Tab 0    Cholecalciferol, Vitamin D3, (VITAMIN D3) 2,000 unit cap capsule Take  by Mouth.  CALCIUM CITRATE (CITRACAL PO) Take 1 Tab by mouth daily.  FLAXSEED PO Take 1 Tab by mouth daily.  acetaminophen (TYLENOL) 500 mg tablet Take  by mouth every six (6) hours as needed for Pain. Allergies   Allergen Reactions    Iodine Hives, Rash and Other (comments)     Feels like Chest caving in. Rash LL lumbosacral area    Fosamax [Alendronate] Itching     rash                  Review of Systems   Constitutional: Negative for chills and fever. Respiratory: Negative for shortness of breath and wheezing. Cardiovascular: Negative for chest pain and palpitations. All other systems reviewed and are negative. Visit Vitals  /73   Pulse 90   Temp 97.8 °F (36.6 °C) (Oral)   Resp 18   Ht 5' 6\" (1.676 m)   Wt 205 lb (93 kg)   LMP 01/01/1990   SpO2 97%   BMI 33.09 kg/m²     Physical Exam  Vitals signs and nursing note reviewed. Constitutional:       Appearance: Normal appearance. HENT:      Head: Normocephalic and atraumatic. Cardiovascular:      Rate and Rhythm: Normal rate and regular rhythm. Heart sounds: No murmur. No friction rub. No gallop. Pulmonary:      Effort: No respiratory distress. Breath sounds: Normal breath sounds. No wheezing. Chest:      Chest wall: No tenderness. Neurological:      Mental Status: She is alert.         Visit Vitals  /73   Pulse 90   Temp 97.8 °F (36.6 °C) (Oral)   Resp 18   Ht 5' 6\" (1.676 m)   Wt 205 lb (93 kg)   LMP 01/01/1990   SpO2 97%   BMI 33.09 kg/m²       UA noted  Assessment/Plan:    Diagnoses and all orders for this visit:    1. Overactive bladder  -     AMB POC URINALYSIS DIP STICK AUTO W/O MICRO    Other orders  -     tolterodine ER (DETROL LA) 4 mg ER capsule; Take 1 Cap by mouth daily. Follow-up and Dispositions    · Return in about 3 months (around 10/20/2020), or oab, for virtual phone, virtual video. As above , no evidence of UTI at this time  Likely reflective of OAB   treatment plan as listed below  Orders Placed This Encounter    AMB POC URINALYSIS DIP STICK AUTO W/O MICRO    Biotin 2,500 mcg cap    pomalidomide (POMALYST PO)    tolterodine ER (DETROL LA) 4 mg ER capsule     Start detrol as ordered  meds reconciled  Follow-up and Dispositions    · Return in about 3 months (around 10/20/2020), or oab, for virtual phone, virtual video. This has been fully explained to the patient, who indicates understanding. An After Visit Summary was printed and given to the patient.           Marry Alvarez MD

## 2020-07-20 NOTE — PATIENT INSTRUCTIONS
Bladder Training: Care Instructions  Your Care Instructions     Bladder training is used to treat urge incontinence and stress incontinence. Urge incontinence means that the need to urinate comes on so fast that you can't get to a toilet in time. Stress incontinence means that you leak urine because of pressure on your bladder. For example, it may happen when you laugh, cough, or lift something heavy. Bladder training can increase how long you can wait before you have to urinate. It can also help your bladder hold more urine. And it can give you better control over the urge to urinate. It is important to remember that bladder training takes a few weeks to a few months to make a difference. You may not see results right away, but don't give up. Follow-up care is a key part of your treatment and safety. Be sure to make and go to all appointments, and call your doctor if you are having problems. It's also a good idea to know your test results and keep a list of the medicines you take. How can you care for yourself at home? Work with your doctor to come up with a bladder training program that is right for you. You may use one or more of the following methods. Delayed urination  · In the beginning, try to keep from urinating for 5 minutes after you first feel the need to go. · While you wait, take deep, slow breaths to relax. Kegel exercises can also help you delay the need to go to the bathroom. · After some practice, when you can easily wait 5 minutes to urinate, try to wait 10 minutes before you urinate. · Slowly increase the waiting period until you are able to control when you have to urinate. Scheduled urination  · Empty your bladder when you first wake up in the morning. · Schedule times throughout the day when you will urinate. · Start by going to the bathroom every hour, even if you don't need to go. · Slowly increase the time between trips to the bathroom.   · When you have found a schedule that works well for you, keep doing it. · If you wake up during the night and have to urinate, do it. Apply your schedule to waking hours only. Kegel exercises  These tighten and strengthen pelvic muscles, which can help you control the flow of urine. To do Kegel exercises:  · Squeeze the same muscles you would use to stop your urine. Your belly and thighs should not move. · Hold the squeeze for 3 seconds, and then relax for 3 seconds. · Start with 3 seconds. Then add 1 second each week until you are able to squeeze for 10 seconds. · Repeat the exercise 10 to 15 times a session. Do three or more sessions a day. When should you call for help? Watch closely for changes in your health, and be sure to contact your doctor if:  · Your incontinence is getting worse. · You do not get better as expected. Where can you learn more? Go to http://alvin-rogelio.info/  Enter B229 in the search box to learn more about \"Bladder Training: Care Instructions. \"  Current as of: August 22, 2019               Content Version: 12.5  © 1965-4220 [employer]. Care instructions adapted under license by [employer] (which disclaims liability or warranty for this information). If you have questions about a medical condition or this instruction, always ask your healthcare professional. [address] any warranty or liability for your use of this information.

## 2020-07-29 NOTE — TELEPHONE ENCOUNTER
Patient states that she was told by her pharmacy that they needed additional information in order for them to fill her medication for Detrol. patoent not sure if a PA is needed.

## 2020-07-30 NOTE — TELEPHONE ENCOUNTER
Per [address], Pt has step therapy where she has to try Oxybutynin, Myrbetriq, Toviaz, Trospium before she can get Detrol. Please advise.        Pt's pharmacy insure is [facility]

## 2020-07-30 NOTE — TELEPHONE ENCOUNTER
Requested Prescriptions     Pending Prescriptions Disp Refills    montelukast (Singulair) 10 mg tablet        Sig: Take 1 Tab by mouth daily.  omeprazole (PRILOSEC) 40 mg capsule 60 Cap 1     Sig: Take 1 Cap by mouth two (2) times a day. Patient is our of medications and requesting to have filled today. She has to have these filled before her cancer treatment on Friday.

## 2020-07-30 NOTE — TELEPHONE ENCOUNTER
Singulair is listed as historical. Please sign if appropriate. Last Visit: 7/20/20 with MD Slade Browning  Next Appointment: 10/22/20 with MD Slade Browning  Previous Refill Encounter(s): 2/18/20 Prilosec #60 with 1 refill    Requested Prescriptions     Pending Prescriptions Disp Refills    montelukast (Singulair) 10 mg tablet 90 Tab 0     Sig: Take 1 Tab by mouth daily.  omeprazole (PRILOSEC) 40 mg capsule 60 Cap 1     Sig: Take 1 Cap by mouth two (2) times a day.

## 2020-09-25 NOTE — TELEPHONE ENCOUNTER
Patient left a voice message on the nurse triage line. I attempted to contact patient. I left a voice mail to have pt return the call.

## 2020-10-13 NOTE — H&P (VIEW-ONLY)
[employer] [facility] General Surgery Subjective: HPI: Patient is a very pleasant 23-year-old female with a past medical history remarkable for morbid obesity with BMI 35.70 kg per metered square, hypertension, history of tobacco abuse, history of meningioma, hypothyroidism status post thyroidectomy, migraine headaches, depression, anxiety, benign paroxysmal positional vertigo and multiple myeloma. Patient is referred for evaluation and management of newly diagnosed left breast 12 o'clock position retroareolar intraductal papilloma. Patient denies any unintentional weight loss or breast pain or nipple drainage or discharge. 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 This was 3 years ago. She sees the [facility] oncology group. Patient Active Problem List  
 Diagnosis Date Noted  Multiple [address] ([facility] [address]  Fatigue 02/05/2018  Rib pain on left side 02/05/2018  History of tobacco abuse 08/21/2017  Post-menopausal osteoporosis 04/27/2017  Spondylolisthesis of lumbar region 01/21/2016  History of thyroidectomy 05/20/2013  Meningioma ([facility] [address] 05/06/2013  BPPV (benign paroxysmal positional vertigo) 08/18/2010  Insomnia 08/03/2010  Migraines 06/22/2010  
 HTN (hypertension) 05/18/2010  Hypothyroid 05/18/2010  Arthritis 05/18/2010  Depression 05/18/2010  Anxiety 05/18/2010  Seasonal allergic rhinitis 05/18/2010 Past Medical History:  
Diagnosis Date  Anxiety 5/18/2010  Arthritis OA spine, left knee  Depression 5/18/2010  
 Headache(784.0)   
 migraines  HTN (hypertension) 5/18/2010  Hypothyroid 5/18/2010  Multiple myeloma ([facility] [address]  Osteopenia  Rib fractures 07/17/2017  
 r/t fall  Seasonal allergic rhinitis 5/18/2010  Seasonal allergies  Spondylolisthesis of lumbar region 1/21/2016 Dr. Percy Frank Shall ortho  Thyroid disease   
 hypothyroid,  thromeagaly Past Surgical History:  
Procedure Laterality Date  BREAST SURGERY PROCEDURE UNLISTED    
 breast cyst s/p biopsy 2008  COLONOSCOPY N/A 9/6/2017 COLONOSCOPY performed by Sid Pierre MD at [address] HX CATARACT REMOVAL  8/2011  
 [facility]  HX CATARACT REMOVAL  8/2011  
 right eye 2 weeks apart from left [facility]. Dr. Romy Winter  HX COLONOSCOPY  2007  HX HEENT    
 partical parathyroidectomy  HX ORTHOPAEDIC    
 rotator cuff repqir 10/1996  
 NEUROLOGICAL PROCEDURE UNLISTED  5-20-13  
 meninginoma Dr. Cheyenne Leahy Family History Problem Relation Age of Onset  Cancer Brother   
     brain Social History Tobacco Use  Smoking status: Former Smoker Packs/day: 0.25 Years: 40.00 Pack years: 10.00 Types: Cigarettes Start date: 8/11/1970 Last attempt to quit: 8/17/2017 Years since quitting: 3.1  Smokeless tobacco: Never Used  Tobacco comment: patient is smoking one cigarette periodically Substance Use Topics  Alcohol use: Yes Comment: wine for communion only Allergies Allergen Reactions  Iodine Hives, Rash and Other (comments) Feels like Chest caving in. Rash LL lumbosacral area  Fosamax [Alendronate] Itching  
  rash Prior to Admission medications Medication Sig Start Date End Date Taking? Authorizing Provider  
oxybutynin chloride XL (DITROPAN XL) 10 mg CR tablet Take 1 Tab by mouth daily. 8/7/20  Yes Marie Officer, MD  
montelukast (Singulair) 10 mg tablet Take 1 Tab by mouth daily. 7/31/20  Yes Floyce Officer, MD  
omeprazole (PRILOSEC) 40 mg capsule Take 1 Cap by mouth two (2) times a day. 7/31/20  Yes Eladioyce Officer, MD  
Biotin 2,500 mcg cap Take  by mouth.    Yes Provider, Historical  
 pomalidomide (POMALYST PO) Take  by mouth. Yes Provider, Historical  
hydroCHLOROthiazide (HYDRODIURIL) 25 mg tablet TAKE 1 TABLET BY MOUTH DAILY 6/23/20  Yes Leidy Murdock MD  
Ventolin HFA 90 mcg/actuation inhaler Take 2 Puffs by inhalation every four (4) hours as needed for Wheezing. 6/22/20  Yes Leidy Murdock MD  
budesonide-formoteroL (Symbicort) 80-4.5 mcg/actuation HFAA Take 2 Puffs by inhalation two (2) times a day. 6/22/20  Yes Leidy Murdock MD  
dexAMETHasone (DECADRON) 4 mg tablet CYCLE 1 TAKE ON DAYS 9 16 AND 23. CYCLE 2 TAKE ON DAYS 2 19 16 AND 23. TOTAL DAILY DOSE IS 40MG 1/28/20  Yes Provider, Historical  
acetaminophen (TYLENOL) 500 mg tablet Take  by mouth every six (6) hours as needed for Pain. Yes Provider, Historical  
sertraline (ZOLOFT) 100 mg tablet Take 1 Tab by mouth daily. 12/16/19  Yes Sedrick Neal NP  
ARMOUR THYROID 120 mg tab Take 90 mg by mouth daily. 4/19/19  Yes Provider, Historical  
ferrous gluconate 324 mg (37.5 mg iron) tablet Take 1 Tab by mouth Daily (before breakfast). 3/4/19  Yes Sedrick Neal NP  
vit P13-[id number] calcium-vit B6 (FOLTX) 2-1.13-25 mg tablet Take 1 Tab by mouth daily. 2/5/18  Yes Khushi Page, DO Cholecalciferol, Vitamin D3, (VITAMIN D3) 2,000 unit cap capsule Take  by Mouth. Yes Provider, Historical  
CALCIUM CITRATE (CITRACAL PO) Take 1 Tab by mouth daily. 4/19/10  Yes Provider, Historical  
FLAXSEED PO Take 1 Tab by mouth daily. 4/19/10  Yes Provider, Historical  
 
 
Review of Systems:   
14 systems were reviewed. The results are as above in the HPI and otherwise negative. Objective:  
 
Vitals:  
 10/13/20 1127 BP: 105/62 Pulse: 76 Resp: 19 Temp: 97.7 °F (36.5 °C) SpO2: 98% Weight: 94.3 kg (208 lb) Height: 5' 4\" (1.626 m) Physical Exam: 
GENERAL: alert, cooperative, no distress, appears stated age, EYE: conjunctivae/corneas clear. PERRL, EOM's intact. THROAT & NECK: normal and no erythema or exudates noted. ,   
LYMPHATIC: Cervical, supraclavicular, and axillary nodes normal. ,  
LUNG: clear to auscultation bilaterally, HEART: regular rate and rhythm, S1, S2 normal, no murmur, click, rub or gallop, BREASTS: Bra size is 44 DDD Left: No dimpling, discoloration, nipple inversion or retractions. No axillary or supraclavicular lymphadenopathy. No mass Right: No dimpling, discoloration, nipple inversion or retractions. No axillary or supraclavicular lymphadenopathy. No mass ABDOMEN: soft, non-tender. Bowel sounds normal. No masses,  no organomegaly, EXTREMITIES:  extremities normal, atraumatic, no cyanosis or edema, SKIN: Normal., NEUROLOGIC: AOx3. Cranial nerves 2-12 and sensation grossly intact. ,  
 
Data Review:  to be done Impression: · Patient with intraductal papilloma retroareolar left breast. 
 
Plan: · Tag localized excisional biopsy left breast 
· Consent on chart · Preoperative orders written

## 2020-10-13 NOTE — PROGRESS NOTES
[facility]  General Surgery    Subjective:      HPI: Patient is a very pleasant 58-year-old female with a past medical history remarkable for morbid obesity with BMI 35.70 kg per metered square, hypertension, history of tobacco abuse, history of meningioma, hypothyroidism status post thyroidectomy, migraine headaches, depression, anxiety, benign paroxysmal positional vertigo and multiple myeloma. Patient is referred for evaluation and management of newly diagnosed left breast 12 o'clock position retroareolar intraductal papilloma. Patient denies any unintentional weight loss or breast pain or nipple drainage or discharge. 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 This was 3 years ago. She sees the [facility] oncology group.     Patient Active Problem List    Diagnosis Date Noted    [facility] myeloma ([facility] [address]     Fatigue 02/05/2018    Rib pain on left side 02/05/2018    History of tobacco abuse 08/21/2017    Post-menopausal osteoporosis 04/27/2017    Spondylolisthesis of lumbar region 01/21/2016    History of thyroidectomy 05/20/2013    Meningioma ([address] 05/06/2013    BPPV (benign paroxysmal positional vertigo) 08/18/2010    Insomnia 08/03/2010    Migraines 06/22/2010    HTN (hypertension) 05/18/2010    Hypothyroid 05/18/2010    Arthritis 05/18/2010    Depression 05/18/2010    Anxiety 05/18/2010    Seasonal allergic rhinitis 05/18/2010     Past Medical History:   Diagnosis Date    Anxiety 5/18/2010    Arthritis     OA spine, left knee    Depression 5/18/2010    Headache(784.0)     migraines    HTN (hypertension) 5/18/2010    Hypothyroid 5/18/2010    Multiple myeloma ([facility] [address]     Osteopenia     Rib fractures 07/17/2017    r/t fall    Seasonal allergic rhinitis 5/18/2010    Seasonal allergies     Spondylolisthesis of lumbar region 1/21/2016 Dr. Aj Brown Shall ortho    Thyroid disease     hypothyroid,  thromeagaly      Past Surgical History:   Procedure Laterality Date    BREAST SURGERY PROCEDURE UNLISTED      breast cyst s/p biopsy 2008    COLONOSCOPY N/A 9/6/2017    COLONOSCOPY performed by Karen Castellanos MD at [address]  8/2011    [facility]  HX CATARACT REMOVAL  8/2011    right eye 2 weeks apart from left [facility]. Dr. Enzo Virgen HX COLONOSCOPY  2007    HX HEENT      partical parathyroidectomy     HX ORTHOPAEDIC      rotator cuff repqir 10/1996    NEUROLOGICAL PROCEDURE UNLISTED  5-20-13    [facility] Dr. Tameka Gonzalez      Family History   Problem Relation Age of Onset    Cancer Brother         brain      Social History     Tobacco Use    Smoking status: Former Smoker     Packs/day: 0.25     Years: 40.00     Pack years: 10.00     Types: Cigarettes     Start date: 8/11/1970     Last attempt to quit: 8/17/2017     Years since quitting: 3.1    Smokeless tobacco: Never Used    Tobacco comment: patient is smoking one cigarette periodically   Substance Use Topics    Alcohol use: Yes     Comment: wine for communion only      Allergies   Allergen Reactions    Iodine Hives, Rash and Other (comments)     Feels like Chest caving in. Rash LL lumbosacral area    Fosamax [Alendronate] Itching     rash       Prior to Admission medications    Medication Sig Start Date End Date Taking? Authorizing Provider   oxybutynin chloride XL (DITROPAN XL) 10 mg CR tablet Take 1 Tab by mouth daily. 8/7/20  Yes Giselle Stokes MD   montelukast (Singulair) 10 mg tablet Take 1 Tab by mouth daily. 7/31/20  Yes Giselle Stokes MD   omeprazole (PRILOSEC) 40 mg capsule Take 1 Cap by mouth two (2) times a day. 7/31/20  Yes Giselle Stokes MD   Biotin 2,500 mcg cap Take  by mouth. Yes Provider, Historical   pomalidomide (POMALYST PO) Take  by mouth.    Yes Provider, Historical   hydroCHLOROthiazide (HYDRODIURIL) 25 mg tablet TAKE 1 TABLET BY MOUTH DAILY 6/23/20  Yes Guillaume Valladares MD   Ventolin HFA 90 mcg/actuation inhaler Take 2 Puffs by inhalation every four (4) hours as needed for Wheezing. 6/22/20  Yes Guillaume Valladares MD   budesonide-formoteroL (Symbicort) 80-4.5 mcg/actuation HFAA Take 2 Puffs by inhalation two (2) times a day. 6/22/20  Yes Guillaume Valladares MD   dexAMETHasone (DECADRON) 4 mg tablet CYCLE 1 TAKE ON DAYS 9 16 AND 23. CYCLE 2 TAKE ON DAYS 2 19 16 AND 23. TOTAL DAILY DOSE IS 40MG 1/28/20  Yes Provider, Historical   acetaminophen (TYLENOL) 500 mg tablet Take  by mouth every six (6) hours as needed for Pain. Yes Provider, Historical   sertraline (ZOLOFT) 100 mg tablet Take 1 Tab by mouth daily. 12/16/19  Yes Mercedes Neal NP   ARMOUR THYROID 120 mg tab Take 90 mg by mouth daily. 4/19/19  Yes Provider, Historical   ferrous gluconate 324 mg (37.5 mg iron) tablet Take 1 Tab by mouth Daily (before breakfast). 3/4/19  Yes Mercedes Neal NP   vit A59-ttuzddyajkju calcium-vit B6 (FOLTX) 2-1.13-25 mg tablet Take 1 Tab by mouth daily. 2/5/18  Yes Ehsan Rojas, DO   Cholecalciferol, Vitamin D3, (VITAMIN D3) 2,000 unit cap capsule Take  by Mouth. Yes Provider, Historical   CALCIUM CITRATE (CITRACAL PO) Take 1 Tab by mouth daily. 4/19/10  Yes Provider, Historical   FLAXSEED PO Take 1 Tab by mouth daily. 4/19/10  Yes Provider, Historical       Review of Systems:    14 systems were reviewed. The results are as above in the HPI and otherwise negative. Objective:     Vitals:    10/13/20 1127   BP: 105/62   Pulse: 76   Resp: 19   Temp: 97.7 °F (36.5 °C)   SpO2: 98%   Weight: 94.3 kg (208 lb)   Height: 5' 4\" (1.626 m)       Physical Exam:  GENERAL: alert, cooperative, no distress, appears stated age,   EYE: conjunctivae/corneas clear. PERRL, EOM's intact.   THROAT & NECK: normal and no erythema or exudates noted. ,    LYMPHATIC: Cervical, supraclavicular, and axillary nodes normal. ,   LUNG: clear to auscultation bilaterally,   HEART: regular rate and rhythm, S1, S2 normal, no murmur, click, rub or gallop,   BREASTS: Bra size is 44 DDD  Left: No dimpling, discoloration, nipple inversion or retractions. No axillary or supraclavicular lymphadenopathy. No mass  Right: No dimpling, discoloration, nipple inversion or retractions. No axillary or supraclavicular lymphadenopathy. No mass  ABDOMEN: soft, non-tender. Bowel sounds normal. No masses,  no organomegaly,   EXTREMITIES:  extremities normal, atraumatic, no cyanosis or edema,   SKIN: Normal.,   NEUROLOGIC: AOx3. Cranial nerves 2-12 and sensation grossly intact. ,     Data Review:  to be done    Impression:     · Patient with intraductal papilloma retroareolar left breast.    Plan:     · Tag localized excisional biopsy left breast  · Consent on chart  · Preoperative orders written

## 2020-10-13 NOTE — PATIENT INSTRUCTIONS
Open Breast Biopsy: What to Expect at Home Your Recovery An open breast biopsy is surgery to remove abnormal breast tissue. The breast tissue will be sent to a lab, where a doctor will look at the tissue under a microscope to check for breast cancer. Your doctor may have some answers right away. But it can take up to 1 to 2 weeks to get the final results. Your doctor will discuss the results with you. For a few days after the surgery, you will probably feel tired and have some pain. The skin around the cut (incision) may feel firm, swollen, and tender. The area may be bruised. Tenderness should go away in about a week, and the bruising will fade within two weeks. Firmness and swelling may last 6 to 8 weeks. Your incision may have been closed with strips of tape or stitches. If you have strips of tape on the incision, leave the tape on for a week or until it falls off. If you have stitches, your doctor will remove them in about a week. This care sheet gives you a general idea about how long it will take for you to recover. But each person recovers at a different pace. Follow the steps below to get better as quickly as possible. How can you care for yourself at home? Activity 
  · Rest when you feel tired. Getting enough sleep will help you recover.  
  · Try to walk each day. Start by walking a little more than you did the day before. Bit by bit, increase the amount you walk. Walking boosts blood flow and helps prevent pneumonia and constipation.  
  · For 2 weeks, avoid strenuous activities that put pressure on your chest or that involve vigorous movement of your upper body and arm on the side of the biopsy. Examples of these might include strenuous housework, holding an active child, jogging, or aerobic exercise.  
  · For 2 weeks, avoid lifting anything that would make you strain.  This may include heavy grocery bags and milk containers, a heavy briefcase or backpack, cat litter or dog food bags, a vacuum , or a child.  
  · Ask your doctor when you can drive again.  
  · You will probably need to take 1 or 2 days off from work. This depends on the type of work you do and how you feel. Diet 
  · You can eat your normal diet. If your stomach is upset, try bland, low-fat foods like plain rice, broiled chicken, toast, and yogurt. Medicines 
  · Your doctor will tell you if and when you can restart your medicines. He or she will also give you instructions about taking any new medicines.  
  · If you take aspirin or some other blood thinner, ask your doctor if and when to start taking it again. Make sure that you understand exactly what your doctor wants you to do.  
  · Be safe with medicines. Take pain medicines exactly as directed. ? If the doctor gave you a prescription medicine for pain, take it as prescribed. ? If you are not taking a prescription pain medicine, ask your doctor if you can take an over-the-counter medicine.  
  · If you think your pain medicine is making you sick to your stomach: 
? Take your medicine after meals (unless your doctor has told you not to). ? Ask your doctor for a different pain medicine.  
  · If your doctor prescribed antibiotics, take them as directed. Do not stop taking them just because you feel better. You need to take the full course of antibiotics. Incision care 
  · If you have strips of tape on the incision, leave the tape on for a week or until it falls off.  
  · Wash the area daily with warm, soapy water, and pat it dry. Don't use hydrogen peroxide or alcohol, which can slow healing. You may cover the area with a gauze bandage if it weeps or rubs against clothing. Change the bandage every day.  
  · Keep the area clean and dry. Other instructions 
  · For the first 3 days after surgery, wear a supportive bra all the time, even at night. Follow-up care is a key part of your treatment and safety. Be sure to make and go to all appointments, and call your doctor if you are having problems. It's also a good idea to know your test results and keep a list of the medicines you take. When should you call for help? Call 911 anytime you think you may need emergency care. For example, call if: 
  · You passed out (lost consciousness).  
  · You have chest pain, are short of breath, or cough up blood. Call your doctor now or seek immediate medical care if: 
  · You are sick to your stomach or cannot drink fluids.  
  · You have pain that does not get better after you take pain medicine.  
  · You cannot pass stools or gas.  
  · You have signs of a blood clot in your leg (called a deep vein thrombosis), such as: 
? Pain in your calf, back of the knee, thigh, or groin. ? Redness or swelling in your leg.  
  · You have signs of infection, such as: 
? Increased pain, swelling, warmth, or redness. ? Red streaks leading from the incision. ? Pus draining from the incision. ? A fever.  
  · You have loose stitches, or your incision comes open.  
  · Bright red blood has soaked through the bandage over your incision. Watch closely for changes in your health, and be sure to contact your doctor if: 
  · You have any problems.  
  · You have new or worse swelling or pain in your arm. Where can you learn more? Go to http://www.gray.com/ Enter G759 in the search box to learn more about \"Open Breast Biopsy: What to Expect at Home. \" Current as of: April 29, 2020               Content Version: 12.6 © 6458-8792 [employer]. Care instructions adapted under license by [employer] (which disclaims liability or warranty for this information).  If you have questions about a medical condition or this instruction, always ask your healthcare professional. [employer]oc[employer] disclaims any warranty or liability for your use of this information.

## 2020-10-14 NOTE — TELEPHONE ENCOUNTER
Pt called with , currently having pains beginning behind her right ear and radiating down her shoulder and across her chest. Forwarded to nurse for triage.    Please adv [phone]

## 2020-10-20 NOTE — TELEPHONE ENCOUNTER
Patient states that the pain that radiates behind her right ear and shoulders across her chest  last for about 15 mins. Patient Denies SOB, dizziness, blurry vision, sweating, tingling and numbness of fingers, toes and face. Patient was advised to seek medical care at the ED when next flare occur.

## 2020-10-22 NOTE — PROGRESS NOTES
Jose Mix is a 77 y.o. female who was seen by synchronous (real-time) audio-video technology on 10/22/2020 for Other (OAB)        Assessment & Plan:   Diagnoses and all orders for this visit:    1. OAB (overactive bladder)    2. Mild episode of recurrent major depressive disorder ([facility] [address]    Other orders  -     mirabegron ER (Myrbetriq) 25 mg ER tablet; Take 1 Tab by mouth daily. As above, not controlled   treatment plan as listed below  Orders Placed This Encounter    mirabegron ER (Myrbetriq) 25 mg ER tablet     Start myrbetric as ordered  Follow-up and Dispositions    · Return in about 3 months (around 1/22/2021). [facility] and pt has been advised of same. This has been fully explained to the patient, who indicates understanding. Labs at next visit. 712  Subjective:     Pt has continued OAB sx  She has to wear multiple incontinence pads for protection  Ditropan worked for only about a week; Of note, she has had some elevated blood sugars in the past. Need to rule out this as a source of patient's symptoms. Lab Results   Component Value Date/Time    Glucose 118 (H) 05/19/2015 03:37 PM         She needs to rest and relax in order to avoid dyspnea on exertion    Feels a pressure on her neck behind her ears and radiates to her chest and down into her abdomen. I have advised pt to present to the ED for ANY CHEST PAIN symptoms. She voiced understanding. Otherwise she feels \" great\"    She is on zoloft for depression; this is relatively stable. BP Readings from Last 3 Encounters:   10/13/20 105/62   07/20/20 139/73   02/18/20 110/64         Prior to Admission medications    Medication Sig Start Date End Date Taking? Authorizing Provider   mirabegron ER (Myrbetriq) 25 mg ER tablet Take 1 Tab by mouth daily. 10/22/20  Yes Nesha Linton MD   montelukast (Singulair) 10 mg tablet Take 1 Tab by mouth daily.  7/31/20  Yes Nesha Linton MD   omeprazole (PRILOSEC) 40 mg capsule Take 1 Cap by mouth two (2) times a day. 7/31/20  Yes Lorena Halsted, MD   Biotin 2,500 mcg cap Take  by mouth. Yes Provider, Historical   pomalidomide (POMALYST PO) Take  by mouth. Yes Provider, Historical   hydroCHLOROthiazide (HYDRODIURIL) 25 mg tablet TAKE 1 TABLET BY MOUTH DAILY 6/23/20  Yes Lorena Halsted, MD   Ventolin HFA 90 mcg/actuation inhaler Take 2 Puffs by inhalation every four (4) hours as needed for Wheezing. 6/22/20  Yes Lorena Halsted, MD   budesonide-formoteroL (Symbicort) 80-4.5 mcg/actuation HFAA Take 2 Puffs by inhalation two (2) times a day. 6/22/20  Yes Lorena Halsted, MD   dexAMETHasone (DECADRON) 4 mg tablet CYCLE 1 TAKE ON DAYS 9 16 AND 23. CYCLE 2 TAKE ON DAYS 2 19 16 AND 23. TOTAL DAILY DOSE IS 40MG 1/28/20  Yes Provider, Historical   acetaminophen (TYLENOL) 500 mg tablet Take  by mouth every six (6) hours as needed for Pain. Yes Provider, Historical   sertraline (ZOLOFT) 100 mg tablet Take 1 Tab by mouth daily. 12/16/19  Yes Joseph Neal NP   ARMOUR THYROID 120 mg tab Take 90 mg by mouth daily. 4/19/19  Yes Provider, Historical   ferrous gluconate 324 mg (37.5 mg iron) tablet Take 1 Tab by mouth Daily (before breakfast). 3/4/19  Yes Joseph Neal, FRANCK   vit B12-levomefolate calcium-vit B6 (FOLTX) 2-1.13-25 mg tablet Take 1 Tab by mouth daily. 2/5/18  Yes Ehsan Rojas S, DO   Cholecalciferol, Vitamin D3, (VITAMIN D3) 2,000 unit cap capsule Take  by Mouth. Yes Provider, Historical   CALCIUM CITRATE (CITRACAL PO) Take 1 Tab by mouth daily. 4/19/10  Yes Provider, Historical   FLAXSEED PO Take 1 Tab by mouth daily.  4/19/10  Yes Provider, Historical     Patient Active Problem List    Diagnosis Date Noted    Multiple myeloma ([facility] [address]     Fatigue 02/05/2018    Rib pain on left side 02/05/2018    History of tobacco abuse 08/21/2017    Post-menopausal osteoporosis 04/27/2017    Spondylolisthesis of lumbar region 01/21/2016    History of thyroidectomy 05/20/2013    Meningioma ([facility] [address] 05/06/2013    BPPV (benign paroxysmal positional vertigo) 08/18/2010    Insomnia 08/03/2010    Migraines 06/22/2010    HTN (hypertension) 05/18/2010    Hypothyroid 05/18/2010    Arthritis 05/18/2010    Depression 05/18/2010    Anxiety 05/18/2010    Seasonal allergic rhinitis 05/18/2010     Allergies   Allergen Reactions    Iodine Hives, Rash and Other (comments)     Feels like Chest caving in. Rash LL lumbosacral area    Fosamax [Alendronate] Itching     rash     Past Medical History:   Diagnosis Date    Anxiety 5/18/2010    Arthritis     OA spine, left knee    Depression 5/18/2010    Headache(784.0)     migraines    HTN (hypertension) 5/18/2010    Hypothyroid 5/18/2010    [facility]     Osteopenia     Rib fractures 07/17/2017    r/t fall    Seasonal allergic rhinitis 5/18/2010    Seasonal allergies     Spondylolisthesis of lumbar region 1/21/2016    Dr. Federico Newman ortho    Thyroid disease     hypothyroid,  thromeagaly     Past Surgical History:   Procedure Laterality Date    BREAST SURGERY PROCEDURE UNLISTED      breast cyst s/p biopsy 2008    COLONOSCOPY N/A 9/6/2017    COLONOSCOPY performed by Conner Brand MD at [address] HX CATARACT REMOVAL  8[facility]2011    [facility]  HX CATARACT REMOVAL  8/2011    right eye 2 weeks apart from left [facility].  Dr. Naima Hollins HX COLONOSCOPY  2007    HX HEENT      partical parathyroidectomy     HX ORTHOPAEDIC      rotator cuff repqir 10/1996    NEUROLOGICAL PROCEDURE UNLISTED  5-20-13    Mary Courtney     Family History   Problem Relation Age of Onset    Cancer Brother         brain     Social History     Tobacco Use    Smoking status: Former Smoker     Packs/day: 0.25     Years: 40.00     Pack years: 10.00     Types: Cigarettes     Start date: 8/11/1970     Last attempt to quit: 8/17/2017 Years since quitting: 3.1    Smokeless tobacco: Never Used    Tobacco comment: patient is smoking one cigarette periodically   Substance Use Topics    Alcohol use: Yes     Comment: wine for communion only       Review of Systems   Constitutional: Negative for chills and fever. Respiratory: Negative for shortness of breath and wheezing. Cardiovascular: Negative for chest pain and palpitations. All other systems reviewed and are negative. Objective:   No flowsheet data found. General: alert, cooperative, no distress   Mental  status: normal mood, behavior, speech, dress, motor activity, and thought processes, able to follow commands   HENT: NCAT   Neck: no visualized mass   Resp: no respiratory distress   Neuro: no gross deficits   Skin: no discoloration or lesions of concern on visible areas   Psychiatric: normal affect, consistent with stated mood, no evidence of hallucinations     Additional exam findings: none      We discussed the expected course, resolution and complications of the diagnosis(es) in detail. Medication risks, benefits, costs, interactions, and alternatives were discussed as indicated. I advised her to contact the office if her condition worsens, changes or fails to improve as anticipated. She expressed understanding with the diagnosis(es) and plan. Carlos Barakat, who was evaluated through a patient-initiated, synchronous (real-time) audio-video encounter, and/or her healthcare decision maker, is aware that it is a billable service, with coverage as determined by her insurance carrier. She provided verbal consent to proceed: Yes, and patient identification was verified. It was conducted pursuant to the emergency declaration under the [address] [place] [address], [address] [place] [address] authority and the [employer] Act. A caregiver was present when appropriate. Ability to conduct physical exam was limited.  I was in the office. The patient was at home.       Nazia Mooney MD

## 2020-10-24 NOTE — PATIENT INSTRUCTIONS
Learning About Depression Screening What is depression screening? Depression screening is a way to see if you have depression symptoms. It may be done by a doctor or counselor. This screening is often part of a routine checkup. That's because your mental health is just as important as your physical health. Depression is a medical illness that affects how you feel, think, and act. You may: 
· Have less energy. · Lose interest in your daily activities. · Feel sad and grouchy for a long time. Depression is very common. It affects men and women of all ages. Many things can trigger depression. Some people become depressed after they have a stroke or find out they have a major illness like cancer or heart disease. The death of a loved one, a breakup, or changes in the natural brain chemicals may lead to depression. It can run in families. Most experts believe that a combination of family history (a person's genes) and stressful life events can cause depression. What happens during screening? You may be asked to fill out a form about your depression symptoms. You and the doctor will discuss your answers. The doctor may ask you more questions to learn more about how you think, act, and feel. What happens after screening? If you have signs of depression, your doctor will talk to you about your options. Doctors usually treat depression with medicines or counseling. Often, combining the two works best. Many people don't get help because they think that they'll get over the depression on their own. But people with depression may not get better unless they get treatment. Many people feel embarrassed or ashamed about having depression. But it isn't a sign of personal weakness. It's not a character flaw. A person who is depressed is not \"crazy. \" Depression is caused by changes in the brain. A serious symptom of depression is thinking about death or suicide.  If you or someone you care about talks about this or about feeling hopeless, get help right away. It's important to know that depression can be treated. The first step toward feeling better is often just seeing that the problem exists. Where can you learn more? Go to http://www.gray.com/ Enter T185 in the search box to learn more about \"Learning About Depression Screening. \" Current as of: January 31, 2020               Content Version: 12.6 © 2006-2020 [employer]. Care instructions adapted under license by [employer] (which disclaims liability or warranty for this information). If you have questions about a medical condition or this instruction, always ask your healthcare professional. [address] any warranty or liability for your use of this information.

## 2020-11-12 PROBLEM — D24.2 INTRADUCTAL PAPILLOMA OF BREAST, LEFT: Status: ACTIVE | Noted: 2020-01-01

## 2020-11-12 NOTE — OP NOTES
[address] [place] [address] [facility], [address] [facility]                                 OPERATIVE REPORT    PATIENT:    Geovanny Mcduffie  MRN:            [id number]      DATE:  2020  BILLIN  ROOM:        @BED@  ATTENDING:   Joao Maria MD  DICTATING:   Joao Maria MD      PREOPERATIVE DIAGNOSIS: intraductal papilloma left breast    POSTOPERATIVE DIAGNOSIS: Same    PROCEDURES PERFORMED: Tag localized excisional biopsy, left breast.    SURGEON: Harshal Rivas MD    ASSISTANT: Mimi Hoyos SA    ANESTHESIA: LMA general and local (.5% marcaine with epinephrine). SPECIMENS REMOVED: left breast excisional biopsy. FINDINGS: Specimen mammography is confirmative. ESTIMATED BLOOD LOSS: 10 mL. FLUID:  800 mL. IMPLANTS:  none    COMPLICATIONS:  none    DESCRIPTION OF PROCEDURE: The patient was identified in the holding area  with family where consent for Tag localized excisional biopsy of left  breast was verified. In the operating room, the patient was positioned  supine on the OR table. She did receive perioperative antibiotics. LMA  general anesthesia was induced. The patient's left breast was prepped and  draped in sterile fashion using chlorhexidine solution and sterile drapes. The time-out was performed to ensure correct procedure. The local  anesthetic was infiltrated into the skin and deep dermal tissues in the  vermilion of the areola from the 4 o'clock to the 8 o'clock position. The 15 blade was used to create an incision through skin into the breast  tissue in a curvilinear fashion along the vermillion. The electrocautery  was used to dissect down approximately 0.5 cm into the breast tissue. The  Localizer was used to guide dissection toward the Tag. Circumferential dissection of the  specimen with a margin of normal tissue was performed. Tag # [id number].   The specimen was marked with the a long stitch lateral, double stitch deep and short stitch superior. The specimen was sent to radiology where specimen mammography was performed  which confirms the area of interest is in the specimen. Additional local  anesthetic was infiltrated into the cavity. Electrocautery was used to  control bleeding within the cavity. The cavity was marked with hemoclips. The 3-0 Vicryl suture in interrupted  stitches were used to reapproximate the deep dermal tissues. 4-0 Monocryl  suture was used to reapproximate the skin in a running subcuticular  closure. Mastisol, steristrips and bandaids were used to create dressings. The patient tolerated the procedure very well. DISPOSITION: She was stable upon transport to the recovery room.                      Gisselle Dior MD

## 2020-11-12 NOTE — ANESTHESIA PREPROCEDURE EVALUATION
Relevant Problems   No relevant active problems       Anesthetic History   No history of anesthetic complications            Review of Systems / Medical History  Patient summary reviewed and pertinent labs reviewed    Pulmonary    COPD: mild               Neuro/Psych   Within defined limits           Cardiovascular    Hypertension              Exercise tolerance: >4 METS     GI/Hepatic/Renal  Within defined limits              Endo/Other      Hypothyroidism: well controlled  Morbid obesity     Other Findings   Comments:                  Physical Exam    Airway  Mallampati: II  TM Distance: 4 - 6 cm  Neck ROM: normal range of motion   Mouth opening: Normal     Cardiovascular  Regular rate and rhythm,  S1 and S2 normal,  no murmur, click, rub, or gallop  Rhythm: regular  Rate: normal         Dental    Dentition: Full lower dentures and Full upper dentures     Pulmonary  Breath sounds clear to auscultation               Abdominal  GI exam deferred       Other Findings            Anesthetic Plan    ASA: 3  Anesthesia type: general          Induction: Intravenous  Anesthetic plan and risks discussed with: Patient

## 2020-11-12 NOTE — ANESTHESIA POSTPROCEDURE EVALUATION
Procedure(s):  TAG LOCALIZED EXCISIONAL BIOPSY OF LEFT BREAST (HBV 11.3.20 @ 1000). general    Anesthesia Post Evaluation      Multimodal analgesia: multimodal analgesia used between 6 hours prior to anesthesia start to PACU discharge  Patient location during evaluation: bedside  Patient participation: complete - patient participated  Level of consciousness: awake  Pain management: adequate  Airway patency: patent  Anesthetic complications: no  Cardiovascular status: stable  Respiratory status: acceptable  Hydration status: acceptable  Post anesthesia nausea and vomiting:  controlled      INITIAL Post-op Vital signs:   Vitals Value Taken Time   /64 11/12/2020  9:32 AM   Temp 36.7 °C (98.1 °F) 11/12/2020  9:04 AM   Pulse 85 11/12/2020  9:36 AM   Resp 13 11/12/2020  9:36 AM   SpO2 96 % 11/12/2020  9:36 AM   Vitals shown include unvalidated device data.

## 2020-11-12 NOTE — INTERVAL H&P NOTE
Update History & Physical    The Patient's History and Physical of October 13, 2020 was reviewed with the patient and I examined the patient. There was no change. The surgical site was confirmed by the patient and me. Plan:  The risk, benefits, expected outcome, and alternative to the recommended procedure have been discussed with the patient. Patient understands and wants to proceed with the procedure.     Electronically signed by Gali Aaron MD on 11/12/2020 at 7:08 AM

## 2020-11-12 NOTE — DISCHARGE SUMMARY
4 Pat Márquez MD, [facility]  General Surgery  Discharge Summary     Patient ID:  Conor Bolivar  [id number]  77 y.o.  1945    Admit Date: 11/12/2020    Discharge Date: 11/12/2020    Admission Diagnoses: Intraductal papilloma of breast, left [D24.2]    Discharge Diagnoses:    Problem List as of 11/12/2020 Date Reviewed: 11/12/2020          Codes Class Noted - Resolved    Intraductal papilloma of breast, left ICD-10-CM: D24.2  ICD-9-CM: 217  11/12/2020 - Present        Multiple myeloma ([facility] [address] ICD-10-CM: C90.00  ICD-9-CM: 203.00  Unknown - Present        Fatigue ICD-10-CM: R53.83  ICD-9-CM: 780.79  2/5/2018 - Present        Rib pain on left side ICD-10-CM: R07.81  ICD-9-CM: 786.50  2/5/2018 - Present        History of tobacco abuse ICD-10-CM: Z87.891  ICD-9-CM: V15.82  8/21/2017 - Present    Overview Signed 8/21/2017  1:33 PM by Belinda Martinez     Just stopped smoking August 2017. Post-menopausal osteoporosis ICD-10-CM: M81.0  ICD-9-CM: 733.01  4/27/2017 - Present        Spondylolisthesis of lumbar region ICD-10-CM: M43.16  ICD-9-CM: 738.4  1/21/2016 - Present    Overview Signed 1/21/2016 12:14 PM by Yandy hill             History of thyroidectomy ICD-10-CM: Z90.09  ICD-9-CM: V15.29  5/20/2013 - Present        [facility] ICD-10-CM: D32.9  ICD-9-CM: 225.2  5/6/2013 - Present    Overview Addendum 1/24/2017  2:46 PM by Anna Linares at [address] [place] [address] ED visit 3-6-13 for increasing headache and found by MR head she was referred to Dr. Narinder Sifuentes.               BPPV (benign paroxysmal positional vertigo) ICD-10-CM: H81.10  ICD-9-CM: 386.11  8/18/2010 - Present    Overview Signed 8/18/2010 11:11 PM by Stephanie Dimas MD     Resolving with head positioning exercisies (epley maneuver) and antivert             Insomnia ICD-10-CM: G47.00  ICD-9-CM: 780.52  8/3/2010 - Present        Migraines ICD-10-CM: X10.994  ICD-9-CM: 346.90  6/22/2010 - Present        HTN (hypertension) ICD-10-CM: I10  ICD-9-CM: 401.9  5/18/2010 - Present        Hypothyroid ICD-10-CM: E03.9  ICD-9-CM: 244.9  5/18/2010 - Present        Arthritis ICD-10-CM: M19.90  ICD-9-CM: 716.90  5/18/2010 - Present        Depression ICD-10-CM: F32.9  ICD-9-CM: 115  5/18/2010 - Present    Overview Signed 5/18/2010 10:23 AM by Electa Bumpers a counselor             Anxiety ICD-10-CM: F41.9  ICD-9-CM: 300.00  5/18/2010 - Present        Seasonal allergic rhinitis ICD-10-CM: J30.2  ICD-9-CM: 477.9  5/18/2010 - Present        RESOLVED: Postoperative state ICD-10-CM: S98.864  ICD-9-CM: V45.89  5/20/2013 - 4/19/2016               Admission Condition: Good    Discharge Condition: Good    Last Procedure: Procedure(s):  TAG LOCALIZED EXCISIONAL BIOPSY OF LEFT BREAST (HBV 11.3.20 @ 1000)    Hospital Course:   Normal hospital course for this procedure. Consults: None    Significant Diagnostic Studies: None    Disposition: home    Patient Instructions:   Current Discharge Medication List      START taking these medications    Details   ibuprofen (MOTRIN) 600 mg tablet Take 1 Tab by mouth every six (6) hours. Qty: 28 Tab, Refills: 0      docusate sodium (COLACE) 100 mg capsule Take 1 Cap by mouth two (2) times a day for 90 days. Qty: 60 Cap, Refills: 2      bisacodyL 5 mg tab Take 5 mg by mouth daily. Qty: 3 Tab, Refills: 0      oxyCODONE IR (ROXICODONE) 5 mg immediate release tablet Take 1 Tab by mouth every four (4) hours as needed for Pain for up to 7 days. Max Daily Amount: 30 mg.  Qty: 20 Tab, Refills: 0    Associated Diagnoses: Postoperative pain         CONTINUE these medications which have CHANGED    Details   acetaminophen (TYLENOL) 325 mg tablet Take 2 Tabs by mouth every six (6) hours.  Indications: pain  Qty: 56 Tab, Refills: 1         CONTINUE these medications which have NOT CHANGED    Details   acyclovir (ZOVIRAX) 400 mg tablet Take 400 mg by mouth two (2) times a day. cyanocobalamin, vitamin B-12, (VITAMIN B-12 PO) Take  by mouth daily. travoprost (Travatan Z) 0.004 % ophthalmic solution Administer 1 Drop to both eyes every evening. loperamide HCl (IMODIUM A-D PO) Take  by mouth as needed. mirabegron ER (Myrbetriq) 25 mg ER tablet Take 1 Tab by mouth daily. Qty: 30 Tab, Refills: 6      omeprazole (PRILOSEC) 40 mg capsule Take 1 Cap by mouth two (2) times a day. Qty: 60 Cap, Refills: 1    Associated Diagnoses: H/O: UGI bleed      Biotin 2,500 mcg cap Take  by mouth.      pomalidomide (POMALYST PO) Take 3 mg by mouth. Daily for three weeks, off for one week      hydroCHLOROthiazide (HYDRODIURIL) 25 mg tablet TAKE 1 TABLET BY MOUTH DAILY  Qty: 90 Tab, Refills: 3    Associated Diagnoses: Essential hypertension      Ventolin HFA 90 mcg/actuation inhaler Take 2 Puffs by inhalation every four (4) hours as needed for Wheezing. Qty: 18 g, Refills: 0    Associated Diagnoses: Chronic obstructive pulmonary disease, unspecified COPD type ([facility] [address]      budesonide-formoteroL (Symbicort) 80-4.5 mcg/actuation HFAA Take 2 Puffs by inhalation two (2) times a day. Qty: 1 Inhaler, Refills: 3    Associated Diagnoses: Chronic obstructive pulmonary disease, unspecified COPD type ([facility])      dexAMETHasone (DECADRON) 4 mg tablet Takes 5 tablets every friday      sertraline (ZOLOFT) 100 mg tablet Take 1 Tab by mouth daily. Qty: 90 Tab, Refills: 1    Associated Diagnoses: Depression, unspecified depression type      ARMOUR THYROID 120 mg tab Take 90 mg by mouth daily. Refills: 3      ferrous gluconate 324 mg (37.5 mg iron) tablet Take 1 Tab by mouth Daily (before breakfast). Qty: 90 Tab, Refills: 1    Associated Diagnoses: Multiple myeloma, remission status unspecified ([facility])      Cholecalciferol, Vitamin D3, (VITAMIN D3) 2,000 unit cap capsule Take  by Mouth. CALCIUM CITRATE (CITRACAL PO) Take 1 Tab by mouth daily.       FLAXSEED PO Take 1 Tab by mouth daily. montelukast (Singulair) 10 mg tablet Take 1 Tab by mouth daily. Qty: 90 Tab, Refills: 0      vit B12-levomefolate calcium-vit B6 (FOLTX) 2-1.13-25 mg tablet Take 1 Tab by mouth daily. Qty: 30 Tab, Refills: 0    Associated Diagnoses: Fatigue, unspecified type           Activity: See surgical instructions  Diet: Low fat, Low cholesterol  Wound Care: As directed    Follow-up with Dr. Rufino Serrano in 2 weeks.   Follow-up tests/labs None    Signed:  Adele Sal MD  11/12/2020  9:19 AM

## 2020-11-12 NOTE — DISCHARGE INSTRUCTIONS
[place] [address] [facility]  Flores Reese MD, FACS  General Surgery    Pt may remove the dressing and shower in two days. Allow soap and water to run over the incision. No driving or operating heavy machinery while on narcotic pain medications. Please apply an ice pack to the operative site for 30 minutes 3 times daily to help reduce pain and swelling and the need for narcotic pain medication. No strenuous activity or contact sports for two weeks. No lifting greater than 15 lbs for 2 weeks. Call MD for any redness, swelling, bleeding or pus at the incision. Also call for any nausea, vomiting, increased pain or pain uncontrolled by pain medicine. DISCHARGE SUMMARY from Nurse  PATIENT INSTRUCTIONS:  After general anesthesia or intravenous sedation, for 24 hours or while taking prescription Narcotics:  · Limit your activities  · Do not drive and operate hazardous machinery  · Do not make important personal or business decisions  · Do  not drink alcoholic beverages  · If you have not urinated within 8 hours after discharge, please contact your surgeon on call. Report the following to your surgeon:  · Excessive pain, swelling, redness or odor of or around the surgical area  · Temperature over 100.5  · Nausea and vomiting lasting longer than 4 hours or if unable to take medications  · Any signs of decreased circulation or nerve impairment to extremity: change in color, persistent  numbness, tingling, coldness or increase pain  · Any questions    What to do at Home:  Recommended activity: Activity as tolerated and no driving for today. *  Please give a list of your current medications to your Primary Care Provider. *  Please update this list whenever your medications are discontinued, doses are      changed, or new medications (including over-the-counter products) are added. *  Please carry medication information at all times in case of emergency situations.     These are general instructions for a healthy lifestyle:    No smoking/ No tobacco products/ Avoid exposure to second hand smoke  Surgeon General's Warning:  Quitting smoking now greatly reduces serious risk to your health. Obesity, smoking, and sedentary lifestyle greatly increases your risk for illness    A healthy diet, regular physical exercise & weight monitoring are important for maintaining a healthy lifestyle    You may be retaining fluid if you have a history of heart failure or if you experience any of the following symptoms:  Weight gain of 3 pounds or more overnight or 5 pounds in a week, increased swelling in our hands or feet or shortness of breath while lying flat in bed. Please call your doctor as soon as you notice any of these symptoms; do not wait until your next office visit. The discharge information has been reviewed with the patient. The patient verbalized understanding. Discharge medications reviewed with the patient and appropriate educational materials and side effects teaching were provided. ___________________________________________________________________________________________________________________________________      Patient Education     Open Breast Biopsy: What to Expect at Home  Your Recovery  An open breast biopsy is surgery to remove abnormal breast tissue. The breast tissue will be sent to a lab, where a doctor will look at the tissue under a microscope to check for breast cancer. Your doctor may have some answers right away. But it can take up to 1 to 2 weeks to get the final results. Your doctor will discuss the results with you. For a few days after the surgery, you will probably feel tired and have some pain. The skin around the cut (incision) may feel firm, swollen, and tender. The area may be bruised. Tenderness should go away in about a week, and the bruising will fade within two weeks. Firmness and swelling may last 6 to 8 weeks.   Your incision may have been closed with strips of tape or stitches. If you have strips of tape on the incision, leave the tape on for a week or until it falls off. If you have stitches, your doctor will remove them in about a week. This care sheet gives you a general idea about how long it will take for you to recover. But each person recovers at a different pace. Follow the steps below to get better as quickly as possible. How can you care for yourself at home? Activity    · Rest when you feel tired. Getting enough sleep will help you recover.     · Try to walk each day. Start by walking a little more than you did the day before. Bit by bit, increase the amount you walk. Walking boosts blood flow and helps prevent pneumonia and constipation.     · For 2 weeks, avoid strenuous activities that put pressure on your chest or that involve vigorous movement of your upper body and arm on the side of the biopsy. Examples of these might include strenuous housework, holding an active child, jogging, or aerobic exercise.     · For 2 weeks, avoid lifting anything that would make you strain. This may include heavy grocery bags and milk containers, a heavy briefcase or backpack, cat litter or dog food bags, a vacuum , or a child.     · Ask your doctor when you can drive again.     · You will probably need to take 1 or 2 days off from work. This depends on the type of work you do and how you feel. Diet    · You can eat your normal diet. If your stomach is upset, try bland, low-fat foods like plain rice, broiled chicken, toast, and yogurt. Medicines    · Your doctor will tell you if and when you can restart your medicines. He or she will also give you instructions about taking any new medicines.     · If you take aspirin or some other blood thinner, ask your doctor if and when to start taking it again. Make sure that you understand exactly what your doctor wants you to do.     · Be safe with medicines. Take pain medicines exactly as directed.   ? If the doctor gave you a prescription medicine for pain, take it as prescribed. ? If you are not taking a prescription pain medicine, ask your doctor if you can take an over-the-counter medicine.     · If you think your pain medicine is making you sick to your stomach:  ? Take your medicine after meals (unless your doctor has told you not to). ? Ask your doctor for a different pain medicine.     · If your doctor prescribed antibiotics, take them as directed. Do not stop taking them just because you feel better. You need to take the full course of antibiotics. Incision care    · If you have strips of tape on the incision, leave the tape on for a week or until it falls off.     · Wash the area daily with warm, soapy water, and pat it dry. Don't use hydrogen peroxide or alcohol, which can slow healing. You may cover the area with a gauze bandage if it weeps or rubs against clothing. Change the bandage every day.     · Keep the area clean and dry. Other instructions    · For the first 3 days after surgery, wear a supportive bra all the time, even at night. Follow-up care is a key part of your treatment and safety. Be sure to make and go to all appointments, and call your doctor if you are having problems. It's also a good idea to know your test results and keep a list of the medicines you take. When should you call for help? Call 911 anytime you think you may need emergency care. For example, call if:    · You passed out (lost consciousness).     · You have chest pain, are short of breath, or cough up blood. Call your doctor now or seek immediate medical care if:    · You are sick to your stomach or cannot drink fluids.     · You have pain that does not get better after you take pain medicine.     · You cannot pass stools or gas.     · You have signs of a blood clot in your leg (called a deep vein thrombosis), such as:  ? Pain in your calf, back of the knee, thigh, or groin. ?  Redness or swelling in your leg.     · You have signs of infection, such as:  ? Increased pain, swelling, warmth, or redness. ? Red streaks leading from the incision. ? Pus draining from the incision. ? A fever.     · You have loose stitches, or your incision comes open.     · Bright red blood has soaked through the bandage over your incision. Watch closely for changes in your health, and be sure to contact your doctor if:    · You have any problems.     · You have new or worse swelling or pain in your arm. Where can you learn more? Go to http://www.gray.com/  Enter A708 in the search box to learn more about \"Open Breast Biopsy: What to Expect at Home. \"  Current as of: April 29, 2020               Content Version: 12.6  © 6047-0374 [employer]. Care instructions adapted under license by [employer] (which disclaims liability or warranty for this information). If you have questions about a medical condition or this instruction, always ask your healthcare professional. [place] [address] any warranty or liability for your use of this information.

## 2020-11-17 NOTE — PROGRESS NOTES
Tyrell Lyle is a 77 y.o. female who was seen by synchronous (real-time) audio-video technology on 11/17/2020 for Breathing Problem and Swelling        Assessment & Plan:   Diagnoses and all orders for this visit:    1. GUTIERREZ (dyspnea on exertion)  -     ECHO ADULT COMPLETE; Future    2. Peripheral edema  -     ECHO ADULT COMPLETE; Future    Other orders  -     azithromycin (Zithromax Z-Dejon) 250 mg tablet; Take two tablets PO on Day #1 and then 1 tab PO on days #2-5  -     methylPREDNISolone (MEDROL DOSEPACK) 4 mg tablet; Use as per package directions  -     furosemide (LASIX) 20 mg tablet; Take 1 Tab by mouth daily for 5 days. As above, not controlled   treatment plan as listed below  Orders Placed This Encounter    azithromycin (Zithromax Z-Dejon) 250 mg tablet    methylPREDNISolone (MEDROL DOSEPACK) 4 mg tablet    furosemide (LASIX) 20 mg tablet     Follow-up and Dispositions    · Return in about 1 week (around 11/24/2020) for GUTIERREZ, peripheral edema. This has been fully explained to the patient, who indicates understanding. [facility] and pt has been advised of same. 712  Subjective:   Pt has had some increased wheezing and swelling in her feet. She is fatigued and tired. States she is \" tired of going to all the doctors. \"  She has Multiple Myeloma Pt is having exertional dyspnea though somewhat mild. Pt has COPD. She has symbicort and albuterol. Pt was tested for COVID - 19 on 11/6/2020. This test was negative. She did not have these symptoms when she was tested. She does not feel SOB at this moment. She had a CXR last week as well. She is having no CP. She does not appear to be in any respiratory distress during this virtual visit  She is talking in complete sentences and is drinking a drink     Prior to Admission medications    Medication Sig Start Date End Date Taking? Authorizing Provider   acyclovir (ZOVIRAX) 400 mg tablet Take 400 mg by mouth two (2) times a day. Yes Provider, Historical   cyanocobalamin, vitamin B-12, (VITAMIN B-12 PO) Take  by mouth daily. Yes Provider, Historical   travoprost (Travatan Z) 0.004 % ophthalmic solution Administer 1 Drop to both eyes every evening. Yes Provider, Historical   acetaminophen (TYLENOL) 325 mg tablet Take 2 Tabs by mouth every six (6) hours. Indications: pain 11/12/20  Yes Jose Muhammad MD   ibuprofen (MOTRIN) 600 mg tablet Take 1 Tab by mouth every six (6) hours. 11/12/20  Yes Jose Muhammad MD   docusate sodium (COLACE) 100 mg capsule Take 1 Cap by mouth two (2) times a day for 90 days. 11/12/20 2/10/21 Yes Jose Muhammad MD   bisacodyL 5 mg tab Take 5 mg by mouth daily. 11/12/20  Yes Jose Muhammad MD   oxyCODONE IR (ROXICODONE) 5 mg immediate release tablet Take 1 Tab by mouth every four (4) hours as needed for Pain for up to 7 days. Max Daily Amount: 30 mg. 11/12/20 11/19/20 Yes Jose Muhammad MD   loperamide HCl (IMODIUM A-D PO) Take  by mouth as needed. Yes Provider, Historical   mirabegron ER (Myrbetriq) 25 mg ER tablet Take 1 Tab by mouth daily. 10/22/20  Yes William Roberson MD   montelukast (Singulair) 10 mg tablet Take 1 Tab by mouth daily. 7/31/20  Yes William Roberson MD   Biotin 2,500 mcg cap Take  by mouth. Yes Provider, Historical   pomalidomide (POMALYST PO) Take 3 mg by mouth. Daily for three weeks, off for one week   Yes Provider, Historical   hydroCHLOROthiazide (HYDRODIURIL) 25 mg tablet TAKE 1 TABLET BY MOUTH DAILY 6/23/20  Yes William Roberson MD   Ventolin HFA 90 mcg/actuation inhaler Take 2 Puffs by inhalation every four (4) hours as needed for Wheezing. 6/22/20  Yes William Roberson MD   budesonide-formoteroL (Symbicort) 80-4.5 mcg/actuation HFAA Take 2 Puffs by inhalation two (2) times a day.  6/22/20  Yes William Roberson MD   dexAMETHasone (DECADRON) 4 mg tablet Takes 5 tablets every friday 1/28/20  Yes Provider, Historical   sertraline (ZOLOFT) 100 mg tablet Take 1 Tab by mouth daily. 12/16/19  Yes Donato Neal NP   ARMOUR THYROID 120 mg tab Take 90 mg by mouth daily. 4/19/19  Yes Provider, Historical   vit B12-levomefolate calcium-vit B6 (FOLTX) 2-1.13-25 mg tablet Take 1 Tab by mouth daily. 2/5/18  Yes Ehsan Rojas,    Cholecalciferol, Vitamin D3, (VITAMIN D3) 2,000 unit cap capsule Take  by Mouth. Yes Provider, Historical   CALCIUM CITRATE (CITRACAL PO) Take 1 Tab by mouth daily. 4/19/10  Yes Provider, Historical   FLAXSEED PO Take 1 Tab by mouth daily. 4/19/10  Yes Provider, Historical   omeprazole (PRILOSEC) 40 mg capsule Take 1 Cap by mouth two (2) times a day. Patient taking differently: Take 40 mg by mouth daily. 7/31/20   Jorge Krishnan MD   ferrous gluconate 324 mg (37.5 mg iron) tablet Take 1 Tab by mouth Daily (before breakfast). 3/4/19   Donato Neal NP     Patient Active Problem List    Diagnosis Date Noted    Intraductal papilloma of breast, left 11/12/2020    Multiple myeloma ([facility]     Fatigue 02/05/2018    Rib pain on left side 02/05/2018    History of tobacco abuse 08/21/2017    Post-menopausal osteoporosis 04/27/2017    Spondylolisthesis of lumbar region 01/21/2016    History of thyroidectomy 05/20/2013    Meningioma ([facility] [address] [facility] 05/06/2013    BPPV (benign paroxysmal positional vertigo) 08/18/2010    Insomnia 08/03/2010    Migraines 06/22/2010    HTN (hypertension) 05/18/2010    Hypothyroid 05/18/2010    Arthritis 05/18/2010    Depression 05/18/2010    Anxiety 05/18/2010    Seasonal allergic rhinitis 05/18/2010     Allergies   Allergen Reactions    Iodine Hives, Rash and Other (comments)     Feels like Chest caving in.   Rash LL lumbosacral area    Fosamax [Alendronate] Itching     rash     Past Medical History:   Diagnosis Date    Anxiety 5/18/2010    Arthritis     OA spine, left knee    Chronic obstructive pulmonary disease ([address]     Depression 5/18/2010    Headache(784.0)     migraines    HTN (hypertension) 5/18/2010    Hypothyroid 5/18/2010    Multiple myeloma ([address]     Osteopenia     Rib fractures 07/17/2017    r/t fall    Seasonal allergic rhinitis 5/18/2010    Seasonal allergies     Spondylolisthesis of lumbar region 1/21/2016    Dr. Jon Menjivar [facility] ortho    Thyroid disease     hypothyroid,  thromeagaly     Past Surgical History:   Procedure Laterality Date    BREAST SURGERY PROCEDURE UNLISTED      breast cyst s/p biopsy 2008    COLONOSCOPY N/A 9/6/2017    COLONOSCOPY performed by Stan Dolan MD at [address] Left 11/12/2020    TAG LOCALIZED EXCISIONAL BIOPSY OF LEFT BREAST (HBV 11.3.20 @ 1000) performed by Joellen Sandifer, MD at [address] HX CATARACT REMOVAL  8/2011    [facility].  HX CATARACT REMOVAL  8/2011    right eye 2 weeks apart from left . Dr. Kedar Mayo HX COLONOSCOPY  2007    HX HEENT      partical parathyroidectomy     HX ORTHOPAEDIC      rotator cuff repqir 10/1996    NEUROLOGICAL PROCEDURE UNLISTED  5-20-13    Tammy Dunn     Family History   Problem Relation Age of Onset    Cancer Brother         brain     Social History     Tobacco Use    Smoking status: Former Smoker     Packs/day: 0.25     Years: 40.00     Pack years: 10.00     Types: Cigarettes     Start date: 8/11/1970     Last attempt to quit: 8/17/2017     Years since quitting: 3.2    Smokeless tobacco: Never Used    Tobacco comment: patient is smoking one cigarette periodically   Substance Use Topics    Alcohol use: Yes     Comment: wine for communion only       Review of Systems   Constitutional: Negative for chills. Respiratory: Negative for hemoptysis. Cardiovascular: Negative for orthopnea. Objective:   No flowsheet data found.    General: alert, cooperative, no distress   Mental  status: normal mood, behavior, speech, dress, motor activity, and thought processes, able to follow commands   HENT: NCAT   Neck: no visualized mass   Resp: no respiratory distress   Neuro: no gross deficits   Skin: no discoloration or lesions of concern on visible areas   Psychiatric: normal affect, consistent with stated mood, no evidence of hallucinations     Additional exam findings: none      We discussed the expected course, resolution and complications of the diagnosis(es) in detail. Medication risks, benefits, costs, interactions, and alternatives were discussed as indicated. I advised her to contact the office if her condition worsens, changes or fails to improve as anticipated. She expressed understanding with the diagnosis(es) and plan. Steve Rodriguez, who was evaluated through a patient-initiated, synchronous (real-time) audio-video encounter, and/or her healthcare decision maker, is aware that it is a billable service, with coverage as determined by her insurance carrier. She provided verbal consent to proceed: Yes, and patient identification was verified. It was conducted pursuant to the emergency declaration under the [address] [place] [address], [address] [place] [address] authority and the [employer] General Act. A caregiver was present when appropriate. Ability to conduct physical exam was limited. I was in the office. The patient was at home.       Margie Ventura MD

## 2020-11-17 NOTE — PATIENT INSTRUCTIONS
Shortness of Breath: Care Instructions Your Care Instructions Shortness of breath has many causes. Sometimes conditions such as anxiety can lead to shortness of breath. Some people get mild shortness of breath when they exercise. Trouble breathing also can be a symptom of a serious problem, such as asthma, lung disease, emphysema, heart problems, and pneumonia. If your shortness of breath continues, you may need tests and treatment. Watch for any changes in your breathing and other symptoms. Follow-up care is a key part of your treatment and safety. Be sure to make and go to all appointments, and call your doctor if you are having problems. It's also a good idea to know your test results and keep a list of the medicines you take. How can you care for yourself at home? · Do not smoke or allow others to smoke around you. If you need help quitting, talk to your doctor about stop-smoking programs and medicines. These can increase your chances of quitting for good. · Get plenty of rest and sleep. · Take your medicines exactly as prescribed. Call your doctor if you think you are having a problem with your medicine. · Find healthy ways to deal with stress. ? Exercise daily. ? Get plenty of sleep. ? Eat regularly and well. When should you call for help? Call 911 anytime you think you may need emergency care. For example, call if: 
  · You have severe shortness of breath.  
  · You have symptoms of a heart attack. These may include: 
? Chest pain or pressure, or a strange feeling in the chest. 
? Sweating. ? Shortness of breath. ? Nausea or vomiting. ? Pain, pressure, or a strange feeling in the back, neck, jaw, or upper belly or in one or both shoulders or arms. ? Lightheadedness or sudden weakness. ? A fast or irregular heartbeat. After you call 911, the  may tell you to chew 1 adult-strength or 2 to 4 low-dose aspirin. Wait for an ambulance. Do not try to drive yourself. Call your doctor now or seek immediate medical care if: 
  · Your shortness of breath gets worse or you start to wheeze. Wheezing is a high-pitched sound when you breathe.  
  · You wake up at night out of breath or have to prop your head up on several pillows to breathe.  
  · You are short of breath after only light activity or while at rest.  
Watch closely for changes in your health, and be sure to contact your doctor if: 
  · You do not get better over the next 1 to 2 days. Where can you learn more? Go to http://www.gray.com/ Enter S780 in the search box to learn more about \"Shortness of Breath: Care Instructions. \" Current as of: February 24, 2020               Content Version: 12.6 © 2006-2020 [employer]. Care instructions adapted under license by [employer] (which disclaims liability or warranty for this information). If you have questions about a medical condition or this instruction, always ask your healthcare professional. [address] any warranty or liability for your use of this information.

## 2020-11-27 PROBLEM — E66.01 SEVERE OBESITY (HCC): Status: ACTIVE | Noted: 2020-01-01

## 2020-11-27 NOTE — PROGRESS NOTES
[facility]  General Surgery    Subjective:  Patient doing well following excisional biopsy of intraductal papilloma left breast.  Objective:  Vitals:    11/27/20 1403   BP: 124/72   Pulse: (!) 104   Resp: 18   Temp: 97.3 °F (36.3 °C)   TempSrc: Temporal   SpO2: 100%   Weight: 101.6 kg (224 lb)   Height: 5' 4\" (1.626 m)       Physical Exam:    General: Awake and alert, oriented x4, no apparent distress   Breasts:    Left:  desquamation of the skin underlying the incision is may be from an adhesive blister type injury. No surrounding cellulitis or fluctuance or crepitance. Current Medications:  Current Outpatient Medications   Medication Sig Dispense Refill    acyclovir (ZOVIRAX) 400 mg tablet Take 400 mg by mouth two (2) times a day.  cyanocobalamin, vitamin B-12, (VITAMIN B-12 PO) Take  by mouth daily.  travoprost (Travatan Z) 0.004 % ophthalmic solution Administer 1 Drop to both eyes every evening.  acetaminophen (TYLENOL) 325 mg tablet Take 2 Tabs by mouth every six (6) hours. Indications: pain 56 Tab 1    ibuprofen (MOTRIN) 600 mg tablet Take 1 Tab by mouth every six (6) hours. 28 Tab 0    loperamide HCl (IMODIUM A-D PO) Take  by mouth as needed.  omeprazole (PRILOSEC) 40 mg capsule Take 1 Cap by mouth two (2) times a day. (Patient taking differently: Take 40 mg by mouth daily.) 60 Cap 1    Biotin 2,500 mcg cap Take  by mouth.  Ventolin HFA 90 mcg/actuation inhaler Take 2 Puffs by inhalation every four (4) hours as needed for Wheezing. 18 g 0    budesonide-formoteroL (Symbicort) 80-4.5 mcg/actuation HFAA Take 2 Puffs by inhalation two (2) times a day. 1 Inhaler 3    dexAMETHasone (DECADRON) 4 mg tablet Takes 5 tablets every friday      sertraline (ZOLOFT) 100 mg tablet Take 1 Tab by mouth daily. 90 Tab 1    ARMOUR THYROID 120 mg tab Take 90 mg by mouth daily.   3    ferrous gluconate 324 mg (37.5 mg iron) tablet Take 1 Tab by mouth Daily (before breakfast). 90 Tab 1    vit B12-levomefolate calcium-vit B6 (FOLTX) 2-1.13-25 mg tablet Take 1 Tab by mouth daily. 30 Tab 0    Cholecalciferol, Vitamin D3, (VITAMIN D3) 2,000 unit cap capsule Take  by Mouth.  CALCIUM CITRATE (CITRACAL PO) Take 1 Tab by mouth daily.  FLAXSEED PO Take 1 Tab by mouth daily.  methylPREDNISolone (MEDROL DOSEPACK) 4 mg tablet Use as per package directions 1 Dose Pack 0    docusate sodium (COLACE) 100 mg capsule Take 1 Cap by mouth two (2) times a day for 90 days. 60 Cap 2    bisacodyL 5 mg tab Take 5 mg by mouth daily. 3 Tab 0    mirabegron ER (Myrbetriq) 25 mg ER tablet Take 1 Tab by mouth daily. 30 Tab 6    montelukast (Singulair) 10 mg tablet Take 1 Tab by mouth daily. 90 Tab 0    pomalidomide (POMALYST PO) Take 3 mg by mouth. Daily for three weeks, off for one week      hydroCHLOROthiazide (HYDRODIURIL) 25 mg tablet TAKE 1 TABLET BY MOUTH DAILY 90 Tab 3       Chart and notes reviewed. Data reviewed. I have evaluated and examined the patient. Impression and plan:  Patient status post excisional biopsy of benign lesion left breast.    Follow-up every 6 months for the next 18 months for clinical breast exam and diagnostic 3D mammography   Please call or visit with any questions or concerns.        Aldo Shaver MD

## 2020-12-02 NOTE — PROGRESS NOTES
Addison Herrmann is a 77 y.o. female who was seen by synchronous (real-time) audio-video technology on 12/2/2020 for Swelling (feet)        Assessment & Plan:   Diagnoses and all orders for this visit:    1. Peripheral edema    2. GUTIERREZ (dyspnea on exertion)        As above, pt improved after use of lasix  Echo without any evidence of systolic dysfunction  CXR revealed no gross abnormality  Follow-up and Dispositions    · Return in about 4 months (around 4/2/2021) for htn. An After Visit Summary was printed and given to the patient. This has been fully explained to the patient, who indicates understanding. 712  Subjective:     Pt here to follow up on swelling; The swelling has improved. Had an echo and CXR- these were unremarkable relative to the leg edema and GUTIERREZ    She is on meds as listed below; She is compliant with her med regimen. Prior to Admission medications    Medication Sig Start Date End Date Taking? Authorizing Provider   omeprazole (PRILOSEC) 40 mg capsule Take 1 capsule by mouth twice daily 12/2/20  Yes Troy Wilson MD   methylPREDNISolone (MEDROL DOSEPACK) 4 mg tablet Use as per package directions 11/17/20  Yes Troy Wilson MD   acyclovir (ZOVIRAX) 400 mg tablet Take 400 mg by mouth two (2) times a day. Yes Provider, Historical   cyanocobalamin, vitamin B-12, (VITAMIN B-12 PO) Take  by mouth daily. Yes Provider, Historical   travoprost (Travatan Z) 0.004 % ophthalmic solution Administer 1 Drop to both eyes every evening. Yes Provider, Historical   acetaminophen (TYLENOL) 325 mg tablet Take 2 Tabs by mouth every six (6) hours. Indications: pain 11/12/20  Yes London Hamilton MD   ibuprofen (MOTRIN) 600 mg tablet Take 1 Tab by mouth every six (6) hours. 11/12/20  Yes London Hamilton MD   docusate sodium (COLACE) 100 mg capsule Take 1 Cap by mouth two (2) times a day for 90 days.  11/12/20 2/10/21 Yes London Hamilton MD   bisacodyL 5 mg tab Take 5 mg by mouth daily. 11/12/20  Yes Alice Pérez MD   loperamide HCl (IMODIUM A-D PO) Take  by mouth as needed. Yes Provider, Historical   mirabegron ER (Myrbetriq) 25 mg ER tablet Take 1 Tab by mouth daily. 10/22/20  Yes Fawad Ortiz MD   montelukast (Singulair) 10 mg tablet Take 1 Tab by mouth daily. 7/31/20  Yes Fawad Ortiz MD   Biotin 2,500 mcg cap Take  by mouth. Yes Provider, Historical   pomalidomide (POMALYST PO) Take 3 mg by mouth. Daily for three weeks, off for one week   Yes Provider, Historical   hydroCHLOROthiazide (HYDRODIURIL) 25 mg tablet TAKE 1 TABLET BY MOUTH DAILY 6/23/20  Yes Fawad Ortiz MD   Ventolin HFA 90 mcg/actuation inhaler Take 2 Puffs by inhalation every four (4) hours as needed for Wheezing. 6/22/20  Yes Fawad Ortiz MD   budesonide-formoteroL (Symbicort) 80-4.5 mcg/actuation HFAA Take 2 Puffs by inhalation two (2) times a day. 6/22/20  Yes Fawad Ortiz MD   dexAMETHasone (DECADRON) 4 mg tablet Takes 5 tablets every friday 1/28/20  Yes Provider, Historical   sertraline (ZOLOFT) 100 mg tablet Take 1 Tab by mouth daily. 12/16/19  Yes Tracey Neal NP   ARMOUR THYROID 120 mg tab Take 90 mg by mouth daily. 4/19/19  Yes Provider, Historical   ferrous gluconate 324 mg (37.5 mg iron) tablet Take 1 Tab by mouth Daily (before breakfast). 3/4/19  Yes Tracey Neal NP   vit [id number] calcium-vit B6 (FOLTX) 2-1.13-25 mg tablet Take 1 Tab by mouth daily. 2/5/18  Yes Ehsan Rojas,    Cholecalciferol, Vitamin D3, (VITAMIN D3) 2,000 unit cap capsule Take  by Mouth. Yes Provider, Historical   CALCIUM CITRATE (CITRACAL PO) Take 1 Tab by mouth daily. 4/19/10  Yes Provider, Historical   FLAXSEED PO Take 1 Tab by mouth daily.  4/19/10  Yes Provider, Historical   Pomalyst 3 mg cap  11/25/20   Provider, Historical     Patient Active Problem List    Diagnosis Date Noted    Severe obesity ([facility] [address] 11/27/2020    Intraductal papilloma of breast, left 11/12/2020    Multiple myeloma ([facility] [address]     Fatigue 02/05/2018    Rib pain on left side 02/05/2018    History of tobacco abuse 08/21/2017    Post-menopausal osteoporosis 04/27/2017    Spondylolisthesis of lumbar region 01/21/2016    History of thyroidectomy 05/20/2013    Meningioma ([facility] [address] 05/06/2013    BPPV (benign paroxysmal positional vertigo) 08/18/2010    Insomnia 08/03/2010    Migraines 06/22/2010    HTN (hypertension) 05/18/2010    Hypothyroid 05/18/2010    Arthritis 05/18/2010    Depression 05/18/2010    Anxiety 05/18/2010    Seasonal allergic rhinitis 05/18/2010     Allergies   Allergen Reactions    Iodine Hives, Rash and Other (comments)     Feels like Chest caving in. Rash LL lumbosacral area    Fosamax [Alendronate] Itching     rash     Past Medical History:   Diagnosis Date    Anxiety 5/18/2010    Arthritis     OA spine, left knee    Chronic obstructive pulmonary disease ([facility] [address]     Depression 5/18/2010    Headache(784.0)     migraines    HTN (hypertension) 5/18/2010    Hypothyroid 5/18/2010    Multiple myeloma ([facility] [address]     Osteopenia     Rib fractures 07/17/2017    r/t fall    Seasonal allergic rhinitis 5/18/2010    Seasonal allergies     Spondylolisthesis of lumbar region 1/21/2016    Dr. Rex Kemp [facility]    Thyroid disease     hypothyroid,  thromeagaly     Past Surgical History:   Procedure Laterality Date    BREAST SURGERY PROCEDURE UNLISTED      breast cyst s/p biopsy 2008    COLONOSCOPY N/A 9/6/2017    COLONOSCOPY performed by Shannan Magana MD at [address] HX BREAST BIOPSY Left 11/12/2020    TAG LOCALIZED EXCISIONAL BIOPSY OF LEFT BREAST (HBV 11.3.20 @ 1000) performed by Arvind Dallas MD at [address] [place] [address] HX CATARACT REMOVAL  8/2011    [facility]  HX CATARACT REMOVAL  8/2011    right eye 2 weeks apart from left [facility].  Dr. Laisha Richardson    HX COLONOSCOPY  2007    HX HEENT partical parathyroidectomy     HX ORTHOPAEDIC      rotator cuff repqir 10/1996    NEUROLOGICAL PROCEDURE UNLISTED  5-20-13    meninginoma Dr. Cathleen Waller     Family History   Problem Relation Age of Onset    Cancer Brother         brain     Social History     Tobacco Use    Smoking status: Former Smoker     Packs/day: 0.25     Years: 40.00     Pack years: 10.00     Types: Cigarettes     Start date: 8/11/1970     Last attempt to quit: 8/17/2017     Years since quitting: 3.3    Smokeless tobacco: Never Used    Tobacco comment: patient is smoking one cigarette periodically   Substance Use Topics    Alcohol use: Yes     Comment: wine for communion only       Review of Systems   Constitutional: Negative for chills and fever. Respiratory: Negative for shortness of breath and wheezing. Cardiovascular: Negative for chest pain and palpitations. All other systems reviewed and are negative. Objective:   No flowsheet data found. General: alert, cooperative, no distress   Mental  status: normal mood, behavior, speech, dress, motor activity, and thought processes, able to follow commands   HENT: NCAT   Neck: no visualized mass   Resp: no respiratory distress   Neuro: no gross deficits   Skin: no discoloration or lesions of concern on visible areas   Psychiatric: normal affect, consistent with stated mood, no evidence of hallucinations     Additional exam findings: none      We discussed the expected course, resolution and complications of the diagnosis(es) in detail. Medication risks, benefits, costs, interactions, and alternatives were discussed as indicated. I advised her to contact the office if her condition worsens, changes or fails to improve as anticipated. She expressed understanding with the diagnosis(es) and plan.        Antonio Kellymassimo, who was evaluated through a patient-initiated, synchronous (real-time) audio-video encounter, and/or her healthcare decision maker, is aware that it is a billable service, with coverage as determined by her insurance carrier. She provided verbal consent to proceed: Yes, and patient identification was verified. It was conducted pursuant to the emergency declaration under the [address] [place] [address] authority and the [employer] General Act. A caregiver was present when appropriate. Ability to conduct physical exam was limited. I was in the office. The patient was at home.       Merlene Galindo MD

## 2020-12-07 NOTE — PATIENT INSTRUCTIONS

## 2021-01-01 ENCOUNTER — VIRTUAL VISIT (OUTPATIENT)
Dept: FAMILY MEDICINE CLINIC | Age: 76
End: 2021-01-01
Payer: MEDICARE

## 2021-01-01 ENCOUNTER — HOME CARE VISIT (OUTPATIENT)
Dept: SCHEDULING | Facility: HOME HEALTH | Age: 76
End: 2021-01-01
Payer: MEDICARE

## 2021-01-01 ENCOUNTER — PATIENT OUTREACH (OUTPATIENT)
Dept: CASE MANAGEMENT | Age: 76
End: 2021-01-01

## 2021-01-01 ENCOUNTER — HOME CARE VISIT (OUTPATIENT)
Dept: HOME HEALTH SERVICES | Facility: HOME HEALTH | Age: 76
End: 2021-01-01
Payer: MEDICARE

## 2021-01-01 ENCOUNTER — HOSPITAL ENCOUNTER (EMERGENCY)
Age: 76
Discharge: HOME | End: 2021-03-03
Attending: EMERGENCY MEDICINE
Payer: MEDICARE

## 2021-01-01 ENCOUNTER — APPOINTMENT (OUTPATIENT)
Dept: GENERAL RADIOLOGY | Age: 76
DRG: 641 | End: 2021-01-01
Attending: EMERGENCY MEDICINE
Payer: MEDICARE

## 2021-01-01 ENCOUNTER — TELEPHONE (OUTPATIENT)
Dept: FAMILY MEDICINE CLINIC | Age: 76
End: 2021-01-01

## 2021-01-01 ENCOUNTER — HOSPITAL ENCOUNTER (OUTPATIENT)
Dept: LAB | Age: 76
Discharge: HOME | End: 2021-01-31
Payer: MEDICARE

## 2021-01-01 ENCOUNTER — APPOINTMENT (OUTPATIENT)
Dept: GENERAL RADIOLOGY | Age: 76
DRG: 641 | End: 2021-01-01
Attending: INTERNAL MEDICINE
Payer: MEDICARE

## 2021-01-01 ENCOUNTER — HOME HEALTH ADMISSION (OUTPATIENT)
Dept: HOME HEALTH SERVICES | Facility: HOME HEALTH | Age: 76
End: 2021-01-01
Payer: MEDICARE

## 2021-01-01 ENCOUNTER — HOSPITAL ENCOUNTER (INPATIENT)
Age: 76
LOS: 6 days | Discharge: HOME HEALTH | DRG: 641 | End: 2021-01-29
Attending: EMERGENCY MEDICINE | Admitting: INTERNAL MEDICINE
Payer: MEDICARE

## 2021-01-01 VITALS
HEART RATE: 78 BPM | TEMPERATURE: 97.9 F | SYSTOLIC BLOOD PRESSURE: 126 MMHG | RESPIRATION RATE: 18 BRPM | OXYGEN SATURATION: 97 % | DIASTOLIC BLOOD PRESSURE: 69 MMHG

## 2021-01-01 VITALS
RESPIRATION RATE: 20 BRPM | OXYGEN SATURATION: 98 % | SYSTOLIC BLOOD PRESSURE: 132 MMHG | DIASTOLIC BLOOD PRESSURE: 64 MMHG | HEART RATE: 114 BPM | TEMPERATURE: 97.2 F

## 2021-01-01 VITALS
RESPIRATION RATE: 14 BRPM | SYSTOLIC BLOOD PRESSURE: 149 MMHG | DIASTOLIC BLOOD PRESSURE: 75 MMHG | HEART RATE: 103 BPM | OXYGEN SATURATION: 96 % | TEMPERATURE: 98.3 F

## 2021-01-01 VITALS
DIASTOLIC BLOOD PRESSURE: 70 MMHG | TEMPERATURE: 98 F | OXYGEN SATURATION: 98 % | SYSTOLIC BLOOD PRESSURE: 138 MMHG | HEART RATE: 80 BPM | RESPIRATION RATE: 20 BRPM

## 2021-01-01 VITALS
TEMPERATURE: 98.2 F | DIASTOLIC BLOOD PRESSURE: 62 MMHG | SYSTOLIC BLOOD PRESSURE: 131 MMHG | HEART RATE: 102 BPM | RESPIRATION RATE: 14 BRPM | OXYGEN SATURATION: 100 %

## 2021-01-01 VITALS
DIASTOLIC BLOOD PRESSURE: 70 MMHG | HEART RATE: 87 BPM | RESPIRATION RATE: 18 BRPM | TEMPERATURE: 97.6 F | SYSTOLIC BLOOD PRESSURE: 122 MMHG | OXYGEN SATURATION: 98 %

## 2021-01-01 VITALS
DIASTOLIC BLOOD PRESSURE: 78 MMHG | RESPIRATION RATE: 19 BRPM | OXYGEN SATURATION: 99 % | HEART RATE: 65 BPM | TEMPERATURE: 98.4 F | SYSTOLIC BLOOD PRESSURE: 126 MMHG

## 2021-01-01 VITALS
OXYGEN SATURATION: 99 % | SYSTOLIC BLOOD PRESSURE: 132 MMHG | TEMPERATURE: 97.4 F | DIASTOLIC BLOOD PRESSURE: 78 MMHG | RESPIRATION RATE: 18 BRPM | HEART RATE: 90 BPM

## 2021-01-01 VITALS
WEIGHT: 214 LBS | HEART RATE: 97 BPM | BODY MASS INDEX: 36.54 KG/M2 | HEIGHT: 64 IN | OXYGEN SATURATION: 97 % | RESPIRATION RATE: 19 BRPM | TEMPERATURE: 97.9 F | DIASTOLIC BLOOD PRESSURE: 62 MMHG | SYSTOLIC BLOOD PRESSURE: 122 MMHG

## 2021-01-01 VITALS
HEART RATE: 89 BPM | SYSTOLIC BLOOD PRESSURE: 115 MMHG | DIASTOLIC BLOOD PRESSURE: 67 MMHG | TEMPERATURE: 96.5 F | OXYGEN SATURATION: 98 %

## 2021-01-01 VITALS
HEART RATE: 97 BPM | DIASTOLIC BLOOD PRESSURE: 87 MMHG | OXYGEN SATURATION: 98 % | SYSTOLIC BLOOD PRESSURE: 111 MMHG | RESPIRATION RATE: 1 BRPM

## 2021-01-01 VITALS
DIASTOLIC BLOOD PRESSURE: 86 MMHG | HEART RATE: 86 BPM | TEMPERATURE: 96.9 F | OXYGEN SATURATION: 99 % | RESPIRATION RATE: 19 BRPM | SYSTOLIC BLOOD PRESSURE: 132 MMHG

## 2021-01-01 VITALS
SYSTOLIC BLOOD PRESSURE: 127 MMHG | DIASTOLIC BLOOD PRESSURE: 61 MMHG | TEMPERATURE: 98.9 F | RESPIRATION RATE: 18 BRPM | HEART RATE: 101 BPM | OXYGEN SATURATION: 97 %

## 2021-01-01 VITALS
SYSTOLIC BLOOD PRESSURE: 124 MMHG | RESPIRATION RATE: 19 BRPM | OXYGEN SATURATION: 98 % | DIASTOLIC BLOOD PRESSURE: 82 MMHG | HEART RATE: 88 BPM | TEMPERATURE: 98.1 F

## 2021-01-01 VITALS
DIASTOLIC BLOOD PRESSURE: 84 MMHG | RESPIRATION RATE: 16 BRPM | SYSTOLIC BLOOD PRESSURE: 164 MMHG | HEART RATE: 103 BPM | OXYGEN SATURATION: 99 % | TEMPERATURE: 97.7 F

## 2021-01-01 DIAGNOSIS — Z13.31 SCREENING FOR DEPRESSION: ICD-10-CM

## 2021-01-01 DIAGNOSIS — M25.551 RIGHT HIP PAIN: ICD-10-CM

## 2021-01-01 DIAGNOSIS — D69.6 THROMBOCYTOPENIA (HCC): Primary | ICD-10-CM

## 2021-01-01 DIAGNOSIS — E83.52 HYPERCALCEMIA: Primary | ICD-10-CM

## 2021-01-01 DIAGNOSIS — D69.6 THROMBOCYTOPENIA (HCC): ICD-10-CM

## 2021-01-01 DIAGNOSIS — F33.0 MILD EPISODE OF RECURRENT MAJOR DEPRESSIVE DISORDER (HCC): ICD-10-CM

## 2021-01-01 DIAGNOSIS — Z00.00 MEDICARE ANNUAL WELLNESS VISIT, SUBSEQUENT: Primary | ICD-10-CM

## 2021-01-01 DIAGNOSIS — J44.9 CHRONIC OBSTRUCTIVE PULMONARY DISEASE, UNSPECIFIED COPD TYPE (HCC): ICD-10-CM

## 2021-01-01 DIAGNOSIS — R53.83 FATIGUE, UNSPECIFIED TYPE: ICD-10-CM

## 2021-01-01 DIAGNOSIS — C90.00 MULTIPLE MYELOMA, REMISSION STATUS UNSPECIFIED (HCC): ICD-10-CM

## 2021-01-01 DIAGNOSIS — F32.A DEPRESSION, UNSPECIFIED DEPRESSION TYPE: ICD-10-CM

## 2021-01-01 DIAGNOSIS — E87.6 HYPOKALEMIA: Primary | ICD-10-CM

## 2021-01-01 DIAGNOSIS — D61.818 PANCYTOPENIA (HCC): ICD-10-CM

## 2021-01-01 LAB
ABO + RH BLD: NORMAL
ALBUMIN SERPL ELPH-MCNC: 3.6 G/DL (ref 2.9–4.4)
ALBUMIN SERPL-MCNC: 2.9 G/DL (ref 3.4–5)
ALBUMIN SERPL-MCNC: 3 G/DL (ref 3.4–5)
ALBUMIN SERPL-MCNC: 3.1 G/DL (ref 3.4–5)
ALBUMIN SERPL-MCNC: 3.7 G/DL (ref 3.4–5)
ALBUMIN/GLOB SERPL: 0.9 {RATIO} (ref 0.8–1.7)
ALBUMIN/GLOB SERPL: 0.9 {RATIO} (ref 0.8–1.7)
ALBUMIN/GLOB SERPL: 1 {RATIO} (ref 0.8–1.7)
ALBUMIN/GLOB SERPL: 1.1 {RATIO} (ref 0.8–1.7)
ALBUMIN/GLOB SERPL: 1.6 {RATIO} (ref 0.7–1.7)
ALP SERPL-CCNC: 60 U/L (ref 45–117)
ALP SERPL-CCNC: 66 U/L (ref 45–117)
ALP SERPL-CCNC: 72 U/L (ref 45–117)
ALP SERPL-CCNC: 85 U/L (ref 45–117)
ALPHA1 GLOB SERPL ELPH-MCNC: 0.3 G/DL (ref 0–0.4)
ALPHA2 GLOB SERPL ELPH-MCNC: 0.9 G/DL (ref 0.4–1)
ALT SERPL-CCNC: 19 U/L (ref 13–56)
ALT SERPL-CCNC: 20 U/L (ref 13–56)
ALT SERPL-CCNC: 20 U/L (ref 13–56)
ALT SERPL-CCNC: 25 U/L (ref 13–56)
ANION GAP SERPL CALC-SCNC: 12 MMOL/L (ref 3–18)
ANION GAP SERPL CALC-SCNC: 4 MMOL/L (ref 3–18)
ANION GAP SERPL CALC-SCNC: 4 MMOL/L (ref 3–18)
ANION GAP SERPL CALC-SCNC: 5 MMOL/L (ref 3–18)
ANION GAP SERPL CALC-SCNC: 6 MMOL/L (ref 3–18)
ANION GAP SERPL CALC-SCNC: 7 MMOL/L (ref 3–18)
ANION GAP SERPL CALC-SCNC: 7 MMOL/L (ref 3–18)
ANION GAP SERPL CALC-SCNC: 9 MMOL/L (ref 3–18)
APPEARANCE UR: CLEAR
AST SERPL-CCNC: 12 U/L (ref 10–38)
AST SERPL-CCNC: 18 U/L (ref 10–38)
AST SERPL-CCNC: 18 U/L (ref 10–38)
AST SERPL-CCNC: 22 U/L (ref 10–38)
ATRIAL RATE: 115 BPM
B-GLOBULIN SERPL ELPH-MCNC: 0.9 G/DL (ref 0.7–1.3)
BASOPHILS # BLD: 0 K/UL (ref 0–0.06)
BASOPHILS # BLD: 0 K/UL (ref 0–0.1)
BASOPHILS NFR BLD: 0 % (ref 0–2)
BASOPHILS NFR BLD: 0 % (ref 0–2)
BASOPHILS NFR BLD: 0 % (ref 0–3)
BASOPHILS NFR BLD: 1 % (ref 0–2)
BILIRUB SERPL-MCNC: 0.5 MG/DL (ref 0.2–1)
BILIRUB SERPL-MCNC: 0.6 MG/DL (ref 0.2–1)
BILIRUB SERPL-MCNC: 0.7 MG/DL (ref 0.2–1)
BILIRUB SERPL-MCNC: 1 MG/DL (ref 0.2–1)
BILIRUB UR QL: NEGATIVE
BLD PROD TYP BPU: NORMAL
BLOOD GROUP ANTIBODIES SERPL: NORMAL
BLOOD GROUP ANTIBODIES SERPL: NORMAL
BPU ID: NORMAL
BUN SERPL-MCNC: 13 MG/DL (ref 7–18)
BUN SERPL-MCNC: 15 MG/DL (ref 7–18)
BUN SERPL-MCNC: 16 MG/DL (ref 7–18)
BUN SERPL-MCNC: 17 MG/DL (ref 7–18)
BUN SERPL-MCNC: 18 MG/DL (ref 7–18)
BUN SERPL-MCNC: 19 MG/DL (ref 7–18)
BUN SERPL-MCNC: 20 MG/DL (ref 7–18)
BUN SERPL-MCNC: 21 MG/DL (ref 7–18)
BUN SERPL-MCNC: 21 MG/DL (ref 7–18)
BUN SERPL-MCNC: 24 MG/DL (ref 7–18)
BUN SERPL-MCNC: 25 MG/DL (ref 7–18)
BUN/CREAT SERPL: 14 (ref 12–20)
BUN/CREAT SERPL: 15 (ref 12–20)
BUN/CREAT SERPL: 20 (ref 12–20)
BUN/CREAT SERPL: 20 (ref 12–20)
BUN/CREAT SERPL: 22 (ref 12–20)
BUN/CREAT SERPL: 27 (ref 12–20)
BUN/CREAT SERPL: 27 (ref 12–20)
BUN/CREAT SERPL: 28 (ref 12–20)
BUN/CREAT SERPL: 29 (ref 12–20)
BUN/CREAT SERPL: 29 (ref 12–20)
BUN/CREAT SERPL: 31 (ref 12–20)
CA-I SERPL-SCNC: 1.85 MMOL/L (ref 1.12–1.32)
CALCIUM SERPL-MCNC: 10.9 MG/DL (ref 8.5–10.1)
CALCIUM SERPL-MCNC: 11.2 MG/DL (ref 8.5–10.1)
CALCIUM SERPL-MCNC: 11.3 MG/DL (ref 8.5–10.1)
CALCIUM SERPL-MCNC: 12.5 MG/DL (ref 8.5–10.1)
CALCIUM SERPL-MCNC: 12.6 MG/DL (ref 8.5–10.1)
CALCIUM SERPL-MCNC: 13.1 MG/DL (ref 8.5–10.1)
CALCIUM SERPL-MCNC: 13.4 MG/DL (ref 8.5–10.1)
CALCIUM SERPL-MCNC: 13.8 MG/DL (ref 8.5–10.1)
CALCIUM SERPL-MCNC: 14 MG/DL (ref 8.5–10.1)
CALCIUM SERPL-MCNC: 14.4 MG/DL (ref 8.5–10.1)
CALCIUM SERPL-MCNC: 9 MG/DL (ref 8.5–10.1)
CALCIUM SERPL-MCNC: 9.7 MG/DL (ref 8.5–10.1)
CALCULATED P AXIS, ECG09: 53 DEGREES
CALCULATED R AXIS, ECG10: 14 DEGREES
CALCULATED T AXIS, ECG11: 46 DEGREES
CAOX CRY URNS QL MICRO: ABNORMAL
CHLORIDE SERPL-SCNC: 102 MMOL/L (ref 100–111)
CHLORIDE SERPL-SCNC: 104 MMOL/L (ref 100–111)
CHLORIDE SERPL-SCNC: 105 MMOL/L (ref 100–111)
CHLORIDE SERPL-SCNC: 105 MMOL/L (ref 100–111)
CHLORIDE SERPL-SCNC: 106 MMOL/L (ref 100–111)
CHLORIDE SERPL-SCNC: 107 MMOL/L (ref 100–111)
CHLORIDE SERPL-SCNC: 109 MMOL/L (ref 100–111)
CHLORIDE SERPL-SCNC: 110 MMOL/L (ref 100–111)
CHLORIDE SERPL-SCNC: 110 MMOL/L (ref 100–111)
CK MB CFR SERPL CALC: NORMAL % (ref 0–4)
CK MB SERPL-MCNC: <1 NG/ML (ref 5–25)
CK SERPL-CCNC: 41 U/L (ref 26–192)
CO2 SERPL-SCNC: 23 MMOL/L (ref 21–32)
CO2 SERPL-SCNC: 26 MMOL/L (ref 21–32)
CO2 SERPL-SCNC: 26 MMOL/L (ref 21–32)
CO2 SERPL-SCNC: 27 MMOL/L (ref 21–32)
CO2 SERPL-SCNC: 29 MMOL/L (ref 21–32)
CO2 SERPL-SCNC: 29 MMOL/L (ref 21–32)
CO2 SERPL-SCNC: 31 MMOL/L (ref 21–32)
CO2 SERPL-SCNC: 32 MMOL/L (ref 21–32)
COLOR UR: YELLOW
CREAT SERPL-MCNC: 0.65 MG/DL (ref 0.6–1.3)
CREAT SERPL-MCNC: 0.66 MG/DL (ref 0.6–1.3)
CREAT SERPL-MCNC: 0.73 MG/DL (ref 0.6–1.3)
CREAT SERPL-MCNC: 0.73 MG/DL (ref 0.6–1.3)
CREAT SERPL-MCNC: 0.77 MG/DL (ref 0.6–1.3)
CREAT SERPL-MCNC: 0.78 MG/DL (ref 0.6–1.3)
CREAT SERPL-MCNC: 0.86 MG/DL (ref 0.6–1.3)
CREAT SERPL-MCNC: 0.86 MG/DL (ref 0.6–1.3)
CREAT SERPL-MCNC: 0.87 MG/DL (ref 0.6–1.3)
CREAT SERPL-MCNC: 1.05 MG/DL (ref 0.6–1.3)
CREAT SERPL-MCNC: 1.08 MG/DL (ref 0.6–1.3)
DIAGNOSIS, 93000: NORMAL
DIFFERENTIAL METHOD BLD: ABNORMAL
EOSINOPHIL # BLD: 0 K/UL (ref 0–0.4)
EOSINOPHIL # BLD: 0.1 K/UL (ref 0–0.4)
EOSINOPHIL NFR BLD: 0 % (ref 0–5)
EOSINOPHIL NFR BLD: 1 % (ref 0–5)
EOSINOPHIL NFR BLD: 2 % (ref 0–5)
EPITH CASTS URNS QL MICRO: ABNORMAL /LPF (ref 0–5)
ERYTHROCYTE [DISTWIDTH] IN BLOOD BY AUTOMATED COUNT: 15.8 % (ref 11.6–14.5)
ERYTHROCYTE [DISTWIDTH] IN BLOOD BY AUTOMATED COUNT: 17.1 % (ref 11.6–14.5)
ERYTHROCYTE [DISTWIDTH] IN BLOOD BY AUTOMATED COUNT: 17.3 % (ref 11.6–14.5)
ERYTHROCYTE [DISTWIDTH] IN BLOOD BY AUTOMATED COUNT: 17.4 % (ref 11.6–14.5)
ERYTHROCYTE [DISTWIDTH] IN BLOOD BY AUTOMATED COUNT: 17.5 % (ref 11.6–14.5)
ERYTHROCYTE [DISTWIDTH] IN BLOOD BY AUTOMATED COUNT: 17.8 % (ref 11.6–14.5)
EST. AVERAGE GLUCOSE BLD GHB EST-MCNC: 169 MG/DL
GAMMA GLOB SERPL ELPH-MCNC: 0.3 G/DL (ref 0.4–1.8)
GLOBULIN SER CALC-MCNC: 2.8 G/DL (ref 2–4)
GLOBULIN SER CALC-MCNC: 2.9 G/DL (ref 2–4)
GLOBULIN SER CALC-MCNC: 3.5 G/DL (ref 2–4)
GLOBULIN SER CALC-MCNC: 3.9 G/DL (ref 2–4)
GLOBULIN SER-MCNC: 2.4 G/DL (ref 2.2–3.9)
GLUCOSE BLD STRIP.AUTO-MCNC: 102 MG/DL (ref 70–110)
GLUCOSE BLD STRIP.AUTO-MCNC: 104 MG/DL (ref 70–110)
GLUCOSE BLD STRIP.AUTO-MCNC: 113 MG/DL (ref 70–110)
GLUCOSE BLD STRIP.AUTO-MCNC: 115 MG/DL (ref 70–110)
GLUCOSE BLD STRIP.AUTO-MCNC: 118 MG/DL (ref 70–110)
GLUCOSE BLD STRIP.AUTO-MCNC: 118 MG/DL (ref 70–110)
GLUCOSE BLD STRIP.AUTO-MCNC: 127 MG/DL (ref 70–110)
GLUCOSE BLD STRIP.AUTO-MCNC: 127 MG/DL (ref 70–110)
GLUCOSE BLD STRIP.AUTO-MCNC: 128 MG/DL (ref 70–110)
GLUCOSE BLD STRIP.AUTO-MCNC: 129 MG/DL (ref 70–110)
GLUCOSE BLD STRIP.AUTO-MCNC: 133 MG/DL (ref 70–110)
GLUCOSE BLD STRIP.AUTO-MCNC: 134 MG/DL (ref 70–110)
GLUCOSE BLD STRIP.AUTO-MCNC: 135 MG/DL (ref 70–110)
GLUCOSE BLD STRIP.AUTO-MCNC: 137 MG/DL (ref 70–110)
GLUCOSE BLD STRIP.AUTO-MCNC: 139 MG/DL (ref 70–110)
GLUCOSE BLD STRIP.AUTO-MCNC: 157 MG/DL (ref 70–110)
GLUCOSE SERPL-MCNC: 108 MG/DL (ref 74–99)
GLUCOSE SERPL-MCNC: 110 MG/DL (ref 74–99)
GLUCOSE SERPL-MCNC: 116 MG/DL (ref 74–99)
GLUCOSE SERPL-MCNC: 119 MG/DL (ref 74–99)
GLUCOSE SERPL-MCNC: 138 MG/DL (ref 74–99)
GLUCOSE SERPL-MCNC: 146 MG/DL (ref 74–99)
GLUCOSE SERPL-MCNC: 183 MG/DL (ref 74–99)
GLUCOSE SERPL-MCNC: 200 MG/DL (ref 74–99)
GLUCOSE SERPL-MCNC: 216 MG/DL (ref 74–99)
GLUCOSE SERPL-MCNC: 94 MG/DL (ref 74–99)
GLUCOSE SERPL-MCNC: 99 MG/DL (ref 74–99)
GLUCOSE UR STRIP.AUTO-MCNC: 250 MG/DL
HBA1C MFR BLD: 7.5 % (ref 4.2–5.6)
HCT VFR BLD AUTO: 26.8 % (ref 35–45)
HCT VFR BLD AUTO: 28.6 % (ref 35–45)
HCT VFR BLD AUTO: 28.9 % (ref 35–45)
HCT VFR BLD AUTO: 29.6 % (ref 35–45)
HCT VFR BLD AUTO: 29.7 % (ref 35–45)
HCT VFR BLD AUTO: 30.7 % (ref 35–45)
HCT VFR BLD AUTO: 32.6 % (ref 35–45)
HCT VFR BLD AUTO: 39 % (ref 35–45)
HGB BLD-MCNC: 10 G/DL (ref 12–16)
HGB BLD-MCNC: 10.1 G/DL (ref 12–16)
HGB BLD-MCNC: 10.3 G/DL (ref 12–16)
HGB BLD-MCNC: 10.7 G/DL (ref 12–16)
HGB BLD-MCNC: 13.3 G/DL (ref 12–16)
HGB BLD-MCNC: 9.3 G/DL (ref 12–16)
HGB BLD-MCNC: 9.9 G/DL (ref 12–16)
HGB BLD-MCNC: 9.9 G/DL (ref 12–16)
HGB UR QL STRIP: NEGATIVE
IGA SERPL-MCNC: 11 MG/DL (ref 64–422)
IGG SERPL-MCNC: 355 MG/DL (ref 586–1602)
IGM SERPL-MCNC: <5 MG/DL (ref 26–217)
INTERPRETATION SERPL IEP-IMP: ABNORMAL
KAPPA LC FREE SER-MCNC: 1068.8 MG/L (ref 3.3–19.4)
KAPPA LC FREE/LAMBDA FREE SER: 593.78 {RATIO} (ref 0.26–1.65)
KETONES UR QL STRIP.AUTO: NEGATIVE MG/DL
LAMBDA LC FREE SERPL-MCNC: 1.8 MG/L (ref 5.7–26.3)
LDH SERPL L TO P-CCNC: 267 U/L (ref 81–234)
LEUKOCYTE ESTERASE UR QL STRIP.AUTO: NEGATIVE
LYMPHOCYTES # BLD: 1 K/UL (ref 0.8–3.5)
LYMPHOCYTES # BLD: 1 K/UL (ref 0.9–3.6)
LYMPHOCYTES # BLD: 1 K/UL (ref 0.9–3.6)
LYMPHOCYTES # BLD: 1.1 K/UL (ref 0.8–3.5)
LYMPHOCYTES # BLD: 1.1 K/UL (ref 0.9–3.6)
LYMPHOCYTES # BLD: 1.2 K/UL (ref 0.8–3.5)
LYMPHOCYTES # BLD: 1.6 K/UL (ref 0.8–3.5)
LYMPHOCYTES # BLD: 2.4 K/UL (ref 0.8–3.5)
LYMPHOCYTES NFR BLD: 23 % (ref 21–52)
LYMPHOCYTES NFR BLD: 27 % (ref 21–52)
LYMPHOCYTES NFR BLD: 28 % (ref 20–51)
LYMPHOCYTES NFR BLD: 30 % (ref 20–51)
LYMPHOCYTES NFR BLD: 30 % (ref 21–52)
LYMPHOCYTES NFR BLD: 33 % (ref 20–51)
LYMPHOCYTES NFR BLD: 35 % (ref 20–51)
LYMPHOCYTES NFR BLD: 52 % (ref 20–51)
M PROTEIN SERPL ELPH-MCNC: 0.1 G/DL
MAGNESIUM SERPL-MCNC: 1.4 MG/DL (ref 1.6–2.6)
MAGNESIUM SERPL-MCNC: 1.5 MG/DL (ref 1.6–2.6)
MAGNESIUM SERPL-MCNC: 1.6 MG/DL (ref 1.6–2.6)
MAGNESIUM SERPL-MCNC: 1.7 MG/DL (ref 1.6–2.6)
MAGNESIUM SERPL-MCNC: 1.8 MG/DL (ref 1.6–2.6)
MCH RBC QN AUTO: 31.2 PG (ref 24–34)
MCH RBC QN AUTO: 31.8 PG (ref 24–34)
MCH RBC QN AUTO: 32.1 PG (ref 24–34)
MCH RBC QN AUTO: 32.1 PG (ref 24–34)
MCH RBC QN AUTO: 32.4 PG (ref 24–34)
MCH RBC QN AUTO: 32.7 PG (ref 24–34)
MCHC RBC AUTO-ENTMCNC: 32.8 G/DL (ref 31–37)
MCHC RBC AUTO-ENTMCNC: 33.3 G/DL (ref 31–37)
MCHC RBC AUTO-ENTMCNC: 33.6 G/DL (ref 31–37)
MCHC RBC AUTO-ENTMCNC: 34.1 G/DL (ref 31–37)
MCHC RBC AUTO-ENTMCNC: 34.1 G/DL (ref 31–37)
MCHC RBC AUTO-ENTMCNC: 34.3 G/DL (ref 31–37)
MCHC RBC AUTO-ENTMCNC: 34.7 G/DL (ref 31–37)
MCHC RBC AUTO-ENTMCNC: 35 G/DL (ref 31–37)
MCV RBC AUTO: 89.9 FL (ref 74–97)
MCV RBC AUTO: 91.1 FL (ref 74–97)
MCV RBC AUTO: 93.8 FL (ref 74–97)
MCV RBC AUTO: 94.8 FL (ref 74–97)
MCV RBC AUTO: 94.9 FL (ref 74–97)
MCV RBC AUTO: 95.5 FL (ref 74–97)
MCV RBC AUTO: 95.8 FL (ref 74–97)
MCV RBC AUTO: 97.9 FL (ref 74–97)
METAMYELOCYTES NFR BLD MANUAL: 2 %
METAMYELOCYTES NFR BLD MANUAL: 4 %
MONOCYTES # BLD: 0.3 K/UL (ref 0–1)
MONOCYTES # BLD: 0.5 K/UL (ref 0.05–1.2)
MONOCYTES # BLD: 0.5 K/UL (ref 0–1)
MONOCYTES # BLD: 1 K/UL (ref 0–1)
MONOCYTES # BLD: 1.2 K/UL (ref 0.05–1.2)
MONOCYTES # BLD: 1.3 K/UL (ref 0–1)
MONOCYTES # BLD: 1.5 K/UL (ref 0.05–1.2)
MONOCYTES # BLD: 1.8 K/UL (ref 0–1)
MONOCYTES NFR BLD: 14 % (ref 2–9)
MONOCYTES NFR BLD: 15 % (ref 3–10)
MONOCYTES NFR BLD: 28 % (ref 3–10)
MONOCYTES NFR BLD: 30 % (ref 2–9)
MONOCYTES NFR BLD: 35 % (ref 2–9)
MONOCYTES NFR BLD: 35 % (ref 3–10)
MONOCYTES NFR BLD: 37 % (ref 2–9)
MONOCYTES NFR BLD: 7 % (ref 2–9)
MYELOCYTES NFR BLD MANUAL: 1 %
MYELOCYTES NFR BLD MANUAL: 2 %
NEUTS BAND NFR BLD MANUAL: 5 % (ref 0–5)
NEUTS BAND NFR BLD MANUAL: 6 % (ref 0–5)
NEUTS SEG # BLD: 0.7 K/UL (ref 1.8–8)
NEUTS SEG # BLD: 1.3 K/UL (ref 1.8–8)
NEUTS SEG # BLD: 1.4 K/UL (ref 1.8–8)
NEUTS SEG # BLD: 1.7 K/UL (ref 1.8–8)
NEUTS SEG # BLD: 1.9 K/UL (ref 1.8–8)
NEUTS SEG # BLD: 2 K/UL (ref 1.8–8)
NEUTS SEG NFR BLD: 19 % (ref 42–75)
NEUTS SEG NFR BLD: 28 % (ref 42–75)
NEUTS SEG NFR BLD: 40 % (ref 42–75)
NEUTS SEG NFR BLD: 41 % (ref 42–75)
NEUTS SEG NFR BLD: 42 % (ref 40–73)
NEUTS SEG NFR BLD: 44 % (ref 40–73)
NEUTS SEG NFR BLD: 49 % (ref 42–75)
NEUTS SEG NFR BLD: 54 % (ref 40–73)
NITRITE UR QL STRIP.AUTO: NEGATIVE
NRBC BLD-RTO: 2 PER 100 WBC
NRBC BLD-RTO: 2 PER 100 WBC
P-R INTERVAL, ECG05: 160 MS
PERIPHERAL SMEAR,PSM: NORMAL
PH UR STRIP: 5.5 [PH] (ref 5–8)
PHOSPHATE SERPL-MCNC: 2.5 MG/DL (ref 2.5–4.9)
PLATELET # BLD AUTO: 13 K/UL (ref 135–420)
PLATELET # BLD AUTO: 46 K/UL (ref 135–420)
PLATELET # BLD AUTO: 49 K/UL (ref 135–420)
PLATELET # BLD AUTO: 53 K/UL (ref 135–420)
PLATELET # BLD AUTO: 57 K/UL (ref 135–420)
PLATELET # BLD AUTO: 62 K/UL (ref 135–420)
PLATELET # BLD AUTO: 68 K/UL (ref 135–420)
PLATELET # BLD AUTO: 74 K/UL (ref 135–420)
PLATELET COMMENTS,PCOM: ABNORMAL
PMV BLD AUTO: 10.4 FL (ref 9.2–11.8)
PMV BLD AUTO: 10.5 FL (ref 9.2–11.8)
PMV BLD AUTO: 10.7 FL (ref 9.2–11.8)
PMV BLD AUTO: 10.8 FL (ref 9.2–11.8)
PMV BLD AUTO: 10.8 FL (ref 9.2–11.8)
PMV BLD AUTO: 11 FL (ref 9.2–11.8)
PMV BLD AUTO: 11.3 FL (ref 9.2–11.8)
POTASSIUM SERPL-SCNC: 3.3 MMOL/L (ref 3.5–5.5)
POTASSIUM SERPL-SCNC: 3.4 MMOL/L (ref 3.5–5.5)
POTASSIUM SERPL-SCNC: 3.6 MMOL/L (ref 3.5–5.5)
POTASSIUM SERPL-SCNC: 3.6 MMOL/L (ref 3.5–5.5)
POTASSIUM SERPL-SCNC: 3.7 MMOL/L (ref 3.5–5.5)
POTASSIUM SERPL-SCNC: 3.7 MMOL/L (ref 3.5–5.5)
POTASSIUM SERPL-SCNC: 3.9 MMOL/L (ref 3.5–5.5)
POTASSIUM SERPL-SCNC: 4.2 MMOL/L (ref 3.5–5.5)
POTASSIUM SERPL-SCNC: 4.3 MMOL/L (ref 3.5–5.5)
POTASSIUM SERPL-SCNC: 4.4 MMOL/L (ref 3.5–5.5)
POTASSIUM SERPL-SCNC: 4.6 MMOL/L (ref 3.5–5.5)
PROT SERPL-MCNC: 5.8 G/DL (ref 6.4–8.2)
PROT SERPL-MCNC: 5.9 G/DL (ref 6.4–8.2)
PROT SERPL-MCNC: 6 G/DL (ref 6–8.5)
PROT SERPL-MCNC: 6.5 G/DL (ref 6.4–8.2)
PROT SERPL-MCNC: 7.6 G/DL (ref 6.4–8.2)
PROT UR STRIP-MCNC: ABNORMAL MG/DL
Q-T INTERVAL, ECG07: 298 MS
QRS DURATION, ECG06: 86 MS
QTC CALCULATION (BEZET), ECG08: 412 MS
RBC # BLD AUTO: 2.98 M/UL (ref 4.2–5.3)
RBC # BLD AUTO: 3.08 M/UL (ref 4.2–5.3)
RBC # BLD AUTO: 3.11 M/UL (ref 4.2–5.3)
RBC # BLD AUTO: 3.12 M/UL (ref 4.2–5.3)
RBC # BLD AUTO: 3.14 M/UL (ref 4.2–5.3)
RBC # BLD AUTO: 3.24 M/UL (ref 4.2–5.3)
RBC # BLD AUTO: 3.33 M/UL (ref 4.2–5.3)
RBC # BLD AUTO: 4.07 M/UL (ref 4.2–5.3)
RBC #/AREA URNS HPF: ABNORMAL /HPF (ref 0–5)
RBC MORPH BLD: ABNORMAL
SODIUM SERPL-SCNC: 137 MMOL/L (ref 136–145)
SODIUM SERPL-SCNC: 140 MMOL/L (ref 136–145)
SODIUM SERPL-SCNC: 140 MMOL/L (ref 136–145)
SODIUM SERPL-SCNC: 141 MMOL/L (ref 136–145)
SODIUM SERPL-SCNC: 141 MMOL/L (ref 136–145)
SODIUM SERPL-SCNC: 142 MMOL/L (ref 136–145)
SODIUM SERPL-SCNC: 143 MMOL/L (ref 136–145)
SP GR UR REFRACTOMETRY: 1.02 (ref 1–1.03)
SPECIMEN EXP DATE BLD: NORMAL
STATUS OF UNIT,%ST: NORMAL
TROPONIN I SERPL-MCNC: <0.02 NG/ML (ref 0–0.04)
UNIT DIVISION, %UDIV: 0
UROBILINOGEN UR QL STRIP.AUTO: 1 EU/DL (ref 0.2–1)
VENTRICULAR RATE, ECG03: 115 BPM
WBC # BLD AUTO: 3.4 K/UL (ref 4.6–13.2)
WBC # BLD AUTO: 3.5 K/UL (ref 4.6–13.2)
WBC # BLD AUTO: 3.6 K/UL (ref 4.6–13.2)
WBC # BLD AUTO: 3.7 K/UL (ref 4.6–13.2)
WBC # BLD AUTO: 4.1 K/UL (ref 4.6–13.2)
WBC # BLD AUTO: 4.3 K/UL (ref 4.6–13.2)
WBC # BLD AUTO: 4.6 K/UL (ref 4.6–13.2)
WBC # BLD AUTO: 4.7 K/UL (ref 4.6–13.2)
WBC URNS QL MICRO: ABNORMAL /HPF (ref 0–4)
YEAST URNS QL MICRO: ABNORMAL

## 2021-01-01 PROCEDURE — 97166 OT EVAL MOD COMPLEX 45 MIN: CPT

## 2021-01-01 PROCEDURE — 3331090002 HH PPS REVENUE DEBIT

## 2021-01-01 PROCEDURE — G0152 HHCP-SERV OF OT,EA 15 MIN: HCPCS

## 2021-01-01 PROCEDURE — 74011250637 HC RX REV CODE- 250/637: Performed by: NURSE PRACTITIONER

## 2021-01-01 PROCEDURE — 1101F PT FALLS ASSESS-DOCD LE1/YR: CPT | Performed by: FAMILY MEDICINE

## 2021-01-01 PROCEDURE — 80048 BASIC METABOLIC PNL TOTAL CA: CPT

## 2021-01-01 PROCEDURE — 3331090001 HH PPS REVENUE CREDIT

## 2021-01-01 PROCEDURE — 97110 THERAPEUTIC EXERCISES: CPT

## 2021-01-01 PROCEDURE — 85025 COMPLETE CBC W/AUTO DIFF WBC: CPT

## 2021-01-01 PROCEDURE — 82962 GLUCOSE BLOOD TEST: CPT

## 2021-01-01 PROCEDURE — G8417 CALC BMI ABV UP PARAM F/U: HCPCS | Performed by: FAMILY MEDICINE

## 2021-01-01 PROCEDURE — G0151 HHCP-SERV OF PT,EA 15 MIN: HCPCS

## 2021-01-01 PROCEDURE — G0444 DEPRESSION SCREEN ANNUAL: HCPCS | Performed by: NURSE PRACTITIONER

## 2021-01-01 PROCEDURE — G8536 NO DOC ELDER MAL SCRN: HCPCS | Performed by: FAMILY MEDICINE

## 2021-01-01 PROCEDURE — 74011250636 HC RX REV CODE- 250/636: Performed by: INTERNAL MEDICINE

## 2021-01-01 PROCEDURE — 74011250636 HC RX REV CODE- 250/636: Performed by: HOSPITALIST

## 2021-01-01 PROCEDURE — 86901 BLOOD TYPING SEROLOGIC RH(D): CPT

## 2021-01-01 PROCEDURE — 99284 EMERGENCY DEPT VISIT MOD MDM: CPT

## 2021-01-01 PROCEDURE — G9717 DOC PT DX DEP/BP F/U NT REQ: HCPCS | Performed by: NURSE PRACTITIONER

## 2021-01-01 PROCEDURE — P9035 PLATELET PHERES LEUKOREDUCED: HCPCS

## 2021-01-01 PROCEDURE — 83735 ASSAY OF MAGNESIUM: CPT

## 2021-01-01 PROCEDURE — 2709999900 HC NON-CHARGEABLE SUPPLY

## 2021-01-01 PROCEDURE — 65660000000 HC RM CCU STEPDOWN

## 2021-01-01 PROCEDURE — 400018 HH-NO PAY CLAIM PROCEDURE

## 2021-01-01 PROCEDURE — G0299 HHS/HOSPICE OF RN EA 15 MIN: HCPCS

## 2021-01-01 PROCEDURE — 74011250637 HC RX REV CODE- 250/637: Performed by: INTERNAL MEDICINE

## 2021-01-01 PROCEDURE — G0157 HHC PT ASSISTANT EA 15: HCPCS

## 2021-01-01 PROCEDURE — 97530 THERAPEUTIC ACTIVITIES: CPT

## 2021-01-01 PROCEDURE — 74011250636 HC RX REV CODE- 250/636: Performed by: EMERGENCY MEDICINE

## 2021-01-01 PROCEDURE — 96374 THER/PROPH/DIAG INJ IV PUSH: CPT

## 2021-01-01 PROCEDURE — 400013 HH SOC

## 2021-01-01 PROCEDURE — 3331090003 HH PPS REVENUE ADJ

## 2021-01-01 PROCEDURE — 74011250637 HC RX REV CODE- 250/637: Performed by: EMERGENCY MEDICINE

## 2021-01-01 PROCEDURE — 3017F COLORECTAL CA SCREEN DOC REV: CPT | Performed by: NURSE PRACTITIONER

## 2021-01-01 PROCEDURE — 81001 URINALYSIS AUTO W/SCOPE: CPT

## 2021-01-01 PROCEDURE — 65270000029 HC RM PRIVATE

## 2021-01-01 PROCEDURE — 36415 COLL VENOUS BLD VENIPUNCTURE: CPT

## 2021-01-01 PROCEDURE — 74011250636 HC RX REV CODE- 250/636: Performed by: NURSE PRACTITIONER

## 2021-01-01 PROCEDURE — G9899 SCRN MAM PERF RSLTS DOC: HCPCS | Performed by: NURSE PRACTITIONER

## 2021-01-01 PROCEDURE — 99232 SBSQ HOSP IP/OBS MODERATE 35: CPT | Performed by: HOSPITALIST

## 2021-01-01 PROCEDURE — 1090F PRES/ABSN URINE INCON ASSESS: CPT | Performed by: FAMILY MEDICINE

## 2021-01-01 PROCEDURE — 83883 ASSAY NEPHELOMETRY NOT SPEC: CPT

## 2021-01-01 PROCEDURE — G8427 DOCREV CUR MEDS BY ELIG CLIN: HCPCS | Performed by: FAMILY MEDICINE

## 2021-01-01 PROCEDURE — 86880 COOMBS TEST DIRECT: CPT

## 2021-01-01 PROCEDURE — G0158 HHC OT ASSISTANT EA 15: HCPCS

## 2021-01-01 PROCEDURE — 83615 LACTATE (LD) (LDH) ENZYME: CPT

## 2021-01-01 PROCEDURE — 84100 ASSAY OF PHOSPHORUS: CPT

## 2021-01-01 PROCEDURE — 82784 ASSAY IGA/IGD/IGG/IGM EACH: CPT

## 2021-01-01 PROCEDURE — 71045 X-RAY EXAM CHEST 1 VIEW: CPT

## 2021-01-01 PROCEDURE — 99213 OFFICE O/P EST LOW 20 MIN: CPT | Performed by: FAMILY MEDICINE

## 2021-01-01 PROCEDURE — 82310 ASSAY OF CALCIUM: CPT

## 2021-01-01 PROCEDURE — 74011250636 HC RX REV CODE- 250/636: Performed by: STUDENT IN AN ORGANIZED HEALTH CARE EDUCATION/TRAINING PROGRAM

## 2021-01-01 PROCEDURE — 97535 SELF CARE MNGMENT TRAINING: CPT

## 2021-01-01 PROCEDURE — 80053 COMPREHEN METABOLIC PANEL: CPT

## 2021-01-01 PROCEDURE — G8427 DOCREV CUR MEDS BY ELIG CLIN: HCPCS | Performed by: NURSE PRACTITIONER

## 2021-01-01 PROCEDURE — 86870 RBC ANTIBODY IDENTIFICATION: CPT

## 2021-01-01 PROCEDURE — 99232 SBSQ HOSP IP/OBS MODERATE 35: CPT | Performed by: NURSE PRACTITIONER

## 2021-01-01 PROCEDURE — 99223 1ST HOSP IP/OBS HIGH 75: CPT | Performed by: STUDENT IN AN ORGANIZED HEALTH CARE EDUCATION/TRAINING PROGRAM

## 2021-01-01 PROCEDURE — 74011636637 HC RX REV CODE- 636/637: Performed by: STUDENT IN AN ORGANIZED HEALTH CARE EDUCATION/TRAINING PROGRAM

## 2021-01-01 PROCEDURE — 97161 PT EVAL LOW COMPLEX 20 MIN: CPT

## 2021-01-01 PROCEDURE — 77030038269 HC DRN EXT URIN PURWCK BARD -A

## 2021-01-01 PROCEDURE — G9717 DOC PT DX DEP/BP F/U NT REQ: HCPCS | Performed by: FAMILY MEDICINE

## 2021-01-01 PROCEDURE — 83036 HEMOGLOBIN GLYCOSYLATED A1C: CPT

## 2021-01-01 PROCEDURE — G0463 HOSPITAL OUTPT CLINIC VISIT: HCPCS | Performed by: NURSE PRACTITIONER

## 2021-01-01 PROCEDURE — G8756 NO BP MEASURE DOC: HCPCS | Performed by: NURSE PRACTITIONER

## 2021-01-01 PROCEDURE — 1111F DSCHRG MED/CURRENT MED MERGE: CPT | Performed by: FAMILY MEDICINE

## 2021-01-01 PROCEDURE — 82553 CREATINE MB FRACTION: CPT

## 2021-01-01 PROCEDURE — 99239 HOSP IP/OBS DSCHRG MGMT >30: CPT | Performed by: INTERNAL MEDICINE

## 2021-01-01 PROCEDURE — 74011000258 HC RX REV CODE- 258: Performed by: HOSPITALIST

## 2021-01-01 PROCEDURE — 93005 ELECTROCARDIOGRAM TRACING: CPT

## 2021-01-01 PROCEDURE — 3017F COLORECTAL CA SCREEN DOC REV: CPT | Performed by: FAMILY MEDICINE

## 2021-01-01 PROCEDURE — G8417 CALC BMI ABV UP PARAM F/U: HCPCS | Performed by: NURSE PRACTITIONER

## 2021-01-01 PROCEDURE — G9899 SCRN MAM PERF RSLTS DOC: HCPCS | Performed by: FAMILY MEDICINE

## 2021-01-01 PROCEDURE — G8536 NO DOC ELDER MAL SCRN: HCPCS | Performed by: NURSE PRACTITIONER

## 2021-01-01 PROCEDURE — G8756 NO BP MEASURE DOC: HCPCS | Performed by: FAMILY MEDICINE

## 2021-01-01 PROCEDURE — 82330 ASSAY OF CALCIUM: CPT

## 2021-01-01 PROCEDURE — 96361 HYDRATE IV INFUSION ADD-ON: CPT

## 2021-01-01 PROCEDURE — 77075 RADEX OSSEOUS SURVEY COMPL: CPT

## 2021-01-01 PROCEDURE — 36430 TRANSFUSION BLD/BLD COMPNT: CPT

## 2021-01-01 PROCEDURE — G0300 HHS/HOSPICE OF LPN EA 15 MIN: HCPCS

## 2021-01-01 PROCEDURE — G0463 HOSPITAL OUTPT CLINIC VISIT: HCPCS | Performed by: FAMILY MEDICINE

## 2021-01-01 PROCEDURE — 99213 OFFICE O/P EST LOW 20 MIN: CPT | Performed by: NURSE PRACTITIONER

## 2021-01-01 PROCEDURE — 1090F PRES/ABSN URINE INCON ASSESS: CPT | Performed by: NURSE PRACTITIONER

## 2021-01-01 PROCEDURE — 1101F PT FALLS ASSESS-DOCD LE1/YR: CPT | Performed by: NURSE PRACTITIONER

## 2021-01-01 PROCEDURE — 86900 BLOOD TYPING SEROLOGIC ABO: CPT

## 2021-01-01 PROCEDURE — G0439 PPPS, SUBSEQ VISIT: HCPCS | Performed by: NURSE PRACTITIONER

## 2021-01-01 RX ORDER — DEXTROSE 50 % IN WATER (D50W) INTRAVENOUS SYRINGE
25-50 AS NEEDED
Status: DISCONTINUED | OUTPATIENT
Start: 2021-01-01 | End: 2021-01-01 | Stop reason: HOSPADM

## 2021-01-01 RX ORDER — MAGNESIUM SULFATE HEPTAHYDRATE 40 MG/ML
2 INJECTION, SOLUTION INTRAVENOUS ONCE
Status: COMPLETED | OUTPATIENT
Start: 2021-01-01 | End: 2021-01-01

## 2021-01-01 RX ORDER — SODIUM CHLORIDE 9 MG/ML
250 INJECTION, SOLUTION INTRAVENOUS AS NEEDED
Status: DISCONTINUED | OUTPATIENT
Start: 2021-01-01 | End: 2021-01-01 | Stop reason: HOSPADM

## 2021-01-01 RX ORDER — ENOXAPARIN SODIUM 100 MG/ML
40 INJECTION SUBCUTANEOUS EVERY 24 HOURS
Status: DISCONTINUED | OUTPATIENT
Start: 2021-01-01 | End: 2021-01-01

## 2021-01-01 RX ORDER — POLYETHYLENE GLYCOL 3350 17 G/17G
17 POWDER, FOR SOLUTION ORAL DAILY PRN
Status: DISCONTINUED | OUTPATIENT
Start: 2021-01-01 | End: 2021-01-01 | Stop reason: HOSPADM

## 2021-01-01 RX ORDER — DICLOFENAC SODIUM 10 MG/G
GEL TOPICAL 4 TIMES DAILY
Qty: 1 EACH | Refills: 0 | Status: SHIPPED | OUTPATIENT
Start: 2021-01-01 | End: 2021-01-01

## 2021-01-01 RX ORDER — LEVOTHYROXINE AND LIOTHYRONINE 19; 4.5 UG/1; UG/1
90 TABLET ORAL DAILY
Status: DISCONTINUED | OUTPATIENT
Start: 2021-01-01 | End: 2021-01-01 | Stop reason: HOSPADM

## 2021-01-01 RX ORDER — LANOLIN ALCOHOL/MO/W.PET/CERES
1000 CREAM (GRAM) TOPICAL DAILY
Qty: 30 TAB | Refills: 3 | Status: SHIPPED | OUTPATIENT
Start: 2021-01-01

## 2021-01-01 RX ORDER — SERTRALINE HYDROCHLORIDE 50 MG/1
100 TABLET, FILM COATED ORAL DAILY
Status: DISCONTINUED | OUTPATIENT
Start: 2021-01-01 | End: 2021-01-01 | Stop reason: HOSPADM

## 2021-01-01 RX ORDER — ACETAMINOPHEN 650 MG/1
650 SUPPOSITORY RECTAL
Status: DISCONTINUED | OUTPATIENT
Start: 2021-01-01 | End: 2021-01-01 | Stop reason: HOSPADM

## 2021-01-01 RX ORDER — SODIUM CHLORIDE 9 MG/ML
125 INJECTION, SOLUTION INTRAVENOUS CONTINUOUS
Status: DISCONTINUED | OUTPATIENT
Start: 2021-01-01 | End: 2021-01-01

## 2021-01-01 RX ORDER — SODIUM CHLORIDE 9 MG/ML
75 INJECTION, SOLUTION INTRAVENOUS CONTINUOUS
Status: DISCONTINUED | OUTPATIENT
Start: 2021-01-01 | End: 2021-01-01

## 2021-01-01 RX ORDER — SERTRALINE HYDROCHLORIDE 50 MG/1
100 TABLET, FILM COATED ORAL
Status: COMPLETED | OUTPATIENT
Start: 2021-01-01 | End: 2021-01-01

## 2021-01-01 RX ORDER — FUROSEMIDE 10 MG/ML
20 INJECTION INTRAMUSCULAR; INTRAVENOUS
Status: COMPLETED | OUTPATIENT
Start: 2021-01-01 | End: 2021-01-01

## 2021-01-01 RX ORDER — PANTOPRAZOLE SODIUM 40 MG/1
40 TABLET, DELAYED RELEASE ORAL
Status: DISCONTINUED | OUTPATIENT
Start: 2021-01-01 | End: 2021-01-01 | Stop reason: HOSPADM

## 2021-01-01 RX ORDER — SODIUM CHLORIDE, SODIUM LACTATE, POTASSIUM CHLORIDE, CALCIUM CHLORIDE 600; 310; 30; 20 MG/100ML; MG/100ML; MG/100ML; MG/100ML
125 INJECTION, SOLUTION INTRAVENOUS CONTINUOUS
Status: DISCONTINUED | OUTPATIENT
Start: 2021-01-01 | End: 2021-01-01

## 2021-01-01 RX ORDER — MAGNESIUM SULFATE 100 %
4 CRYSTALS MISCELLANEOUS AS NEEDED
Status: DISCONTINUED | OUTPATIENT
Start: 2021-01-01 | End: 2021-01-01 | Stop reason: HOSPADM

## 2021-01-01 RX ORDER — SODIUM CHLORIDE 0.9 % (FLUSH) 0.9 %
5-40 SYRINGE (ML) INJECTION EVERY 8 HOURS
Status: DISCONTINUED | OUTPATIENT
Start: 2021-01-01 | End: 2021-01-01 | Stop reason: HOSPADM

## 2021-01-01 RX ORDER — ONDANSETRON 2 MG/ML
4 INJECTION INTRAMUSCULAR; INTRAVENOUS
Status: DISCONTINUED | OUTPATIENT
Start: 2021-01-01 | End: 2021-01-01 | Stop reason: HOSPADM

## 2021-01-01 RX ORDER — ACETAMINOPHEN 325 MG/1
650 TABLET ORAL
Status: DISCONTINUED | OUTPATIENT
Start: 2021-01-01 | End: 2021-01-01 | Stop reason: HOSPADM

## 2021-01-01 RX ORDER — SODIUM CHLORIDE 9 MG/ML
150 INJECTION, SOLUTION INTRAVENOUS ONCE
Status: COMPLETED | OUTPATIENT
Start: 2021-01-01 | End: 2021-01-01

## 2021-01-01 RX ORDER — CALCITONIN SALMON 200 [USP'U]/ML
4 INJECTION, SOLUTION INTRAMUSCULAR; SUBCUTANEOUS EVERY 12 HOURS
Status: COMPLETED | OUTPATIENT
Start: 2021-01-01 | End: 2021-01-01

## 2021-01-01 RX ORDER — POTASSIUM CHLORIDE 20 MEQ/1
40 TABLET, EXTENDED RELEASE ORAL EVERY 4 HOURS
Status: COMPLETED | OUTPATIENT
Start: 2021-01-01 | End: 2021-01-01

## 2021-01-01 RX ORDER — ONDANSETRON 2 MG/ML
4 INJECTION INTRAMUSCULAR; INTRAVENOUS
Status: COMPLETED | OUTPATIENT
Start: 2021-01-01 | End: 2021-01-01

## 2021-01-01 RX ORDER — DICLOFENAC SODIUM 10 MG/G
4 GEL TOPICAL 4 TIMES DAILY
Qty: 1 EACH | Refills: 3 | Status: SHIPPED | OUTPATIENT
Start: 2021-01-01

## 2021-01-01 RX ORDER — INSULIN LISPRO 100 [IU]/ML
INJECTION, SOLUTION INTRAVENOUS; SUBCUTANEOUS
Status: DISCONTINUED | OUTPATIENT
Start: 2021-01-01 | End: 2021-01-01 | Stop reason: HOSPADM

## 2021-01-01 RX ORDER — ACETAMINOPHEN 500 MG
1000 TABLET ORAL
Qty: 90 TAB | Refills: 0 | Status: SHIPPED | OUTPATIENT
Start: 2021-01-01

## 2021-01-01 RX ORDER — FUROSEMIDE 10 MG/ML
40 INJECTION INTRAMUSCULAR; INTRAVENOUS
Status: COMPLETED | OUTPATIENT
Start: 2021-01-01 | End: 2021-01-01

## 2021-01-01 RX ORDER — SODIUM CHLORIDE 0.9 % (FLUSH) 0.9 %
5-40 SYRINGE (ML) INJECTION AS NEEDED
Status: DISCONTINUED | OUTPATIENT
Start: 2021-01-01 | End: 2021-01-01 | Stop reason: HOSPADM

## 2021-01-01 RX ORDER — IPRATROPIUM BROMIDE AND ALBUTEROL SULFATE 2.5; .5 MG/3ML; MG/3ML
3 SOLUTION RESPIRATORY (INHALATION)
Status: DISCONTINUED | OUTPATIENT
Start: 2021-01-01 | End: 2021-01-01 | Stop reason: HOSPADM

## 2021-01-01 RX ORDER — ACYCLOVIR 200 MG/1
400 CAPSULE ORAL 2 TIMES DAILY
Status: DISCONTINUED | OUTPATIENT
Start: 2021-01-01 | End: 2021-01-01 | Stop reason: HOSPADM

## 2021-01-01 RX ADMIN — FUROSEMIDE 20 MG: 10 INJECTION, SOLUTION INTRAMUSCULAR; INTRAVENOUS at 12:11

## 2021-01-01 RX ADMIN — LEVOTHYROXINE, LIOTHYRONINE 90 MG: 19; 4.5 TABLET ORAL at 08:08

## 2021-01-01 RX ADMIN — Medication 10 ML: at 05:25

## 2021-01-01 RX ADMIN — ACYCLOVIR 400 MG: 200 CAPSULE ORAL at 19:04

## 2021-01-01 RX ADMIN — SERTRALINE HYDROCHLORIDE 100 MG: 50 TABLET ORAL at 08:54

## 2021-01-01 RX ADMIN — LEVOTHYROXINE, LIOTHYRONINE 90 MG: 19; 4.5 TABLET ORAL at 09:19

## 2021-01-01 RX ADMIN — POTASSIUM CHLORIDE 40 MEQ: 1500 TABLET, EXTENDED RELEASE ORAL at 13:17

## 2021-01-01 RX ADMIN — ACYCLOVIR 400 MG: 200 CAPSULE ORAL at 09:00

## 2021-01-01 RX ADMIN — Medication 10 ML: at 05:22

## 2021-01-01 RX ADMIN — SODIUM CHLORIDE 125 ML/HR: 900 INJECTION, SOLUTION INTRAVENOUS at 12:16

## 2021-01-01 RX ADMIN — SODIUM CHLORIDE 150 ML/HR: 900 INJECTION, SOLUTION INTRAVENOUS at 04:22

## 2021-01-01 RX ADMIN — SODIUM CHLORIDE 125 ML/HR: 900 INJECTION, SOLUTION INTRAVENOUS at 22:09

## 2021-01-01 RX ADMIN — SERTRALINE HYDROCHLORIDE 100 MG: 50 TABLET ORAL at 13:03

## 2021-01-01 RX ADMIN — SERTRALINE HYDROCHLORIDE 100 MG: 50 TABLET ORAL at 09:19

## 2021-01-01 RX ADMIN — Medication 10 ML: at 13:17

## 2021-01-01 RX ADMIN — CALCITONIN SALMON 388 INT'L UNITS: 200 INJECTION, SOLUTION INTRAMUSCULAR; SUBCUTANEOUS at 16:04

## 2021-01-01 RX ADMIN — Medication 10 ML: at 18:06

## 2021-01-01 RX ADMIN — SODIUM CHLORIDE, SODIUM LACTATE, POTASSIUM CHLORIDE, AND CALCIUM CHLORIDE 125 ML/HR: 600; 310; 30; 20 INJECTION, SOLUTION INTRAVENOUS at 08:22

## 2021-01-01 RX ADMIN — ONDANSETRON 4 MG: 2 INJECTION INTRAMUSCULAR; INTRAVENOUS at 20:58

## 2021-01-01 RX ADMIN — ACYCLOVIR 400 MG: 200 CAPSULE ORAL at 09:19

## 2021-01-01 RX ADMIN — SODIUM CHLORIDE 75 ML/HR: 900 INJECTION, SOLUTION INTRAVENOUS at 09:29

## 2021-01-01 RX ADMIN — SODIUM CHLORIDE, SODIUM LACTATE, POTASSIUM CHLORIDE, AND CALCIUM CHLORIDE 125 ML/HR: 600; 310; 30; 20 INJECTION, SOLUTION INTRAVENOUS at 18:04

## 2021-01-01 RX ADMIN — ACYCLOVIR 400 MG: 200 CAPSULE ORAL at 08:08

## 2021-01-01 RX ADMIN — LEVOTHYROXINE, LIOTHYRONINE 90 MG: 19; 4.5 TABLET ORAL at 13:07

## 2021-01-01 RX ADMIN — Medication 10 ML: at 00:04

## 2021-01-01 RX ADMIN — LEVOTHYROXINE, LIOTHYRONINE 90 MG: 19; 4.5 TABLET ORAL at 08:59

## 2021-01-01 RX ADMIN — POTASSIUM CHLORIDE 40 MEQ: 1500 TABLET, EXTENDED RELEASE ORAL at 09:22

## 2021-01-01 RX ADMIN — SODIUM CHLORIDE 1000 ML: 900 INJECTION, SOLUTION INTRAVENOUS at 14:46

## 2021-01-01 RX ADMIN — Medication 10 ML: at 14:43

## 2021-01-01 RX ADMIN — PANTOPRAZOLE SODIUM 40 MG: 40 TABLET, DELAYED RELEASE ORAL at 08:59

## 2021-01-01 RX ADMIN — SERTRALINE HYDROCHLORIDE 100 MG: 50 TABLET ORAL at 09:03

## 2021-01-01 RX ADMIN — ACYCLOVIR 400 MG: 200 CAPSULE ORAL at 18:02

## 2021-01-01 RX ADMIN — SODIUM CHLORIDE 1000 ML: 900 INJECTION, SOLUTION INTRAVENOUS at 21:00

## 2021-01-01 RX ADMIN — SODIUM CHLORIDE 125 ML/HR: 900 INJECTION, SOLUTION INTRAVENOUS at 08:56

## 2021-01-01 RX ADMIN — LEVOTHYROXINE, LIOTHYRONINE 90 MG: 19; 4.5 TABLET ORAL at 09:04

## 2021-01-01 RX ADMIN — LEVOTHYROXINE, LIOTHYRONINE 90 MG: 19; 4.5 TABLET ORAL at 08:52

## 2021-01-01 RX ADMIN — SODIUM CHLORIDE, SODIUM LACTATE, POTASSIUM CHLORIDE, AND CALCIUM CHLORIDE 125 ML/HR: 600; 310; 30; 20 INJECTION, SOLUTION INTRAVENOUS at 21:43

## 2021-01-01 RX ADMIN — LEVOTHYROXINE, LIOTHYRONINE 90 MG: 19; 4.5 TABLET ORAL at 09:22

## 2021-01-01 RX ADMIN — Medication 10 ML: at 21:55

## 2021-01-01 RX ADMIN — SERTRALINE HYDROCHLORIDE 100 MG: 50 TABLET ORAL at 08:08

## 2021-01-01 RX ADMIN — MAGNESIUM SULFATE HEPTAHYDRATE 2 G: 2 INJECTION, SOLUTION INTRAVENOUS at 05:07

## 2021-01-01 RX ADMIN — MAGNESIUM SULFATE HEPTAHYDRATE 2 G: 40 INJECTION, SOLUTION INTRAVENOUS at 09:29

## 2021-01-01 RX ADMIN — INSULIN LISPRO 2 UNITS: 100 INJECTION, SOLUTION INTRAVENOUS; SUBCUTANEOUS at 21:55

## 2021-01-01 RX ADMIN — Medication 10 ML: at 23:51

## 2021-01-01 RX ADMIN — SERTRALINE HYDROCHLORIDE 100 MG: 50 TABLET ORAL at 09:22

## 2021-01-01 RX ADMIN — Medication 10 ML: at 14:00

## 2021-01-01 RX ADMIN — CALCITONIN SALMON 388 INT'L UNITS: 200 INJECTION, SOLUTION INTRAMUSCULAR; SUBCUTANEOUS at 21:57

## 2021-01-01 RX ADMIN — Medication 10 ML: at 09:29

## 2021-01-01 RX ADMIN — MAGNESIUM SULFATE HEPTAHYDRATE 2 G: 2 INJECTION, SOLUTION INTRAVENOUS at 10:38

## 2021-01-01 RX ADMIN — Medication 10 ML: at 00:12

## 2021-01-01 RX ADMIN — FUROSEMIDE 40 MG: 10 INJECTION, SOLUTION INTRAMUSCULAR; INTRAVENOUS at 10:37

## 2021-01-01 RX ADMIN — SODIUM CHLORIDE 125 ML/HR: 900 INJECTION, SOLUTION INTRAVENOUS at 06:01

## 2021-01-01 RX ADMIN — SODIUM CHLORIDE 125 ML/HR: 900 INJECTION, SOLUTION INTRAVENOUS at 10:38

## 2021-01-01 RX ADMIN — ACYCLOVIR 400 MG: 200 CAPSULE ORAL at 17:55

## 2021-01-01 RX ADMIN — ZOLEDRONIC ACID 4 MG: 0.04 INJECTION, SOLUTION INTRAVENOUS at 13:00

## 2021-01-01 RX ADMIN — SODIUM CHLORIDE, SODIUM LACTATE, POTASSIUM CHLORIDE, AND CALCIUM CHLORIDE 125 ML/HR: 600; 310; 30; 20 INJECTION, SOLUTION INTRAVENOUS at 04:48

## 2021-01-01 RX ADMIN — SERTRALINE HYDROCHLORIDE 100 MG: 50 TABLET ORAL at 09:00

## 2021-01-18 NOTE — PROGRESS NOTES
Dami Oliva is a 77 y.o. female who was seen by synchronous (real-time) audio-video technology on 1/18/2021 for Annual Wellness Visit and Hip Pain    Pain Review:  She presents do to pain of her right buttock that is secondary to no known injury and started 1 week ago. Since it started her pain has worsened. She describes the pain as sharp, stabbing. It is intermittent. The pain radiates: no where. She States the pain is steady throughout the day. Exacerbating factors identifiable by patient are standing, sitting, walking. She has tried the following: OTC medication, and OTC ointments. .  These have been: not very effective. Previous workup: none.  reviewed: yes      No results found for: DSPM1T        Assessment & Plan:     Diagnoses and all orders for this visit:    1. Medicare annual wellness visit, subsequent    2. Right hip pain  -     acetaminophen (TYLENOL) 500 mg tablet; Take 2 Tabs by mouth every six (6) hours as needed for Pain.  -     diclofenac (VOLTAREN) 1 % gel; Apply  to affected area four (4) times daily. 3. Screening for depression  -     DEPRESSION SCREEN ANNUAL      Follow-up and Dispositions    · Return in about 1 week (around 1/25/2021), or if symptoms worsen or fail to improve, for Hip Pain (VV). 712  Subjective:       Prior to Admission medications    Medication Sig Start Date End Date Taking? Authorizing Provider   acetaminophen (TYLENOL) 500 mg tablet Take 2 Tabs by mouth every six (6) hours as needed for Pain. 1/18/21  Yes Hector Mora NP   diclofenac (VOLTAREN) 1 % gel Apply  to affected area four (4) times daily. 1/18/21  Yes Hector Mora NP   Pomalyst 3 mg cap  11/25/20  Yes Provider, Historical   acyclovir (ZOVIRAX) 400 mg tablet Take 400 mg by mouth two (2) times a day. Yes Provider, Historical   cyanocobalamin, vitamin B-12, (VITAMIN B-12 PO) Take  by mouth daily.    Yes Provider, Historical   travoprost (Travatan Z) 0.004 % ophthalmic solution Administer 1 Drop to both eyes every evening. Yes Provider, Historical   montelukast (Singulair) 10 mg tablet Take 1 Tab by mouth daily. 7/31/20  Yes Leida Gillespie MD   Biotin 2,500 mcg cap Take  by mouth. Yes Provider, Historical   pomalidomide (POMALYST PO) Take 3 mg by mouth. Daily for three weeks, off for one week   Yes Provider, Historical   hydroCHLOROthiazide (HYDRODIURIL) 25 mg tablet TAKE 1 TABLET BY MOUTH DAILY 6/23/20  Yes Leida Gillespie MD   Ventolin HFA 90 mcg/actuation inhaler Take 2 Puffs by inhalation every four (4) hours as needed for Wheezing. 6/22/20  Yes Leida Gillespie MD   budesonide-formoteroL (Symbicort) 80-4.5 mcg/actuation HFAA Take 2 Puffs by inhalation two (2) times a day. 6/22/20  Yes Leida Gillespie MD   dexAMETHasone (DECADRON) 4 mg tablet Takes 5 tablets every friday 1/28/20  Yes Provider, Historical   sertraline (ZOLOFT) 100 mg tablet Take 1 Tab by mouth daily. 12/16/19  Yes Franky Neal NP   ARMOUR THYROID 120 mg tab Take 90 mg by mouth daily. 4/19/19  Yes Provider, Historical   ferrous gluconate 324 mg (37.5 mg iron) tablet Take 1 Tab by mouth Daily (before breakfast). 3/4/19  Yes Franky Neal NP   vit [id number] calcium-vit B6 (FOLTX) 2-1.13-25 mg tablet Take 1 Tab by mouth daily. 2/5/18  Yes Ehsan Rojas,    Cholecalciferol, Vitamin D3, (VITAMIN D3) 2,000 unit cap capsule Take  by Mouth. Yes Provider, Historical   CALCIUM CITRATE (CITRACAL PO) Take 1 Tab by mouth daily. 4/19/10  Yes Provider, Historical   FLAXSEED PO Take 1 Tab by mouth daily. 4/19/10  Yes Provider, Historical   omeprazole (PRILOSEC) 40 mg capsule Take 1 capsule by mouth twice daily 12/2/20   Leida Gillespie MD   methylPREDNISolone ([facility]) 4 mg tablet Use as per package directions 11/17/20 1/18/21  Leida Gillespie MD   docusate sodium (COLACE) 100 mg capsule Take 1 Cap by mouth two (2) times a day for 90 days.  11/12/20 2/10/21  Real Cisneros MD [id number]   bisacodyL 5 mg tab Take 5 mg by mouth daily. 11/12/20   Ophelia Smith MD   acetaminophen (TYLENOL) 325 mg tablet Take 2 Tabs by mouth every six (6) hours. Indications: pain 11/12/20 1/18/21  Ophelia Smith MD   ibuprofen (MOTRIN) 600 mg tablet Take 1 Tab by mouth every six (6) hours. 11/12/20 1/18/21  Ophelia Smith MD   loperamide HCl (IMODIUM A-D PO) Take  by mouth as needed. Provider, Historical   mirabegron ER (Myrbetriq) 25 mg ER tablet Take 1 Tab by mouth daily. 10/22/20   Ashwini Dunlap MD     Patient Active Problem List   Diagnosis Code    HTN (hypertension) I10    Hypothyroid E03.9    Arthritis M19.90    Depression F32.9    Anxiety F41.9    Seasonal allergic rhinitis J30.2    Migraines G43.909    Insomnia G47.00    BPPV (benign paroxysmal positional vertigo) H81.10    Meningioma (HCC) D32.9    Spondylolisthesis of lumbar region M43.16    History of thyroidectomy Z90.09    Post-menopausal osteoporosis M81.0    History of tobacco abuse Z87.891    Fatigue R53.83    Rib pain on left side R07.81    Multiple myeloma (HCC) C90.00    Intraductal papilloma of breast, left D24.2    Severe obesity ([facility]) E66.01     Current Outpatient Medications   Medication Sig Dispense Refill    acetaminophen (TYLENOL) 500 mg tablet Take 2 Tabs by mouth every six (6) hours as needed for Pain. 90 Tab 0    diclofenac (VOLTAREN) 1 % gel Apply  to affected area four (4) times daily. 1 Each 0    Pomalyst 3 mg cap       acyclovir (ZOVIRAX) 400 mg tablet Take 400 mg by mouth two (2) times a day.  cyanocobalamin, vitamin B-12, (VITAMIN B-12 PO) Take  by mouth daily.  travoprost (Travatan Z) 0.004 % ophthalmic solution Administer 1 Drop to both eyes every evening.  montelukast (Singulair) 10 mg tablet Take 1 Tab by mouth daily. 90 Tab 0    Biotin 2,500 mcg cap Take  by mouth.  pomalidomide (POMALYST PO) Take 3 mg by mouth.  Daily for three weeks, off for one week  hydroCHLOROthiazide (HYDRODIURIL) 25 mg tablet TAKE 1 TABLET BY MOUTH DAILY 90 Tab 3    Ventolin HFA 90 mcg/actuation inhaler Take 2 Puffs by inhalation every four (4) hours as needed for Wheezing. 18 g 0    budesonide-formoteroL (Symbicort) 80-4.5 mcg/actuation HFAA Take 2 Puffs by inhalation two (2) times a day. 1 Inhaler 3    dexAMETHasone (DECADRON) 4 mg tablet Takes 5 tablets every friday      sertraline (ZOLOFT) 100 mg tablet Take 1 Tab by mouth daily. 90 Tab 1    ARMOUR THYROID 120 mg tab Take 90 mg by mouth daily. 3    ferrous gluconate 324 mg (37.5 mg iron) tablet Take 1 Tab by mouth Daily (before breakfast). 90 Tab 1    vit B12-levomefolate calcium-vit B6 (FOLTX) 2-1.13-25 mg tablet Take 1 Tab by mouth daily. 30 Tab 0    Cholecalciferol, Vitamin D3, (VITAMIN D3) 2,000 unit cap capsule Take  by Mouth.  CALCIUM CITRATE (CITRACAL PO) Take 1 Tab by mouth daily.  FLAXSEED PO Take 1 Tab by mouth daily.  omeprazole (PRILOSEC) 40 mg capsule Take 1 capsule by mouth twice daily 60 Cap 6    docusate sodium (COLACE) 100 mg capsule Take 1 Cap by mouth two (2) times a day for 90 days. 60 Cap 2    bisacodyL 5 mg tab Take 5 mg by mouth daily. 3 Tab 0    loperamide HCl (IMODIUM A-D PO) Take  by mouth as needed.  mirabegron ER (Myrbetriq) 25 mg ER tablet Take 1 Tab by mouth daily. 30 Tab 6     Allergies   Allergen Reactions    Iodine Hives, Rash and Other (comments)     Feels like Chest caving in. Rash LL lumbosacral area    Fosamax [Alendronate] Itching     rash       ROS    Objective:   No flowsheet data found.    General: alert, cooperative, no distress   Mental  status: normal mood, behavior, speech, dress, motor activity, and thought processes, able to follow commands   HENT: NCAT   Neck: no visualized mass   Resp: no respiratory distress   Neuro: no gross deficits   Skin: no discoloration or lesions of concern on visible areas   Psychiatric: normal affect, consistent with stated mood, no evidence of hallucinations     Additional exam findings: N/A      We discussed the expected course, resolution and complications of the diagnosis(es) in detail. Medication risks, benefits, costs, interactions, and alternatives were discussed as indicated. I advised her to contact the office if her condition worsens, changes or fails to improve as anticipated. She expressed understanding with the diagnosis(es) and plan. Stan Cruz, who was evaluated through a patient-initiated, synchronous (real-time) audio-video encounter, and/or her healthcare decision maker, is aware that it is a billable service, with coverage as determined by her insurance carrier. She provided verbal consent to proceed: Yes, and patient identification was verified. It was conducted pursuant to the emergency declaration under the [address] [place] [address], [address] [place] [address] authority and the [employer] General Act. A caregiver was present when appropriate. Ability to conduct physical exam was limited. I was at home. The patient was at home.       Jewel Block NP

## 2021-01-18 NOTE — PATIENT INSTRUCTIONS
Medicare Wellness Visit, Female The best way to live healthy is to have a lifestyle where you eat a well-balanced diet, exercise regularly, limit alcohol use, and quit all forms of tobacco/nicotine, if applicable. Regular preventive services are another way to keep healthy. Preventive services (vaccines, screening tests, monitoring & exams) can help personalize your care plan, which helps you manage your own care. Screening tests can find health problems at the earliest stages, when they are easiest to treat. [facility] follows the current, evidence-based guidelines published by the [facility] Torsten Kinsey ([facility]) when recommending preventive services for our patients. Because we follow these guidelines, sometimes recommendations change over time as research supports it. (For example, mammograms used to be recommended annually. Even though Medicare will still pay for an annual mammogram, the newer guidelines recommend a mammogram every two years for women of average risk). Of course, you and your doctor may decide to screen more often for some diseases, based on your risk and your co-morbidities (chronic disease you are already diagnosed with). Preventive services for you include: - Medicare offers their members a free annual wellness visit, which is time for you and your primary care provider to discuss and plan for your preventive service needs. Take advantage of this benefit every year! 
-All adults over the age of 72 should receive the recommended pneumonia vaccines. Current USPSTF guidelines recommend a series of two vaccines for the best pneumonia protection.  
-All adults should have a flu vaccine yearly and a tetanus vaccine every 10 years.  
-All adults age 48 and older should receive the shingles vaccines (series of two vaccines). -All adults age 38-68 who are overweight should have a diabetes screening test once every three years. -All adults born between [birth year] and 1964 should be screened once for Hepatitis C. 
-Other screening tests and preventive services for persons with diabetes include: an eye exam to screen for diabetic retinopathy, a kidney function test, a foot exam, and stricter control over your cholesterol.  
-Cardiovascular screening for adults with routine risk involves an electrocardiogram (ECG) at intervals determined by your doctor.  
-Colorectal cancer screenings should be done for adults age 54-65 with no increased risk factors for colorectal cancer. There are a number of acceptable methods of screening for this type of cancer. Each test has its own benefits and drawbacks. Discuss with your doctor what is most appropriate for you during your annual wellness visit. The different tests include: colonoscopy (considered the best screening method), a fecal occult blood test, a fecal DNA test, and sigmoidoscopy. 
 
-A bone mass density test is recommended when a woman turns 65 to screen for osteoporosis. This test is only recommended one time, as a screening. Some providers will use this same test as a disease monitoring tool if you already have osteoporosis. -Breast cancer screenings are recommended every other year for women of normal risk, age 54-69. 
-Cervical cancer screenings for women over age 72 are only recommended with certain risk factors. Here is a list of your current Health Maintenance items (your personalized list of preventive services) with a due date: 
Health Maintenance Due Topic Date Due  Shingles Vaccine (1 of 2) 08/22/1996  Glaucoma Screening   08/21/2019  Cholesterol Test   05/19/2020  Yearly Flu Vaccine (1) 09/01/2020

## 2021-01-18 NOTE — TELEPHONE ENCOUNTER
Pharmacy needs to know how many grams daily the patient should use for her Voltaren cream. Please advise [phone].

## 2021-01-18 NOTE — PROGRESS NOTES
This is the Subsequent Medicare Annual Wellness Exam, performed 12 months or more after the Initial AWV or the last Subsequent AWV    I have reviewed the patient's medical history in detail and updated the computerized patient record. Depression Risk Factor Screening:     3 most recent PHQ Screens 1/18/2021   PHQ Not Done -   Little interest or pleasure in doing things Several days   Feeling down, depressed, irritable, or hopeless Several days   Total Score PHQ 2 2   Trouble falling or staying asleep, or sleeping too much -   Feeling tired or having little energy -   Poor appetite, weight loss, or overeating -   Feeling bad about yourself - or that you are a failure or have let yourself or your family down -   Trouble concentrating on things such as school, work, reading, or watching TV -   Moving or speaking so slowly that other people could have noticed; or the opposite being so fidgety that others notice -   Thoughts of being better off dead, or hurting yourself in some way -   PHQ 9 Score -   How difficult have these problems made it for you to do your work, take care of your home and get along with others -       Alcohol Risk Screen    Do you average more than 1 drink per night or more than 7 drinks a week:  No    On any one occasion in the past three months have you have had more than 3 drinks containing alcohol:  No        Functional Ability and Level of Safety:    Hearing: Hearing is good. Activities of Daily Living: The home contains: no safety equipment. Patient does total self care      Ambulation: with difficulty, uses a cane and walker     Fall Risk:  Fall Risk Assessment, last 12 mths 11/27/2020   Able to walk? Yes   Fall in past 12 months? No   Number of falls in past 12 months -   Fall with injury?  -      Abuse Screen:  Patient is not abused       Cognitive Screening    Has your family/caregiver stated any concerns about your memory: no    Cognitive Screening: A+OX3    Assessment/Plan Education and counseling provided:  Are appropriate based on today's review and evaluation    Diagnoses and all orders for this visit:    1. Medicare annual wellness visit, subsequent    2. Right hip pain  -     acetaminophen (TYLENOL) 500 mg tablet; Take 2 Tabs by mouth every six (6) hours as needed for Pain.  -     diclofenac (VOLTAREN) 1 % gel; Apply  to affected area four (4) times daily.     3. Screening for depression  -     [place] Maintenance Due     Health Maintenance Due   Topic Date Due    Shingrix Vaccine Age 49> (1 of 2) 08/22/1996    GLAUCOMA SCREENING Q2Y  08/21/2019    Lipid Screen  05/19/2020    Flu Vaccine (1) 09/01/2020       Patient Care Team   Patient Care Team:  Bijal Hope MD as PCP - General (Family Medicine)  Bijal Hope MD as PCP - [facility] Provider  Leticia Lo MD (Pulmonary Disease)  Katya Alarcon MD as Surgeon (General Surgery)    History     Patient Active Problem List   Diagnosis Code    HTN (hypertension) I10    Hypothyroid E03.9    Arthritis M19.90    Depression F32.9    Anxiety F41.9    Seasonal allergic rhinitis J30.2    Migraines G43.909    Insomnia G47.00    BPPV (benign paroxysmal positional vertigo) H81.10    Meningioma (HCC) D32.9    Spondylolisthesis of lumbar region M43.16    History of thyroidectomy Z90.09    Post-menopausal osteoporosis M81.0    History of tobacco abuse Z87.891    Fatigue R53.83    Rib pain on left side R07.81    Multiple myeloma ([address] C90.00    Intraductal papilloma of breast, left D24.2    Severe obesity ([address] E66.01     Past Medical History:   Diagnosis Date    Anxiety 5/18/2010    Arthritis     OA spine, left knee    Chronic obstructive pulmonary disease ([address]     Depression 5/18/2010    Headache(784.0)     migraines    HTN (hypertension) 5/18/2010    Hypothyroid 5/18/2010    Multiple myeloma ([address]     Osteopenia     Rib fractures 07/17/2017    r/t fall  Seasonal allergic rhinitis 5/18/2010    Seasonal allergies     Spondylolisthesis of lumbar region 1/21/2016    Dr. Raul Butterfield [facility]    Thyroid disease     hypothyroid,  thromeagaly      Past Surgical History:   Procedure Laterality Date    BREAST SURGERY PROCEDURE UNLISTED      breast cyst s/p biopsy 2008    COLONOSCOPY N/A 9/6/2017    COLONOSCOPY performed by Sherie Kyle MD at [address] Left 11/12/2020    TAG LOCALIZED EXCISIONAL BIOPSY OF LEFT BREAST (HBV 11.3.20 @ 1000) performed by Pippa Posey MD at [address] HX CATARACT REMOVAL  8/2011    [facility]  HX CATARACT REMOVAL  8/2011    right eye 2 weeks apart from left [facility]. Dr. Roshni Sams    HX COLONOSCOPY  2007    HX HEENT      partical parathyroidectomy     HX ORTHOPAEDIC      rotator cuff repqir 10/1996    NEUROLOGICAL PROCEDURE UNLISTED  5-20-13    meninginoma Dr. Deborah Valdovinos     Current Outpatient Medications   Medication Sig Dispense Refill    acetaminophen (TYLENOL) 500 mg tablet Take 2 Tabs by mouth every six (6) hours as needed for Pain. 90 Tab 0    diclofenac (VOLTAREN) 1 % gel Apply  to affected area four (4) times daily. 1 Each 0    Pomalyst 3 mg cap       acyclovir (ZOVIRAX) 400 mg tablet Take 400 mg by mouth two (2) times a day.  cyanocobalamin, vitamin B-12, (VITAMIN B-12 PO) Take  by mouth daily.  travoprost (Travatan Z) 0.004 % ophthalmic solution Administer 1 Drop to both eyes every evening.  montelukast (Singulair) 10 mg tablet Take 1 Tab by mouth daily. 90 Tab 0    Biotin 2,500 mcg cap Take  by mouth.  pomalidomide (POMALYST PO) Take 3 mg by mouth. Daily for three weeks, off for one week      hydroCHLOROthiazide (HYDRODIURIL) 25 mg tablet TAKE 1 TABLET BY MOUTH DAILY 90 Tab 3    Ventolin HFA 90 mcg/actuation inhaler Take 2 Puffs by inhalation every four (4) hours as needed for Wheezing.  18 g 0  budesonide-formoteroL (Symbicort) 80-4.5 mcg/actuation HFAA Take 2 Puffs by inhalation two (2) times a day. 1 Inhaler 3    dexAMETHasone (DECADRON) 4 mg tablet Takes 5 tablets every friday      sertraline (ZOLOFT) 100 mg tablet Take 1 Tab by mouth daily. 90 Tab 1    ARMOUR THYROID 120 mg tab Take 90 mg by mouth daily. 3    ferrous gluconate 324 mg (37.5 mg iron) tablet Take 1 Tab by mouth Daily (before breakfast). 90 Tab 1    vit B12-levomefolate calcium-vit B6 (FOLTX) 2-1.13-25 mg tablet Take 1 Tab by mouth daily. 30 Tab 0    Cholecalciferol, Vitamin D3, (VITAMIN D3) 2,000 unit cap capsule Take  by Mouth.  CALCIUM CITRATE (CITRACAL PO) Take 1 Tab by mouth daily.  FLAXSEED PO Take 1 Tab by mouth daily.  omeprazole (PRILOSEC) 40 mg capsule Take 1 capsule by mouth twice daily 60 Cap 6    docusate sodium (COLACE) 100 mg capsule Take 1 Cap by mouth two (2) times a day for 90 days. 60 Cap 2    bisacodyL 5 mg tab Take 5 mg by mouth daily. 3 Tab 0    loperamide HCl (IMODIUM A-D PO) Take  by mouth as needed.  mirabegron ER (Myrbetriq) 25 mg ER tablet Take 1 Tab by mouth daily. 30 Tab 6     Allergies   Allergen Reactions    Iodine Hives, Rash and Other (comments)     Feels like Chest caving in.   Rash LL lumbosacral area    Fosamax [Alendronate] Itching     rash       Family History   Problem Relation Age of Onset    Cancer Brother         brain     Social History     Tobacco Use    Smoking status: Former Smoker     Packs/day: 0.25     Years: 40.00     Pack years: 10.00     Types: Cigarettes     Start date: 8/11/1970     Quit date: 8/17/2017     Years since quitting: 3.4    Smokeless tobacco: Never Used    Tobacco comment: patient is smoking one cigarette periodically   Substance Use Topics    Alcohol use: Yes     Comment: wine for communion only       Stanley Dutta, who was evaluated through a synchronous (real-time) audio-video encounter, and/or her healthcare decision maker, is aware that it is a billable service, with coverage as determined by her insurance carrier. She provided verbal consent to proceed: Yes, and patient identification was verified. It was conducted pursuant to the emergency declaration under the [address] [place] [address] authority and the [employer] Act. A caregiver was present when appropriate. Ability to conduct physical exam was limited. I was at home. The patient was at home.     Domingo Weinberg NP

## 2021-01-22 NOTE — Clinical Note
Status[de-identified] INPATIENT [101]   Type of Bed: Telemetry [19]   Inpatient Hospitalization Certified Necessary for the Following Reasons: 3. Patient receiving treatment that can only be provided in an inpatient setting (further clarification in H&P documentation)   Admitting Diagnosis: Hypercalcemia [275.42. ICD-9-CM]   Admitting Physician: Mary Ceron [[id number]]   Attending Physician: Mary Ceron [[id number]]   Estimated Length of Stay: 3-4 Midnights   Discharge Plan[de-identified] Home with Office Follow-up

## 2021-01-23 PROBLEM — E83.52 HYPERCALCEMIA: Status: ACTIVE | Noted: 2021-01-01

## 2021-01-23 NOTE — ED NOTES
Pt offered lunch tray, stated not interested at present time, request tray to be placed in pt nutrition refrigerator.

## 2021-01-23 NOTE — ED TRIAGE NOTES
Pt reports having blood work done today at oncology office and receivied call stating that her  Calcium was elevated and to report to ER.

## 2021-01-23 NOTE — ED PROVIDER NOTES
HPI patient is a 71-year-old female presents to the ER with complaint of generalized fatigue and malaise for the last 3 to 4 days. States her symptoms started after she had death in her family. No other complaint. Patient history of multiple myeloma that was treated 3 years ago. Past Medical History:   Diagnosis Date    Anxiety 5/18/2010    Arthritis     OA spine, left knee    Chronic obstructive pulmonary disease ([facility]     Depression 5/18/2010    Headache(784.0)     migraines    HTN (hypertension) 5/18/2010    Hypothyroid 5/18/2010    Multiple myeloma ([facility]     Osteopenia     Rib fractures 07/17/2017    r/t fall    Seasonal allergic rhinitis 5/18/2010    Seasonal allergies     Spondylolisthesis of lumbar region 1/21/2016    Dr. Armendariz June [facility]    Thyroid disease     hypothyroid,  thromeagaly       Past Surgical History:   Procedure Laterality Date    COLONOSCOPY N/A 9/6/2017    COLONOSCOPY performed by Dayanara Gatica MD at [place] [address] Left 11/12/2020    TAG LOCALIZED EXCISIONAL BIOPSY OF LEFT BREAST (HBV 11.3.20 @ 1000) performed by Shirin Reeves MD at [address] [place] [address] HX CATARACT REMOVAL  8/2011    [facility]  HX CATARACT REMOVAL  8/2011    right eye 2 weeks apart from left [facility].  Dr. Anita Aguirre HX COLONOSCOPY  2007    HX HEENT      partical parathyroidectomy     HX ORTHOPAEDIC      rotator cuff repqir 10/1996    NEUROLOGICAL PROCEDURE UNLISTED  5-20-13    meninginoma Dr. Mindy Block      breast cyst s/p biopsy 2008         Family History:   Problem Relation Age of Onset    Cancer Brother         brain       Social History     Socioeconomic History    Marital status: LEGALLY      Spouse name: Not on file    Number of children: Not on file    Years of education: Not on file    Highest education level: Not on file   Occupational History  Not on file   Social Needs    Financial resource strain: Not on file    Food insecurity     Worry: Not on file     Inability: Not on file    Transportation needs     Medical: Not on file     Non-medical: Not on file   Tobacco Use    Smoking status: Former Smoker     Packs/day: 0.25     Years: 40.00     Pack years: 10.00     Types: Cigarettes     Start date: 8/11/1970     Quit date: 8/17/2017     Years since quitting: 3.4    Smokeless tobacco: Never Used    Tobacco comment: patient is smoking one cigarette periodically   Substance and Sexual Activity    Alcohol use: Yes     Comment: wine for communion only    Drug use: No    Sexual activity: Yes     Partners: Male   Lifestyle    Physical activity     Days per week: Not on file     Minutes per session: Not on file    Stress: Not on file   Relationships    Social connections     Talks on phone: Not on file     Gets together: Not on file     Attends Jehovah's witness service: Not on file     Active member of club or organization: Not on file     Attends meetings of clubs or organizations: Not on file     Relationship status: Not on file    Intimate partner violence     Fear of current or ex partner: Not on file     Emotionally abused: Not on file     Physically abused: Not on file     Forced sexual activity: Not on file   Other Topics Concern    Not on file   Social History Narrative    Not on file         ALLERGIES: Iodine and Fosamax [alendronate]    Review of Systems   Constitutional: Negative. HENT: Negative. Eyes: Negative. Respiratory: Negative. Cardiovascular: Negative. Gastrointestinal: Negative. Endocrine: Negative. Genitourinary: Negative. Musculoskeletal: Negative. Skin: Negative. Allergic/Immunologic: Negative. Neurological: Negative. Hematological: Negative. Psychiatric/Behavioral: Negative. All other systems reviewed and are negative.       Vitals:    01/22/21 1932   BP: (!) 130/57   Pulse: (!) 122 Resp: 22   Temp: 98.9 °F (37.2 °C)   SpO2: 98%   Weight: 97.1 kg (214 lb)            Physical Exam  Vitals signs and nursing note reviewed. Constitutional:       General: She is not in acute distress. Appearance: She is well-developed. She is not diaphoretic. HENT:      Head: Normocephalic. Right Ear: External ear normal.      Left Ear: External ear normal.      Mouth/Throat:      Pharynx: No oropharyngeal exudate. Eyes:      General: No scleral icterus. Right eye: No discharge. Left eye: No discharge. Conjunctiva/sclera: Conjunctivae normal.      Pupils: Pupils are equal, round, and reactive to light. Neck:      Musculoskeletal: Normal range of motion and neck supple. Thyroid: No thyromegaly. Vascular: No JVD. Trachea: No tracheal deviation. Cardiovascular:      Rate and Rhythm: Normal rate and regular rhythm. Heart sounds: Normal heart sounds. No murmur. No friction rub. No gallop. Pulmonary:      Effort: Pulmonary effort is normal. No respiratory distress. Breath sounds: Normal breath sounds. No stridor. No wheezing or rales. Chest:      Chest wall: No tenderness. Abdominal:      General: Bowel sounds are normal. There is no distension. Palpations: Abdomen is soft. There is no mass. Tenderness: There is no abdominal tenderness. There is no guarding or rebound. Musculoskeletal: Normal range of motion. General: No tenderness. Lymphadenopathy:      Cervical: No cervical adenopathy. Skin:     General: Skin is warm and dry. Coloration: Skin is not pale. Findings: No erythema or rash. Neurological:      Mental Status: She is alert and oriented to person, place, and time. Cranial Nerves: No cranial nerve deficit. Motor: No abnormal muscle tone.       Coordination: Coordination normal.      Deep Tendon Reflexes: Reflexes normal.          [facility]  ED Course as of Jan 22 2058 Fri Jan 22, 2021 2057 Calcium(!!): 14.4 [KB]      ED Course User Index  [KB] Barbara Young MD       Procedures     Case discussed with Dr. Kaveh Everett (hospitalist). He accepted patient for admission to telemetry. Dx: Hypercalcemia    Disp: admit    Dictation disclaimer:  Please note that this dictation was completed with [employer], the computer voice recognition software. Quite often unanticipated grammatical, syntax, homophones, and other interpretive errors are inadvertently transcribed by the computer software. Please disregard these errors. Please excuse any errors that have escaped final proofreading.

## 2021-01-23 NOTE — ED NOTES
Fluid bolus started and medicated per orders  No complaints.  Sleeping but easily arousable to answer questions

## 2021-01-24 NOTE — ED NOTES
Pt. Reassessed and is sleeping in bed in the POC with no new complaints. Pt. Is in NAD at this time, Pure wick in place. MD notified and will continue to monitor.

## 2021-01-24 NOTE — ED NOTES
In room to bring pt lunch tray, pt sleeping, lunch placed in nutrition room refrigerator for when wakes.

## 2021-01-24 NOTE — ED NOTES
Pt. Cleaned and had a bowel movement, ayleen care performed and new linen given, replaced ayleen-wick, pt resting in bed in the POC at this time.

## 2021-01-24 NOTE — ED NOTES
Pt. Reassessed at this time and is sleeping in bed in the POC with NAD, pt AM labs were drawn at this time and pure wick in place. MD notified and will continue to monitor.

## 2021-01-24 NOTE — ED NOTES
Pt. Assessed and discussed her nutrtion, pt reports drinking 4-5 Ensure Plus a day and 5-6 Propel hydration drinks per day, both drinks have significant amounts of calcium in them and patient was cautioned to limit these drinks and speak to her PCP about drinking so much of these drinks. MD notified.

## 2021-01-25 NOTE — PROGRESS NOTES
Pt's daughter Elian Escobedo called back. She confirmed that pt lives with her  and he assists her at home and pt and her  makes decision.       MARK Bell RN  Care Management  Pager: [phone]

## 2021-01-25 NOTE — PROGRESS NOTES
Problem: Falls - Risk of  Goal: *Absence of Falls  Description: Document Clydene Bone Fall Risk and appropriate interventions in the flowsheet. Outcome: Progressing Towards Goal  Note: Fall Risk Interventions:  Mobility Interventions: Patient to call before getting OOB         Medication Interventions: Bed/chair exit alarm, Teach patient to arise slowly    Elimination Interventions: Bed/chair exit alarm, Call light in reach              Problem: Patient Education: Go to Patient Education Activity  Goal: Patient/Family Education  Outcome: Progressing Towards Goal     Problem: Pressure Injury - Risk of  Goal: *Prevention of pressure injury  Description: Document Lonnie Scale and appropriate interventions in the flowsheet.   Outcome: Progressing Towards Goal  Note: Pressure Injury Interventions:  Sensory Interventions: Assess changes in LOC, Keep linens dry and wrinkle-free    Moisture Interventions: Absorbent underpads, Minimize layers    Activity Interventions: Increase time out of bed, PT/OT evaluation    Mobility Interventions: HOB 30 degrees or less, PT/OT evaluation    Nutrition Interventions: Document food/fluid/supplement intake    Friction and Shear Interventions: Minimize layers

## 2021-01-25 NOTE — ROUTINE PROCESS
Bedside shift change report given to [facility] RN (oncoming nurse) by  Jasmin Crockett RN (offgoing nurse). Report included the following information SBAR, Kardex, Intake/Output and Recent Results.
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
## 2021-01-25 NOTE — PROGRESS NOTES
conducted an initial consultation and Spiritual Assessment for Nella Sauer, who is a 77 y.o.,female. Patients Primary Language is: Chance Michael. According to the patients EMR Islam Affiliation is: Uatsdin. The reason the Patient came to the hospital is:   Patient Active Problem List    Diagnosis Date Noted    Hypercalcemia 01/23/2021    Severe obesity ([address] 11/27/2020    Intraductal papilloma of breast, left 11/12/2020    Multiple myeloma ([facility] [address]     Fatigue 02/05/2018    Rib pain on left side 02/05/2018    History of tobacco abuse 08/21/2017    Post-menopausal osteoporosis 04/27/2017    Spondylolisthesis of lumbar region 01/21/2016    History of thyroidectomy 05/20/2013    Meningioma ([facility] [address] 05/06/2013    BPPV (benign paroxysmal positional vertigo) 08/18/2010    Insomnia 08/03/2010    Migraines 06/22/2010    HTN (hypertension) 05/18/2010    Hypothyroid 05/18/2010    Arthritis 05/18/2010    Depression 05/18/2010    Anxiety 05/18/2010    Seasonal allergic rhinitis 05/18/2010        The  provided the following Interventions:  Initiated a relationship of care and support. Explored issues of opal, belief, spirituality and Jainism/ritual needs while hospitalized. Listened empathically. Provided chaplaincy education. Provided information about Spiritual Care Services. Offered prayer and assurance of continued prayers on patient's behalf. Chart reviewed. The following outcomes where achieved:  Patient shared limited information about both their medical narrative and spiritual journey/beliefs.  confirmed Patient's Islam Affiliation. Patient processed feeling about current hospitalization. Patient expressed gratitude for 's visit. Assessment:  Patient does not have any Jainism/cultural needs that will affect patients preferences in health care. There are no spiritual or Jainism issues which require intervention at this time. Plan:  Chaplains will continue to follow and will provide pastoral care on an as needed/requested basis.  recommends bedside caregivers page  on duty if patient shows signs of acute spiritual or emotional distress. [address]   [phone] conducted an initial consultation and Spiritual Assessment for Poonam Escobar, who is a 77 y.o.,female. Patients Primary Language is: [place]. According to the patients EMR Hinduism Affiliation is: Christianity. The reason the Patient came to the hospital is:   Patient Active Problem List    Diagnosis Date Noted    Hypercalcemia 01/23/2021    Severe obesity ([address] 11/27/2020    Intraductal papilloma of breast, left 11/12/2020    Multiple myeloma ([address]     Fatigue 02/05/2018    Rib pain on left side 02/05/2018    History of tobacco abuse 08/21/2017    Post-menopausal osteoporosis 04/27/2017    Spondylolisthesis of lumbar region 01/21/2016    History of thyroidectomy 05/20/2013    Meningioma ([facility] [address] 05/06/2013    BPPV (benign paroxysmal positional vertigo) 08/18/2010    Insomnia 08/03/2010    Migraines 06/22/2010    HTN (hypertension) 05/18/2010    Hypothyroid 05/18/2010    Arthritis 05/18/2010    Depression 05/18/2010    Anxiety 05/18/2010    Seasonal allergic rhinitis 05/18/2010        The  provided the following Interventions:  Initiated a relationship of care and support. Explored issues of opal, belief, spirituality and Sabianist/ritual needs while hospitalized. Listened empathically. Provided chaplaincy education. Provided information about Spiritual Care Services. Offered prayer and assurance of continued prayers on patient's behalf. Chart reviewed. The following outcomes where achieved:  Patient shared limited information about both their medical narrative and spiritual journey/beliefs.  confirmed Patient's Hinduism Affiliation.   Patient processed feeling about current hospitalization. Patient expressed gratitude for 's visit. Assessment:  Patient does not have any Zoroastrianism/cultural needs that will affect patients preferences in health care. There are no spiritual or Zoroastrianism issues which require intervention at this time. Plan:  Chaplains will continue to follow and will provide pastoral care on an as needed/requested basis.  recommends bedside caregivers page  on duty if patient shows signs of acute spiritual or emotional distress.       [address] [facility]   [phone]

## 2021-01-25 NOTE — PROGRESS NOTES
[facility]  Progress Note    Patient: Crystal Christensen Age: 77 y.o. : 1945 MR#: [id number] SSN: [id number]  Date: 2021     Subjective:     Reports being a little tired; otherwise no concerns including no SOB, CP, n/v/abd pain     Assessment/Plan:   1. Hypercalcemia - cont to improve /  Cont armour / IVF  2. [facility]  / oncology consulted - discussed with Dr. Caryn Haider  3. Pancytopenia -  Trend / hold lovenox / no acute bleeding episodes  4.  Hypomagnesemia - recheck s/p replacement (not completed - called lab requested check)    Called daughter Kajal Cesar [phone] per patient request, daughter shares that  is primary source of contact - she will relay pt status - for further updates  Judy Vásquez [phone]    Additional Notes:      Case discussed with:  [x]Patient  [x]Family  [x]Nursing  []Case Management  DVT Prophylaxis:  []Lovenox  []Hep SQ  [x]SCDs  []Coumadin   []On Heparin gtt    Objective:   VS:   Visit Vitals  /82 (BP 1 Location: Left arm, BP Patient Position: At rest)   Pulse 92   Temp 97.8 °F (36.6 °C)   Resp 20   Wt 97.1 kg (214 lb)   LMP 1990   SpO2 96%   BMI 36.73 kg/m²      Tmax/24hrs: Temp (24hrs), Av °F (36.7 °C), Min:97.5 °F (36.4 °C), Max:98.7 °F (37.1 °C)      Intake/Output Summary (Last 24 hours) at 2021 0848  Last data filed at 2021 0750  Gross per 24 hour   Intake 2220 ml   Output 2300 ml   Net -80 ml     General:  Alert, NAD  Cardiovascular:  RRR  Pulmonary:  LSC throughout  GI:  +BS in all four quadrants, soft, non-tender  Extremities:  No edema; 2+ dorsalis pedis pulses bilaterally  Neuro: alert and oriented x 4    Family contact: Otis Mobley [phone]    Labs:    Recent Results (from the past 24 hour(s))   URINALYSIS W/ RFLX MICROSCOPIC    Collection Time: 21  9:37 AM   Result Value Ref Range    Color YELLOW      Appearance CLEAR      Specific gravity 1.025 1.005 - 1.030      pH (UA) 5.5 5.0 - 8.0      Protein TRACE (A) NEG mg/dL    Glucose 250 (A) NEG mg/dL    Ketone Negative NEG mg/dL    Bilirubin Negative NEG      Blood Negative NEG      Urobilinogen 1.0 0.2 - 1.0 EU/dL    Nitrites Negative NEG      Leukocyte Esterase Negative NEG     URINE MICROSCOPIC ONLY    Collection Time: 01/24/21  9:37 AM   Result Value Ref Range    WBC 0 to 3 0 - 4 /hpf    RBC NONE 0 - 5 /hpf    Epithelial cells 2+ 0 - 5 /lpf    CA Oxalate crystals 1+ (A) NEG    Yeast FEW (A) NEG     HEMOGLOBIN A1C WITH EAG    Collection Time: 01/25/21  5:08 AM   Result Value Ref Range    Hemoglobin A1c 7.5 (H) 4.2 - 5.6 %    Est. average glucose 829 mg/dL   METABOLIC PANEL, COMPREHENSIVE    Collection Time: 01/25/21  5:08 AM   Result Value Ref Range    Sodium 141 136 - 145 mmol/L    Potassium 3.9 3.5 - 5.5 mmol/L    Chloride 110 100 - 111 mmol/L    CO2 27 21 - 32 mmol/L    Anion gap 4 3.0 - 18 mmol/L    Glucose 146 (H) 74 - 99 mg/dL    BUN 24 (H) 7.0 - 18 MG/DL    Creatinine 0.78 0.6 - 1.3 MG/DL    BUN/Creatinine ratio 31 (H) 12 - 20      GFR est AA >60 >60 ml/min/1.73m2    GFR est non-AA >60 >60 ml/min/1.73m2    Calcium 12.6 (H) 8.5 - 10.1 MG/DL    Bilirubin, total 0.6 0.2 - 1.0 MG/DL    ALT (SGPT) 19 13 - 56 U/L    AST (SGOT) 12 10 - 38 U/L    Alk.  phosphatase 66 45 - 117 U/L    Protein, total 5.8 (L) 6.4 - 8.2 g/dL    Albumin 2.9 (L) 3.4 - 5.0 g/dL    Globulin 2.9 2.0 - 4.0 g/dL    A-G Ratio 1.0 0.8 - 1.7     MAGNESIUM    Collection Time: 01/25/21  5:08 AM   Result Value Ref Range    Magnesium 1.4 (L) 1.6 - 2.6 mg/dL       Signed By: Kisha Fontan, NP     January 25, 2021

## 2021-01-25 NOTE — PROGRESS NOTES
Problem: Mobility Impaired (Adult and Pediatric)  Goal: *Acute Goals and Plan of Care (Insert Text)  Description: Physical Therapy Goals  Initiated 1/25/2021 and to be accomplished within 7 day(s)  1. Patient will move from supine to sit and sit to supine  in bed with modified independence. 2.  Patient will transfer from bed to chair and chair to bed with modified independence using the least restrictive device. 3.  Patient will perform sit to stand with modified independence. 4.  Patient will ambulate with modified independence for 150 feet with the least restrictive device. 5.  Patient will ascend/descend 8 stairs with handrail(s) with supervision    PLOF: Patient was mod I with functional mobility using cane/RW. She lives in [address] [facility] home with her spouse. 1/25/2021 1748 by Christopher Franco, PT  Outcome: Progressing Towards Goal    PHYSICAL THERAPY EVALUATION    Patient: Ashish Stevenson (78 y.o. female)  Date: 1/25/2021  Primary Diagnosis: Hypercalcemia [E83.52]        Precautions:   Fall, Skin      ASSESSMENT :  Upon entering room, patient awake in bed, agreeable to PT evaluation, and cleared by nursing. Based on the objective data described below, the patient presents with decreased strength, decreased activity tolerance, and impaired balance. Patient comes to sit at EOB with stand by assistance and additional time to scoot to EOB. Pt became emotional sitting EOB, reports sealing with recent deaths close to her, and provided therapeutic listening. Encouraged patient to sit EOB for meals and educated on LE HEP. She comes to stand with CGA and stood with support of RW. Patient c/o dizziness and returns to sit at EOB. Pt c/o fatigue and requests to return to bed. Encouraged patient to sit EOB at least 3x for meals to improve activity tolerance. At end of session, pt left resting with call bell within reach and no further needs.      Patient will benefit from skilled intervention to address the above impairments. Patient's rehabilitation potential is considered to be Good  Factors which may influence rehabilitation potential include:   []         None noted  []         Mental ability/status  [x]         Medical condition  []         Home/family situation and support systems  []         Safety awareness  []         Pain tolerance/management  []         Other:      PLAN :  Recommendations and Planned Interventions:   [x]           Bed Mobility Training             [x]    Neuromuscular Re-Education  [x]           Transfer Training                   []    Orthotic/Prosthetic Training  [x]           Gait Training                          []    Modalities  [x]           Therapeutic Exercises           []    Edema Management/Control  [x]           Therapeutic Activities            []    Family Training/Education  [x]           Patient Education  []           Other (comment):    Frequency/Duration: Patient will be followed by physical therapy 1-2 times per day/4-7 days per week to address goals. Discharge Recommendations: Home Health   Further Equipment Recommendations for Discharge: shower chair     SUBJECTIVE:   Patient stated I am not 22 more anymore.     OBJECTIVE DATA SUMMARY:     Past Medical History:   Diagnosis Date    Anxiety 5/18/2010    Arthritis     OA spine, left knee    Chronic obstructive pulmonary disease ([address]     Depression 5/18/2010    Headache(784.0)     migraines    HTN (hypertension) 5/18/2010    Hypothyroid 5/18/2010    Multiple myeloma ([address]     Osteopenia     Rib fractures 07/17/2017    r/t fall    Seasonal allergic rhinitis 5/18/2010    Seasonal allergies     Spondylolisthesis of lumbar region 1/21/2016    Dr. Jamey Pacheco [facility]    Thyroid disease     hypothyroid,  thromeagaly     Past Surgical History:   Procedure Laterality Date    COLONOSCOPY N/A 9/6/2017    COLONOSCOPY performed by Alejandra Barnhart MD at [facility] Left 11/12/2020    TAG LOCALIZED EXCISIONAL BIOPSY OF LEFT BREAST (HBV 11.3.20 @ 1000) performed by Jermaine Rodríguez MD at [address]    HX CATARACT REMOVAL  8[facility]2011    [facility] HX CATARACT REMOVAL  8/2011    right eye 2 weeks apart from left [facility]. Dr. Teodora Scott COLONOSCOPY  2007    HX HEENT      partical parathyroidectomy     HX ORTHOPAEDIC      rotator cuff repqir 10/1996    NEUROLOGICAL PROCEDURE UNLISTED  5-20-13    meninginoma Dr. Cindy Haq      breast cyst s/p biopsy 2008     Barriers to Learning/Limitations: None  Compensate with: Visual Cues and Verbal Cues  Home Situation:  Home Situation  Home Environment: Private residence  One/Two Story Residence: One story  Living Alone: No  Support Systems: Family member(s)  Patient Expects to be Discharged to[de-identified] Private residence  Current DME Used/Available at Home: [facility] Behavior:  Neurologic State: Alert  Orientation Level: Oriented X4  Cognition: Follows commands     Psychosocial  Patient Behaviors: Calm; Cooperative  Needs Expressed: Educational  Purposeful Interaction: Yes  Pt Identified Daily Priority: Clinical issues (comment)  [employer] Process: Teaching/learning;Create healing environment  Caring Interventions: Therapeutic modalities; Other caring modalities  Reassure: Therapeutic listening; Informing;Caring rounds  Therapeutic Modalities: Deep breathing; Intentional therapeutic touch  Other Caring Modalities: Hourly rounds  Skin Condition/Temp: Dry;Warm     Skin Integrity: Scars (comment)  Skin Integumentary  Skin Color: Appropriate for ethnicity  Skin Condition/Temp: Dry;Warm  Skin Integrity: Scars (comment)  Turgor: Non-tenting  Hair Growth: Present  Varicosities: Absent  Nails: Within Defined Limits     Strength:    Strength: Generally decreased, functional    Tone & Sensation:   Tone: Normal    Sensation: Intact        Range Of Motion:  AROM: Within functional limits Functional Mobility:  Bed Mobility:     Supine to Sit: Stand-by assistance; Additional time  Sit to Supine: Contact guard assistance  Scooting: Stand-by assistance  Transfers:  Sit to Stand: Contact guard assistance  Stand to Sit: Contact guard assistance    Balance:   Sitting: Intact; With support  Standing: Impaired; With support  Standing - Static: Fair((+))      Therapeutic Exercises:   10x ankle pumps, 10x LAQ  Educated on heel slides and SLR for supine HEP    Pain:  Pain level pre-treatment: 0/10   Pain level post-treatment: 0/10   Pain Intervention(s) : Medication (see MAR); Rest, Ice, Repositioning  Response to intervention: Nurse notified, See doc flow    Activity Tolerance:   fair  Please refer to the flowsheet for vital signs taken during this treatment. After treatment:   []         Patient left in no apparent distress sitting up in chair  [x]         Patient left in no apparent distress in bed  [x]         Call bell left within reach  [x]         Nursing notified  []         Caregiver present  []         Bed alarm activated  []         SCDs applied    COMMUNICATION/EDUCATION:   [x]         Role of Physical Therapy in the acute care setting. [x]         Fall prevention education was provided and the patient/caregiver indicated understanding. [x]         Patient/family have participated as able in goal setting and plan of care. [x]         Patient/family agree to work toward stated goals and plan of care. []         Patient understands intent and goals of therapy, but is neutral about his/her participation. []         Patient is unable to participate in goal setting/plan of care: ongoing with therapy staff.  []         Other:     Thank you for this referral.  Alvaro Griffiths, PT   Time Calculation: 33 mins      Eval Complexity: History: LOW Complexity : Zero comorbidities / personal factors that will impact the outcome / POCExam:LOW Complexity : 1-2 Standardized tests and measures addressing body structure, function, activity limitation and / or participation in recreation  Presentation: LOW Complexity : Stable, uncomplicated  Clinical Decision Making:Low Complexity    Overall Complexity:LOW

## 2021-01-25 NOTE — ROUTINE PROCESS
TRANSFER - OUT REPORT: 
 
Verbal report given to Arti Concepcion RN(name) on Yair Olivarez  being transferred to 4N(unit) for routine progression of care Report consisted of patients Situation, Background, Assessment and  
Recommendations(SBAR). Information from the following report(s) SBAR, ED Summary, Procedure Summary, Intake/Output, Recent Results and Cardiac Rhythm nsr was reviewed with the receiving nurse. Lines:  
Peripheral IV 01/22/21 Right Antecubital (Active) Opportunity for questions and clarification was provided. Patient transported with: 
 Monitor

## 2021-01-25 NOTE — PROGRESS NOTES
Reason for Admission:  Hypercalcemia [E83.52]                 RUR Score:    21           Plan for utilizing home health: To be determined                    Likelihood of Readmission:   Moderate                         Do you (patient/family) have any concerns for transition/discharge?  no    Transition of Care Plan:       Initial assessment completed with patient. Cognitive status of patient: oriented to time, place, person and situation. Face sheet information confirmed:  yes. The patient designates her  Latashase Pearson to participate in her discharge plan and to receive any needed information. This patient lives in a home with . Patient is not able to navigate steps as needed. Prior to hospitalization, patient was considered to be independent with ADLs/IADLS : No  . If not independent,  patient needs assist with : food preparation and cooking    Patient has a current ACP document on file: no  The daughter and  will be available to transport patient home upon discharge. The patient already has [employer], [employer], W/C,medical equipment available in the home. Patient is not currently active with home health. Patient has not stayed in a skilled nursing facility or rehab. Was  stay within last 60 days : no. This patient is on dialysis :no     List of available Home Health agencies were provided and reviewed with the patient prior to discharge. Freedom of choice signed: yes, for [facility]. . Currently, the discharge plan is to be determined. Per pt, she had home health before and it was beneficial to her. She stated she wants to return home with home health when discharged. Called pt's  and daughter but they did not . Left messages. The patient states that she can obtain her medications from the pharmacy, and take her medications as directed.     Patient's current insurance is [employer],  [address]  Per pt, she applied for Medicaid but still waiting to hear from them. Care Management Interventions  PCP Verified by CM: Yes  Palliative Care Criteria Met (RRAT>21 & CHF Dx)?: No  Mode of Transport at Discharge:  Other (see comment)(family)  Transition of Care Consult (CM Consult): Discharge Planning  Physical Therapy Consult: Yes  Occupational Therapy Consult: Yes  Speech Therapy Consult: No  Current Support Network: Lives with Spouse, Family Lives Nearby  Confirm Follow Up Transport: Family  Discharge Location  Discharge Placement: Unable to determine at this time        MARK Van RN  Care Management  Pager: [phone]

## 2021-01-26 NOTE — PROGRESS NOTES
Bedside shift change report given to Estephanie Villeda RN (oncoming nurse) by [facility], RN (offgoing nurse). Report included the following information SBAR, Kardex and MAR.

## 2021-01-26 NOTE — PROGRESS NOTES
Occupational Therapy Goals  Initiated 1/26/2021 within 7 day(s). 1.  Patient will perform bed mobility in preparation for selfcare with supervision/set-up. 2.  Patient will perform functional activity standing at sink for 4-7 minutes with modified independence, F+ balance. 3.  Patient will perform lower body dressing with supervision/set-up standing. 4.  Patient will perform toilet transfers with supervision/set-up. 5.  Patient will perform all aspects of toileting with supervision/set-up. 6.  Patient will participate in upper extremity therapeutic exercise/activities with independence for 8-10 minutes. Prior Level of Function: Patient was independent with self-care and used a cane/RW for functional mobility PTA. Problem: Self Care Deficits Care Plan (Adult)  Goal: *Acute Goals and Plan of Care (Insert Text)  Outcome: Progressing Towards Goal       OCCUPATIONAL THERAPY EVALUATION    Patient: Tamy Cano (28 y.o. female)  Date: 1/26/2021  Primary Diagnosis: Hypercalcemia [E83.52]  Precautions: Fall, Skin    ASSESSMENT :  Patient cleared to participate in OT evaluation by RN. Upon entering the room, the patient was supine in bed, alert, and agreeable to participate in OT evaluation with nursing student present. Patient oriented to person, place and situation but presents with increased time needed this session for processing and periodic confusion during PLOF interview. Patient requires moderate verbal cues for safety this session during functional transfer to chair in preparation for self-feeding. Based on the objective data described below, the patient presents with decreased strength, decreased independence, decreased safety awareness, decreased functional balance, and decreased functional mobility, which impedes pt performance in basic self-care/ADL tasks. Patient would benefit from skilled OT to restore PLOF and maximize function.          Patient will benefit from skilled intervention to address the above impairments. Patient's rehabilitation potential is considered to be Fair  Factors which may influence rehabilitation potential include:   []             None noted  [x]             Mental ability/status  [x]             Medical condition  [x]             Home/family situation and support systems  [x]             Safety awareness  []             Pain tolerance/management  []             Other:      PLAN :  Recommendations and Planned Interventions:   [x]               Self Care Training                  [x]      Therapeutic Activities  [x]               Functional Mobility Training   [x]      Cognitive Retraining  [x]               Therapeutic Exercises           [x]      Endurance Activities  [x]               Balance Training                    [x]      Neuromuscular Re-Education  []               Visual/Perceptual Training     [x]      Home Safety Training  [x]               Patient Education                   [x]      Family Training/Education  []               Other (comment):    Frequency/Duration: Patient will be followed by occupational therapy 1-2 times per day/4-7 days per week to address goals.   Discharge Recommendations: Home Health with 24/7 Supervision   Further Equipment Recommendations for Discharge: shower chair to decrease the risk of falls     SUBJECTIVE:   Patient stated Ban Gray just washed up this morning    OBJECTIVE DATA SUMMARY:     Past Medical History:   Diagnosis Date    Anxiety 5/18/2010    Arthritis     OA spine, left knee    Chronic obstructive [address] disease ([facility] [address]     Depression 5/18/2010    Headache(784.0)     migraines    HTN (hypertension) 5/18/2010    Hypothyroid 5/18/2010    Multiple myeloma ([facility] [address]     Osteopenia     Rib fractures 07/17/2017    r/t fall    Seasonal allergic rhinitis 5/18/2010    Seasonal allergies     Spondylolisthesis of lumbar region 1/21/2016    Dr. Jesus Phoenix [facility]    Thyroid disease     hypothyroid,  thromeagaly     Past Surgical History: Procedure Laterality Date    COLONOSCOPY N/A 9/6/2017    COLONOSCOPY performed by Hakeem Herrera MD at [place] Left 11/12/2020    TAG LOCALIZED EXCISIONAL BIOPSY OF LEFT BREAST (HBV 11.3.20 @ 1000) performed by Anju Pat MD at [address]    HX CATARACT REMOVAL  8/2011    [facility] HX CATARACT REMOVAL  8/2011    right eye 2 weeks apart from left [facility]. Dr. Brad Rg COLONOSCOPY  2007    HX HEENT      partical parathyroidectomy     HX ORTHOPAEDIC      rotator cuff repqir 10/1996    NEUROLOGICAL PROCEDURE UNLISTED  5-20-13    meninginoma Dr. Moon Wilder      breast cyst s/p biopsy 2008     Barriers to Learning/Limitations: None  Compensate with: visual, verbal, tactile, kinesthetic cues/model    Home Situation:   Home Situation  Home Environment: Private residence  One/Two Story Residence: Two story  Living Alone: No  Support Systems: Spouse/Significant Other/Partner  Patient Expects to be Discharged to[de-identified] Private residence  Current DME Used/Available at Home: Trarabella Bailon, bonita, Rubie Mcardle, rolling  [x]  Right hand dominant   []  Left hand dominant    Cognitive/Behavioral Status:  Neurologic State: Alert  Orientation Level: Oriented to person;Oriented to place;Oriented to situation  Cognition: Follows commands;Decreased attention/concentration;Poor safety awareness  Safety/Judgement: Fall prevention    Skin: Intact  Edema: None noted    Vision/Perceptual:    Acuity: Within Defined Limits      Coordination: BUE  Fine Motor Skills-Upper: Left Intact; Right Intact    Gross Motor Skills-Upper: Right Impaired;Left Intact(pt copensates, brings head to R hand)    Balance:  Sitting: Impaired; Without support  Sitting - Static: Good (unsupported)  Sitting - Dynamic: Fair (occasional)  Standing: Impaired; With support  Standing - Static: Fair  Standing - Dynamic : Fair;Occasional    Strength: BUE  Strength: Generally decreased, functional      Tone & Sensation: BUE  Sensation: Intact            Range of Motion: BUE  AROM: Generally decreased, functional(R shoulder; pt reports having arthritis)    Functional Mobility and Transfers for ADLs:  Bed Mobility:  Supine to Sit: Minimum assistance  Scooting: Contact guard assistance    Transfers:  Sit to Stand: Contact guard assistance  Stand to Sit: Minimum assistance(to control descend into chair as pt abandoning cues)   Toilet Transfer : Contact guard assistance;Minimum assistance(simulation with recliner)     ADL Assessment:   Feeding: Setup;Stand-by assistance    Oral Facial Hygiene/Grooming: Setup;Stand-by assistance    Bathing: Minimum assistance(standing)    Upper Body Dressing: Stand-by assistance    Lower Body Dressing: Minimum assistance(standing)    Toileting: Minimum assistance    ADL Intervention:  Feeding  Feeding Assistance: Set-up  Container Management: Set-up  Cutting Food: Stand-by assistance  Utensil Management: Stand-by assistance  Food to Mouth: Stand-by assistance    Grooming  Grooming Assistance: Stand-by assistance  Position Performed: Seated edge of bed  Brushing/Combing Hair: Stand-by assistance(additional time needed for RUE)    Upper Body Dressing Assistance  Dressing Assistance: Stand-by assistance  Hospital Gown: Stand-by assistance(on backside as male nursing student present)    Lower Body Dressing Assistance  Socks: Stand-by assistance(seated EOB)  Leg Crossed Method Used: Yes  Position Performed: Seated edge of bed      Cognitive Retraining  Safety/Judgement: Fall prevention    Pain:  Pain level pre-treatment: 0/10   Pain level post-treatment: 0/10   Pain Intervention(s): Medication (see MAR); Response to intervention: Nurse notified, See doc flow    Activity Tolerance:   Fair    Please refer to the flowsheet for vital signs taken during this treatment.   After treatment:   [x] Patient left in no apparent distress sitting up in chair  [] Patient left in no apparent distress in bed  [x] Call bell left within reach  [x] Nursing notified  [x]  Nursing student present  [] Bed alarm activated    COMMUNICATION/EDUCATION:   [x] Role of Occupational Therapy in the acute care setting  [x] Home safety education was provided and the patient/caregiver indicated understanding. [x] Patient/family have participated as able in goal setting and plan of care. [x] Patient/family agree to work toward stated goals and plan of care. [] Patient understands intent and goals of therapy, but is neutral about his/her participation. [] Patient is unable to participate in goal setting and plan of care. Thank you for this referral.  Dolly Kemp, OTR/L  Time Calculation: 23 mins    Eval Complexity: History: MEDIUM Complexity : Expanded review of history including physical, cognitive and psychosocial  history ; Examination: MEDIUM Complexity : 3-5 performance deficits relating to physical, cognitive , or psychosocial skils that result in activity limitations and / or participation restrictions; Decision Making:MEDIUM Complexity : Patient may present with comorbidities that affect occupational performnce.  Miniml to moderate modification of tasks or assistance (eg, physical or verbal ) with assesment(s) is necessary to enable patient to complete evaluation

## 2021-01-26 NOTE — PROGRESS NOTES
Problem: Falls - Risk of  Goal: *Absence of Falls  Description: Document Gil Lopez Fall Risk and appropriate interventions in the flowsheet. Outcome: Progressing Towards Goal  Note: Fall Risk Interventions:  Mobility Interventions: Patient to call before getting OOB         Medication Interventions: Bed/chair exit alarm, Teach patient to arise slowly    Elimination Interventions: Bed/chair exit alarm, Call light in reach              Problem: Patient Education: Go to Patient Education Activity  Goal: Patient/Family Education  Outcome: Progressing Towards Goal     Problem: Pressure Injury - Risk of  Goal: *Prevention of pressure injury  Description: Document Lonnie Scale and appropriate interventions in the flowsheet.   Outcome: Progressing Towards Goal  Note: Pressure Injury Interventions:  Sensory Interventions: Assess changes in LOC, Keep linens dry and wrinkle-free    Moisture Interventions: Absorbent underpads, Minimize layers    Activity Interventions: Increase time out of bed, PT/OT evaluation    Mobility Interventions: HOB 30 degrees or less, PT/OT evaluation    Nutrition Interventions: Document food/fluid/supplement intake    Friction and Shear Interventions: Minimize layers

## 2021-01-26 NOTE — CONSULTS
Consult Note  Consult requested by: Brenda Wall is a 77 y.o. female [address] who is being seen on consult for Hypercalcemia  Chief Complaint   Patient presents with    Abnormal Lab Results         HPI:  77 yr old female with pmhx of multiple myeloma who presents in to hospital for hypercalcemia with calcium at 14.4. Managed with fluids and lasix. Calcium is down to 12.5 today. Has some rash to fosomax which limits the use of bisphosphanate use. She doesn't remember this allergy and is not sure. Feels tired. Looks depressed. It appears that she had to deal with lot of family deaths recently. Says she is tired but otherwise feels ok  No chest pain or sob or abdominal pain currently  Urinating ok  No hematuria  It appears that she might be taking vitamin d and calcium supplements as OP  She is not sure of her meds. She was taking hctz per medication bottles that I reviewed  Also taking motrin 600 mg po     Past Medical History:   Diagnosis Date    Anxiety 5/18/2010    Arthritis     OA spine, left knee    [facility] obstructive pulmonary disease ([facility] [address]     Depression 5/18/2010    Headache(784.0)     migraines    HTN (hypertension) 5/18/2010    Hypothyroid 5/18/2010    Multiple myeloma ([facility] [address]     Osteopenia     Rib fractures 07/17/2017    r/t fall    Seasonal allergic rhinitis 5/18/2010    Seasonal allergies     Spondylolisthesis of lumbar region 1/21/2016    Dr. Mj Bah Shall ortho    Thyroid disease     hypothyroid,  thromeagaly      Past Surgical History:   Procedure Laterality Date    COLONOSCOPY N/A 9/6/2017    COLONOSCOPY performed by Xiao Park MD at [address] Left 11/12/2020    TAG LOCALIZED EXCISIONAL BIOPSY OF LEFT BREAST (HBV 11.3.20 @ 1000) performed by Ophelia Smith MD at [address] HX CATARACT REMOVAL  8/2011    left eye two weeks apart from [facility] Dr Harvey Suarez.     HX CATARACT REMOVAL  8/2011    right eye 2 weeks apart from left [facility].  Dr. Amauri Duron HX COLONOSCOPY  2007    HX HEENT      partical parathyroidectomy     HX ORTHOPAEDIC      rotator cuff repqir 10/1996    NEUROLOGICAL PROCEDURE UNLISTED  5-20-13    meninginoma Dr. Kajal Garsia UNLISTED      breast cyst s/p biopsy 2008       Social History     Socioeconomic History    Marital status: LEGALLY      Spouse name: Not on file    Number of children: Not on file    Years of education: Not on file    Highest education level: Not on file   Occupational History    Not on file   Social Needs    Financial resource strain: Not on file    Food insecurity     Worry: Not on file     Inability: Not on file    Transportation needs     Medical: Not on file     Non-medical: Not on file   Tobacco Use    Smoking status: Former Smoker     Packs/day: 0.25     Years: 40.00     Pack years: 10.00     Types: Cigarettes     Start date: 8/11/1970     Quit date: 8/17/2017     Years since quitting: 3.4    Smokeless tobacco: Never Used    Tobacco comment: patient is smoking one cigarette periodically   Substance and Sexual Activity    Alcohol use: Yes     Comment: wine for communion only    Drug use: No    Sexual activity: Yes     Partners: Male   Lifestyle    Physical activity     Days per week: Not on file     Minutes per session: Not on file    Stress: Not on file   Relationships    Social connections     Talks on phone: Not on file     Gets together: Not on file     Attends Religion service: Not on file     Active member of club or organization: Not on file     Attends meetings of clubs or organizations: Not on file     Relationship status: Not on file    Intimate partner violence     Fear of current or ex partner: Not on file     Emotionally abused: Not on file     Physically abused: Not on file     Forced sexual activity: Not on file   Other Topics Concern    Not on file   Social History Narrative    Not on file       Family History   Problem Relation Age of Onset    Cancer Brother         brain     Allergies   Allergen Reactions    Iodine Hives, Rash and Other (comments)     Feels like Chest caving in. Rash LL lumbosacral area    Fosamax [Alendronate] Itching     rash        Home Medications:     Prior to Admission Medications   Prescriptions Last Dose Informant Patient Reported? Taking? ARMOUR THYROID 120 mg tab Unknown at Unknown time  Yes No   Sig: Take 90 mg by mouth daily. Biotin 2,500 mcg cap Unknown at Unknown time  Yes No   Sig: Take  by mouth. CALCIUM CITRATE (CITRACAL PO) Unknown at Unknown time  Yes No   Sig: Take 1 Tab by mouth daily. Cholecalciferol, Vitamin D3, (VITAMIN D3) 2,000 unit cap capsule Unknown at Unknown time  Yes No   Sig: Take  by Mouth. FLAXSEED PO Unknown at Unknown time  Yes No   Sig: Take 1 Tab by mouth daily. Pomalyst 3 mg cap Unknown at Unknown time  Yes No   Ventolin HFA 90 mcg/actuation inhaler Unknown at Unknown time  No No   Sig: Take 2 Puffs by inhalation every four (4) hours as needed for Wheezing. acetaminophen (TYLENOL) 500 mg tablet Unknown at Unknown time  No No   Sig: Take 2 Tabs by mouth every six (6) hours as needed for Pain. acyclovir (ZOVIRAX) 400 mg tablet Unknown at Unknown time  Yes No   Sig: Take 400 mg by mouth two (2) times a day. bisacodyL 5 mg tab Unknown at Unknown time  No No   Sig: Take 5 mg by mouth daily. budesonide-formoteroL (Symbicort) 80-4.5 mcg/actuation HFAA Unknown at Unknown time  No No   Sig: Take 2 Puffs by inhalation two (2) times a day. cyanocobalamin, vitamin B-12, (VITAMIN B-12 PO) Unknown at Unknown time  Yes No   Sig: Take  by mouth daily. dexAMETHasone (DECADRON) 4 mg tablet Unknown at Unknown time  Yes No   Sig: Takes 5 tablets every friday   diclofenac (VOLTAREN) 1 % gel Unknown at Unknown time  No No   Sig: Apply 4 g to affected area four (4) times daily.    docusate sodium (COLACE) 100 mg capsule Unknown at Unknown time  No No   Sig: Take 1 Cap by mouth two (2) times a day for 90 days. ferrous gluconate 324 mg (37.5 mg iron) tablet Unknown at Unknown time  No No   Sig: Take 1 Tab by mouth Daily (before breakfast). hydroCHLOROthiazide (HYDRODIURIL) 25 mg tablet Unknown at Unknown time  No No   Sig: TAKE 1 TABLET BY MOUTH DAILY   loperamide HCl (IMODIUM A-D PO) Unknown at Unknown time  Yes No   Sig: Take  by mouth as needed. mirabegron ER (Myrbetriq) 25 mg ER tablet Unknown at Unknown time  No No   Sig: Take 1 Tab by mouth daily. montelukast (Singulair) 10 mg tablet Unknown at Unknown time  No No   Sig: Take 1 Tab by mouth daily. omeprazole (PRILOSEC) 40 mg capsule Unknown at Unknown time  No No   Sig: Take 1 capsule by mouth twice daily   pomalidomide (POMALYST PO) Unknown at Unknown time  Yes No   Sig: Take 3 mg by mouth. Daily for three weeks, off for one week   sertraline (ZOLOFT) 100 mg tablet Unknown at Unknown time  No No   Sig: Take 1 Tab by mouth daily. travoprost (Travatan Z) 0.004 % ophthalmic solution Unknown at Unknown time  Yes No   Sig: Administer 1 Drop to both eyes every evening. vit B12-levomefolate calcium-vit B6 (FOLTX) 2-1.13-25 mg tablet Unknown at Unknown time  No No   Sig: Take 1 Tab by mouth daily.       Facility-Administered Medications: None       Current Facility-Administered Medications   Medication Dose Route Frequency    magnesium sulfate 2 g/50 ml IVPB (premix or compounded)  2 g IntraVENous ONCE    potassium chloride (K-DUR, KLOR-CON) SR tablet 40 mEq  40 mEq Oral Q4H    furosemide (LASIX) injection 40 mg  40 mg IntraVENous NOW    0.9% sodium chloride infusion  125 mL/hr IntraVENous CONTINUOUS    calciTONIN (MIACALCIN) injection 388 Int'l Units  4 Int'l Units/kg IntraMUSCular Q12H    sertraline (ZOLOFT) tablet 100 mg  100 mg Oral DAILY    sodium chloride (NS) flush 5-40 mL  5-40 mL IntraVENous Q8H    sodium chloride (NS) flush 5-40 mL  5-40 mL IntraVENous PRN    acetaminophen (TYLENOL) tablet 650 mg  650 mg Oral Q6H PRN    Or    acetaminophen (TYLENOL) suppository 650 mg  650 mg Rectal Q6H PRN    polyethylene glycol (MIRALAX) packet 17 g  17 g Oral DAILY PRN    ondansetron (ZOFRAN) injection 4 mg  4 mg IntraVENous Q6H PRN    insulin lispro (HUMALOG) injection   SubCUTAneous AC&HS    glucose chewable tablet 16 g  4 Tab Oral PRN    glucagon (GLUCAGEN) injection 1 mg  1 mg IntraMUSCular PRN    dextrose (D50W) injection syrg 12.5-25 g  25-50 mL IntraVENous PRN    albuterol-ipratropium (DUO-NEB) 2.5 MG-0.5 MG/3 ML  3 mL Nebulization Q4H PRN    thyroid (Pork) (ARMOUR) tablet 90 mg  90 mg Oral DAILY       Review of Systems:      Complete 10-point review of systems were obtained and discussed in length  with the patient. Complete review of systems was negative/unremarkable  except as mentioned in HPI section. Data Review:    Labs: Results:       Chemistry Recent Labs     01/26/21 0433 01/25/21  0508 01/24/21  0404   * 146* 216*    141 141   K 3.6 3.9 4.2    110 105   CO2 29 27 31   BUN 21* 24* 25*   CREA 0.77 0.78 0.87   CA 12.5* 12.6* 13.1*   AGAP 4 4 5   BUCR 27* 31* 29*   AP  --  66  --    TP  --  5.8*  --    ALB  --  2.9*  --    GLOB  --  2.9  --    AGRAT  --  1.0  --       CBC w/Diff Recent Labs     01/26/21 0433 01/24/21  0404   WBC 3.4* 3.6*   RBC 3.11* 3.33*   HGB 9.9* 10.7*   HCT 29.7* 32.6*   PLT 46* 62*   GRANS 40* 19*   LYMPH 28 33   EOS 0 2      Coagulation No results for input(s): PTP, INR, APTT, INREXT in the last 72 hours. Iron/Ferritin No results for input(s): IRON in the last 72 hours. No lab exists for component: TIBCCALC   BNP No results for input(s): BNPP in the last 72 hours. Cardiac Enzymes No results for input(s): CPK, CKND1, LYUBOV in the last 72 hours.     No lab exists for component: CKRMB, TROIP   Liver Enzymes Recent Labs     01/25/21  0508   TP 5.8*   ALB 2.9*   AP 66      Thyroid Studies No results found for: T4, T3U, TSH, TSHEXT          Physical Assessment:     Visit Vitals  BP (!) 157/80 (BP 1 Location: Left arm, BP Patient Position: At rest)   Pulse 98   Temp 97.3 °F (36.3 °C)   Resp 20   Ht 5' 4\" (1.626 m)   Wt 97.1 kg (214 lb)   SpO2 98%   BMI 36.73 kg/m²     Weight change:     Intake/Output Summary (Last 24 hours) at 1/26/2021 1022  Last data filed at 1/26/2021 0933  Gross per 24 hour   Intake 1360 ml   Output 450 ml   Net 910 ml     Physical Exam:   General: comfortable, no acute distress   HEENT sclera anicteric, supple neck, no thyromegaly  CVS: S1S2 heard,  no rub  RS: + air entry b/l,   Abd: Soft, Non tender, Not distended, Positive bowel sounds, no organomegaly, no CVA / supra pubic tenderness  Neuro: non focal, awake, alert , CN II-XII are grossly intact  Extrm: edema, no cyanosis, clubbing   Skin: no visible  Rash  Musculoskeletal: No gross joints or bone deformities     Procedures/imaging: see electronic medical records for all procedures, Xrays and details which were not copied into this note but were reviewed prior to creation of Plan      Impression & Plan:   IMPRESSION:   Hypercalcemia due to combination of hctz,multiple myeloma,vitamin d/calcium supplementation. Decreasing on hydration  Hypertension  Multiple myeloma hx    PLAN:    Change iv fluid type to NS  Can use lasix if she gets wet with above  Hesitant to use bisphosphonate with allergy history to fosomax in past  Will  Use calcitonin for 4 doses  Watch daily calcium  Watch for polyuria with hypercalcemia  Stop hctz on discharge  Stop vitamin d and calcium supplementation on discharge  If calcium is resistant, then probably can try zomega as her allergy to fosamax is really questionable as she doesn't remember and listed as skin rash/itching as type of reaction. If this is chosen then will have to infuse slowly and observe on tele.           Mitchell Arellano MD  January 26, 2021  [facility]  Office [phone]

## 2021-01-26 NOTE — PROGRESS NOTES
Received Critical Lab Value from [facility] in Lab. Ca 13.4. CEZAR Cleary provided Critical Lab value.

## 2021-01-26 NOTE — PROGRESS NOTES
HEMATOLOGY AND ONCOLOGY   PROGRESS NOTE      Patient: Ish Soto   MRN: [id number]      YOB: 1945      Admit Date: 2021    Assessment:     # Hypercalcemia:   # Multiple Myeloma:  # Hypertension  # Vitamin D Deficiency  # Osteoporosis  # H/O Meningioma s/p left frontotemporal craniotomy (13)  # Glaucoma  # Hypothyroidism  # Dementia  # H/O Migraines     1. Multiple Myeloma, IgG Kappa dx 17:  patient incidentally noted to have 17 x 18 x 48mm lesion on left lateral 6th rib, a left breast focal calcification for which mammographic correlation was advised. As there is concern for possible malignancy, will need to further evaluate. Imaging (2017): CT C/A/P with multiple lytic lesions with most prominent involving left 6th rib which has associated soft tissue mass. Bone scan with mild uptake of expansile lesion left 6th rib. Mammogram with stable benign findings in bilateral breast with no suspicious findings; recommend annual surveillance. 17:  Serologies: Cr: 0.65, Ca: 9.6, Hb: 10.8, SPEP: IgG kappa 4.62 g/dL, 24 hour UPEP: IgG kappa 23 mg/24 hours, serum FLC's: kappa: 178, lambda: 6.5, ratio: 27.3, B2M: 3.41, Quant Ig's: Ig, IgA: 25, IgM: 13  10/05/17: Skeletal survey with multple lytic lesions. 17: BMBx [[facility] Case #: [id number]]: 80-90% replacement with plasma cell neoplasm with kappa restriction, normal cytogenetics, FISH with + 4 copies of chromosome 1, + monosomy 13, + del IGH at 14q32, + del 17. FIRST LINE TREATMENT:  CyBorD started 17 - 2018   Due to intractible back pain, palliative XRT to T9-L3 to 2400 cGy in 8 fractions under the care of Dr. Jonas Spear  19: Transitioned to maintenance Revlimid 10mg po once daily. Held 2019 due to neutropenia/fatigue. 19: Resumed maintenance Revlimid 10mg po once daily with continued monthly Xgeva.   Adjusted dosing to 21 days on 7 days off regimen 2019.  20: Progressed to plasma cell leukemia  SECOND LINE TREATMENT: Daratumumab + Velcade + Pomalyst + Dexamethasone started 20 due to progression to plasma cell leukemia  Daratumumab on days 1,8,15,22 q28 days --> days 1,15 starting with cycle 3 - 6 --> day 1 only thereafter  Velcade days 1,4,8,11 q28 days  Pomalyst 4mg days 1-21 q28 days  --> decreased to 3mg po daily on days 1-21 q28 days on 4/10/20  Dexamethasone 20mg on days 1,8,15,22  Acyclovir 400mg po BID for HSV prophylaxis. Plan:     - We need work up of hypercalcemia. Possibly related to her MM.   - Recent Kappa on 2021 was 370 with K/L 106. This is suggestive of relapsed disease.   - Will repeat serum FLC and obtain SPEP/BERTHA.   - IVF as per nephrology, consider Zometa based on the trend of calcium.  - Hold Calcium supplements. - Bone survey. - Moniotor counts. - DVT prophylaxis as per the primary team.     Ken Chavez MD  [facility] [phone]    Subjective:     Patient is known to the [facility]. 15 team. She has been treated for MM under the care of Dr. Elbert Gross. treatment history is listed above. She presented for weekly Velcade injection on 21, was directed to ER d/t progressive hypercalcemia. Clinically, she is asymptomatics other than usual weakness. She does not say much unless we ask questions and she replies yes or no.      Objective:     Patient Vitals for the past 24 hrs:   BP Temp Pulse Resp SpO2   21 1121 (!) 154/81 97.8 °F (36.6 °C) 86 18 94 %   21 0727 (!) 157/80 97.3 °F (36.3 °C) 98 20 98 %   21 0423 135/75 98.3 °F (36.8 °C) 96 18 93 %   21 0041 124/75 98.2 °F (36.8 °C) 98 18 93 %   21 131/72 98.5 °F (36.9 °C) 99 20 93 %   21 1549 (!) 123/54 97.9 °F (36.6 °C) 89 22 96 %         Intake/Output Summary (Last 24 hours) at 2021 1250  Last data filed at 2021 1026  Gross per 24 hour   Intake 1240 ml   Output 550 ml   Net 690 ml       Temp (24hrs), Av °F (36.7 °C), Min:97.3 °F (36.3 °C), Max:98.5 °F (36.9 °C)       Review Of Systems:     Constitutional: negative for fevers, chills, or sweats. + fatigue  Eyes: negative for visual disturbance, redness and icterus  Ears, Nose, Mouth, Throat, and Face: negative for tinnitus, epistaxis, sore mouth and hoarseness  Respiratory: negative for cough, sputum, hemoptysis, pleurisy/chest pain or wheezing. Cardiovascular: negative for chest pain, chest pressure/discomfort, palpitations, irregular heart beats, syncope, paroxysmal nocturnal dyspnea  Gastrointestinal: negative for reflux symptoms, nausea, vomiting, change in bowel habits, melena, diarrhea, constipation and abdominal pain. Genitourinary:negative for dysuria, nocturia, urinary incontinence, hesitancy and hematuria  Integument: negative for rash, skin lesion(s) and pruritus  Hematologic/Lymphatic: negative for easy bruising, bleeding and lymphadenopathy  Musculoskeletal:negative for myalgias, arthralgias and bone pain  Neurological: negative for headaches, dizziness, seizures, paresthesia and weakness. Physical Exam:     Constitutional: Alert, oriented, not in distress  Eyes: PERRLA, anicteric, no redness  HEENT: no palpable Lymph nodes, no mucositis, no thrush. Respiratory: symmetrical expansion, no rales, no  no wheezing. Cardiovascular: S1S2 positive. Gastrointestinal: soft, benign, non tender, obesity. Integument: no rash, no petechiae, no ecchymosis. Musculoskeletal: no edema, no clubbing. Neurological: intact, symmetrical exam with no focal motor or sensory deficits.     Lab:     Recent Results (from the past 24 hour(s))   GLUCOSE, POC    Collection Time: 01/25/21  1:28 PM   Result Value Ref Range    Glucose (POC) 137 (H) 70 - 110 mg/dL   GLUCOSE, POC    Collection Time: 01/25/21  6:02 PM   Result Value Ref Range    Glucose (POC) 118 (H) 70 - 110 mg/dL   GLUCOSE, POC    Collection Time: 01/25/21  9:27 PM   Result Value Ref Range    Glucose (POC) 127 (H) 70 - 598 mg/dL   METABOLIC PANEL, BASIC    Collection Time: 01/26/21  4:33 AM   Result Value Ref Range    Sodium 140 136 - 145 mmol/L    Potassium 3.6 3.5 - 5.5 mmol/L    Chloride 107 100 - 111 mmol/L    CO2 29 21 - 32 mmol/L    Anion gap 4 3.0 - 18 mmol/L    Glucose 108 (H) 74 - 99 mg/dL    BUN 21 (H) 7.0 - 18 MG/DL    Creatinine 0.77 0.6 - 1.3 MG/DL    BUN/Creatinine ratio 27 (H) 12 - 20      GFR est AA >60 >60 ml/min/1.73m2    GFR est non-AA >60 >60 ml/min/1.73m2    Calcium 12.5 (H) 8.5 - 10.1 MG/DL   CBC WITH AUTOMATED DIFF    Collection Time: 01/26/21  4:33 AM   Result Value Ref Range    WBC 3.4 (L) 4.6 - 13.2 K/uL    RBC 3.11 (L) 4.20 - 5.30 M/uL    HGB 9.9 (L) 12.0 - 16.0 g/dL    HCT 29.7 (L) 35.0 - 45.0 %    MCV 95.5 74.0 - 97.0 FL    MCH 31.8 24.0 - 34.0 PG    MCHC 33.3 31.0 - 37.0 g/dL    RDW 17.4 (H) 11.6 - 14.5 %    PLATELET 46 (L) 380 - 420 K/uL    MPV 10.7 9.2 - 11.8 FL    NEUTROPHILS 40 (L) 42 - 75 %    LYMPHOCYTES 28 20 - 51 %    MONOCYTES 30 (H) 2 - 9 %    EOSINOPHILS 0 0 - 5 %    BASOPHILS 0 0 - 3 %    METAMYELOCYTES 2 (H) 0 %    NRBC 2.0 (H) 0  WBC    ABS. NEUTROPHILS 1.4 (L) 1.8 - 8.0 K/UL    ABS. LYMPHOCYTES 1.0 0.8 - 3.5 K/UL    ABS. MONOCYTES 1.0 0 - 1.0 K/UL    ABS. EOSINOPHILS 0.0 0.0 - 0.4 K/UL    ABS.  BASOPHILS 0.0 0.0 - 0.06 K/UL    DF MANUAL      PLATELET COMMENTS DECREASED PLATELETS      RBC COMMENTS ANISOCYTOSIS  1+        RBC COMMENTS SCHISTOCYTES  1+       MAGNESIUM    Collection Time: 01/26/21  4:33 AM   Result Value Ref Range    Magnesium 1.7 1.6 - 2.6 mg/dL   GLUCOSE, POC    Collection Time: 01/26/21  7:33 AM   Result Value Ref Range    Glucose (POC) 113 (H) 70 - 110 mg/dL   GLUCOSE, POC    Collection Time: 01/26/21 11:15 AM   Result Value Ref Range    Glucose (POC) 115 (H) 70 - 110 mg/dL

## 2021-01-26 NOTE — PROGRESS NOTES
Progress Note    Patient: Harvel Olszewski MRN: [id number]  CSN: [id number]    YOB: 1945  Age: 77 y.o. Sex: female    DOA: 1/22/2021 LOS:  LOS: 3 days             Subjective:     Chief Complaint:   Chief Complaint   Patient presents with    Abnormal Lab Results       Assessment/Plan       Assessment  1. Hypercalcemia  2. Multiple myeloma, hx meningioma s/p left frontotemporal craniotomy- followed by Dr. Dominga Kelly w/ [facility]  3. Pancytopenia  4. Hypomagnesemia  5. Hypokalemia  6. Hypertension  7. Hypothyroidism  8. COPD  9. Osteoporosis  10. Dementia  11. Obesity        Plan  1. Heme/onc following, appreciate assistance. Planning for work up- hypercalcemia possibly related to multiple myeloma, serum FLC, SPEP/BERTHA. Consider Zometa based on trend of calcium. Bone survey. 2. Nephrology consulted- appreciate assistance. Continue IVFs per recommendations. Calcitonin x 4 doses. Stop HCTZ, Vitamin D and Calcium supplementation on d/c.  3. Potassium and magnesium replacement  4. Lovenox dc'd. Continue to monitor platelet counts. SCD for DVT prophylaxis. 5. PT, OT following- recommend home health with 24/7 supervision, shower chair to decrease risk of falls  6. Monitor vital signs, weight per unit protocol  7. Fall, aspiration precautions      Diet: Diabetic  Code status: FULL     Contact:  Jessica Gaspar [phone]. Spoke with  to update on plan of care. Informed of nephrology consult to assist with hypercalcemia, informed [facility] following and performing work up to ensure hypercalcemia not related to multiple myeloma. All questions answered.     Case discussed with:  [x]Patient  [x]Family  [x]Nursing  [x]Case Management  DVT Prophylaxis:  []Lovenox  []Hep SQ  [x]SCDs  []Coumadin   []On Heparin gtt      Keanu Harley, FRANCK-C  [facility]  pager [phone]      Objective:     Physical Exam:  Visit Vitals  BP (!) 157/80 (BP 1 Location: Left arm, BP Patient Position: At rest)   Pulse 98   Temp 97.3 °F (36.3 °C)   Resp 20   Ht 5' 4\" (1.626 m)   Wt 97.1 kg (214 lb)   SpO2 98%   BMI 36.73 kg/m²        General:         Alert, cooperative, no acute distress    HEENT: NC, Atraumatic. PERRLA, anicteric sclerae. Lungs: CTA Bilaterally. No Wheezing/Rhonchi/Rales. Heart:  Regular  rhythm,  No murmur, No Rubs, No Gallops  Abdomen: Soft, Non distended, Non tender. +Bowel sounds, no HSM  Extremities: No c/c/e  Psych:   Good insight. Not anxious or agitated. Neurologic:  Alert and oriented X 3. No acute neurological deficits      Intake and Output:  Current Shift:  01/26 0701 - 01/26 1900  In: 240 [P.O.:240]  Out: -   Last three shifts:  01/24 1901 - 01/26 0700  In: 1360 [P.O.:1360]  Out: 1450 [Urine:1450]    Labs: Results:       Chemistry Recent Labs     01/26/21  0433 01/25/21  0508 01/24/21  0404   * 146* 216*    141 141   K 3.6 3.9 4.2    110 105   CO2 29 27 31   BUN 21* 24* 25*   CREA 0.77 0.78 0.87   CA 12.5* 12.6* 13.1*   AGAP 4 4 5   BUCR 27* 31* 29*   AP  --  66  --    TP  --  5.8*  --    ALB  --  2.9*  --    GLOB  --  2.9  --    AGRAT  --  1.0  --       CBC w/Diff Recent Labs     01/26/21  0433 01/24/21  0404   WBC 3.4* 3.6*   RBC 3.11* 3.33*   HGB 9.9* 10.7*   HCT 29.7* 32.6*   PLT 46* 62*   GRANS PENDING 19*   LYMPH PENDING 33   EOS PENDING 2      Cardiac Enzymes No results for input(s): CPK, CKND1, LYUBOV in the last 72 hours. No lab exists for component: CKRMB, TROIP   Coagulation No results for input(s): PTP, INR, APTT, INREXT in the last 72 hours. Lipid Panel Lab Results   Component Value Date/Time    Cholesterol, total 143 05/19/2015 03:37 PM    HDL Cholesterol 62 05/19/2015 03:37 PM    LDL, calculated 66 05/19/2015 03:37 PM    VLDL, calculated 15 05/19/2015 03:37 PM    Triglyceride 74 05/19/2015 03:37 PM      BNP No results for input(s): BNPP in the last 72 hours.    Liver Enzymes Recent Labs     01/25/21  0508   TP 5.8*   ALB 2.9*   AP 66      Thyroid Studies No results found for: T4, T3U, TSH, TSHEXT       Procedures/imaging: see electronic medical records for all procedures/Xrays and details which were not copied into this note but were reviewed prior to creation of Plan    Medications:   Current Facility-Administered Medications   Medication Dose Route Frequency    magnesium sulfate 2 g/50 ml IVPB (premix or compounded)  2 g IntraVENous ONCE    potassium chloride (K-DUR, KLOR-CON) SR tablet 40 mEq  40 mEq Oral Q4H    sertraline (ZOLOFT) tablet 100 mg  100 mg Oral DAILY    lactated Ringers infusion  125 mL/hr IntraVENous CONTINUOUS    sodium chloride (NS) flush 5-40 mL  5-40 mL IntraVENous Q8H    sodium chloride (NS) flush 5-40 mL  5-40 mL IntraVENous PRN    acetaminophen (TYLENOL) tablet 650 mg  650 mg Oral Q6H PRN    Or    acetaminophen (TYLENOL) suppository 650 mg  650 mg Rectal Q6H PRN    polyethylene glycol (MIRALAX) packet 17 g  17 g Oral DAILY PRN    ondansetron (ZOFRAN) injection 4 mg  4 mg IntraVENous Q6H PRN    [Held by provider] enoxaparin (LOVENOX) injection 40 mg  40 mg SubCUTAneous Q24H    insulin lispro (HUMALOG) injection   SubCUTAneous AC&HS    glucose chewable tablet 16 g  4 Tab Oral PRN    glucagon (GLUCAGEN) injection 1 mg  1 mg IntraMUSCular PRN    dextrose (D50W) injection syrg 12.5-25 g  25-50 mL IntraVENous PRN    albuterol-ipratropium (DUO-NEB) 2.5 MG-0.5 MG/3 ML  3 mL Nebulization Q4H PRN    thyroid (Pork) (ARMOUR) tablet 90 mg  90 mg Oral DAILY

## 2021-01-27 NOTE — PROGRESS NOTES
Problem: Falls - Risk of  Goal: *Absence of Falls  Description: Document Richie Davila Fall Risk and appropriate interventions in the flowsheet. Outcome: Progressing Towards Goal  Note: Fall Risk Interventions:  Mobility Interventions: Bed/chair exit alarm, Patient to call before getting OOB         Medication Interventions: Bed/chair exit alarm, Patient to call before getting OOB    Elimination Interventions: Bed/chair exit alarm, Call light in reach, Patient to call for help with toileting needs              Problem: Patient Education: Go to Patient Education Activity  Goal: Patient/Family Education  Outcome: Progressing Towards Goal     Problem: Pressure Injury - Risk of  Goal: *Prevention of pressure injury  Description: Document Lonnie Scale and appropriate interventions in the flowsheet.   Outcome: Progressing Towards Goal  Note: Pressure Injury Interventions:  Sensory Interventions: Assess changes in LOC    Moisture Interventions: Absorbent underpads, Internal/External urinary devices    Activity Interventions: Increase time out of bed    Mobility Interventions: Pressure redistribution bed/mattress (bed type), PT/OT evaluation    Nutrition Interventions: Document food/fluid/supplement intake    Friction and Shear Interventions: Minimize layers

## 2021-01-27 NOTE — PROGRESS NOTES
HEMATOLOGY AND ONCOLOGY   PROGRESS NOTE      Patient: Pat Fay   MRN: [id number]      YOB: 1945      Admit Date: 2021    Assessment:     # Hypercalcemia:   # Multiple Myeloma:  # Hypertension  # Vitamin D Deficiency  # Osteoporosis  # H/O Meningioma s/p left frontotemporal craniotomy (13)  # Glaucoma  # Hypothyroidism  # Dementia  # H/O Migraines     1. Multiple Myeloma, IgG Kappa dx 17:  patient incidentally noted to have 17 x 18 x 48mm lesion on left lateral 6th rib, a left breast focal calcification for which mammographic correlation was advised. As there is concern for possible malignancy, will need to further evaluate. Imaging (2017): CT C/A/P with multiple lytic lesions with most prominent involving left 6th rib which has associated soft tissue mass. Bone scan with mild uptake of expansile lesion left 6th rib. Mammogram with stable benign findings in bilateral breast with no suspicious findings; recommend annual surveillance. 17:  Serologies: Cr: 0.65, Ca: 9.6, Hb: 10.8, SPEP: IgG kappa 4.62 g/dL, 24 hour UPEP: IgG kappa 23 mg/24 hours, serum FLC's: kappa: 178, lambda: 6.5, ratio: 27.3, B2M: 3.41, Quant Ig's: Ig, IgA: 25, IgM: 13  10/05/17: Skeletal survey with multple lytic lesions. 17: BMBx [[facility] Case #: [id number]]: 80-90% replacement with plasma cell neoplasm with kappa restriction, normal cytogenetics, FISH with + 4 copies of chromosome 1, + monosomy 13, + del IGH at 14q32, + del 17. FIRST LINE TREATMENT:  CyBorD started 17 - 2018   Due to intractible back pain, palliative XRT to T9-L3 to 2400 cGy in 8 fractions under the care of Dr. Elin Mckinonn  19: Transitioned to maintenance Revlimid 10mg po once daily. Held 2019 due to neutropenia/fatigue. 19: Resumed maintenance Revlimid 10mg po once daily with continued monthly Xgeva.   Adjusted dosing to 21 days on  days off regimen 2019.  20: Progressed to plasma cell leukemia  SECOND LINE TREATMENT: Daratumumab + Velcade + Pomalyst + Dexamethasone started 20 due to progression to plasma cell leukemia  Daratumumab on days 1,8,15,22 q28 days --> days 1,15 starting with cycle 3 - 6 --> day 1 only thereafter  Velcade days 1,4,8,11 q28 days  Pomalyst 4mg days 1-21 q28 days  --> decreased to 3mg po daily on days 1-21 q28 days on 4/10/20  Dexamethasone 20mg on days 1,8,15,22  Acyclovir 400mg po BID for HSV prophylaxis. Plan:     - I discussed the abnormal labs with the patient and called her .   - To achieve a better control of hypercalcemia, will do Zometa,  is aware. - Will ask for smear and add LDH to her labs d/t thrombocytopenia.   - Overall, labs are concerning of POD. - Appreciate nephrology input.   - Bone survey, reviewed results. - Moniotor counts. Bleeding precautions.   - DVT prophylaxis as per the primary team.     Jessica Lo MD  [facility] [phone]    Subjective:     She feels tired this morning. She had nausea and x1 vomitus. No other new complaints. Objective:     Patient Vitals for the past 24 hrs:   BP Temp Pulse Resp SpO2 Height   21 1101 (!) 149/72 98.8 °F (37.1 °C) 93 20 95 %    21 0738 132/67 98.7 °F (37.1 °C) 95 18 94 %    21 0344 123/69 98.8 °F (37.1 °C) 98 20 95 %    21 0019 132/73 99.6 °F (37.6 °C) (!) 102 20 94 %    21 2000 105/63 98.3 °F (36.8 °C) 100 20 95 %    21 1804      5' 4\" (1.626 m)   21 1520 (!) 156/80 97.7 °F (36.5 °C) 97 20 95 %          Intake/Output Summary (Last 24 hours) at 2021 1306  Last data filed at 2021 0950  Gross per 24 hour   Intake 1850 ml   Output 2600 ml   Net -750 ml       Temp (24hrs), Av.7 °F (37.1 °C), Min:97.7 °F (36.5 °C), Max:99.6 °F (37.6 °C)       Review Of Systems:     Constitutional: negative for fevers, chills, or sweats.  + fatigue  Eyes: negative for visual disturbance, redness and icterus  Ears, Nose, Mouth, Throat, and Face: negative for tinnitus, epistaxis, sore mouth and hoarseness  Respiratory: negative for cough, sputum, hemoptysis, pleurisy/chest pain or wheezing. Cardiovascular: negative for chest pain, chest pressure/discomfort, palpitations, irregular heart beats, syncope, paroxysmal nocturnal dyspnea  Gastrointestinal: negative for reflux symptoms, nausea, vomiting, change in bowel habits, melena, diarrhea, constipation and abdominal pain. Genitourinary:negative for dysuria, nocturia, urinary incontinence, hesitancy and hematuria  Integument: negative for rash, skin lesion(s) and pruritus  Hematologic/Lymphatic: negative for easy bruising, bleeding and lymphadenopathy  Musculoskeletal:negative for myalgias, arthralgias and bone pain  Neurological: negative for headaches, dizziness, seizures, paresthesia and weakness. Physical Exam:     Constitutional: Alert, oriented, not in distress  Eyes: PERRLA, anicteric, no redness  HEENT: no palpable Lymph nodes, no mucositis, no thrush. Respiratory: symmetrical expansion, no wheezing. Cardiovascular: S1S2 positive. Gastrointestinal: soft, benign, non tender, obesity. Integument: no rash,  no ecchymosis. Musculoskeletal: no edema, no clubbing. Neurological: intact, symmetrical exam with no focal deficits.  Symmetrical exam.     Lab:     Recent Results (from the past 24 hour(s))   GLUCOSE, POC    Collection Time: 01/26/21  4:14 PM   Result Value Ref Range    Glucose (POC) 134 (H) 70 - 110 mg/dL   GLUCOSE, POC    Collection Time: 01/26/21  9:51 PM   Result Value Ref Range    Glucose (POC) 157 (H) 70 - 110 mg/dL   CBC WITH AUTOMATED DIFF    Collection Time: 01/27/21  4:33 AM   Result Value Ref Range    WBC 4.1 (L) 4.6 - 13.2 K/uL    RBC 3.12 (L) 4.20 - 5.30 M/uL    HGB 10.1 (L) 12.0 - 16.0 g/dL    HCT 29.6 (L) 35.0 - 45.0 %    MCV 94.9 74.0 - 97.0 FL    MCH 32.4 24.0 - 34.0 PG    MCHC 34.1 31.0 - 37.0 g/dL RDW 17.3 (H) 11.6 - 14.5 %    PLATELET 49 (L) 261 - 420 K/uL    MPV 11.0 9.2 - 11.8 FL    NEUTROPHILS 42 40 - 73 %    LYMPHOCYTES 23 21 - 52 %    MONOCYTES 35 (H) 3 - 10 %    EOSINOPHILS 0 0 - 5 %    BASOPHILS 0 0 - 2 %    ABS. NEUTROPHILS 1.7 (L) 1.8 - 8.0 K/UL    ABS. LYMPHOCYTES 1.0 0.9 - 3.6 K/UL    ABS. MONOCYTES 1.5 (H) 0.05 - 1.2 K/UL    ABS. EOSINOPHILS 0.0 0.0 - 0.4 K/UL    ABS.  BASOPHILS 0.0 0.0 - 0.1 K/UL    DF AUTOMATED     METABOLIC PANEL, BASIC    Collection Time: 01/27/21  4:33 AM   Result Value Ref Range    Sodium 142 136 - 145 mmol/L    Potassium 3.7 3.5 - 5.5 mmol/L    Chloride 109 100 - 111 mmol/L    CO2 27 21 - 32 mmol/L    Anion gap 6 3.0 - 18 mmol/L    Glucose 138 (H) 74 - 99 mg/dL    BUN 21 (H) 7.0 - 18 MG/DL    Creatinine 0.73 0.6 - 1.3 MG/DL    BUN/Creatinine ratio 29 (H) 12 - 20      GFR est AA >60 >60 ml/min/1.73m2    GFR est non-AA >60 >60 ml/min/1.73m2    Calcium 11.2 (H) 8.5 - 10.1 MG/DL   GLUCOSE, POC    Collection Time: 01/27/21  7:43 AM   Result Value Ref Range    Glucose (POC) 127 (H) 70 - 110 mg/dL   GLUCOSE, POC    Collection Time: 01/27/21 11:01 AM   Result Value Ref Range    Glucose (POC) 128 (H) 70 - 110 mg/dL

## 2021-01-27 NOTE — PROGRESS NOTES
RENAL DAILY PROGRESS NOTE              Subjective:       Complaint:says she is tired  Overnight events noted  no nausea, vomiting, chest pain, short of breath, cough, seizure. IMPRESSION:   · Hypercalcemia due to combination of hctz,multiple myeloma,vitamin d/calcium supplementation. Decreasing on hydration  · Hypertension  · Multiple myeloma hx    PLAN:   · C/w NS. Got calcitonin with response. · Can use lasix if she gets wet with above  · Hesitant to use bisphosphonate with allergy history to fosomax in past  · Watch daily calcium  · Stop hctz on discharge  · Stop vitamin d and calcium supplementation on discharge  · If calcium is resistant, then probably can try zomega as her allergy to fosamax is really questionable as she doesn't remember and listed as skin rash/itching as type of reaction. If this is chosen then will have to infuse slowly and observe on tele.              Current Facility-Administered Medications   Medication Dose Route Frequency    0.9% sodium chloride infusion  75 mL/hr IntraVENous CONTINUOUS    acyclovir (ZOVIRAX) capsule 400 mg  400 mg Oral BID    sertraline (ZOLOFT) tablet 100 mg  100 mg Oral DAILY    sodium chloride (NS) flush 5-40 mL  5-40 mL IntraVENous Q8H    sodium chloride (NS) flush 5-40 mL  5-40 mL IntraVENous PRN    acetaminophen (TYLENOL) tablet 650 mg  650 mg Oral Q6H PRN    Or    acetaminophen (TYLENOL) suppository 650 mg  650 mg Rectal Q6H PRN    polyethylene glycol (MIRALAX) packet 17 g  17 g Oral DAILY PRN    ondansetron (ZOFRAN) injection 4 mg  4 mg IntraVENous Q6H PRN    insulin lispro (HUMALOG) injection   SubCUTAneous AC&HS    glucose chewable tablet 16 g  4 Tab Oral PRN    glucagon (GLUCAGEN) injection 1 mg  1 mg IntraMUSCular PRN    dextrose (D50W) injection syrg 12.5-25 g  25-50 mL IntraVENous PRN    albuterol-ipratropium (DUO-NEB) 2.5 MG-0.5 MG/3 ML  3 mL Nebulization Q4H PRN    thyroid (Pork) (ARMOUR) tablet 90 mg  90 mg Oral DAILY Review of Symptoms: comprehensive ROS negative except above.    Objective:     Patient Vitals for the past 24 hrs:   Temp Pulse Resp BP SpO2   01/27/21 1101 98.8 °F (37.1 °C) 93 20 (!) 149/72 95 %   01/27/21 0738 98.7 °F (37.1 °C) 95 18 132/67 94 %   01/27/21 0344 98.8 °F (37.1 °C) 98 20 123/69 95 %   01/27/21 0019 99.6 °F (37.6 °C) (!) 102 20 132/73 94 %   01/26/21 2000 98.3 °F (36.8 °C) 100 20 105/63 95 %   01/26/21 1520 97.7 °F (36.5 °C) 97 20 (!) 156/80 95 %   01/26/21 1121 97.8 °F (36.6 °C) 86 18 (!) 154/81 94 %        Weight change:      01/25 1901 - 01/27 0700  In: 2180 [P.O.:680; I.V.:1500]  Out: 2650 [Urine:2650]    Intake/Output Summary (Last 24 hours) at 1/27/2021 1104  Last data filed at 1/27/2021 0950  Gross per 24 hour   Intake 1850 ml   Output 2600 ml   Net -750 ml     Physical Exam:   General: comfortable, no acute distress   HEENT sclera anicteric, supple neck, no thyromegaly  CVS: S1S2 heard,  no rub  RS: + air entry b/l,   Abd: Soft, Non tender, Not distended, Positive bowel sounds, no organomegaly, no CVA / supra pubic tenderness  Neuro: non focal, awake, alert , CN II-XII are grossly intact  Extrm: no edema, no cyanosis, clubbing   Skin: no visible  Rash  Musculoskeletal: No gross joints or bone deformities         Data Review:     LABS:   Hematology:   Recent Labs     01/27/21  0433 01/26/21  0433   WBC 4.1* 3.4*   HGB 10.1* 9.9*   HCT 29.6* 29.7*     Chemistry:   Recent Labs     01/27/21  0433 01/26/21  1247 01/26/21  0433 01/25/21  0508   BUN 21* 19* 21* 24*   CREA 0.73 0.86 0.77 0.78   CA 11.2* 13.4* 12.5* 12.6*   ALB  --   --   --  2.9*   K 3.7 3.7 3.6 3.9    142 140 141    104 107 110   CO2 27 32 29 27   * 116* 108* 146*            Procedures/imaging: see electronic medical records for all procedures, Xrays and details which were not copied into this note but were reviewed prior to creation of Amaya Rojas MD  1/27/2021

## 2021-01-27 NOTE — PROGRESS NOTES
Bedside shift change report given to [facility], RN (oncoming nurse) by Wang Washington RN (offgoing nurse). Report included the following information SBAR, Kardex and MAR.

## 2021-01-27 NOTE — ROUTINE PROCESS
Bedside and Verbal shift change report given to CEZAR Beverly (oncoming nurse) by Erik Spence RN 
 (offgoing nurse). Report included the following information SBAR, Kardex, Intake/Output, MAR and Recent Results.

## 2021-01-27 NOTE — PROGRESS NOTES
Attempted to see patient for PT treatment however she declines due to nausea. Will re-attempt later in the day.  Crista Salcido, PT

## 2021-01-28 NOTE — PROGRESS NOTES
Problem: Falls - Risk of  Goal: *Absence of Falls  Description: Document Marry Becker Fall Risk and appropriate interventions in the flowsheet. Outcome: Progressing Towards Goal  Note: Fall Risk Interventions:  Mobility Interventions: Bed/chair exit alarm         Medication Interventions: Bed/chair exit alarm    Elimination Interventions: Bed/chair exit alarm              Problem: Patient Education: Go to Patient Education Activity  Goal: Patient/Family Education  Outcome: Progressing Towards Goal     Problem: Pressure Injury - Risk of  Goal: *Prevention of pressure injury  Description: Document Lonnie Scale and appropriate interventions in the flowsheet. Outcome: Progressing Towards Goal  Note: Pressure Injury Interventions:  Sensory Interventions: Assess changes in LOC    Moisture Interventions: Absorbent underpads    Activity Interventions: Increase time out of bed    Mobility Interventions: Pressure redistribution bed/mattress (bed type)    Nutrition Interventions: Document food/fluid/supplement intake    Friction and Shear Interventions: Minimize layers                Problem: Patient Education: Go to Patient Education Activity  Goal: Patient/Family Education  Outcome: Progressing Towards Goal     Problem: Diabetes Self-Management  Goal: *Disease process and treatment process  Description: Define diabetes and identify own type of diabetes; list 3 options for treating diabetes. Outcome: Progressing Towards Goal  Goal: *Incorporating nutritional management into lifestyle  Description: Describe effect of type, amount and timing of food on blood glucose; list 3 methods for planning meals. Outcome: Progressing Towards Goal  Goal: *Incorporating physical activity into lifestyle  Description: State effect of exercise on blood glucose levels.   Outcome: Progressing Towards Goal  Goal: *Developing strategies to promote health/change behavior  Description: Define the ABC's of diabetes; identify appropriate screenings, schedule and personal plan for screenings. Outcome: Progressing Towards Goal  Goal: *Using medications safely  Description: State effect of diabetes medications on diabetes; name diabetes medication taking, action and side effects. Outcome: Progressing Towards Goal  Goal: *Monitoring blood glucose, interpreting and using results  Description: Identify recommended blood glucose targets  and personal targets. Outcome: Progressing Towards Goal  Goal: *Prevention, detection, treatment of acute complications  Description: List symptoms of hyper- and hypoglycemia; describe how to treat low blood sugar and actions for lowering  high blood glucose level. Outcome: Progressing Towards Goal  Goal: *Prevention, detection and treatment of chronic complications  Description: Define the natural course of diabetes and describe the relationship of blood glucose levels to long term complications of diabetes.   Outcome: Progressing Towards Goal  Goal: *Developing strategies to address psychosocial issues  Description: Describe feelings about living with diabetes; identify support needed and support network  Outcome: Progressing Towards Goal  Goal: *Insulin pump training  Outcome: Progressing Towards Goal  Goal: *Sick day guidelines  Outcome: Progressing Towards Goal  Goal: *Patient Specific Goal (EDIT GOAL, INSERT TEXT)  Outcome: Progressing Towards Goal     Problem: Patient Education: Go to Patient Education Activity  Goal: Patient/Family Education  Outcome: Progressing Towards Goal     Problem: Patient Education: Go to Patient Education Activity  Goal: Patient/Family Education  Outcome: Progressing Towards Goal     Problem: Patient Education: Go to Patient Education Activity  Goal: Patient/Family Education  Outcome: Progressing Towards Goal     Problem: Nutrition Deficit  Goal: *Optimize nutritional status  Outcome: Progressing Towards Goal

## 2021-01-28 NOTE — ROUTINE PROCESS
Bedside and Verbal shift change report given to Jaxson Grijalva RN (oncoming nurse) by Katty Ernst RN (offgoing nurse). Report included the following information SBAR, Kardex, MAR and Recent Results.

## 2021-01-28 NOTE — PROGRESS NOTES
Bedside shift change report given to [facility] (oncoming nurse) by Gracie Mooney RN (offgoing nurse). Report included the following information SBAR, Kardex, MAR and Recent Results.

## 2021-01-28 NOTE — PROGRESS NOTES
Problem: Mobility Impaired (Adult and Pediatric)  Goal: *Acute Goals and Plan of Care (Insert Text)  Description: Physical Therapy Goals  Initiated 1/25/2021 and to be accomplished within 7 day(s)  1. Patient will move from supine to sit and sit to supine  in bed with modified independence. 2.  Patient will transfer from bed to chair and chair to bed with modified independence using the least restrictive device. 3.  Patient will perform sit to stand with modified independence. 4.  Patient will ambulate with modified independence for 150 feet with the least restrictive device. 5.  Patient will ascend/descend 8 stairs with handrail(s) with supervision    PLOF: Patient was mod I with functional mobility using cane/RW. She lives in [address] [place] [address] home with her spouse. Outcome: Progressing Towards Goal   PHYSICAL THERAPY TREATMENT    Patient: Gigi Woo (44 y.o. female)  Date: 1/28/2021  Diagnosis: Hypercalcemia [E83.52] <principal problem not specified>       Precautions: Fall, Skin    ASSESSMENT:  Patient is cleared by EMR review for PT, and patient consents to therapy. Pt supine in bed upon arrival and needs to use the bathroom. Pt performing supine to sit minimal assistance with head of bed elevated and increased time. Stand pivot bed to bedside commode maximum assistance with pt having increased forward flexed posture. Performed sit to stands from bedside commode 4 times maximum assistance and cues for increasing upright posture as pt is very flexed forward and has difficulty looking up. Rehab tech assisted with pericare as PT needed both hands to assist pt in standing at this time. Gait back to the bed with rolling walker 2 feet with maximum assistance. Pt has shuffled steps and decreased clearance. Pt required increased assistance with mobility today and pt had decreased standing balance today. Pt ended therapy supine in bed with alarm donned and all needs met.      Progression toward goals: [x]      Improving appropriately and progressing toward goals  []      Improving slowly and progressing toward goals  []      Not making progress toward goals and plan of care will be adjusted     PLAN:  Patient continues to benefit from skilled intervention to address the above impairments. Continue treatment per established plan of care. Discharge Recommendations:  Skilled Nursing Facility  Further Equipment Recommendations for Discharge:  TBD     SUBJECTIVE:   Patient stated I don't know why I can't stand up.     OBJECTIVE DATA SUMMARY:   Critical Behavior:  Neurologic State: Alert  Orientation Level: Disoriented to person, Disoriented to place, Oriented to situation  Cognition: Follows commands  Safety/Judgement: Fall prevention  Functional Mobility Training:  Bed Mobility:     Supine to Sit: Minimum assistance(with HOB elevated)  Sit to Supine: Moderate assistance            Transfers:  Sit to Stand: Maximum assistance  Stand to Sit: Maximum assistance        Bed to Chair: Maximum assistance                    Balance:  Sitting: Impaired; With support  Sitting - Static: Good (unsupported)  Sitting - Dynamic: Fair (occasional)  Standing: Impaired; With support  Standing - Static: Fair;Poor  Standing - Dynamic : Poor      Therapeutic Exercises:   Reviewed and performed ankle pumps to increase blood flow and circulation. EXERCISE   Sets   Reps   Active Active Assist   Passive Self ROM   Comments   Ankle Pumps 1 10  [x] [] [] []    Quad Sets/Glut Sets    [] [] [] [] Hold for 5 secs   Hamstring Sets   [] [] [] []    Short Arc Quads   [] [] [] []    Heel Slides   [] [] [] []    Straight Leg Raises   [] [] [] []    Hip Abd/adduction   [] [] [] []    Long Arc Quads 1 10 [x] [] [] []    Seated Marching   [] [] [] []    Standing Marching   [] [] [] []       [] [] [] []        Pain:  No pain noted before, during, or at end of session.       Activity Tolerance:   Fair-/poor  Please refer to the flowsheet for vital signs taken during this treatment. After treatment:   [] Patient left in no apparent distress sitting up in chair  [x] Patient left in no apparent distress in bed  [x] Call bell left within reach  [x] Nursing notified [place]  [x] Personal items in reach  [] Caregiver present  [x] Bed/chair alarm activated  [] SCDs applied      COMMUNICATION/EDUCATION:   [x]         Role of Physical Therapy in the acute care setting. [x]         Fall prevention education was provided and the patient/caregiver indicated understanding. [x]         Patient/family have participated as able in working toward goals and plan of care. [x]         Patient/family agree to work toward stated goals and plan of care. []         Patient understands intent and goals of therapy, but is neutral about his/her participation. []         Patient is unable to participate in stated goals/plan of care: ongoing with therapy staff.  []         Out of bed at least 3-5 times a day with nursing assistance.    []         Other:        Janet De La Rosa, PT, DPT   Time Calculation: 38 mins

## 2021-01-28 NOTE — PROGRESS NOTES
RENAL DAILY PROGRESS NOTE              Subjective:       Complaint:says she is ok  Overnight events noted  no nausea, vomiting, chest pain, short of breath, cough, seizure. IMPRESSION:   · Hypercalcemia due to combination of hctz,multiple myeloma,vitamin d/calcium supplementation. Decreasing on hydration  · Hypertension  · Multiple myeloma hx    PLAN:   · Stop fluids. calcium improving  · Hem onc managing high calcium at this point  · Will sign off at this point  · Watch daily calcium  · Stop hctz on discharge  · Stop vitamin d and calcium supplementation on discharge            Current Facility-Administered Medications   Medication Dose Route Frequency    0.9% sodium chloride infusion  75 mL/hr IntraVENous CONTINUOUS    acyclovir (ZOVIRAX) capsule 400 mg  400 mg Oral BID    sertraline (ZOLOFT) tablet 100 mg  100 mg Oral DAILY    sodium chloride (NS) flush 5-40 mL  5-40 mL IntraVENous Q8H    sodium chloride (NS) flush 5-40 mL  5-40 mL IntraVENous PRN    acetaminophen (TYLENOL) tablet 650 mg  650 mg Oral Q6H PRN    Or    acetaminophen (TYLENOL) suppository 650 mg  650 mg Rectal Q6H PRN    polyethylene glycol (MIRALAX) packet 17 g  17 g Oral DAILY PRN    ondansetron (ZOFRAN) injection 4 mg  4 mg IntraVENous Q6H PRN    insulin lispro (HUMALOG) injection   SubCUTAneous AC&HS    glucose chewable tablet 16 g  4 Tab Oral PRN    glucagon (GLUCAGEN) injection 1 mg  1 mg IntraMUSCular PRN    dextrose (D50W) injection syrg 12.5-25 g  25-50 mL IntraVENous PRN    albuterol-ipratropium (DUO-NEB) 2.5 MG-0.5 MG/3 ML  3 mL Nebulization Q4H PRN    thyroid (Pork) (ARMOUR) tablet 90 mg  90 mg Oral DAILY       Review of Symptoms: comprehensive ROS negative except above.    Objective:     Patient Vitals for the past 24 hrs:   Temp Pulse Resp BP SpO2   01/28/21 1116 98.1 °F (36.7 °C) 88 18 129/77 96 %   01/28/21 0852 97.3 °F (36.3 °C) 92 20 131/69 98 %   01/27/21 2348 98.3 °F (36.8 °C) 99 18 130/70 95 % 01/27/21 2007 97.3 °F (36.3 °C) 89 18 134/67 98 %   01/27/21 1631 98.1 °F (36.7 °C) 96 18 (!) 126/98 95 %        Weight change:      01/26 1901 - 01/28 0700  In: 1930 [P.O.:330;  I.V.:1600]  Out: 2100 [Urine:2100]    Intake/Output Summary (Last 24 hours) at 1/28/2021 1201  Last data filed at 1/28/2021 0933  Gross per 24 hour   Intake 560 ml   Output 800 ml   Net -240 ml     Physical Exam:   General: comfortable, no acute distress   HEENT sclera anicteric, supple neck, no thyromegaly  CVS: S1S2 heard,  no rub  RS: + air entry b/l,   Abd: Soft, Non tender  Neuro: non focal        Data Review:     LABS:   Hematology:   Recent Labs     01/28/21  0032 01/27/21  0433 01/26/21  0433   WBC 4.3* 4.1* 3.4*   HGB 10.3* 10.1* 9.9*   HCT 30.7* 29.6* 29.7*     Chemistry:   Recent Labs     01/28/21  0032 01/27/21  0433 01/26/21  1247 01/26/21  0433   BUN 20* 21* 19* 21*   CREA 0.73 0.73 0.86 0.77   CA 11.3* 11.2* 13.4* 12.5*   K 3.6 3.7 3.7 3.6    142 142 140    109 104 107   CO2 27 27 32 29   * 138* 116* 108*            Procedures/imaging: see electronic medical records for all procedures, Xrays and details which were not copied into this note but were reviewed prior to creation of Lisha Cortez MD  1/28/2021

## 2021-01-28 NOTE — PROGRESS NOTES
Bedside shift change report given to Elise Goldberg, RN (oncoming nurse) by Lyle Greco RN (offgoing nurse). Report included the following information SBAR, Kardex and MAR.

## 2021-01-28 NOTE — PROGRESS NOTES
[facility]  CHART CHECK    Patient ID:  Tyrone Dewitt  77 y.o.  1945    Admission Date: 1/22/2021      Problem  Hospital Problems  Date Reviewed: 12/6/2020          Codes Class Noted POA    Hypercalcemia ICD-10-CM: E83.52  ICD-9-CM: 275.42  1/23/2021 Unknown            Multiple myeloma, relapsed  Post zometa 1/27  Post calcitonin  On ivf    Track cbc, lytes, calcium  Continue hydration  Continue with current treatment plan. Zeb Peterson.  Otto Trimble, [address] [place] [address]  Office [phone]

## 2021-01-28 NOTE — PROGRESS NOTES
Progress Note    Patient: Dina Nur MRN: [id number]  CSN: [id number]    YOB: 1945  Age: 77 y.o. Sex: female    DOA: 1/22/2021 LOS:  LOS: 5 days             Subjective:     Pt more awake today, no further c/o N/V. Tolerated breakfast.  States she has been tired for the past week. Asks for her  Rogerio Linton to be updated. Chief Complaint:   Chief Complaint   Patient presents with    Abnormal Lab Results       Assessment/Plan       Assessment  1. Hypercalcemia  2. Multiple myeloma, hx meningioma s/p left frontotemporal craniotomy- followed by Dr. Malvin Capone w/ [facility]  3. Pancytopenia  4. Hypomagnesemia  5. Hypokalemia  6. Nausea/vomiting  7. Hypertension  8. Hypothyroidism  9. COPD  10. Osteoporosis  11. Dementia  12. Obesity      Plan  1. Heme/onc following, appreciate assistance. Planning for work up- hypercalcemia possibly related to multiple myeloma, serum FLC, SPEP/BERTHA. Bone survey 1/26 osteolytic foci throughout axial and pedicular skeleton compatible with clinical history of multiple myeloma, numerous age-indeterminate mild/mod compression fracture deformities throughout T and L spine. Zometa started today 1/27. 2. Nephrology signed off today as calcium improved. IVFs dc'd. Stop HCTZ, Vitamin D and Calcium supplementation on d/c.  3. Potassium and magnesium replacement as needed  4. Lovenox dc'd. Platelet counts improving- continue to monitor. Hematology following-plan for peripheral smear and LD. SCD for DVT prophylaxis. 5. Anti-emetic prn  6. PT, OT following- recommend home health with 24/7 supervision, shower chair to decrease risk of falls  7. Monitor vital signs, weight per unit protocol  8. Fall, aspiration precautions      Diet: Diabetic  Code status: FULL     Contact:  Rogerio Linton [phone]. Spoke with  to update on plan of care. Advised of calcium levels, nephrology signed off but oncology still following.    did confirm that he will be able to be home w/ pt 24/7 as recommended by therapy. Case discussed with:  [x]Patient  [x]Family  [x]Nursing  [x]Case Management  DVT Prophylaxis:  []Lovenox  []Hep SQ  [x]SCDs  []Coumadin   []On Heparin gtt      Bhavna Sterling, NP-C  [facility]  pager [phone]      Objective:     Physical Exam:  Visit Vitals  /70 (BP 1 Location: Left arm, BP Patient Position: At rest)   Pulse 99   Temp 98.3 °F (36.8 °C)   Resp 18   Ht 5' 4\" (1.626 m)   Wt 97.1 kg (214 lb)   SpO2 95%   BMI 36.73 kg/m²        General:         Alert, cooperative, no acute distress    HEENT: NC, Atraumatic. PERRLA, anicteric sclerae. Lungs: CTA Bilaterally. No Wheezing/Rhonchi/Rales. Heart:  Regular  rhythm,  No murmur, No Rubs, No Gallops  Abdomen: Soft, Non distended, Non tender. +Bowel sounds, no HSM  Extremities: No c/c/e  Psych:   Good insight. Not anxious or agitated. Neurologic:  Alert and oriented X 3. No acute neurological deficits      Intake and Output:  Current Shift:  01/27 1901 - 01/28 0700  In: -   Out: 300 [Urine:300]  Last three shifts:  01/26 0701 - 01/27 1900  In: 1914 [P.O.:1010; I.V.:1600]  Out: 2950 [Urine:2950]    Labs: Results:       Chemistry Recent Labs     01/28/21 0032 01/27/21  0433 01/26/21  1247   * 138* 116*    142 142   K 3.6 3.7 3.7    109 104   CO2 27 27 32   BUN 20* 21* 19*   CREA 0.73 0.73 0.86   CA 11.3* 11.2* 13.4*   AGAP 7 6 6   BUCR 27* 29* 22*      CBC w/Diff Recent Labs     01/28/21  0032 01/27/21 0433 01/26/21 0433   WBC 4.3* 4.1* 3.4*   RBC 3.24* 3.12* 3.11*   HGB 10.3* 10.1* 9.9*   HCT 30.7* 29.6* 29.7*   PLT 53* 49* 46*   GRANS 44 42 40*   LYMPH 27 23 28   EOS 0 0 0      Cardiac Enzymes No results for input(s): CPK, CKND1, LYUBOV in the last 72 hours. No lab exists for component: CKRMB, TROIP   Coagulation No results for input(s): PTP, INR, APTT, INREXT, INREXT in the last 72 hours.     Lipid Panel Lab Results   Component Value Date/Time    Cholesterol, total 143 05/19/2015 03:37 PM    HDL Cholesterol 62 05/19/2015 03:37 PM    LDL, calculated 66 05/19/2015 03:37 PM    VLDL, calculated 15 05/19/2015 03:37 PM    Triglyceride 74 05/19/2015 03:37 PM      BNP No results for input(s): BNPP in the last 72 hours. Liver Enzymes No results for input(s): TP, ALB, TBIL, AP in the last 72 hours.     No lab exists for component: SGOT, GPT, DBIL   Thyroid Studies No results found for: T4, T3U, TSH, TSHEXT, TSHEXT       Procedures/imaging: see electronic medical records for all procedures/Xrays and details which were not copied into this note but were reviewed prior to creation of Plan    Medications:   Current Facility-Administered Medications   Medication Dose Route Frequency    magnesium sulfate 2 g/50 ml IVPB (premix or compounded)  2 g IntraVENous ONCE    0.9% sodium chloride infusion  75 mL/hr IntraVENous CONTINUOUS    acyclovir (ZOVIRAX) capsule 400 mg  400 mg Oral BID    sertraline (ZOLOFT) tablet 100 mg  100 mg Oral DAILY    sodium chloride (NS) flush 5-40 mL  5-40 mL IntraVENous Q8H    sodium chloride (NS) flush 5-40 mL  5-40 mL IntraVENous PRN    acetaminophen (TYLENOL) tablet 650 mg  650 mg Oral Q6H PRN    Or    acetaminophen (TYLENOL) suppository 650 mg  650 mg Rectal Q6H PRN    polyethylene glycol (MIRALAX) packet 17 g  17 g Oral DAILY PRN    ondansetron (ZOFRAN) injection 4 mg  4 mg IntraVENous Q6H PRN    insulin lispro (HUMALOG) injection   SubCUTAneous AC&HS    glucose chewable tablet 16 g  4 Tab Oral PRN    glucagon (GLUCAGEN) injection 1 mg  1 mg IntraMUSCular PRN    dextrose (D50W) injection syrg 12.5-25 g  25-50 mL IntraVENous PRN    albuterol-ipratropium (DUO-NEB) 2.5 MG-0.5 MG/3 ML  3 mL Nebulization Q4H PRN    thyroid (Pork) (ARMOUR) tablet 90 mg  90 mg Oral DAILY

## 2021-01-29 NOTE — PROGRESS NOTES
Problem: Falls - Risk of  Goal: *Absence of Falls  Description: Document [place] Cease Fall Risk and appropriate interventions in the flowsheet. Outcome: Progressing Towards Goal  Note: Fall Risk Interventions:  Mobility Interventions: Bed/chair exit alarm, Patient to call before getting OOB, Utilize walker, cane, or other assistive device         Medication Interventions: Bed/chair exit alarm, Patient to call before getting OOB, Teach patient to arise slowly    Elimination Interventions: Bed/chair exit alarm, Call light in reach              Problem: Patient Education: Go to Patient Education Activity  Goal: Patient/Family Education  Outcome: Progressing Towards Goal     Problem: Pressure Injury - Risk of  Goal: *Prevention of pressure injury  Description: Document Lonnie Scale and appropriate interventions in the flowsheet. Outcome: Progressing Towards Goal  Note: Pressure Injury Interventions:  Sensory Interventions: Assess changes in LOC, Avoid rigorous massage over bony prominences, Check visual cues for pain, Keep linens dry and wrinkle-free, Maintain/enhance activity level, Minimize linen layers, Monitor skin under medical devices, Turn and reposition approx. every two hours (pillows and wedges if needed)    Moisture Interventions: Absorbent underpads, Apply protective barrier, creams and emollients, Internal/External urinary devices    Activity Interventions: Increase time out of bed, PT/OT evaluation    Mobility Interventions: Assess need for specialty bed, Turn and reposition approx.  every two hours(pillow and wedges), Pressure redistribution bed/mattress (bed type)    Nutrition Interventions: Document food/fluid/supplement intake    Friction and Shear Interventions: Apply protective barrier, creams and emollients, Foam dressings/transparent film/skin sealants, Lift sheet, Transferring/repositioning devices                Problem: Patient Education: Go to Patient Education Activity  Goal: Patient/Family Education  Outcome: Progressing Towards Goal     Problem: Diabetes Self-Management  Goal: *Disease process and treatment process  Description: Define diabetes and identify own type of diabetes; list 3 options for treating diabetes. Outcome: Progressing Towards Goal  Goal: *Incorporating nutritional management into lifestyle  Description: Describe effect of type, amount and timing of food on blood glucose; list 3 methods for planning meals. Outcome: Progressing Towards Goal  Goal: *Incorporating physical activity into lifestyle  Description: State effect of exercise on blood glucose levels. Outcome: Progressing Towards Goal  Goal: *Developing strategies to promote health/change behavior  Description: Define the ABC's of diabetes; identify appropriate screenings, schedule and personal plan for screenings. Outcome: Progressing Towards Goal  Goal: *Using medications safely  Description: State effect of diabetes medications on diabetes; name diabetes medication taking, action and side effects. Outcome: Progressing Towards Goal  Goal: *Monitoring blood glucose, interpreting and using results  Description: Identify recommended blood glucose targets  and personal targets. Outcome: Progressing Towards Goal  Goal: *Prevention, detection, treatment of acute complications  Description: List symptoms of hyper- and hypoglycemia; describe how to treat low blood sugar and actions for lowering  high blood glucose level. Outcome: Progressing Towards Goal  Goal: *Prevention, detection and treatment of chronic complications  Description: Define the natural course of diabetes and describe the relationship of blood glucose levels to long term complications of diabetes.   Outcome: Progressing Towards Goal  Goal: *Developing strategies to address psychosocial issues  Description: Describe feelings about living with diabetes; identify support needed and support network  Outcome: Progressing Towards Goal  Goal: *Insulin pump training  Outcome: Progressing Towards Goal  Goal: *Sick day guidelines  Outcome: Progressing Towards Goal  Goal: *Patient Specific Goal (EDIT GOAL, INSERT TEXT)  Outcome: Progressing Towards Goal     Problem: Patient Education: Go to Patient Education Activity  Goal: Patient/Family Education  Outcome: Progressing Towards Goal     Problem: Patient Education: Go to Patient Education Activity  Goal: Patient/Family Education  Outcome: Progressing Towards Goal     Problem: Patient Education: Go to Patient Education Activity  Goal: Patient/Family Education  Outcome: Progressing Towards Goal     Problem: Nutrition Deficit  Goal: *Optimize nutritional status  Outcome: Progressing Towards Goal

## 2021-01-29 NOTE — PROGRESS NOTES
Pt assisted by this nurse to chair. Chair locks on. Pt awaiting discharge;  aware. LBM today 1/20/21. Call bell within reach.

## 2021-01-29 NOTE — DISCHARGE INSTRUCTIONS
Patient Education     DISCHARGE SUMMARY from Nurse    PATIENT INSTRUCTIONS:    After general anesthesia or intravenous sedation, for 24 hours or while taking prescription Narcotics:  · Limit your activities  · Do not drive and operate hazardous machinery  · Do not make important personal or business decisions  · Do  not drink alcoholic beverages  · If you have not urinated within 8 hours after discharge, please contact your surgeon on call. Report the following to your surgeon:  · Excessive pain, swelling, redness or odor of or around the surgical area  · Temperature over 100.5  · Nausea and vomiting lasting longer than 4 hours or if unable to take medications  · Any signs of decreased circulation or nerve impairment to extremity: change in color, persistent  numbness, tingling, coldness or increase pain  · Any questions    What to do at Home:  Recommended activity: Activity as tolerated. If you experience any of the following symptoms chest pain, fever, nausea/vomiting, or SOB, please follow up with Dr. Stevie Cleary. *  Please give a list of your current medications to your Primary Care Provider. *  Please update this list whenever your medications are discontinued, doses are      changed, or new medications (including over-the-counter products) are added. *  Please carry medication information at all times in case of emergency situations. These are general instructions for a healthy lifestyle:    No smoking/ No tobacco products/ Avoid exposure to second hand smoke  Surgeon General's Warning:  Quitting smoking now greatly reduces serious risk to your health.     Obesity, smoking, and sedentary lifestyle greatly increases your risk for illness    A healthy diet, regular physical exercise & weight monitoring are important for maintaining a healthy lifestyle    You may be retaining fluid if you have a history of heart failure or if you experience any of the following symptoms:  Weight gain of 3 pounds or more overnight or 5 pounds in a week, increased swelling in our hands or feet or shortness of breath while lying flat in bed. Please call your doctor as soon as you notice any of these symptoms; do not wait until your next office visit. Patient armband removed and shredded    The discharge information has been reviewed with the patient. The patient verbalized understanding. Discharge medications reviewed with the patient and appropriate educational materials and side effects teaching were provided. ___________________________________________________________________________________________________________________________________     Hypercalcemia: Care Instructions  Your Care Instructions     Hypercalcemia is too much calcium in the blood. You need calcium to have strong bones. It also helps your muscles, heart, and nerves work as they should. But too much is dangerous. Several problems can cause too much calcium in the blood. It can happen because of medicines or certain health problems. Some diseases can make your intestines take in too much calcium. And some can take calcium from your bones. A noncancerous tumor can grow in the glands that control calcium levels. And some cancers can cause high calcium levels. These high levels may make you lose fluids (become dehydrated). You may get confused and very tired. Some people also have nausea, vomiting, and constipation. Your doctor will treat you based on how serious the problem is and what is causing it. Since too much calcium can be dangerous, it is important to treat it. You may get fluids to stop dehydration. You also may get medicine to help your body get rid of calcium through your urine or put it back into your bones. If a tumor is the cause, you may need surgery. Follow-up care is a key part of your treatment and safety. Be sure to make and go to all appointments, and call your doctor if you are having problems.  It's also a good idea to know your test results and keep a list of the medicines you take. How can you care for yourself at home? · Take your medicines exactly as prescribed. Call your doctor if you think you are having a problem with your medicine. · Make sure your doctor knows about all the medicines (including over-the-counter or herbal products) you are taking. If a medicine is causing your high calcium levels, your doctor will have you stop taking it. · Drink plenty of fluids, enough so that your urine is light yellow or clear like water. If you have kidney, heart, or liver disease and have to limit fluids, talk with your doctor before you increase the amount of fluids you drink. · Get at least 30 minutes of exercise on most days of the week. Walking is a good choice. You also may want to do other activities, such as running, swimming, cycling, or playing tennis or team sports. Exercise helps the calcium go back into your bones. · Do not reduce how much calcium you eat. · Let your doctor know if you take vitamins or other supplements that have calcium or vitamin D. When should you call for help? Call your doctor now or seek immediate medical care if:    · You are confused or have trouble thinking clearly. Watch closely for changes in your health, and be sure to contact your doctor if:    · You are feeling so tired or weak that you cannot do your usual activities.     · You do not get better as expected. Where can you learn more? Go to http://www.gray.com/  Enter I288 in the search box to learn more about \"Hypercalcemia: Care Instructions. \"  Current as of: April 29, 2020               Content Version: 12.6  © 5190-4781 [employer]. Care instructions adapted under license by [employer] (which disclaims liability or warranty for this information).  If you have questions about a medical condition or this instruction, always ask your healthcare professional. [employer] disclaims any warranty or liability for your use of this information.

## 2021-01-29 NOTE — PROGRESS NOTES
1900-Bedside and Verbal shift change report given to Antonieta Saba (oncoming nurse) by Saint Martin RN (offgoing nurse).  Report included the following information SBAR, Kardex, [facility] and Cardiac Rhythm SR.

## 2021-01-29 NOTE — PROGRESS NOTES
WHEELCHAIR NECESSITY DOCUMENTATION      Patient _______________________________________ was evaluated on _________________ for a manual wheelchair for home use. Patient has a diagnosis(s) of: ______________________________________________________________________ that causes mobility limitations in the home that significantly impairs the ability to participate in mobility related activities of daily living (MRADLs) like-   Please check all that apply:  __ Toileting __Bathing __ Grooming __ Dressing __ Feeding      Patient has the following mobility condition/limitation that  Please check one:  __ Prevents the beneficiary from accomplishing MRADL entirely  __ Places the beneficiary at reasonably determined heightened risk of morbidity or mortality secondary to the attempts to perform MRADL  __ Prevents the beneficiary from completing an MRADL within a reasonable time frame      The Patients: (Please check all that apply)  __ Mobility limitation cannot be sufficiently resolved by the use of appropriately fitted cane or walker   __ Home provides adequate access between rooms, maneuvering space, and surfaces for use of the manual wheelchair that is provided  __ Use of a manual wheelchair will significantly improve the ability to participate in MRADLs and will be able to use it at home on a regular basis  __ Has not expressed an unwillingness to use the manual wheelchair in the home  __ Has sufficient upper extremity function and other physical and mental capabilities needed to safely self-propel the wheelchair that is provided in the home during a typical day. Limitations of strength, endurance and range of motion, or coordination, presence of pain, or deformity or absence of one or both upper extremities are relevant to the assessment of upper extremity function.    __ Has a caregiver who is available, willing and able to provide assistance with the wheelchair     _________ Height                _________ Weight    _______________________________                   ______________________  [facility]

## 2021-01-29 NOTE — PROGRESS NOTES
Called to bedside - pt max assist with PT today. Discussed with patient whom absolutely refuses SNF stay. Discussed with patient's  along with Cecelia Ya, nurse manager of [address] [place] [address] - he is aware of degree of patient weakness and wishes to have patient return home at this time. Discussed with patient and  as well as CM request for non-emergent medical transport. Additional equipment for home ordered per last PT note. D/c summary to follow.    Signed By: Kisha Castro NP     January 29, 2021

## 2021-01-29 NOTE — ROUTINE PROCESS
Bedside and Verbal shift change report given to Dipak Pérez RN (oncoming nurse) by Iraida Nelson RN (offgoing nurse). Report included the following information SBAR, Kardex, Intake/Output, MAR and Recent Results.

## 2021-01-29 NOTE — PROGRESS NOTES
Discussed pt/ot recommendations with pt's  Lynn eMraz. He stated for pt to return home with home health. He stated he has a shower chair at home that pt can use. He stated he can be called to  pt when she is ready for d/c. Informed pt's nurse Meredith White      Discharge order noted for today. Pt has been accepted to [facility] agency. Met with patient and spoke and are agreeable to the transition plan today. Transport has been arranged through pt's usband. Patient's discharge summary and home health  orders have been forwarded to [facility]. Updated bedside RN, Meredith White,  to the transition plan. Discharge information has been documented on the AVS.         Medicare pt has received, reviewed, and signed 2nd IM letter informing them of their right to appeal the discharge. Signed copy has been placed on pt bedside chart. 1330: Per Dilan Paul NP, pt unable to stand for nursing to take her downstairs to her  and will need medicaid transport home. Called [facility]. They will pick pt up at 3:30pm.    PT recommending hospital bed and  Wheelchair.   Informed Dilan Paul NP    1500: Order for hospital bed and wheelchair faxed to MARK Pandey RN  Care Management  Pager: [phone]

## 2021-01-29 NOTE — PROGRESS NOTES
Assumed care of pt from [facility]. Pt breakfast tray at bedside. Pt tolerated meds well. Pt is NPO as of 0940 d/t test today. Pt has been informed of NPO status d/t test.   Pt denies pain. Bed in lowest position/callbell within reach.

## 2021-01-29 NOTE — DISCHARGE SUMMARY
[facility]  [facility]       Patient: Jess Estrada Age: 77 y.o. : 1945 MR#: [id number] SSN: [id number]  PCP on record: Rashmi Zhang MD  Admit date: 2021  Discharge date: 2021    Disposition:    []Home   [x]Home with Home Health   []SNF/NH   []Rehab   []Home with family   []Alternate Facility:____________________    Admission Diagnoses:  Hypercalcemia [E83.52]    Discharge Diagnoses:                             Hypercalcemia  Multiple myeloma, hx meningioma s/p left frontotemporal craniotomy  Pancytopenia  Hypomagnesemia  Hypokalemia  Hypertension  Hypothyroidism  COPD  Debility    Discharge Medications:     Current Discharge Medication List      CONTINUE these medications which have CHANGED    Details   bisacodyL 5 mg tab Take 5 mg by mouth daily as needed for Other (constipation). Qty: 3 Tab, Refills: 0         CONTINUE these medications which have NOT CHANGED    Details   acetaminophen (TYLENOL) 500 mg tablet Take 2 Tabs by mouth every six (6) hours as needed for Pain. Qty: 90 Tab, Refills: 0    Associated Diagnoses: Right hip pain      diclofenac (VOLTAREN) 1 % gel Apply 4 g to affected area four (4) times daily. Qty: 1 Each, Refills: 3    Associated Diagnoses: Right hip pain      Pomalyst 3 mg cap       omeprazole (PRILOSEC) 40 mg capsule Take 1 capsule by mouth twice daily  Qty: 60 Cap, Refills: 6    Associated Diagnoses: H/O: UGI bleed      acyclovir (ZOVIRAX) 400 mg tablet Take 400 mg by mouth two (2) times a day. cyanocobalamin, vitamin B-12, (VITAMIN B-12 PO) Take  by mouth daily. travoprost (Travatan Z) 0.004 % ophthalmic solution Administer 1 Drop to both eyes every evening. docusate sodium (COLACE) 100 mg capsule Take 1 Cap by mouth two (2) times a day for 90 days. Qty: 60 Cap, Refills: 2      loperamide HCl (IMODIUM A-D PO) Take  by mouth as needed.       mirabegron ER (Myrbetriq) 25 mg ER tablet Take 1 Tab by mouth daily. Qty: 30 Tab, Refills: 6      montelukast (Singulair) 10 mg tablet Take 1 Tab by mouth daily. Qty: 90 Tab, Refills: 0      Biotin 2,500 mcg cap Take  by mouth. Ventolin HFA 90 mcg/actuation inhaler Take 2 Puffs by inhalation every four (4) hours as needed for Wheezing. Qty: 18 g, Refills: 0    Associated Diagnoses: Chronic obstructive pulmonary disease, unspecified COPD type ([address]      budesonide-formoteroL (Symbicort) 80-4.5 mcg/actuation HFAA Take 2 Puffs by inhalation two (2) times a day. Qty: 1 Inhaler, Refills: 3    Associated Diagnoses: Chronic obstructive pulmonary disease, unspecified COPD type ([facility])      sertraline (ZOLOFT) 100 mg tablet Take 1 Tab by mouth daily. Qty: 90 Tab, Refills: 1    Associated Diagnoses: Depression, unspecified depression type      ARMOUR THYROID 120 mg tab Take 90 mg by mouth daily. Refills: 3      ferrous gluconate 324 mg (37.5 mg iron) tablet Take 1 Tab by mouth Daily (before breakfast). Qty: 90 Tab, Refills: 1    Associated Diagnoses: Multiple myeloma, remission status unspecified ([facility])      vit B12-levomefolate calcium-vit B6 (FOLTX) 2-1.13-25 mg tablet Take 1 Tab by mouth daily. Qty: 30 Tab, Refills: 0    Associated Diagnoses: Fatigue, unspecified type      FLAXSEED PO Take 1 Tab by mouth daily. STOP taking these medications       hydroCHLOROthiazide (HYDRODIURIL) 25 mg tablet Comments:   Reason for Stopping:         dexAMETHasone (DECADRON) 4 mg tablet Comments:   Reason for Stopping:         Cholecalciferol, Vitamin D3, (VITAMIN D3) 2,000 unit cap capsule Comments:   Reason for Stopping:         CALCIUM CITRATE (CITRACAL PO) Comments:   Reason for Stopping:             Consults:    -Oncology  -Nephrology    Procedures:  -   None    Significant Diagnostic Studies:   -  Xr Bone Survey Comp    Result Date: 1/26/2021  Osteolytic foci throughout the axial and the pedicular skeleton compatible with clinical history of multiple myeloma.  No prior whole-body bone survey for comparison. Numerous, age indeterminant mild and/or moderate compression fracture deformities throughout the thoracic and lumbar spine. Xr Chest Port    Result Date: 1/23/2021  Diffuse interstitial opacities, likely combination of vascular congestion and edema. Thank you for your referral.    Hospital Course by Problem   1. Hypercalcemia -treated with IV fluids and hydrochlorothiazide, calcium supplements were held during admission and upon discharge. Calcium level was followed 14.4->12.6-> and 9.7 on day of discharge. Recheck labs requested on 2/1/2021 plan to start Zometa per oncology  2. Multiple myeloma, hx meningioma s/p left frontotemporal craniotomy- followed by Dr. Javier Mendoza w/ Lacytr. 15 outpatient follow-up  3. Pancytopenia -followed during admission at 74->46 and then 57 prior to discharge, no evidence of acute bleeding episodes. Recheck CBC on 2/1/2021  4. Hypomagnesemia -low at 1.4 during hospital stay and replaced follow as outpatient  5. Hypokalemia -mild at 3.4 during admission, replaced recheck BMP on 2/1/2021  6. Hypertension -controlled during admission, hydrochlorothiazide held in setting of hypercalcemia during admission and upon discharge. 7. Hypothyroidism -no acute issues, continue Gattman Thyroid  8. COPD -no acute exacerbation, continue home regimen  9. Debility -patient is seen by therapy during admission with recommendation initially for home health versus SNF for which patient adamantly denied SNF placement. On day of discharge patient noted to be weak and she continues to adamantly refuse consideration for SNF stay. Discussion with  jointly with nurse manager Noah Villanueva and this provider discussed that she is a maximum assist for transfers of which he verbalizes awareness and wishes to continue to have his wife come home with home health care and rehab services.       Today's examination of the patient revealed:     Subjective:   Feels well only complaint is of reflux last night for which she takes omeprazole at home. She denies any other complaints including no numbness, spasms, anxiety, shortness of breath or chest pain  Objective:   VS:   Visit Vitals  BP (!) 109/59 (BP 1 Location: Left upper arm, BP Patient Position: Supine)   Pulse 91   Temp 97.3 °F (36.3 °C)   Resp 18   Ht 5' 4\" (1.626 m)   Wt 97.1 kg (214 lb)   LMP 1990   SpO2 99%   BMI 36.73 kg/m²      Tmax/24hrs: Temp (24hrs), Av.7 °F (36.5 °C), Min:97.3 °F (36.3 °C), Max:97.9 °F (36.6 °C)     Input/Output:     Intake/Output Summary (Last 24 hours) at 2021 1212  Last data filed at 2021 0556  Gross per 24 hour   Intake 360 ml   Output 1800 ml   Net -1440 ml     General:  Alert, NAD  Cardiovascular:  RRR  Pulmonary:  LSC throughout; respiratory effort WNL  GI:  +BS in all four quadrants, soft, non-tender  Extremities:  No edema; 2+ dorsalis pedis pulses bilaterally  Neurology: Alert and oriented x3    Labs:    Recent Results (from the past 24 hour(s))   GLUCOSE, POC    Collection Time: 21  4:56 PM   Result Value Ref Range    Glucose (POC) 118 (H) 70 - 110 mg/dL   GLUCOSE, POC    Collection Time: 21 10:14 PM   Result Value Ref Range    Glucose (POC) 102 70 - 261 mg/dL   METABOLIC PANEL, COMPREHENSIVE    Collection Time: 21  4:05 AM   Result Value Ref Range    Sodium 142 136 - 145 mmol/L    Potassium 3.4 (L) 3.5 - 5.5 mmol/L    Chloride 109 100 - 111 mmol/L    CO2 27 21 - 32 mmol/L    Anion gap 6 3.0 - 18 mmol/L    Glucose 99 74 - 99 mg/dL    BUN 18 7.0 - 18 MG/DL    Creatinine 0.65 0.6 - 1.3 MG/DL    BUN/Creatinine ratio 28 (H) 12 - 20      GFR est AA >60 >60 ml/min/1.73m2    GFR est non-AA >60 >60 ml/min/1.73m2    Calcium 9.7 8.5 - 10.1 MG/DL    Bilirubin, total 1.0 0.2 - 1.0 MG/DL    ALT (SGPT) 20 13 - 56 U/L    AST (SGOT) 22 10 - 38 U/L    Alk.  phosphatase 60 45 - 117 U/L    Protein, total 5.9 (L) 6.4 - 8.2 g/dL    Albumin 3.1 (L) 3.4 - 5.0 g/dL    Globulin 2.8 2.0 - 4.0 g/dL    A-G Ratio 1.1 0.8 - 1.7     CBC WITH AUTOMATED DIFF    Collection Time: 01/29/21  4:05 AM   Result Value Ref Range    WBC 3.7 (L) 4.6 - 13.2 K/uL    RBC 3.08 (L) 4.20 - 5.30 M/uL    HGB 9.9 (L) 12.0 - 16.0 g/dL    HCT 28.9 (L) 35.0 - 45.0 %    MCV 93.8 74.0 - 97.0 FL    MCH 32.1 24.0 - 34.0 PG    MCHC 34.3 31.0 - 37.0 g/dL    RDW 17.3 (H) 11.6 - 14.5 %    PLATELET 57 (L) 996 - 420 K/uL    MPV 11.3 9.2 - 11.8 FL    NEUTROPHILS 49 42 - 75 %    BAND NEUTROPHILS 6 (H) 0 - 5 %    LYMPHOCYTES 30 20 - 51 %    MONOCYTES 14 (H) 2 - 9 %    EOSINOPHILS 0 0 - 5 %    BASOPHILS 0 0 - 3 %    MYELOCYTES 1 (H) 0 %    ABS. NEUTROPHILS 2.0 1.8 - 8.0 K/UL    ABS. LYMPHOCYTES 1.1 0.8 - 3.5 K/UL    ABS. MONOCYTES 0.5 0 - 1.0 K/UL    ABS. EOSINOPHILS 0.0 0.0 - 0.4 K/UL    ABS. BASOPHILS 0.0 0.0 - 0.06 K/UL    DF MANUAL      PLATELET COMMENTS DECREASED PLATELETS      RBC COMMENTS ANISOCYTOSIS  1+        RBC COMMENTS POIKILOCYTOSIS  1+        RBC COMMENTS OVALOCYTES  1+       GLUCOSE, POC    Collection Time: 01/29/21  8:02 AM   Result Value Ref Range    Glucose (POC) 104 70 - 110 mg/dL     Additional Data Reviewed:     Condition: Stable  Follow-up Appointments:   1.  Your PCP: Deepak Silverman MD, within [address]  Hematology / Oncology office for labs on 02/01/2021  Dr. Malvin Capone in 1-2 weeks    >30 minutes spent coordinating this discharge (review instructions/follow-up, prescriptions, preparing report for sign off)    Signed:  Roger Sanchez NP  1/29/2021  12:12 PM

## 2021-01-29 NOTE — PROGRESS NOTES
[address] [facility]   Patient has been evaluated to have a hospital bed for home. Patient has a chronic medical condition(s) of: ______________________________________________________________________ Patient's medical condition requires positioning of the body in ways not feasible with an ordinary bed due to (please select a condition)   ___ Requires positioning of the body in ways not feasible with an ordinary bed to alleviate pain. ___ Requires the head of the bed to be elevated more than 30 degrees most of the time due to congestive heart failure, chronic pulmonary disease, or problems with aspiration. ___ Requires traction equipment, which can only be attached to a hospital bed. AND   __X_ Patient has need for frequent changes of body position and/or an immediate need for a change in body position.    Physician Signature________________________________ NPI________________   Name Printed _________________________________ Date___________________

## 2021-01-29 NOTE — PROGRESS NOTES
Problem: Mobility Impaired (Adult and Pediatric)  Goal: *Acute Goals and Plan of Care (Insert Text)  Description: Physical Therapy Goals  Initiated 1/25/2021 and to be accomplished within 7 day(s)  1. Patient will move from supine to sit and sit to supine  in bed with modified independence. 2.  Patient will transfer from bed to chair and chair to bed with modified independence using the least restrictive device. 3.  Patient will perform sit to stand with modified independence. 4.  Patient will ambulate with modified independence for 150 feet with the least restrictive device. 5.  Patient will ascend/descend 8 stairs with handrail(s) with supervision    PLOF: Patient was mod I with functional mobility using cane/RW. She lives in [address] [place] [address] home with her spouse. Outcome: Progressing Towards Goal   PHYSICAL THERAPY TREATMENT    Patient: Tyrone Dewitt (20 y.o. female)  Date: 1/29/2021  Diagnosis: Hypercalcemia [E83.52] <principal problem not specified>       Precautions: Fall, Skin  PLOF: see above    ASSESSMENT:  Pt found sitting in a transport chair in the hallway with nursing who is concerned about patient being able to transfer to the car. Had patient attempt to stand from the chair with RW in front of her and she was unable to achieve a full standing position and move her hands from the chair to the walker despite max A. Then stood in front of patient and assisted without the walker present and patient was able to stand with max A, however required continued max A to maintain standing for approximately 10 seconds while leaning heavily onto this therapist.  Pt does not appear safe for car transfers at this time and if family continues to decline SNF, as has been recommended, medical transport home and into the house would be the safest option.   Progression toward goals:   []      Improving appropriately and progressing toward goals  [x]      Improving slowly and progressing toward goals  [] Not making progress toward goals and plan of care will be adjusted     PLAN:  Patient continues to benefit from skilled intervention to address the above impairments. Continue treatment per established plan of care. Discharge Recommendations:  [facility] versus [facility]  Further Equipment Recommendations for Discharge:  if patient discharges home would benefit from: bedside commode, hospital bed (or bed rail), rolling walker, wheelchair     SUBJECTIVE:   Patient stated I was stronger than this before.     OBJECTIVE DATA SUMMARY:   Critical Behavior:  Neurologic State: Alert  Orientation Level: Oriented to place, Oriented to person, Oriented to situation  Cognition: Follows commands  Safety/Judgement: Fall prevention  Functional Mobility Training:  Transfers:  Sit to Stand: Maximum assistance  Stand to Sit: Maximum assistance       Pain:  Pain level pre-treatment: 0/10  Pain level post-treatment: 0/10   Pain Intervention(s): n/a    Activity Tolerance:   Poor+  Please refer to the flowsheet for vital signs taken during this treatment. After treatment:   [x] Patient left in no apparent distress sitting up in chair with nursing present  [] Patient left in no apparent distress in bed  [] Call bell left within reach  [x] Nursing notified  [] Caregiver present  [] Bed alarm activated  [] SCDs applied      COMMUNICATION/EDUCATION:   [x]         Role of Physical Therapy in the acute care setting. [x]         Fall prevention education was provided and the patient/caregiver indicated understanding. [x]         Patient/family have participated as able in working toward goals and plan of care. [x]         Patient/family agree to work toward stated goals and plan of care. []         Patient understands intent and goals of therapy, but is neutral about his/her participation.   []         Patient is unable to participate in stated goals/plan of care: ongoing with therapy staff.  []         Other:        Isiah Holley Florentino PT   Time Calculation: 10 mins

## 2021-01-29 NOTE — PROGRESS NOTES
Problem: Self Care Deficits Care Plan (Adult)  Goal: *Acute Goals and Plan of Care (Insert Text)  Description: Occupational Therapy Goals  Initiated 1/26/2021 within 7 day(s). 1.  Patient will perform bed mobility in preparation for selfcare with supervision/set-up. 2.  Patient will perform functional activity standing at sink for 4-7 minutes with modified independence, F+ balance. 3.  Patient will perform lower body dressing with supervision/set-up standing. 4.  Patient will perform toilet transfers with supervision/set-up. 5.  Patient will perform all aspects of toileting with supervision/set-up. 6.  Patient will participate in upper extremity therapeutic exercise/activities with independence for 8-10 minutes. Prior Level of Function: Patient was independent with self-care and used a cane/RW for functional mobility PTA. Outcome: Progressing Towards Goal   OCCUPATIONAL THERAPY TREATMENT    Patient: Ashish Stevenson (53 y.o. female)  Date: 1/28/2021  Diagnosis: Hypercalcemia [E83.52] <principal problem not specified>       Precautions: Fall, Skin    Chart, occupational therapy assessment, plan of care, and goals were reviewed. ASSESSMENT:  Patient supine in bed upon arrival and agreeable to particiapte in therapy session. She was able to sit on EOB with min assist in prep for functional task. Patient applied lotion to BLEs to address reaching feet for LB dressing. Extra time needed and she was able to lift her LEs up to her body as well as bend forward. Standing attempted using a RW for replacement of wet pad on bed. She was able to clear the bed with her buttocks but had a flexed trunk and knees. Max assist needed. Patient returned to supine in bed to perform BUE therapeutic exercises. She was left supine in bed with HOB elevated at end of session with all needs met.   Progression toward goals:  []          Improving appropriately and progressing toward goals  [x]          Improving slowly and progressing toward goals  []          Not making progress toward goals and plan of care will be adjusted     PLAN:  Patient continues to benefit from skilled intervention to address the above impairments. Continue treatment per established plan of care. Discharge Recommendations:  Home Health with 24/7 assist vs Skilled Nursing Facility  Further Equipment Recommendations for Discharge:  shower chair     SUBJECTIVE:   Patient stated I feel wet.     OBJECTIVE DATA SUMMARY:   Cognitive/Behavioral Status:  Neurologic State: Alert  Orientation Level: Oriented to person, Oriented to place, Oriented to situation  Cognition: Follows commands  Safety/Judgement: Fall prevention    Functional Mobility and Transfers for ADLs:   Bed Mobility:  Supine to Sit: Minimum assistance  Sit to Supine: Moderate assistance     Transfers:  Sit to Stand: Maximum assistance  Stand to Sit: Maximum assistance  Bed to Chair: Maximum assistance    Balance:  Sitting: Intact(on EOB)  Sitting - Static: Good (unsupported)  Sitting - Dynamic: Fair (occasional)  Standing: Impaired; With support  Standing - Static: Fair;Poor  Standing - Dynamic : Poor    ADL Intervention:  Grooming  Grooming Assistance: Set-up; Supervision(Putting lotion on UEs and LEs from ankle to thigh)  Position Performed: Seated edge of bed    Cognitive Retraining  Safety/Judgement: Fall prevention    UE Therapeutic Exercises:   Pt was able to perform BUE:    EXERCISE   Sets   Reps   Active Active Assist   Passive Self- assisted ROM   Comments   Shoulder horizontal abduction 1  10  [x]  []  []  []      Shoulder flexion 1   10 [x]  []  []  []      Shoulder extension  1  10 [x]  []  []  []      Forearm extension/flexion 1 10 [x]  []  []  []     Shoulder external rotation     []  []  []  []      Shoulder internal rotation     []  []  []  []      Wrist flexion/extension 1  10  [x]  []  []  []      Scapular protraction/retraction     []  []  []  []      To increase strength/endurance for ADLs. Pain: No pain stated when resting but pain noted in BLEs when standing. She was unable to rate. Pain level pre-treatment: 0/10   Pain level post-treatment: 0/10  Pain Intervention(s): NA  Response to intervention: NA    Activity Tolerance:    Good  Please refer to the flowsheet for vital signs taken during this treatment. After treatment:   []  Patient left in no apparent distress sitting up in chair  [x]  Patient left in no apparent distress in bed  [x]  Call bell left within reach  [x]  Nursing notified  []  Caregiver present  [x]  Bed alarm activated    COMMUNICATION/EDUCATION:   [x] Role of Occupational Therapy in the acute care setting  [x] Home safety education was provided and the patient/caregiver indicated understanding. [x] Patient/family have participated as able in working towards goals and plan of care. [x] Patient/family agree to work toward stated goals and plan of care. [] Patient understands intent and goals of therapy, but is neutral about his/her participation. [] Patient is unable to participate in goal setting and plan of care.       Thank you for this referral.  Aggie Jones MS OTR/L   Time Calculation: 23 mins

## 2021-01-29 NOTE — HOME CARE
Discharge noted for today. Received home health referral for [facility] for SN, PT, and OT. Spoke with patient and she directed this writer to speak with her . Mr. Joseline Powell was contacted, this writer explained services and answered all questions. Demographics verified. Referral processed and arranged through central office. Patient has the following DME: front wheeled walker, rollator, and cane. Per , patient does not have a suitable shower chair at home and the chair in the home need to be replaced. TYRELL Lerma made aware.   Adam Salguero, [facility] Liaison

## 2021-02-01 NOTE — PROGRESS NOTES
Vimal Rangel,      I wanted to make you aware that Mrs. Rock Loomis has been admitted to home health care services with skilled nursing in for disease/medication management and education regarding hypercalcemia. Since being discharged from hospital, patient has not been able to bear weight on her legs, very weak. Patient and patient's  are requesting a wheelchair to help get to car to go to [place] [address] appointments on Mondays and Fridays and to maneuver around the house as well. PT/OT is ordered for her, but I would like to request HHA as well. Also, patient and her  had mentioned the possibility of going to rehab due to severe weakness. Thank you for allowing [address] to care for your patient.     Thank you,  Dario Whitney RN

## 2021-02-04 NOTE — PROGRESS NOTES
Chief Complaint   Patient presents with    Follow Up Appointment     hospital follow up       1. Have you been to the ER, urgent care clinic since your last visit? Hospitalized since your last visit? Yes     2. Have you seen or consulted any other health care providers outside of the [address] [place] [address] since your last visit? Include any pap smears or colon screening.  No

## 2021-02-04 NOTE — PROGRESS NOTES
HPI:  Harvel Olszewski is a 77 y.o. female who presents today with   Chief Complaint   Patient presents with    Other     multiple myeloma               3 most recent PHQ Screens 2/4/2021   PHQ Not Done -   Little interest or pleasure in doing things Not at all   Feeling down, depressed, irritable, or hopeless Not at all   Total Score PHQ 2 0   Trouble falling or staying asleep, or sleeping too much -   Feeling tired or having little energy -   Poor appetite, weight loss, or overeating -   Feeling bad about yourself - or that you are a failure or have let yourself or your family down -   Trouble concentrating on things such as school, work, reading, or watching TV -   Moving or speaking so slowly that other people could have noticed; or the opposite being so fidgety that others notice -   Thoughts of being better off dead, or hurting yourself in some way -   PHQ 9 Score -   How difficult have these problems made it for you to do your work, take care of your home and get along with others -               PMH,  Meds, Allergies, Family History, Social history reviewed      Current Outpatient Medications   Medication Sig Dispense Refill    bisacodyL 5 mg tab Take 5 mg by mouth daily as needed for Other (constipation). 3 Tab 0    OTHER CBC and bmp on 02/01/2021  Dx: thrombocytopenia and hypercalcemia  Fax results to Dr Dominga Kelly with [facility] and PCP Dr. Ashu Gautam 1 Each 0    acetaminophen (TYLENOL) 500 mg tablet Take 2 Tabs by mouth every six (6) hours as needed for Pain. 90 Tab 0    diclofenac (VOLTAREN) 1 % gel Apply 4 g to affected area four (4) times daily. 1 Each 3    Pomalyst 3 mg cap       omeprazole (PRILOSEC) 40 mg capsule Take 1 capsule by mouth twice daily 60 Cap 6    acyclovir (ZOVIRAX) 400 mg tablet Take 400 mg by mouth two (2) times a day.  cyanocobalamin, vitamin B-12, (VITAMIN B-12 PO) Take 1,000 mcg by mouth daily.       travoprost (Travatan Z) 0.004 % ophthalmic solution Administer 1 Drop to both eyes every evening.  docusate sodium (COLACE) 100 mg capsule Take 1 Cap by mouth two (2) times a day for 90 days. 60 Cap 2    loperamide HCl (IMODIUM A-D PO) Take  by mouth as needed.  mirabegron ER (Myrbetriq) 25 mg ER tablet Take 1 Tab by mouth daily. 30 Tab 6    montelukast (Singulair) 10 mg tablet Take 1 Tab by mouth daily. 90 Tab 0    Biotin 2,500 mcg cap Take 2,500 mcg by mouth daily.  pomalidomide (POMALYST PO) Take 3 mg by mouth. Daily for three weeks, off for one week      Ventolin HFA 90 mcg/actuation inhaler Take 2 Puffs by inhalation every four (4) hours as needed for Wheezing. 18 g 0    budesonide-formoteroL (Symbicort) 80-4.5 mcg/actuation HFAA Take 2 Puffs by inhalation two (2) times a day. 1 Inhaler 3    sertraline (ZOLOFT) 100 mg tablet Take 1 Tab by mouth daily. 90 Tab 1    ARMOUR THYROID 120 mg tab Take 90 mg by mouth daily. 3    ferrous gluconate 324 mg (37.5 mg iron) tablet Take 1 Tab by mouth Daily (before breakfast). 90 Tab 1    vit B12-levomefolate calcium-vit B6 (FOLTX) 2-1.13-25 mg tablet Take 1 Tab by mouth daily. 30 Tab 0    FLAXSEED PO Take 1 Tab by mouth daily. Allergies   Allergen Reactions    Iodine Hives, Rash and Other (comments)     Feels like Chest caving in. Rash LL lumbosacral area    Fosamax [Alendronate] Itching     rash                  ROS      Visit Vitals  LMP 01/01/1990     Physical Exam    Assessment/Plan:    There are no diagnoses linked to this encounter.             Marybel Akers MD

## 2021-02-07 PROBLEM — D61.818 PANCYTOPENIA (HCC): Status: ACTIVE | Noted: 2021-01-01

## 2021-02-07 PROBLEM — D69.6 THROMBOCYTOPENIA (HCC): Status: ACTIVE | Noted: 2021-01-01

## 2021-02-07 PROBLEM — J44.9 CHRONIC OBSTRUCTIVE PULMONARY DISEASE (HCC): Status: ACTIVE | Noted: 2021-01-01

## 2021-02-07 PROBLEM — F33.0 MILD EPISODE OF RECURRENT MAJOR DEPRESSIVE DISORDER (HCC): Status: ACTIVE | Noted: 2021-01-01

## 2021-02-07 NOTE — PATIENT INSTRUCTIONS
Learning About Low Blood Counts From Cancer Treatment What are low blood counts? Bone marrow is the soft tissue found mainly inside the long bones, vertebrae, and pelvic bones. The bone marrow's job is to make three important parts of your blood. These parts are: · Red blood cells, which carry oxygen from your lungs to the rest of your body. · White blood cells, which help your body fight infection. · Platelets, which help your blood clot. When your bone marrow doesn't make new blood cells as it should, you have low blood counts. The medical term for this is bone marrow suppression. The result of low blood counts will depend on which blood cells are affected. · A low level of red blood cells is called anemia. Without enough red blood cells, your body tissues get less oxygen, so you may feel weak and tired. · A low level of certain white blood cells (neutrophils) is called neutropenia (say \"ysu-eiaj-ENG-nee-uh\"). These white blood cells help protect against infection. Without enough white blood cells, you are more likely to get infections. · A low level of platelets is called thrombocytopenia (say \"cpzqs-thb-la-yvg-GSL-cuc-uh\"). Without enough platelets, your blood can't clot well. That means it's harder to stop bleeding. Low blood counts are a common side effect of some cancer treatments. This usually starts about a week or two after treatment. If needed, your doctor will check your blood cell levels often. Blood counts usually return to normal a few weeks after cancer treatment ends. What are the symptoms? The symptoms of low blood counts depend on which parts of the blood are affected. Red blood cells A low level of red blood cells may make you: · Feel weak and get tired more easily. · Feel dizzy or short of breath. · Have headaches. · Look very pale. · Have trouble concentrating. White blood cells A low level of white blood cells raises the risk of infection of your skin and organs. Signs of infection include: · A fever. Fever is a common symptom of infection. It may be the only symptom. If your white blood cell level is low and you develop a fever, call your doctor right away. · A rash. · Diarrhea. · A cough or sore throat. · Pain or burning when you urinate. · Other problems such as: 
? Increased pain, swelling, warmth, or redness of your skin. ? Red streaks leading from a wound. ? Pus draining from a wound. Platelets A low level of platelets may cause abnormal bleeding. Symptoms include: 
· Easy bruising. · Nosebleeds or bleeding gums. · Tiny red or purple spots on the skin. · Bloody or pink urine. · Bloody or black stools, or rectal bleeding. · Vaginal bleeding that is different (heavier, more frequent, at a different time of the month) than what you are used to. Tell your doctor right away about any new or changing symptoms during your cancer treatment. How are low blood counts treated? You may get medicines and other treatments to help prevent problems from low blood counts. · For a low level of red blood cells, your doctor may prescribe a transfusion of packed red blood cells. This helps your blood carry more oxygen to the tissues of your body. It can help you feel stronger. · For a low level of white blood cells, your doctor may prescribe antibiotics to help prevent infections. You may also take medicines to help your body make more white blood cells. You may have to stay in the hospital while you are receiving this treatment. · For a low level of platelets, you may not need treatment if it is mild. If you do need treatment, you may get a transfusion of platelets. Your doctor may also recommend steps you can take at home to feel better and reduce your risk of infection and bleeding. Follow-up care is a key part of your treatment and safety. Be sure to make and go to all appointments, and call your doctor if you are having problems. It's also a good idea to know your test results and keep a list of the medicines you take. Where can you learn more? Go to http://www.gray.com/ Enter [id number] in the search box to learn more about \"Learning About Low Blood Counts From Cancer Treatment. \" Current as of: April 29, 2020               Content Version: 12.6 © 0647-5564 [employer]. Care instructions adapted under license by [employer] (which disclaims liability or warranty for this information). If you have questions about a medical condition or this instruction, always ask your healthcare professional. [address] any warranty or liability for your use of this information.

## 2021-02-07 NOTE — PROGRESS NOTES
Dina Nur is a 77 y.o. female who was seen by synchronous (real-time) audio-video technology on 2/4/2021 for Other (multiple myeloma)        Assessment & Plan:   Diagnoses and all orders for this visit:    1. Hypercalcemia    2. Multiple myeloma, remission status unspecified ([facility]    3. Chronic obstructive pulmonary disease, unspecified COPD type ([facility]  Assessment & Plan: This condition is managed by Specialist.      4. Pancytopenia ([facility]  Assessment & Plan: This condition is managed by Specialist.      5. Mild episode of recurrent major depressive disorder [facility])  Assessment & Plan:  Stable, based on history, physical exam and review of pertinent labs, studies and medications; meds reconciled; continue current treatment plan. 6. Thrombocytopenia ([facility]  Assessment & Plan: This condition is managed by Specialist.        As above,  above all stable unless otherwise noted   treatment plan as listed below    Follow up with [facility] on 2/8  Follow-up and Dispositions    · Return in about 4 months (around 6/4/2021) for htn, depression. This has been fully explained to the patient, who indicates understanding. AVS is accessible thru [employer] and pt has been advised of same. 712  Subjective:     Pt recently hospitalized for hypercalcemia due to Multiple Myeloma; She has had a progressive general debility due to her sx; She has follow up with [facility] oncology on 2/8/2021. She has been changing her diet ; eating more oranges. No specific acute new complaint at this time; Her last calcium level was 9.0    Lab Results   Component Value Date/Time    Calcium 9.0 01/31/2021 04:15 PM    Phosphorus 2.5 01/24/2021 04:04 AM     Prior to Admission medications    Medication Sig Start Date End Date Taking? Authorizing Provider   bisacodyL 5 mg tab Take 5 mg by mouth daily as needed for Other (constipation).  1/29/21  Yes Kvng Agudelo NP   OTHER CBC and bmp on 02/01/2021  Dx: thrombocytopenia and hypercalcemia  [facility]CP Dr. Ernst Lo 1/29/21  Yes Pennie Dent NP   acetaminophen (TYLENOL) 500 mg tablet Take 2 Tabs by mouth every six (6) hours as needed for Pain. 1/18/21  Yes Cecily Roberson NP   diclofenac (VOLTAREN) 1 % gel Apply 4 g to affected area four (4) times daily. 1/18/21  Yes Cecily Roberson NP   Pomalyst 3 mg cap  11/25/20  Yes Provider, Historical   omeprazole (PRILOSEC) 40 mg capsule Take 1 capsule by mouth twice daily 12/2/20  Yes Lisa Moy MD   acyclovir (ZOVIRAX) 400 mg tablet Take 400 mg by mouth two (2) times a day. Yes Provider, Historical   cyanocobalamin, vitamin B-12, (VITAMIN B-12 PO) Take 1,000 mcg by mouth daily. Yes Provider, Historical   travoprost (Travatan Z) 0.004 % ophthalmic solution Administer 1 Drop to both eyes every evening. Yes Provider, Historical   docusate sodium (COLACE) 100 mg capsule Take 1 Cap by mouth two (2) times a day for 90 days. 11/12/20 2/10/21 Yes Rachelle Crisostomo MD   loperamide HCl (IMODIUM A-D PO) Take  by mouth as needed. Yes Provider, Historical   mirabegron ER (Myrbetriq) 25 mg ER tablet Take 1 Tab by mouth daily. 10/22/20  Yes Lisa Moy MD   montelukast (Singulair) 10 mg tablet Take 1 Tab by mouth daily. 7/31/20  Yes Lisa Moy MD   Biotin 2,500 mcg cap Take 2,500 mcg by mouth daily. Yes Provider, Historical   pomalidomide (POMALYST PO) Take 3 mg by mouth. Daily for three weeks, off for one week   Yes Provider, Historical   Ventolin HFA 90 mcg/actuation inhaler Take 2 Puffs by inhalation every four (4) hours as needed for Wheezing. 6/22/20  Yes Lisa Moy MD   budesonide-formoteroL (Symbicort) 80-4.5 mcg/actuation HFAA Take 2 Puffs by inhalation two (2) times a day. 6/22/20  Yes Lisa Moy MD   sertraline (ZOLOFT) 100 mg tablet Take 1 Tab by mouth daily. 12/16/19  Yes Camilo Neal NP   ARMOUR THYROID 120 mg tab Take 90 mg by mouth daily.  4/19/19 Yes Provider, Historical   ferrous gluconate 324 mg (37.5 mg iron) tablet Take 1 Tab by mouth Daily (before breakfast). 3/4/19  Yes Zuri Neal NP   vit Z56-eiitnhykosxs calcium-vit B6 (FOLTX) 2-1.13-25 mg tablet Take 1 Tab by mouth daily. 2/5/18  Yes Ehsan Rojas S, DO   FLAXSEED PO Take 1 Tab by mouth daily. 4/19/10  Yes Provider, Historical     Patient Active Problem List    Diagnosis Date Noted    Pancytopenia ([address] 02/07/2021    Chronic obstructive pulmonary disease ([address] 02/07/2021    Mild episode of recurrent major depressive disorder ([address] 02/07/2021    Thrombocytopenia ([address] 02/07/2021    Hypercalcemia 01/23/2021    Severe obesity ([address] 11/27/2020    Intraductal papilloma of breast, left 11/12/2020    Multiple myeloma ([address]     Fatigue 02/05/2018    Rib pain on left side 02/05/2018    History of tobacco abuse 08/21/2017    Post-menopausal osteoporosis 04/27/2017    Spondylolisthesis of lumbar region 01/21/2016    History of thyroidectomy 05/20/2013    Meningioma ([address] 05/06/2013    BPPV (benign paroxysmal positional vertigo) 08/18/2010    Insomnia 08/03/2010    Migraines 06/22/2010    HTN (hypertension) 05/18/2010    Hypothyroid 05/18/2010    Arthritis 05/18/2010    Depression 05/18/2010    Anxiety 05/18/2010    Seasonal allergic rhinitis 05/18/2010     Allergies   Allergen Reactions    Iodine Hives, Rash and Other (comments)     Feels like Chest caving in.   Rash LL lumbosacral area    Fosamax [Alendronate] Itching     rash     Past Medical History:   Diagnosis Date    Anxiety 5/18/2010    Arthritis     OA spine, left knee    Chronic obstructive pulmonary disease ([address]     Depression 5/18/2010    Headache(784.0)     migraines    HTN (hypertension) 5/18/2010    Hypothyroid 5/18/2010    Multiple myeloma ([address]     Osteopenia     Rib fractures 07/17/2017    r/t fall    Seasonal allergic rhinitis 5/18/2010    Seasonal allergies     Spondylolisthesis of lumbar region 1/21/2016    Dr. Jacey Phillip Shall ortho    Thyroid disease     hypothyroid,  thromeagaly     Past Surgical History:   Procedure Laterality Date    COLONOSCOPY N/A 9/6/2017    COLONOSCOPY performed by Lynn Taylor MD at [address] HX BREAST BIOPSY Left 11/12/2020    TAG LOCALIZED EXCISIONAL BIOPSY OF LEFT BREAST (HBV 11.3.20 @ 1000) performed by Inna Quiñones MD at [address] HX CATARACT REMOVAL  8/2011    [facility]  HX CATARACT REMOVAL  8/2011    right eye 2 weeks apart from left [facility]. Dr. Joe Leal HX COLONOSCOPY  2007    HX HEENT      partical parathyroidectomy     HX ORTHOPAEDIC      rotator cuff repqir 10/1996    NEUROLOGICAL PROCEDURE UNLISTED  5-20-13    meninginoma Dr. Valencia Bi      breast cyst s/p biopsy 2008     Family History   Problem Relation Age of Onset    Cancer Brother         brain     Social History     Tobacco Use    Smoking status: Former Smoker     Packs/day: 0.25     Years: 40.00     Pack years: 10.00     Types: Cigarettes     Start date: 8/11/1970     Quit date: 8/17/2017     Years since quitting: 3.4    Smokeless tobacco: Never Used    Tobacco comment: patient is smoking one cigarette periodically   Substance Use Topics    Alcohol use: Yes     Comment: wine for communion only       Review of Systems   Constitutional: Negative for chills and fever. Cardiovascular: Negative for chest pain and palpitations. Objective:   No flowsheet data found.    General: alert, cooperative, no distress   Mental  status: normal mood, behavior, speech, dress, motor activity, and thought processes, able to follow commands   HENT: NCAT   Neck: no visualized mass   Resp: no respiratory distress   Neuro: no gross deficits   Skin: no discoloration or lesions of concern on visible areas   Psychiatric: normal affect, consistent with stated mood, no evidence of hallucinations     Additional exam findings: none      We discussed the expected course, resolution and complications of the diagnosis(es) in detail. Medication risks, benefits, costs, interactions, and alternatives were discussed as indicated. I advised her to contact the office if her condition worsens, changes or fails to improve as anticipated. She expressed understanding with the diagnosis(es) and plan. Gigi Woo, who was evaluated through a patient-initiated, synchronous (real-time) audio-video encounter, and/or her healthcare decision maker, is aware that it is a billable service, with coverage as determined by her insurance carrier. She provided verbal consent to proceed: Yes, and patient identification was verified. It was conducted pursuant to the emergency declaration under the [address] [place] [address] authority and the [employer] Act. A caregiver was present when appropriate. Ability to conduct physical exam was limited. I was at home. The patient was at home.       Beth Durand MD

## 2021-02-25 NOTE — PROGRESS NOTES
Patient was admitted to [facility] on 21 and discharged on 21 for hypercalcemia. Outreach made within 2 business days of discharge: Yes    Top Discharge Challenges to be reviewed by the provider   Additional needs identified to be addressed with provider yes   stated that the the oncologist recommended the patient and family concider hospice as apposed to another round of chemo for her.  statedd he will talk to his children over the week end to make a family decision.  will be talking to oncologist on Monday  Discussed COVID-19 related testing which was not done at this time. Test results were not done. Patient informed of results, if available? no   Method of communication with provider : staff message       Advance Care Planning:   Does patient have an Advance Directive:  currently not on file; education provided     Inpatient Readmission Risk score: 29  Was this a readmission? no   Patient stated reason for the admission: calcium levels too high  Patients top risk factors for readmission: medical condition, multiple health system providers and caregiver stress  Interventions to address risk factors: education and support    Care Transition Nurse (CTN) contacted the family by telephone to perform post hospital discharge assessment. Verified name and  with family as identifiers. Provided introduction to self, and explanation of the CTN role. CTN reviewed discharge instructions, medical action plan and red flags with family who verbalized understanding. Family given an opportunity to ask questions and does not have any further questions or concerns at this time. The family agrees to contact the PCP office for questions related to their healthcare. Medication reconciliation was performed with family, who verbalizes understanding of administration of home medications. Advised obtaining a 90-day supply of all daily and as-needed medications.    Referral to Pharm D needed: no     Home Health/Outpatient orders at discharge: none      Durable Medical Equipment ordered at discharge: none    Covid Risk Education    Patient has following risk factors of: immunocompromised. Education provided regarding infection prevention, and signs and symptoms of COVID-19 and when to seek medical attention with family who verbalized understanding. Discussed exposure protocols and quarantine From CDC: Are you at higher risk for severe illness?  and given an opportunity for questions and concerns. The family agrees to contact the COVID-19 hotline [phone] or PCP office for questions related to COVID-19. For more information on steps you can take to protect yourself, see CDC's How to Protect Yourself     Patient/family/caregiver given information for [employer] and agrees to enroll no  Patient's preferred e-mail: declines  Patient's preferred phone number: declines    Discussed follow-up appointments. If no appointment was previously scheduled, appointment scheduling offered: yes  [facility] follow up appointment(s):   Future Appointments   Date Time Provider Vladislav Vale   5/5/2021 11:30 AM Estela Delgado MD [facility]     Non-BS follow up appointment(s): TBD  Plan for follow-up call in 3-5 days based on severity of symptoms and risk factors. CTN provided contact information for future needs.     Goals Addressed    None

## 2021-03-02 NOTE — PROGRESS NOTES
Transitions of Care Coordination  Follow-Up    Date/Time:  3/2/2021 3:22 PM       CTN (Care Transitions Nurse) contacted family for Transitions of Care Coordination  follow up. Verified 2 patient identifiers (name and ). Spoke to family. Introduced self/role and reason for call. Discussed COVID-19 related testing which was not done at this time. Test results were not done. Patient informed of results, if available? no     Family reported:   Patient has decided to start a new round of Chemo. paitent will go Thursday. Patient's  stated that the patient looked at him and asked him not to give up on her and he is honoring her wishes. Worked with PT today and is strong enough to go to the restroom with out assistance    Pertinent negatives:  Pain  Fever  confusion    Specialist appointments since last outreach? no  If so, specialist and date: n/a    Medications:   New medications since last outreach: no  Does patient need refills on any medications: no  Medication changes since last outreach (dose adjustments or discontinued meds): no    Home Health company: [facility]  Date of discharge: tbd    Barriers to care? medical condition        Patient reminded that there are physicians on call 24 hours a day / 7 days a week (M-F 5pm to 8am and from Friday 5pm until Monday 8a for the weekend) should the patient have questions or concerns.      Future Appointments   Date Time Provider Vladislav Vale   3/3/2021 11:30 AM JENNY Ochoa   3/3/2021  3:00 PM JEFFERY Dean   3/5/2021 10:30 AM Gume Ramos PTA [facility]   3/5/2021 11:30 AM Jonathan Akins [facility]   3/8/2021 To Be Determined Gume Ramos PTA [facility]   3/9/2021 To Be Determined Jonathan Akins [facility]   3/10/2021 To Be Determined Monda Koyanagi [facility]   3/11/2021 To Be Determined JENNY Ochoa   3/12/2021 To Be Determined Chery Lopez [address]   3/15/2021 To Be Determined Ricky Saldivar, OT [address]   3/15/2021 To Be Determined Giuseppe Bautista PT [facility]   3/18/2021 To Be Determined [facility]   3/23/2021 To Be Determined [facility]   3/25/2021 To Be Determined Ricky Saldivar, OT [facility]   5/5/2021 11:30 AM Katya Alarcon MD [facility]        Other upcoming appointments:  Chemo Thursday    Goals      Attends follow-up appointments as directed.  Prevent complications post hospitalization. Education provided regarding infection prevention, and signs and symptoms of COVID-19 and when to seek medical attention with family who verbalized understanding. Discussed exposure protocols and quarantine from [address] you at higher risk for severe illness 2019 and given an opportunity for questions and concerns. The family agrees to contact the COVID-19 hotline [phone] or PCP office for questions related to their healthcare. CTN/ACM/LPN provided contact information for future reference. From CDC: Are you at higher risk for severe illness?  Wash your hands often.  Avoid close contact (6 feet, which is about two arm lengths) with people who are sick.  Put distance between yourself and other people if COVID-19 is spreading in your community.  Clean and disinfect frequently touched surfaces.  Avoid all cruise travel and non-essential air travel.  Call your healthcare professional if you have concerns about COVID-19 and your underlying condition or if you are sick. For more information on steps you can take to protect yourself, see CDC's How to [place] for follow-up call in 7-14 days based on severity of symptoms and risk factors.

## 2021-03-03 NOTE — ED PROVIDER NOTES
EMERGENCY DEPARTMENT HISTORY AND PHYSICAL EXAM    8:37 PM      Date: 3/2/2021  Patient Name: Ish Soto    History of Presenting Illness     Chief Complaint   Patient presents with    Abnormal Lab Results         History Provided By: Patient's     Chief Complaint: low platelets  Duration:  N/A  Timing:  N/A  Location: NA  Quality: Tightness  Severity: N/A  Modifying Factors: NA  Associated Symptoms: denies any other associated signs or symptoms      Additional History (Context): Ish Soto is a 77 y.o. female with hypertension and hypothyroid, multiple myeloma who presents for platelet transfusion. Reportedly was called by oncologist today and had a low platelet counts needs to get platelets prior to restarting chemotherapy in 2 days. They were unable to get her into the infusion center tomorrow so instructed her to come to the ED. Is having some chronic fatigue. Last chemo was 3 weeks ago. No other complaints. PCP: Leland Solis MD    Current Facility-Administered Medications   Medication Dose Route Frequency Provider Last Rate Last Admin    0.9% sodium chloride infusion 250 mL  250 mL IntraVENous PRN Brewerelayne Luz DO         Current Outpatient Medications   Medication Sig Dispense Refill    cyanocobalamin (Vitamin B-12) 1,000 mcg tablet Take 1 Tab by mouth daily. 30 Tab 3    dexAMETHasone (DECADRON) 4 mg tablet Take 4 mg by mouth two (2) times daily (with meals).  furosemide (LASIX) 20 mg tablet Take 20 mg by mouth daily.  trimethoprim-sulfamethoxazole (BACTRIM DS, SEPTRA DS) 160-800 mg per tablet Take 1 Tab by mouth two (2) times a day.  lactulose (Constulose) 10 gram/15 mL solution Take 20 g by mouth three (3) times daily.  bisacodyL 5 mg tab Take 5 mg by mouth daily as needed for Other (constipation).  3 Tab 0    OTHER CBC and bmp on 02/01/2021  Dx: thrombocytopenia and hypercalcemia  Fax results to Dr Blanca East with [facility] and PCP Dr. Kris Mac 1 Each 0    acetaminophen (TYLENOL) 500 mg tablet Take 2 Tabs by mouth every six (6) hours as needed for Pain. 90 Tab 0    diclofenac (VOLTAREN) 1 % gel Apply 4 g to affected area four (4) times daily. 1 Each 3    Pomalyst 3 mg cap       omeprazole (PRILOSEC) 40 mg capsule Take 1 capsule by mouth twice daily 60 Cap 6    acyclovir (ZOVIRAX) 400 mg tablet Take 400 mg by mouth two (2) times a day.  travoprost (Travatan Z) 0.004 % ophthalmic solution Administer 1 Drop to both eyes every evening.  loperamide HCl (IMODIUM A-D PO) Take  by mouth as needed.  mirabegron ER (Myrbetriq) 25 mg ER tablet Take 1 Tab by mouth daily. 30 Tab 6    montelukast (Singulair) 10 mg tablet Take 1 Tab by mouth daily. 90 Tab 0    Biotin 2,500 mcg cap Take 2,500 mcg by mouth daily.  pomalidomide (POMALYST PO) Take 3 mg by mouth. Daily for three weeks, off for one week      Ventolin HFA 90 mcg/actuation inhaler Take 2 Puffs by inhalation every four (4) hours as needed for Wheezing. 18 g 0    budesonide-formoteroL (Symbicort) 80-4.5 mcg/actuation HFAA Take 2 Puffs by inhalation two (2) times a day. 1 Inhaler 3    sertraline (ZOLOFT) 100 mg tablet Take 1 Tab by mouth daily. 90 Tab 1    ARMOUR THYROID 120 mg tab Take 90 mg by mouth daily. 3    ferrous gluconate 324 mg (37.5 mg iron) tablet Take 1 Tab by mouth Daily (before breakfast). 90 Tab 1    vit B12-levomefolate calcium-vit B6 (FOLTX) 2-1.13-25 mg tablet Take 1 Tab by mouth daily. 30 Tab 0    FLAXSEED PO Take 1 Tab by mouth daily.          Past History     Past Medical History:  Past Medical History:   Diagnosis Date    Anxiety 5/18/2010    Arthritis     OA spine, left knee    Chronic obstructive pulmonary disease ([facility] [address]     Depression 5/18/2010    Headache(784.0)     migraines    HTN (hypertension) 5/18/2010    Hypothyroid 5/18/2010    Multiple myeloma ([facility] [address]     Osteopenia     Rib fractures 07/17/2017    r/t fall    Seasonal allergic rhinitis 5/18/2010    Seasonal allergies     Spondylolisthesis of lumbar region 1/21/2016    Dr. Lakhwinder Orellana [facility] orth[facility]    Thyroid disease     hypothyroid,  thromeagaly       Past Surgical History:  Past Surgical History:   Procedure Laterality Date    COLONOSCOPY N/A 9/6/2017    COLONOSCOPY performed by Torsten Moore MD at [address] HX BREAST BIOPSY Left 11/12/2020    TAG LOCALIZED EXCISIONAL BIOPSY OF LEFT BREAST (HBV 11.3.20 @ 1000) performed by Francisco Thornton MD at [address] HX CATARACT REMOVAL  8/2011    [facility]  HX CATARACT REMOVAL  8/2011    right eye 2 weeks apart from left [facility]. Dr. Avinash Chang HX COLONOSCOPY  2007    HX HEENT      partical parathyroidectomy     HX ORTHOPAEDIC      rotator cuff repqir 10/1996    NEUROLOGICAL PROCEDURE UNLISTED  5-20-13    meninginoma Dr. Jackson      breast cyst s/p biopsy 2008       Family History:  Family History   Problem Relation Age of Onset    Cancer Brother         brain       Social History:  Social History     Tobacco Use    Smoking status: Former Smoker     Packs/day: 0.25     Years: 40.00     Pack years: 10.00     Types: Cigarettes     Start date: 8/11/1970     Quit date: 8/17/2017     Years since quitting: 3.5    Smokeless tobacco: Never Used    Tobacco comment: patient is smoking one cigarette periodically   Substance Use Topics    Alcohol use: Yes     Comment: wine for communion only    Drug use: No       Allergies: Allergies   Allergen Reactions    Iodine Hives, Rash and Other (comments)     Feels like Chest caving in. Rash LL lumbosacral area    Fosamax [Alendronate] Itching     rash         Review of Systems       Review of Systems   Constitutional: Positive for fatigue. HENT: Negative for nosebleeds. Gastrointestinal: Negative for blood in stool. Genitourinary: Negative for hematuria.    All other systems reviewed and are negative. Physical Exam     Visit Vitals  BP (!) 152/68   Pulse 100   Temp 98.3 °F (36.8 °C)   Resp 21   LMP 01/01/1990   SpO2 100%         Physical Exam  Constitutional:       Appearance: She is well-developed. HENT:      Head: Normocephalic and atraumatic. Neck:      Musculoskeletal: Neck supple. Vascular: No JVD. Cardiovascular:      Rate and Rhythm: Normal rate and regular rhythm. Heart sounds: Murmur present. Pulmonary:      Effort: Pulmonary effort is normal. No respiratory distress. Breath sounds: Normal breath sounds. Abdominal:      General: There is no distension. Palpations: Abdomen is soft. Tenderness: There is no abdominal tenderness. There is no guarding or rebound. Musculoskeletal:      Comments: No joint tenderness   Skin:     General: Skin is warm and dry. Findings: No erythema. Neurological:      Mental Status: She is alert. Comments: Oriented to self and location, baseline mental status per    Psychiatric:      Comments: Patient cooperative           Diagnostic Study Results     Labs -  Recent Results (from the past 12 hour(s))   CBC WITH AUTOMATED DIFF    Collection Time: 03/02/21  8:34 PM   Result Value Ref Range    WBC 4.6 4.6 - 13.2 K/uL    RBC 2.98 (L) 4.20 - 5.30 M/uL    HGB 9.3 (L) 12.0 - 16.0 g/dL    HCT 26.8 (L) 35.0 - 45.0 %    MCV 89.9 74.0 - 97.0 FL    MCH 31.2 24.0 - 34.0 PG    MCHC 34.7 31.0 - 37.0 g/dL    RDW 15.8 (H) 11.6 - 14.5 %    PLATELET 13 (LL) 586 - 420 K/uL    NEUTROPHILS 41 (L) 42 - 75 %    LYMPHOCYTES 52 (H) 20 - 51 %    MONOCYTES 7 2 - 9 %    EOSINOPHILS 0 0 - 5 %    BASOPHILS 0 0 - 3 %    ABS. NEUTROPHILS 1.9 1.8 - 8.0 K/UL    ABS. LYMPHOCYTES 2.4 0.8 - 3.5 K/UL    ABS. MONOCYTES 0.3 0 - 1.0 K/UL    ABS. EOSINOPHILS 0.0 0.0 - 0.4 K/UL    ABS.  BASOPHILS 0.0 0.0 - 0.06 K/UL    DF MANUAL      PLATELET COMMENTS DECREASED PLATELETS      RBC COMMENTS ANISOCYTOSIS  1+       TYPE & SCREEN    Collection Time: 03/02/21  8:34 PM   Result Value Ref Range    Crossmatch Expiration 03/05/2021,2359     ABO/Rh(D) O POSITIVE     Antibody screen POS     Antibody ID PENDING    METABOLIC PANEL, BASIC    Collection Time: 03/02/21  8:34 PM   Result Value Ref Range    Sodium 137 136 - 145 mmol/L    Potassium 4.6 3.5 - 5.5 mmol/L    Chloride 105 100 - 111 mmol/L    CO2 23 21 - 32 mmol/L    Anion gap 9 3.0 - 18 mmol/L    Glucose 200 (H) 74 - 99 mg/dL    BUN 15 7.0 - 18 MG/DL    Creatinine 1.05 0.6 - 1.3 MG/DL    BUN/Creatinine ratio 14 12 - 20      GFR est AA >60 >60 ml/min/1.73m2    GFR est non-AA 51 (L) >60 ml/min/1.73m2    Calcium 10.9 (H) 8.5 - 10.1 MG/DL   PLATELETS, ALLOCATE    Collection Time: 03/02/21  9:15 PM   Result Value Ref Range    Unit number [id number]     [facility]     Unit division 00     Status of unit ALLOCATED        Radiologic Studies -   No orders to display         Medical Decision Making   I am the first provider for this patient. I reviewed the vital signs, available nursing notes, past medical history, past surgical history, family history and social history. Vital Signs-Reviewed the patient's vital signs. Pulse Oximetry Analysis -  100 on room air (Interpretation)nl     Records Reviewed: Nursing Notes and Old Medical Records (Time of Review: 8:37 PM)    ED Course: Progress Notes, Reevaluation, and Consults:    Provider Notes (Medical Decision Making): 70-year-old female presenting for platelet transfusion. No signs of active bleeding at this time. I am unable to find any lab results in the system but will repeat and plan for likely pack of platelets. Note I did speak with patient's , he is consented for blood transfusion. He states she has previously had transfusions without any issues. My prior colleague had speaking with the airway physician who was sending the patient to get 2 doses of platelets. Patient transfused 1 doses of platelets.   There was quite an extensive delay following this is a stated they needed to contact the [employer]. Ultimately the [employer] called back and stated that platelets are only in emergent release for trauma conditions. At this time patient will be discharged home. Oncology can arrange for an additional platelet transfusion later as an outpatient if need be for her chemotherapy but this is not an emergent condition as she is not actively bleeding. Diagnosis     Clinical Impression:   1. Thrombocytopenia ([address]        Disposition: discharged    Follow-up Information     Follow up With Specialties Details Why Contact Info    Lito Deleon MD Hematology and Oncology Schedule an appointment as soon as possible for a visit in 1 day  [address] [place] [address]  [phone]      [facility] DEPT Emergency Medicine  As needed, If symptoms worsen [address]  [phone]           Patient's Medications   Start Taking    No medications on file   Continue Taking    ACETAMINOPHEN (TYLENOL) 500 MG TABLET    Take 2 Tabs by mouth every six (6) hours as needed for Pain. ACYCLOVIR (ZOVIRAX) 400 MG TABLET    Take 400 mg by mouth two (2) times a day. ARMOUR THYROID 120 MG TAB    Take 90 mg by mouth daily. BIOTIN 2,500 MCG CAP    Take 2,500 mcg by mouth daily. BISACODYL 5 MG TAB    Take 5 mg by mouth daily as needed for Other (constipation). BUDESONIDE-FORMOTEROL (SYMBICORT) 80-4.5 MCG/ACTUATION HFAA    Take 2 Puffs by inhalation two (2) times a day. CYANOCOBALAMIN (VITAMIN B-12) 1,000 MCG TABLET    Take 1 Tab by mouth daily. DEXAMETHASONE (DECADRON) 4 MG TABLET    Take 4 mg by mouth two (2) times daily (with meals). DICLOFENAC (VOLTAREN) 1 % GEL    Apply 4 g to affected area four (4) times daily. FERROUS GLUCONATE 324 MG (37.5 MG IRON) TABLET    Take 1 Tab by mouth Daily (before breakfast). FLAXSEED PO    Take 1 Tab by mouth daily.     FUROSEMIDE (LASIX) 20 MG TABLET    Take 20 mg by mouth daily. LACTULOSE (CONSTULOSE) 10 GRAM/15 ML SOLUTION    Take 20 g by mouth three (3) times daily. LOPERAMIDE HCL (IMODIUM A-D PO)    Take  by mouth as needed. MIRABEGRON ER (MYRBETRIQ) 25 MG ER TABLET    Take 1 Tab by mouth daily. MONTELUKAST (SINGULAIR) 10 MG TABLET    Take 1 Tab by mouth daily. OMEPRAZOLE (PRILOSEC) 40 MG CAPSULE    Take 1 capsule by mouth twice daily    OTHER    CBC and bmp on 02/01/2021  Dx: thrombocytopenia and hypercalcemia  Fax results to Dr Jeremiah Edge with [facility] and PCP Dr. Farrel Saint (POMALYST PO)    Take 3 mg by mouth. Daily for three weeks, off for one week    POMALYST 3 MG CAP        SERTRALINE (ZOLOFT) 100 MG TABLET    Take 1 Tab by mouth daily. TRAVOPROST (TRAVATAN Z) 0.004 % OPHTHALMIC SOLUTION    Administer 1 Drop to both eyes every evening. TRIMETHOPRIM-SULFAMETHOXAZOLE (BACTRIM DS, SEPTRA DS) 160-800 MG PER TABLET    Take 1 Tab by mouth two (2) times a day. VENTOLIN HFA 90 MCG/ACTUATION INHALER    Take 2 Puffs by inhalation every four (4) hours as needed for Wheezing. VIT B12-LEVOMEFOLATE CALCIUM-VIT B6 (FOLTX) 2-1.13-25 MG TABLET    Take 1 Tab by mouth daily.    These Medications have changed    No medications on file   Stop Taking    No medications on file     _______________________________

## 2021-03-03 NOTE — ED NOTES
Pt placed in room, assumed care. Spoke to lab and they had a send out labs to [facility] cross for specialized type and cross.

## 2021-03-03 NOTE — ED NOTES
Lab called to notify staff that non emergent platelets will not be available until after noon. Dr. Dimas Driscoll made aware. Patient will be discharged.

## 2021-03-03 NOTE — ED TRIAGE NOTES
PT arrived from home after receiving call from Oncologist stating her platelets were 44,661 and a transfusion was needed before chemo takes place Thursday.

## 2021-03-04 NOTE — PROGRESS NOTES
Contacted patient for Complex Case Management  follow up. No answer. . Will attempt to contact at a later time.

## 2021-03-05 NOTE — PROGRESS NOTES
Patient has graduated from the Transitions of Care Coordination  program on 3/5/2021 since patient has enrolled with hospice. Patient has had furter decline in health.  stated the he just finished an hour long conversation with hospice and they admitted her today. Yesterday patient stopped responding.  stated it is going to take a lot of adjusting for him but he is comfortable with his decision. Goals Addressed                 This Visit's Progress     COMPLETED: Attends follow-up appointments as directed.  COMPLETED: Prevent complications post hospitalization. Patient's  has care transitions nurse contact information for any further questions, concerns, or needs.   Patient's upcoming visits:    Future Appointments   Date Time Provider Vladislav Vale   3/9/2021 12:40 PM Dennise Pinto MD [facility] BS AMB   5/5/2021 11:30 AM Sridevi Mancera MD [facility] BS AMB

## 2021-03-08 NOTE — TELEPHONE ENCOUNTER
Last Visit: 2/4/21 with MD Pena [facility]  Next Appointment: none  Previous Refill Encounter(s): 12/16/19 #90 with 1 refill    Requested Prescriptions     Pending Prescriptions Disp Refills    sertraline (ZOLOFT) 100 mg tablet 90 Tab 1     Sig: Take 1 Tab by mouth daily.

## 2021-03-10 RX ORDER — SERTRALINE HYDROCHLORIDE 100 MG/1
100 TABLET, FILM COATED ORAL DAILY
Qty: 90 TAB | Refills: 1 | Status: SHIPPED | OUTPATIENT
Start: 2021-03-10

## 2021-05-04 ENCOUNTER — TELEPHONE (OUTPATIENT)
Dept: SURGERY | Age: 76
End: 2021-05-04

## 2021-05-04 DIAGNOSIS — D36.9 INTRADUCTAL PAPILLOMA: Primary | ICD-10-CM

## 2021-05-04 DIAGNOSIS — D36.9 INTRADUCTAL PAPILLOMA: ICD-10-CM

## 2021-05-04 NOTE — TELEPHONE ENCOUNTER
Calling patient to reschedule her upcoming appointment until after her left breast mammo is imaged .  LVOTTONIEL

## 2021-05-05 ENCOUNTER — DOCUMENTATION ONLY (OUTPATIENT)
Dept: SURGERY | Age: 76
End: 2021-05-05

## 2023-06-27 NOTE — PROGRESS NOTES
Steve Rodriguez is a 77 y.o. female  Chief Complaint   Patient presents with    Follow-up     11/12/20 left breast biopsy [Dear  ___] : Dear  [unfilled], [Consult Letter:] : I had the pleasure of evaluating your patient, [unfilled]. [Please see my note below.] : Please see my note below. [Consult Closing:] : Thank you very much for allowing me to participate in the care of this patient.  If you have any questions, please do not hesitate to contact me. [Sincerely,] : Sincerely, [FreeTextEntry2] : Tanya Gill MD\par [address], [place] [address] [FreeTextEntry3] : Stanford Anderson MD\par [facility]\par [address] [place] [address], [address]\par [place], [place]\par [address]\par p: [phone]\par f: [phone]

## 2024-05-31 NOTE — PROGRESS NOTES
2/24/2021 4:21 PM  Call placed to patient at only number. Attempted to reach patient for [facility] follow up for hyper calcemia. No answer and there was no way to leave a message because mailbox full. [de-identified] : well appearing, overweight, no distress [de-identified] : reg, nL s1/s2, no m/r/g [de-identified] : CTA [de-identified] : alert, normal affect, logical conversation

## (undated) DEVICE — 3M™ STERI-STRIP™ REINFORCED ADHESIVE SKIN CLOSURES, R1549, 1/2 IN X 2 IN (12 MM X 50 MM), 6 STRIPS/ENVELOPE: Brand: 3M™ STERI-STRIP™

## (undated) DEVICE — CATHETER THOR 36FR DIA10.7MM POLYVI CHL TRCR TIP STR SFT

## (undated) DEVICE — SHEAR HARMONIC FOCUS OEM 9CM --

## (undated) DEVICE — STRIP,CLOSURE,WOUND,MEDI-STRIP,1/2X4: Brand: MEDLINE

## (undated) DEVICE — AIRLIFE™ NASAL OXYGEN CANNULA CURVED, NONFLARED TIP WITH 14 FOOT (4.3 M) CRUSH-RESISTANT TUBING, OVER-THE-EAR STYLE: Brand: AIRLIFE™

## (undated) DEVICE — SYRINGE MED 25GA 3ML L5/8IN SUBQ PLAS W/ DETACH NDL SFTY

## (undated) DEVICE — GARMENT,MEDLINE,DVT,SEQUENTIAL,CALF,MD: Brand: MEDLINE

## (undated) DEVICE — ENDOSCOPY PUMP TUBING/ CAP SET: Brand: ERBE

## (undated) DEVICE — GAUZE SPONGES,16 PLY: Brand: CURITY

## (undated) DEVICE — (D)SYR 10ML 1/5ML GRAD NSAF -- PKGING CHANGE USE ITEM 338027

## (undated) DEVICE — SUT SLK 2-0SH 30IN BLK --

## (undated) DEVICE — PREP SKN CHLRAPRP 26ML TNT -- CONVERT TO ITEM 373320

## (undated) DEVICE — SUTURE VCRL SZ 3-0 L18IN ABSRB UD POLYGLACTIN 910 BRAID TIE J910T

## (undated) DEVICE — FORCEPS BX L240CM JAW DIA2.8MM L CAP W/ NDL MIC MESH TOOTH

## (undated) DEVICE — FLEX ADVANTAGE 3000CC: Brand: FLEX ADVANTAGE

## (undated) DEVICE — FLUFF AND POLYMER UNDERPAD,EXTRA HEAVY: Brand: WINGS

## (undated) DEVICE — SUTURE VCRL SZ 3-0 L27IN ABSRB UD L26MM SH 1/2 CIR J416H

## (undated) DEVICE — CANNULA ORIG TL CLR W FOAM CUSHIONS AND 14FT SUPL TB 3 CHN

## (undated) DEVICE — (D)GLOVE EXAM LG NITRL NS -- DISC BY MFR NO SUB

## (undated) DEVICE — KIT CLN UP BON SECOURS MARYV

## (undated) DEVICE — GLOVE SURG BIOGEL 8.0 STRL -- SKINSENSE

## (undated) DEVICE — GLOVE SURG SZ 8 L11.77IN FNGR THK9.8MIL STRW LTX POLYMER

## (undated) DEVICE — APPLIER CLP L9.38IN M LIG TI DISP STR RNG HNDL LIGACLP

## (undated) DEVICE — SYRINGE MED 20ML STD CLR PLAS LUERLOCK TIP N CTRL DISP

## (undated) DEVICE — SYR 50ML SLIP TIP NSAF LF STRL --

## (undated) DEVICE — SUT SLK 3-0 30IN SH BLK --

## (undated) DEVICE — SNARE POLYP M W27MMXL240CM OVL STIFF DISP CAPTIVATOR

## (undated) DEVICE — SUTURE MCRYL SZ 4-0 L27IN ABSRB UD L24MM PS-1 3/8 CIR PRIM Y935H

## (undated) DEVICE — STERILE POLYISOPRENE POWDER-FREE SURGICAL GLOVES: Brand: PROTEXIS

## (undated) DEVICE — Device

## (undated) DEVICE — CATHETER SUCT TR FL TIP 14FR W/ O CTRL

## (undated) DEVICE — MASK AERO PED UNIV CLR VYN ADJ NOSE CLP E STRP SHT STYL W/O

## (undated) DEVICE — SUTURE VCRL SZ 4-0 L18IN ABSRB UD L19MM PS-2 3/8 CIR PRIM J496H

## (undated) DEVICE — GOWN ISOL IMPERV UNIV, DISP, OPEN BACK, BLUE --

## (undated) DEVICE — SOLUTION IRRIG 1000ML H2O STRL BLT

## (undated) DEVICE — SMOKE EVACUATION PENCIL: Brand: VALLEYLAB

## (undated) DEVICE — REM POLYHESIVE ADULT PATIENT RETURN ELECTRODE: Brand: VALLEYLAB

## (undated) DEVICE — MEDI-VAC NON-CONDUCTIVE SUCTION TUBING: Brand: CARDINAL HEALTH

## (undated) DEVICE — MEDI-VAC SUCTION HIGH CAPACITY: Brand: CARDINAL HEALTH

## (undated) DEVICE — 3M™ STERI-STRIP™ COMPOUND BENZOIN TINCTURE 40 BAGS/CARTON 4 CARTONS/CASE C1544: Brand: 3M™ STERI-STRIP™

## (undated) DEVICE — GAUZE BORDERED 4X4 --

## (undated) DEVICE — INTENDED FOR TISSUE SEPARATION, AND OTHER PROCEDURES THAT REQUIRE A SHARP SURGICAL BLADE TO PUNCTURE OR CUT.: Brand: BARD-PARKER SAFETY BLADES SIZE 15, STERILE

## (undated) DEVICE — ICE PACK 11X14 IN

## (undated) DEVICE — BLANKET WRM AD W50XL85.8IN PACU FULL BODY FORC AIR

## (undated) DEVICE — GAUZE,SPONGE,8"X4",12PLY,XRAY,STRL,LF: Brand: MEDLINE

## (undated) DEVICE — PACK PROCEDURE SURG MAJ W/ BASIN LF

## (undated) DEVICE — PROBE SET W/ DRP